# Patient Record
Sex: FEMALE | Race: WHITE | NOT HISPANIC OR LATINO | Employment: OTHER | URBAN - METROPOLITAN AREA
[De-identification: names, ages, dates, MRNs, and addresses within clinical notes are randomized per-mention and may not be internally consistent; named-entity substitution may affect disease eponyms.]

---

## 2022-01-01 ENCOUNTER — TELEPHONE (OUTPATIENT)
Dept: INTERNAL MEDICINE CLINIC | Facility: CLINIC | Age: 72
End: 2022-01-01

## 2022-01-01 ENCOUNTER — APPOINTMENT (INPATIENT)
Dept: RADIOLOGY | Facility: HOSPITAL | Age: 72
DRG: 177 | End: 2022-01-01
Payer: MEDICARE

## 2022-01-01 ENCOUNTER — TRANSITIONAL CARE MANAGEMENT (OUTPATIENT)
Dept: INTERNAL MEDICINE CLINIC | Facility: CLINIC | Age: 72
End: 2022-01-01

## 2022-01-01 ENCOUNTER — LAB REQUISITION (OUTPATIENT)
Dept: LAB | Facility: HOSPITAL | Age: 72
End: 2022-01-01
Payer: MEDICARE

## 2022-01-01 ENCOUNTER — HOSPITAL ENCOUNTER (INPATIENT)
Facility: HOSPITAL | Age: 72
LOS: 12 days | Discharge: HOME HEALTH | DRG: 177 | End: 2022-02-01
Attending: INTERNAL MEDICINE | Admitting: INTERNAL MEDICINE
Payer: MEDICARE

## 2022-01-01 ENCOUNTER — HOSPITAL ENCOUNTER (OUTPATIENT)
Dept: NON INVASIVE DIAGNOSTICS | Facility: CLINIC | Age: 72
Discharge: HOME | End: 2022-03-07
Payer: MEDICARE

## 2022-01-01 ENCOUNTER — APPOINTMENT (EMERGENCY)
Dept: RADIOLOGY | Facility: HOSPITAL | Age: 72
End: 2022-01-01
Payer: MEDICARE

## 2022-01-01 ENCOUNTER — APPOINTMENT (INPATIENT)
Dept: CT IMAGING | Facility: HOSPITAL | Age: 72
DRG: 177 | End: 2022-01-01
Payer: MEDICARE

## 2022-01-01 ENCOUNTER — HOSPITAL ENCOUNTER (EMERGENCY)
Facility: HOSPITAL | Age: 72
End: 2022-05-19
Attending: EMERGENCY MEDICINE
Payer: MEDICARE

## 2022-01-01 ENCOUNTER — HOME CARE VISIT (OUTPATIENT)
Dept: HOME HOSPICE | Facility: HOSPICE | Age: 72
End: 2022-01-01
Payer: MEDICARE

## 2022-01-01 ENCOUNTER — HOME CARE VISIT (OUTPATIENT)
Dept: HOME HEALTH SERVICES | Facility: HOME HEALTHCARE | Age: 72
End: 2022-01-01
Payer: MEDICARE

## 2022-01-01 ENCOUNTER — PATIENT OUTREACH (OUTPATIENT)
Dept: INTERNAL MEDICINE CLINIC | Facility: CLINIC | Age: 72
End: 2022-01-01

## 2022-01-01 ENCOUNTER — HOSPITAL ENCOUNTER (INPATIENT)
Facility: HOSPITAL | Age: 72
LOS: 1 days | Discharge: HOME HEALTH | DRG: 843 | End: 2022-05-14
Attending: EMERGENCY MEDICINE | Admitting: STUDENT IN AN ORGANIZED HEALTH CARE EDUCATION/TRAINING PROGRAM
Payer: MEDICARE

## 2022-01-01 ENCOUNTER — APPOINTMENT (INPATIENT)
Dept: RADIOLOGY | Facility: HOSPITAL | Age: 72
DRG: 314 | End: 2022-01-01
Payer: MEDICARE

## 2022-01-01 ENCOUNTER — APPOINTMENT (INPATIENT)
Dept: NON INVASIVE DIAGNOSTICS | Facility: HOSPITAL | Age: 72
DRG: 314 | End: 2022-01-01
Payer: MEDICARE

## 2022-01-01 ENCOUNTER — APPOINTMENT (EMERGENCY)
Dept: CT IMAGING | Facility: HOSPITAL | Age: 72
End: 2022-01-01
Payer: MEDICARE

## 2022-01-01 ENCOUNTER — HOSPICE ADMISSION (OUTPATIENT)
Dept: HOME HOSPICE | Facility: HOSPICE | Age: 72
End: 2022-01-01
Payer: MEDICARE

## 2022-01-01 ENCOUNTER — TELEPHONE (OUTPATIENT)
Dept: RADIOLOGY | Facility: HOSPITAL | Age: 72
End: 2022-01-01

## 2022-01-01 ENCOUNTER — OFFICE VISIT (OUTPATIENT)
Dept: GASTROENTEROLOGY | Facility: CLINIC | Age: 72
End: 2022-01-01
Payer: MEDICARE

## 2022-01-01 ENCOUNTER — APPOINTMENT (OUTPATIENT)
Dept: LAB | Facility: CLINIC | Age: 72
End: 2022-01-01
Payer: MEDICARE

## 2022-01-01 ENCOUNTER — OFFICE VISIT (OUTPATIENT)
Dept: INTERNAL MEDICINE CLINIC | Facility: CLINIC | Age: 72
End: 2022-01-01
Payer: MEDICARE

## 2022-01-01 ENCOUNTER — APPOINTMENT (INPATIENT)
Dept: NON INVASIVE DIAGNOSTICS | Facility: HOSPITAL | Age: 72
DRG: 177 | End: 2022-01-01
Payer: MEDICARE

## 2022-01-01 ENCOUNTER — APPOINTMENT (INPATIENT)
Dept: RADIOLOGY | Facility: HOSPITAL | Age: 72
DRG: 314 | End: 2022-01-01
Attending: INTERNAL MEDICINE
Payer: MEDICARE

## 2022-01-01 ENCOUNTER — TELEPHONE (OUTPATIENT)
Dept: PULMONOLOGY | Facility: CLINIC | Age: 72
End: 2022-01-01

## 2022-01-01 ENCOUNTER — OFFICE VISIT (OUTPATIENT)
Dept: PULMONOLOGY | Facility: CLINIC | Age: 72
End: 2022-01-01
Payer: MEDICARE

## 2022-01-01 ENCOUNTER — OFFICE VISIT (OUTPATIENT)
Dept: CARDIOLOGY CLINIC | Facility: CLINIC | Age: 72
End: 2022-01-01
Payer: MEDICARE

## 2022-01-01 ENCOUNTER — APPOINTMENT (EMERGENCY)
Dept: RADIOLOGY | Facility: HOSPITAL | Age: 72
DRG: 177 | End: 2022-01-01
Payer: MEDICARE

## 2022-01-01 ENCOUNTER — HOSPITAL ENCOUNTER (INPATIENT)
Facility: HOSPITAL | Age: 72
LOS: 4 days | Discharge: HOSPICE | DRG: 314 | End: 2022-05-23
Attending: INTERNAL MEDICINE | Admitting: INTERNAL MEDICINE
Payer: MEDICARE

## 2022-01-01 ENCOUNTER — APPOINTMENT (EMERGENCY)
Dept: NON INVASIVE DIAGNOSTICS | Facility: HOSPITAL | Age: 72
End: 2022-01-01
Payer: MEDICARE

## 2022-01-01 VITALS
BODY MASS INDEX: 15.83 KG/M2 | HEIGHT: 65 IN | RESPIRATION RATE: 17 BRPM | SYSTOLIC BLOOD PRESSURE: 102 MMHG | HEART RATE: 112 BPM | TEMPERATURE: 97.2 F | DIASTOLIC BLOOD PRESSURE: 70 MMHG | WEIGHT: 95 LBS | OXYGEN SATURATION: 90 %

## 2022-01-01 VITALS
SYSTOLIC BLOOD PRESSURE: 104 MMHG | DIASTOLIC BLOOD PRESSURE: 84 MMHG | OXYGEN SATURATION: 99 % | HEART RATE: 96 BPM | TEMPERATURE: 98.1 F | BODY MASS INDEX: 17.14 KG/M2 | HEIGHT: 65 IN

## 2022-01-01 VITALS
HEART RATE: 98 BPM | WEIGHT: 89 LBS | DIASTOLIC BLOOD PRESSURE: 72 MMHG | HEIGHT: 65 IN | RESPIRATION RATE: 16 BRPM | OXYGEN SATURATION: 100 % | SYSTOLIC BLOOD PRESSURE: 102 MMHG | BODY MASS INDEX: 14.83 KG/M2

## 2022-01-01 VITALS
DIASTOLIC BLOOD PRESSURE: 101 MMHG | BODY MASS INDEX: 14.39 KG/M2 | HEART RATE: 101 BPM | RESPIRATION RATE: 24 BRPM | OXYGEN SATURATION: 92 % | WEIGHT: 86.4 LBS | HEIGHT: 65 IN | SYSTOLIC BLOOD PRESSURE: 137 MMHG | TEMPERATURE: 98.2 F

## 2022-01-01 VITALS
RESPIRATION RATE: 22 BRPM | WEIGHT: 86 LBS | TEMPERATURE: 98 F | HEART RATE: 110 BPM | OXYGEN SATURATION: 97 % | BODY MASS INDEX: 14.33 KG/M2 | HEIGHT: 65 IN | DIASTOLIC BLOOD PRESSURE: 98 MMHG | SYSTOLIC BLOOD PRESSURE: 142 MMHG

## 2022-01-01 VITALS
RESPIRATION RATE: 18 BRPM | BODY MASS INDEX: 17.16 KG/M2 | HEART RATE: 81 BPM | DIASTOLIC BLOOD PRESSURE: 72 MMHG | TEMPERATURE: 97.5 F | HEIGHT: 65 IN | OXYGEN SATURATION: 96 % | SYSTOLIC BLOOD PRESSURE: 108 MMHG | WEIGHT: 103 LBS

## 2022-01-01 VITALS
HEIGHT: 65 IN | SYSTOLIC BLOOD PRESSURE: 96 MMHG | RESPIRATION RATE: 19 BRPM | WEIGHT: 91.93 LBS | OXYGEN SATURATION: 95 % | DIASTOLIC BLOOD PRESSURE: 69 MMHG | TEMPERATURE: 97.9 F | BODY MASS INDEX: 15.32 KG/M2 | HEART RATE: 58 BPM

## 2022-01-01 VITALS
HEIGHT: 65 IN | SYSTOLIC BLOOD PRESSURE: 110 MMHG | OXYGEN SATURATION: 99 % | HEART RATE: 105 BPM | BODY MASS INDEX: 15.81 KG/M2 | DIASTOLIC BLOOD PRESSURE: 80 MMHG

## 2022-01-01 VITALS — DIASTOLIC BLOOD PRESSURE: 60 MMHG | HEART RATE: 68 BPM | TEMPERATURE: 97.4 F | SYSTOLIC BLOOD PRESSURE: 92 MMHG

## 2022-01-01 DIAGNOSIS — F33.9 DEPRESSION, RECURRENT (HCC): ICD-10-CM

## 2022-01-01 DIAGNOSIS — J96.11 CHRONIC HYPOXEMIC RESPIRATORY FAILURE (HCC): ICD-10-CM

## 2022-01-01 DIAGNOSIS — J41.0 SIMPLE CHRONIC BRONCHITIS (HCC): Primary | ICD-10-CM

## 2022-01-01 DIAGNOSIS — Z11.59 NEED FOR HEPATITIS C SCREENING TEST: ICD-10-CM

## 2022-01-01 DIAGNOSIS — D64.9 ANEMIA, UNSPECIFIED TYPE: ICD-10-CM

## 2022-01-01 DIAGNOSIS — E87.6 HYPOKALEMIA: ICD-10-CM

## 2022-01-01 DIAGNOSIS — D64.9 ANEMIA, UNSPECIFIED TYPE: Primary | ICD-10-CM

## 2022-01-01 DIAGNOSIS — R62.7 FAILURE TO THRIVE IN ADULT: Primary | ICD-10-CM

## 2022-01-01 DIAGNOSIS — R53.83 FATIGUE, UNSPECIFIED TYPE: ICD-10-CM

## 2022-01-01 DIAGNOSIS — R63.6 UNDERWEIGHT: ICD-10-CM

## 2022-01-01 DIAGNOSIS — E43 SEVERE PROTEIN-CALORIE MALNUTRITION (HCC): ICD-10-CM

## 2022-01-01 DIAGNOSIS — I26.99 PULMONARY EMBOLISM, UNSPECIFIED CHRONICITY, UNSPECIFIED PULMONARY EMBOLISM TYPE, UNSPECIFIED WHETHER ACUTE COR PULMONALE PRESENT (HCC): ICD-10-CM

## 2022-01-01 DIAGNOSIS — R60.0 LOWER EXTREMITY EDEMA: ICD-10-CM

## 2022-01-01 DIAGNOSIS — K59.00 CONSTIPATION: ICD-10-CM

## 2022-01-01 DIAGNOSIS — K52.9 CHRONIC DIARRHEA: ICD-10-CM

## 2022-01-01 DIAGNOSIS — I71.4 ABDOMINAL AORTIC ANEURYSM (AAA) WITHOUT RUPTURE (HCC): ICD-10-CM

## 2022-01-01 DIAGNOSIS — J96.11 CHRONIC RESPIRATORY FAILURE WITH HYPOXIA (HCC): ICD-10-CM

## 2022-01-01 DIAGNOSIS — R60.0 LOCALIZED EDEMA: ICD-10-CM

## 2022-01-01 DIAGNOSIS — J96.01 ACUTE RESPIRATORY FAILURE WITH HYPOXIA (HCC): ICD-10-CM

## 2022-01-01 DIAGNOSIS — R63.4 WEIGHT LOSS: ICD-10-CM

## 2022-01-01 DIAGNOSIS — I71.9 AORTIC ANEURYSM (HCC): Primary | ICD-10-CM

## 2022-01-01 DIAGNOSIS — Z86.711 HISTORY OF PULMONARY EMBOLISM: ICD-10-CM

## 2022-01-01 DIAGNOSIS — E88.09 HYPOALBUMINEMIA: ICD-10-CM

## 2022-01-01 DIAGNOSIS — R60.0 BILATERAL LEG EDEMA: ICD-10-CM

## 2022-01-01 DIAGNOSIS — Z76.89 ENCOUNTER FOR SOCIAL WORK INTERVENTION: Primary | ICD-10-CM

## 2022-01-01 DIAGNOSIS — K83.8 COMMON BILE DUCT DILATATION: ICD-10-CM

## 2022-01-01 DIAGNOSIS — E53.8 B12 DEFICIENCY: ICD-10-CM

## 2022-01-01 DIAGNOSIS — I26.99 ACUTE PULMONARY EMBOLISM WITHOUT ACUTE COR PULMONALE, UNSPECIFIED PULMONARY EMBOLISM TYPE (HCC): ICD-10-CM

## 2022-01-01 DIAGNOSIS — E55.9 VITAMIN D DEFICIENCY: ICD-10-CM

## 2022-01-01 DIAGNOSIS — R60.0 EDEMA LEG: ICD-10-CM

## 2022-01-01 DIAGNOSIS — R60.0 BILATERAL LOWER EXTREMITY EDEMA: Primary | ICD-10-CM

## 2022-01-01 DIAGNOSIS — E43 UNSPECIFIED SEVERE PROTEIN-CALORIE MALNUTRITION (HCC): ICD-10-CM

## 2022-01-01 DIAGNOSIS — I95.9 HYPOTENSION, UNSPECIFIED HYPOTENSION TYPE: ICD-10-CM

## 2022-01-01 DIAGNOSIS — R60.0 LEG EDEMA, LEFT: ICD-10-CM

## 2022-01-01 DIAGNOSIS — J90 PLEURAL EFFUSION: ICD-10-CM

## 2022-01-01 DIAGNOSIS — M79.89 LEFT LEG SWELLING: ICD-10-CM

## 2022-01-01 DIAGNOSIS — E88.09 OTHER DISORDERS OF PLASMA-PROTEIN METABOLISM, NOT ELSEWHERE CLASSIFIED: ICD-10-CM

## 2022-01-01 DIAGNOSIS — I31.3 PERICARDIAL EFFUSION: ICD-10-CM

## 2022-01-01 DIAGNOSIS — R60.0 BILATERAL LEG EDEMA: Primary | ICD-10-CM

## 2022-01-01 DIAGNOSIS — R26.2 AMBULATORY DYSFUNCTION: ICD-10-CM

## 2022-01-01 DIAGNOSIS — U07.1 PNEUMONIA DUE TO COVID-19 VIRUS: ICD-10-CM

## 2022-01-01 DIAGNOSIS — R60.0 LOWER EXTREMITY EDEMA: Primary | ICD-10-CM

## 2022-01-01 DIAGNOSIS — K21.9 GERD (GASTROESOPHAGEAL REFLUX DISEASE): ICD-10-CM

## 2022-01-01 DIAGNOSIS — Z78.0 ASYMPTOMATIC POSTMENOPAUSAL STATE: ICD-10-CM

## 2022-01-01 DIAGNOSIS — J12.82 PNEUMONIA DUE TO COVID-19 VIRUS: ICD-10-CM

## 2022-01-01 DIAGNOSIS — R19.7 DIARRHEA, UNSPECIFIED TYPE: ICD-10-CM

## 2022-01-01 DIAGNOSIS — R11.14 BILIOUS VOMITING WITH NAUSEA: Primary | ICD-10-CM

## 2022-01-01 DIAGNOSIS — Z00.00 MEDICARE ANNUAL WELLNESS VISIT, INITIAL: ICD-10-CM

## 2022-01-01 DIAGNOSIS — U07.1 COVID-19 VIRUS INFECTION: Primary | ICD-10-CM

## 2022-01-01 DIAGNOSIS — I26.99 OTHER PULMONARY EMBOLISM WITHOUT ACUTE COR PULMONALE (HCC): ICD-10-CM

## 2022-01-01 DIAGNOSIS — Z51.81 ENCOUNTER FOR THERAPEUTIC DRUG LEVEL MONITORING: ICD-10-CM

## 2022-01-01 LAB
25(OH)D3 SERPL-MCNC: 27.2 NG/ML (ref 30–100)
2HR DELTA HS TROPONIN: -9 NG/L
2HR DELTA HS TROPONIN: 6 NG/L
4HR DELTA HS TROPONIN: -7 NG/L
ALBUMIN FLD-MCNC: 1 G/DL
ALBUMIN SERPL BCP-MCNC: 1.6 G/DL (ref 3.5–5)
ALBUMIN SERPL BCP-MCNC: 1.7 G/DL (ref 3.5–5)
ALBUMIN SERPL BCP-MCNC: 1.8 G/DL (ref 3.5–5)
ALBUMIN SERPL BCP-MCNC: 1.9 G/DL (ref 3.5–5)
ALBUMIN SERPL BCP-MCNC: 2.4 G/DL (ref 3.5–5)
ALBUMIN SERPL BCP-MCNC: 2.9 G/DL (ref 3.5–5)
ALP SERPL-CCNC: 146 U/L (ref 46–116)
ALP SERPL-CCNC: 213 U/L (ref 46–116)
ALP SERPL-CCNC: 228 U/L (ref 46–116)
ALP SERPL-CCNC: 233 U/L (ref 46–116)
ALP SERPL-CCNC: 239 U/L (ref 46–116)
ALP SERPL-CCNC: 241 U/L (ref 46–116)
ALP SERPL-CCNC: 246 U/L (ref 46–116)
ALP SERPL-CCNC: 247 U/L (ref 46–116)
ALT SERPL W P-5'-P-CCNC: 18 U/L (ref 12–78)
ALT SERPL W P-5'-P-CCNC: 26 U/L (ref 12–78)
ALT SERPL W P-5'-P-CCNC: 27 U/L (ref 12–78)
ALT SERPL W P-5'-P-CCNC: 27 U/L (ref 12–78)
ALT SERPL W P-5'-P-CCNC: 28 U/L (ref 12–78)
ALT SERPL W P-5'-P-CCNC: 30 U/L (ref 12–78)
ALT SERPL W P-5'-P-CCNC: 35 U/L (ref 12–78)
ALT SERPL W P-5'-P-CCNC: 52 U/L (ref 12–78)
ANA TITR SER IF: NEGATIVE {TITER}
ANION GAP SERPL CALCULATED.3IONS-SCNC: 10 MMOL/L (ref 4–13)
ANION GAP SERPL CALCULATED.3IONS-SCNC: 10 MMOL/L (ref 4–13)
ANION GAP SERPL CALCULATED.3IONS-SCNC: 11 MMOL/L (ref 4–13)
ANION GAP SERPL CALCULATED.3IONS-SCNC: 13 MMOL/L (ref 4–13)
ANION GAP SERPL CALCULATED.3IONS-SCNC: 17 MMOL/L (ref 4–13)
ANION GAP SERPL CALCULATED.3IONS-SCNC: 4 MMOL/L (ref 4–13)
ANION GAP SERPL CALCULATED.3IONS-SCNC: 6 MMOL/L (ref 4–13)
ANION GAP SERPL CALCULATED.3IONS-SCNC: 6 MMOL/L (ref 4–13)
ANION GAP SERPL CALCULATED.3IONS-SCNC: 7 MMOL/L (ref 4–13)
ANION GAP SERPL CALCULATED.3IONS-SCNC: 7 MMOL/L (ref 4–13)
ANION GAP SERPL CALCULATED.3IONS-SCNC: 8 MMOL/L (ref 4–13)
AORTIC ROOT: 3.1 CM
AORTIC ROOT: 3.3 CM
APICAL FOUR CHAMBER EJECTION FRACTION: 67 %
APICAL FOUR CHAMBER EJECTION FRACTION: 71 %
APICAL FOUR CHAMBER EJECTION FRACTION: 72 %
APICAL FOUR CHAMBER EJECTION FRACTION: 75 %
APTT PPP: 147 SECONDS (ref 23–37)
APTT PPP: 170 SECONDS (ref 23–37)
APTT PPP: 29 SECONDS (ref 23–37)
APTT PPP: 29 SECONDS (ref 23–37)
APTT PPP: 31 SECONDS (ref 23–37)
APTT PPP: 39 SECONDS (ref 23–37)
APTT PPP: 58 SECONDS (ref 23–37)
ASCENDING AORTA: 3.1 CM
AST SERPL W P-5'-P-CCNC: 173 U/L (ref 5–45)
AST SERPL W P-5'-P-CCNC: 32 U/L (ref 5–45)
AST SERPL W P-5'-P-CCNC: 35 U/L (ref 5–45)
AST SERPL W P-5'-P-CCNC: 38 U/L (ref 5–45)
AST SERPL W P-5'-P-CCNC: 45 U/L (ref 5–45)
AST SERPL W P-5'-P-CCNC: 53 U/L (ref 5–45)
AST SERPL W P-5'-P-CCNC: 56 U/L (ref 5–45)
AST SERPL W P-5'-P-CCNC: 66 U/L (ref 5–45)
ATRIAL RATE: 102 BPM
ATRIAL RATE: 106 BPM
ATRIAL RATE: 106 BPM
ATRIAL RATE: 107 BPM
ATRIAL RATE: 113 BPM
ATRIAL RATE: 117 BPM
ATRIAL RATE: 120 BPM
ATRIAL RATE: 120 BPM
ATRIAL RATE: 123 BPM
ATRIAL RATE: 337 BPM
ATRIAL RATE: 78 BPM
ATRIAL RATE: 90 BPM
BACTERIA BLD CULT: NORMAL
BACTERIA BLD CULT: NORMAL
BACTERIA SPEC ANAEROBE CULT: NO GROWTH
BACTERIA SPEC BFLD CULT: NO GROWTH
BASOPHILS # BLD AUTO: 0.01 THOUSANDS/ΜL (ref 0–0.1)
BASOPHILS # BLD AUTO: 0.02 THOUSANDS/ΜL (ref 0–0.1)
BASOPHILS # BLD AUTO: 0.03 THOUSANDS/ΜL (ref 0–0.1)
BASOPHILS NFR BLD AUTO: 0 % (ref 0–1)
BASOPHILS NFR BLD AUTO: 1 % (ref 0–1)
BASOPHILS NFR BLD AUTO: 1 % (ref 0–1)
BILIRUB DIRECT SERPL-MCNC: 0.09 MG/DL (ref 0–0.2)
BILIRUB SERPL-MCNC: 0.2 MG/DL (ref 0.2–1)
BILIRUB SERPL-MCNC: 0.25 MG/DL (ref 0.2–1)
BILIRUB SERPL-MCNC: 0.26 MG/DL (ref 0.2–1)
BILIRUB SERPL-MCNC: 0.28 MG/DL (ref 0.2–1)
BILIRUB SERPL-MCNC: 0.31 MG/DL (ref 0.2–1)
BILIRUB SERPL-MCNC: 0.4 MG/DL (ref 0.2–1)
BILIRUB SERPL-MCNC: 0.41 MG/DL (ref 0.2–1)
BILIRUB SERPL-MCNC: 0.49 MG/DL (ref 0.2–1)
BUN SERPL-MCNC: 11 MG/DL (ref 5–25)
BUN SERPL-MCNC: 16 MG/DL (ref 5–25)
BUN SERPL-MCNC: 16 MG/DL (ref 5–25)
BUN SERPL-MCNC: 17 MG/DL (ref 5–25)
BUN SERPL-MCNC: 18 MG/DL (ref 5–25)
BUN SERPL-MCNC: 18 MG/DL (ref 5–25)
BUN SERPL-MCNC: 21 MG/DL (ref 5–25)
BUN SERPL-MCNC: 22 MG/DL (ref 5–25)
BUN SERPL-MCNC: 23 MG/DL (ref 5–25)
BUN SERPL-MCNC: 23 MG/DL (ref 5–25)
BUN SERPL-MCNC: 8 MG/DL (ref 5–25)
BUN SERPL-MCNC: 9 MG/DL (ref 5–25)
CALCIUM ALBUM COR SERPL-MCNC: 10 MG/DL (ref 8.3–10.1)
CALCIUM ALBUM COR SERPL-MCNC: 9.5 MG/DL (ref 8.3–10.1)
CALCIUM ALBUM COR SERPL-MCNC: 9.5 MG/DL (ref 8.3–10.1)
CALCIUM ALBUM COR SERPL-MCNC: 9.6 MG/DL (ref 8.3–10.1)
CALCIUM ALBUM COR SERPL-MCNC: 9.6 MG/DL (ref 8.3–10.1)
CALCIUM ALBUM COR SERPL-MCNC: 9.8 MG/DL (ref 8.3–10.1)
CALCIUM ALBUM COR SERPL-MCNC: 9.9 MG/DL (ref 8.3–10.1)
CALCIUM SERPL-MCNC: 7.6 MG/DL (ref 8.3–10.1)
CALCIUM SERPL-MCNC: 7.6 MG/DL (ref 8.3–10.1)
CALCIUM SERPL-MCNC: 7.7 MG/DL (ref 8.3–10.1)
CALCIUM SERPL-MCNC: 7.9 MG/DL (ref 8.3–10.1)
CALCIUM SERPL-MCNC: 8 MG/DL (ref 8.3–10.1)
CALCIUM SERPL-MCNC: 8.1 MG/DL (ref 8.3–10.1)
CALCIUM SERPL-MCNC: 8.2 MG/DL (ref 8.3–10.1)
CALCIUM SERPL-MCNC: 8.3 MG/DL (ref 8.3–10.1)
CALCIUM SERPL-MCNC: 8.3 MG/DL (ref 8.3–10.1)
CALCIUM SERPL-MCNC: 8.7 MG/DL (ref 8.3–10.1)
CALCIUM SERPL-MCNC: 8.8 MG/DL (ref 8.3–10.1)
CARDIAC TROPONIN I PNL SERPL HS: 16 NG/L (ref 8–18)
CARDIAC TROPONIN I PNL SERPL HS: 23 NG/L
CARDIAC TROPONIN I PNL SERPL HS: 25 NG/L
CARDIAC TROPONIN I PNL SERPL HS: 28 NG/L
CARDIAC TROPONIN I PNL SERPL HS: 32 NG/L
CARDIAC TROPONIN I PNL SERPL HS: 34 NG/L
CHLORIDE SERPL-SCNC: 100 MMOL/L (ref 100–108)
CHLORIDE SERPL-SCNC: 101 MMOL/L (ref 100–108)
CHLORIDE SERPL-SCNC: 102 MMOL/L (ref 100–108)
CHLORIDE SERPL-SCNC: 103 MMOL/L (ref 100–108)
CHLORIDE SERPL-SCNC: 104 MMOL/L (ref 100–108)
CHLORIDE SERPL-SCNC: 106 MMOL/L (ref 100–108)
CHLORIDE SERPL-SCNC: 94 MMOL/L (ref 100–108)
CHLORIDE SERPL-SCNC: 98 MMOL/L (ref 100–108)
CHLORIDE SERPL-SCNC: 98 MMOL/L (ref 100–108)
CHLORIDE SERPL-SCNC: 99 MMOL/L (ref 100–108)
CO2 SERPL-SCNC: 21 MMOL/L (ref 21–32)
CO2 SERPL-SCNC: 22 MMOL/L (ref 21–32)
CO2 SERPL-SCNC: 23 MMOL/L (ref 21–32)
CO2 SERPL-SCNC: 26 MMOL/L (ref 21–32)
CO2 SERPL-SCNC: 27 MMOL/L (ref 21–32)
CO2 SERPL-SCNC: 29 MMOL/L (ref 21–32)
CO2 SERPL-SCNC: 29 MMOL/L (ref 21–32)
CO2 SERPL-SCNC: 30 MMOL/L (ref 21–32)
CO2 SERPL-SCNC: 31 MMOL/L (ref 21–32)
CO2 SERPL-SCNC: 31 MMOL/L (ref 21–32)
CO2 SERPL-SCNC: 32 MMOL/L (ref 21–32)
CO2 SERPL-SCNC: 33 MMOL/L (ref 21–32)
CO2 SERPL-SCNC: 33 MMOL/L (ref 21–32)
CREAT SERPL-MCNC: 0.41 MG/DL (ref 0.6–1.3)
CREAT SERPL-MCNC: 0.51 MG/DL (ref 0.6–1.3)
CREAT SERPL-MCNC: 0.52 MG/DL (ref 0.6–1.3)
CREAT SERPL-MCNC: 0.52 MG/DL (ref 0.6–1.3)
CREAT SERPL-MCNC: 0.56 MG/DL (ref 0.6–1.3)
CREAT SERPL-MCNC: 0.56 MG/DL (ref 0.6–1.3)
CREAT SERPL-MCNC: 0.57 MG/DL (ref 0.6–1.3)
CREAT SERPL-MCNC: 0.6 MG/DL (ref 0.6–1.3)
CREAT SERPL-MCNC: 0.6 MG/DL (ref 0.6–1.3)
CREAT SERPL-MCNC: 0.61 MG/DL (ref 0.6–1.3)
CREAT SERPL-MCNC: 0.64 MG/DL (ref 0.6–1.3)
CREAT SERPL-MCNC: 0.65 MG/DL (ref 0.6–1.3)
CREAT SERPL-MCNC: 1.05 MG/DL (ref 0.6–1.3)
CREAT SERPL-MCNC: 1.14 MG/DL (ref 0.6–1.3)
CRP SERPL QL: 156.5 MG/L
CRP SERPL QL: 28.5 MG/L
CRP SERPL QL: 29.3 MG/L
D DIMER PPP FEU-MCNC: 19.99 UG/ML FEU
D DIMER PPP FEU-MCNC: 2.85 UG/ML FEU
DME PARACHUTE DELIVERY DATE ACTUAL: NORMAL
DME PARACHUTE DELIVERY DATE EXPECTED: NORMAL
DME PARACHUTE DELIVERY DATE REQUESTED: NORMAL
DME PARACHUTE DELIVERY NOTE: NORMAL
DME PARACHUTE ITEM DESCRIPTION: NORMAL
DME PARACHUTE ORDER STATUS: NORMAL
DME PARACHUTE SUPPLIER NAME: NORMAL
DME PARACHUTE SUPPLIER PHONE: NORMAL
E WAVE DECELERATION TIME: 123 MS
E WAVE DECELERATION TIME: 125 MS
E WAVE DECELERATION TIME: 233 MS
EOSINOPHIL # BLD AUTO: 0 THOUSAND/ΜL (ref 0–0.61)
EOSINOPHIL # BLD AUTO: 0.01 THOUSAND/ΜL (ref 0–0.61)
EOSINOPHIL # BLD AUTO: 0.02 THOUSAND/ΜL (ref 0–0.61)
EOSINOPHIL # BLD AUTO: 0.02 THOUSAND/ΜL (ref 0–0.61)
EOSINOPHIL NFR BLD AUTO: 0 % (ref 0–6)
ERYTHROCYTE [DISTWIDTH] IN BLOOD BY AUTOMATED COUNT: 16 % (ref 11.6–15.1)
ERYTHROCYTE [DISTWIDTH] IN BLOOD BY AUTOMATED COUNT: 16.1 % (ref 11.6–15.1)
ERYTHROCYTE [DISTWIDTH] IN BLOOD BY AUTOMATED COUNT: 16.2 % (ref 11.6–15.1)
ERYTHROCYTE [DISTWIDTH] IN BLOOD BY AUTOMATED COUNT: 16.2 % (ref 11.6–15.1)
ERYTHROCYTE [DISTWIDTH] IN BLOOD BY AUTOMATED COUNT: 16.3 % (ref 11.6–15.1)
ERYTHROCYTE [DISTWIDTH] IN BLOOD BY AUTOMATED COUNT: 16.5 % (ref 11.6–15.1)
ERYTHROCYTE [DISTWIDTH] IN BLOOD BY AUTOMATED COUNT: 16.7 % (ref 11.6–15.1)
ERYTHROCYTE [DISTWIDTH] IN BLOOD BY AUTOMATED COUNT: 17.2 % (ref 11.6–15.1)
ERYTHROCYTE [DISTWIDTH] IN BLOOD BY AUTOMATED COUNT: 17.3 % (ref 11.6–15.1)
ERYTHROCYTE [DISTWIDTH] IN BLOOD BY AUTOMATED COUNT: 21.4 % (ref 11.6–15.1)
ERYTHROCYTE [DISTWIDTH] IN BLOOD BY AUTOMATED COUNT: 22.3 % (ref 11.6–15.1)
ERYTHROCYTE [DISTWIDTH] IN BLOOD BY AUTOMATED COUNT: 22.8 % (ref 11.6–15.1)
ERYTHROCYTE [DISTWIDTH] IN BLOOD BY AUTOMATED COUNT: 23.2 % (ref 11.6–15.1)
ERYTHROCYTE [DISTWIDTH] IN BLOOD BY AUTOMATED COUNT: 23.3 % (ref 11.6–15.1)
ERYTHROCYTE [DISTWIDTH] IN BLOOD BY AUTOMATED COUNT: 23.8 % (ref 11.6–15.1)
ERYTHROCYTE [SEDIMENTATION RATE] IN BLOOD: 15 MM/HOUR (ref 0–29)
FERRITIN SERPL-MCNC: 98 NG/ML (ref 8–388)
FLUAV RNA RESP QL NAA+PROBE: NEGATIVE
FLUBV RNA RESP QL NAA+PROBE: NEGATIVE
FRACTIONAL SHORTENING: 41 (ref 28–44)
FRACTIONAL SHORTENING: 42 % (ref 28–44)
FRACTIONAL SHORTENING: 44 % (ref 28–44)
GFR SERPL CREATININE-BSD FRML MDRD: 103 ML/MIN/1.73SQ M
GFR SERPL CREATININE-BSD FRML MDRD: 48 ML/MIN/1.73SQ M
GFR SERPL CREATININE-BSD FRML MDRD: 53 ML/MIN/1.73SQ M
GFR SERPL CREATININE-BSD FRML MDRD: 88 ML/MIN/1.73SQ M
GFR SERPL CREATININE-BSD FRML MDRD: 89 ML/MIN/1.73SQ M
GFR SERPL CREATININE-BSD FRML MDRD: 90 ML/MIN/1.73SQ M
GFR SERPL CREATININE-BSD FRML MDRD: 91 ML/MIN/1.73SQ M
GFR SERPL CREATININE-BSD FRML MDRD: 91 ML/MIN/1.73SQ M
GFR SERPL CREATININE-BSD FRML MDRD: 92 ML/MIN/1.73SQ M
GFR SERPL CREATININE-BSD FRML MDRD: 93 ML/MIN/1.73SQ M
GFR SERPL CREATININE-BSD FRML MDRD: 93 ML/MIN/1.73SQ M
GFR SERPL CREATININE-BSD FRML MDRD: 95 ML/MIN/1.73SQ M
GFR SERPL CREATININE-BSD FRML MDRD: 95 ML/MIN/1.73SQ M
GFR SERPL CREATININE-BSD FRML MDRD: 96 ML/MIN/1.73SQ M
GLUCOSE P FAST SERPL-MCNC: 107 MG/DL (ref 65–99)
GLUCOSE SERPL-MCNC: 100 MG/DL (ref 65–140)
GLUCOSE SERPL-MCNC: 103 MG/DL (ref 65–140)
GLUCOSE SERPL-MCNC: 114 MG/DL (ref 65–140)
GLUCOSE SERPL-MCNC: 126 MG/DL (ref 65–140)
GLUCOSE SERPL-MCNC: 134 MG/DL (ref 65–140)
GLUCOSE SERPL-MCNC: 134 MG/DL (ref 65–140)
GLUCOSE SERPL-MCNC: 141 MG/DL (ref 65–140)
GLUCOSE SERPL-MCNC: 64 MG/DL (ref 65–140)
GLUCOSE SERPL-MCNC: 71 MG/DL (ref 65–140)
GLUCOSE SERPL-MCNC: 75 MG/DL (ref 65–140)
GLUCOSE SERPL-MCNC: 75 MG/DL (ref 65–140)
GLUCOSE SERPL-MCNC: 88 MG/DL (ref 65–140)
GLUCOSE SERPL-MCNC: 89 MG/DL (ref 65–140)
GLUCOSE SERPL-MCNC: 96 MG/DL (ref 65–140)
GLUCOSE SERPL-MCNC: 97 MG/DL (ref 65–140)
GRAM STN SPEC: NORMAL
GRAM STN SPEC: NORMAL
HCT VFR BLD AUTO: 31.5 % (ref 34.8–46.1)
HCT VFR BLD AUTO: 32.8 % (ref 34.8–46.1)
HCT VFR BLD AUTO: 33.5 % (ref 34.8–46.1)
HCT VFR BLD AUTO: 36.6 % (ref 34.8–46.1)
HCT VFR BLD AUTO: 36.8 % (ref 34.8–46.1)
HCT VFR BLD AUTO: 36.9 % (ref 34.8–46.1)
HCT VFR BLD AUTO: 37.2 % (ref 34.8–46.1)
HCT VFR BLD AUTO: 37.7 % (ref 34.8–46.1)
HCT VFR BLD AUTO: 40.3 % (ref 34.8–46.1)
HCT VFR BLD AUTO: 40.8 % (ref 34.8–46.1)
HCT VFR BLD AUTO: 42.3 % (ref 34.8–46.1)
HCT VFR BLD AUTO: 43.1 % (ref 34.8–46.1)
HCT VFR BLD AUTO: 43.4 % (ref 34.8–46.1)
HCT VFR BLD AUTO: 45.1 % (ref 34.8–46.1)
HCT VFR BLD AUTO: 46.3 % (ref 34.8–46.1)
HCV AB SER QL: NORMAL
HGB BLD-MCNC: 10.2 G/DL (ref 11.5–15.4)
HGB BLD-MCNC: 10.3 G/DL (ref 11.5–15.4)
HGB BLD-MCNC: 10.7 G/DL (ref 11.5–15.4)
HGB BLD-MCNC: 11.2 G/DL (ref 11.5–15.4)
HGB BLD-MCNC: 11.3 G/DL (ref 11.5–15.4)
HGB BLD-MCNC: 11.6 G/DL (ref 11.5–15.4)
HGB BLD-MCNC: 11.6 G/DL (ref 11.5–15.4)
HGB BLD-MCNC: 11.8 G/DL (ref 11.5–15.4)
HGB BLD-MCNC: 12.4 G/DL (ref 11.5–15.4)
HGB BLD-MCNC: 12.7 G/DL (ref 11.5–15.4)
HGB BLD-MCNC: 12.9 G/DL (ref 11.5–15.4)
HGB BLD-MCNC: 13 G/DL (ref 11.5–15.4)
HGB BLD-MCNC: 13 G/DL (ref 11.5–15.4)
HGB BLD-MCNC: 13.5 G/DL (ref 11.5–15.4)
HGB BLD-MCNC: 14.1 G/DL (ref 11.5–15.4)
HISTIOCYTES NFR FLD: 5 %
IMM GRANULOCYTES # BLD AUTO: 0.02 THOUSAND/UL (ref 0–0.2)
IMM GRANULOCYTES # BLD AUTO: 0.03 THOUSAND/UL (ref 0–0.2)
IMM GRANULOCYTES # BLD AUTO: 0.04 THOUSAND/UL (ref 0–0.2)
IMM GRANULOCYTES # BLD AUTO: 0.06 THOUSAND/UL (ref 0–0.2)
IMM GRANULOCYTES # BLD AUTO: 0.06 THOUSAND/UL (ref 0–0.2)
IMM GRANULOCYTES # BLD AUTO: 0.08 THOUSAND/UL (ref 0–0.2)
IMM GRANULOCYTES # BLD AUTO: 0.1 THOUSAND/UL (ref 0–0.2)
IMM GRANULOCYTES NFR BLD AUTO: 0 % (ref 0–2)
IMM GRANULOCYTES NFR BLD AUTO: 1 % (ref 0–2)
INR PPP: 0.99 (ref 0.84–1.19)
INR PPP: 1.04 (ref 0.84–1.19)
INR PPP: 1.22 (ref 0.84–1.19)
INR PPP: 1.41 (ref 0.84–1.19)
INR PPP: 1.64 (ref 0.84–1.19)
INR PPP: 2.11 (ref 0.84–1.19)
INR PPP: 2.28 (ref 0.84–1.19)
INTERVENTRICULAR SEPTUM IN DIASTOLE (PARASTERNAL SHORT AXIS VIEW): 0.8 CM
INTERVENTRICULAR SEPTUM IN DIASTOLE (PARASTERNAL SHORT AXIS VIEW): 1 CM
INTERVENTRICULAR SEPTUM IN DIASTOLE (PARASTERNAL SHORT AXIS VIEW): 1.4 CM
INTERVENTRICULAR SEPTUM: 1 CM (ref 0.6–1.1)
INTERVENTRICULAR SEPTUM: 1.4 CM (ref 0.6–1.1)
IRON SATN MFR SERPL: 14 % (ref 15–50)
IRON SERPL-MCNC: 50 UG/DL (ref 50–170)
LAAS-AP2: 11.6 CM2
LAAS-AP4: 9.8 CM2
LACTATE SERPL-SCNC: 0.7 MMOL/L (ref 0.5–2)
LACTATE SERPL-SCNC: 4.9 MMOL/L (ref 0.5–2)
LACTATE SERPL-SCNC: 5 MMOL/L (ref 0.5–2)
LDH SERPL-CCNC: 485 U/L (ref 81–234)
LEFT ATRIUM AREA SYSTOLE SINGLE PLANE A4C: 8.9 CM2
LEFT ATRIUM AREA SYSTOLE SINGLE PLANE A4C: 8.9 CM2
LEFT ATRIUM SIZE: 2.7 CM
LEFT ATRIUM SIZE: 2.8 CM
LEFT INTERNAL DIMENSION IN SYSTOLE: 1.3 CM (ref 2.1–4)
LEFT INTERNAL DIMENSION IN SYSTOLE: 1.5 CM (ref 2.1–4)
LEFT INTERNAL DIMENSION IN SYSTOLE: 1.9 CM (ref 2.1–4)
LEFT VENTRICULAR INTERNAL DIMENSION IN DIASTOLE: 2.2 CM (ref 3.5–6)
LEFT VENTRICULAR INTERNAL DIMENSION IN DIASTOLE: 2.6 CM (ref 3.5–6)
LEFT VENTRICULAR INTERNAL DIMENSION IN DIASTOLE: 3.4 CM (ref 3.6–5.35)
LEFT VENTRICULAR POSTERIOR WALL IN END DIASTOLE: 0.8 CM
LEFT VENTRICULAR POSTERIOR WALL IN END DIASTOLE: 1 CM
LEFT VENTRICULAR POSTERIOR WALL IN END DIASTOLE: 1 CM
LEFT VENTRICULAR STROKE VOLUME: 11 ML
LEFT VENTRICULAR STROKE VOLUME: 18 ML
LEFT VENTRICULAR STROKE VOLUME: 37 ML
LIPASE SERPL-CCNC: 13 U/L (ref 73–393)
LVSV (TEICH): 11 ML
LVSV (TEICH): 18 ML
LYMPHOCYTES # BLD AUTO: 0.32 THOUSANDS/ΜL (ref 0.6–4.47)
LYMPHOCYTES # BLD AUTO: 0.42 THOUSANDS/ΜL (ref 0.6–4.47)
LYMPHOCYTES # BLD AUTO: 0.51 THOUSANDS/ΜL (ref 0.6–4.47)
LYMPHOCYTES # BLD AUTO: 0.52 THOUSANDS/ΜL (ref 0.6–4.47)
LYMPHOCYTES # BLD AUTO: 0.73 THOUSANDS/ΜL (ref 0.6–4.47)
LYMPHOCYTES NFR BLD AUTO: 10 % (ref 14–44)
LYMPHOCYTES NFR BLD AUTO: 3 % (ref 14–44)
LYMPHOCYTES NFR BLD AUTO: 44 %
LYMPHOCYTES NFR BLD AUTO: 5 % (ref 14–44)
LYMPHOCYTES NFR BLD AUTO: 6 % (ref 14–44)
LYMPHOCYTES NFR BLD AUTO: 8 % (ref 14–44)
LYMPHOCYTES NFR BLD AUTO: 9 % (ref 14–44)
LYMPHOCYTES NFR BLD AUTO: 9 % (ref 14–44)
MAGNESIUM SERPL-MCNC: 1.9 MG/DL (ref 1.6–2.6)
MAGNESIUM SERPL-MCNC: 2.1 MG/DL (ref 1.6–2.6)
MCH RBC QN AUTO: 25.8 PG (ref 26.8–34.3)
MCH RBC QN AUTO: 26.4 PG (ref 26.8–34.3)
MCH RBC QN AUTO: 26.6 PG (ref 26.8–34.3)
MCH RBC QN AUTO: 26.6 PG (ref 26.8–34.3)
MCH RBC QN AUTO: 26.7 PG (ref 26.8–34.3)
MCH RBC QN AUTO: 27 PG (ref 26.8–34.3)
MCH RBC QN AUTO: 31.1 PG (ref 26.8–34.3)
MCH RBC QN AUTO: 31.2 PG (ref 26.8–34.3)
MCH RBC QN AUTO: 31.3 PG (ref 26.8–34.3)
MCH RBC QN AUTO: 31.5 PG (ref 26.8–34.3)
MCH RBC QN AUTO: 31.5 PG (ref 26.8–34.3)
MCH RBC QN AUTO: 31.6 PG (ref 26.8–34.3)
MCH RBC QN AUTO: 31.7 PG (ref 26.8–34.3)
MCH RBC QN AUTO: 31.8 PG (ref 26.8–34.3)
MCH RBC QN AUTO: 32.3 PG (ref 26.8–34.3)
MCHC RBC AUTO-ENTMCNC: 28.8 G/DL (ref 31.4–37.4)
MCHC RBC AUTO-ENTMCNC: 29.9 G/DL (ref 31.4–37.4)
MCHC RBC AUTO-ENTMCNC: 30.1 G/DL (ref 31.4–37.4)
MCHC RBC AUTO-ENTMCNC: 30.4 G/DL (ref 31.4–37.4)
MCHC RBC AUTO-ENTMCNC: 30.5 G/DL (ref 31.4–37.4)
MCHC RBC AUTO-ENTMCNC: 30.7 G/DL (ref 31.4–37.4)
MCHC RBC AUTO-ENTMCNC: 30.8 G/DL (ref 31.4–37.4)
MCHC RBC AUTO-ENTMCNC: 30.9 G/DL (ref 31.4–37.4)
MCHC RBC AUTO-ENTMCNC: 31.1 G/DL (ref 31.4–37.4)
MCHC RBC AUTO-ENTMCNC: 31.4 G/DL (ref 31.4–37.4)
MCHC RBC AUTO-ENTMCNC: 31.4 G/DL (ref 31.4–37.4)
MCHC RBC AUTO-ENTMCNC: 31.5 G/DL (ref 31.4–37.4)
MCHC RBC AUTO-ENTMCNC: 31.9 G/DL (ref 31.4–37.4)
MCHC RBC AUTO-ENTMCNC: 32.1 G/DL (ref 31.4–37.4)
MCHC RBC AUTO-ENTMCNC: 32.4 G/DL (ref 31.4–37.4)
MCV RBC AUTO: 100 FL (ref 82–98)
MCV RBC AUTO: 101 FL (ref 82–98)
MCV RBC AUTO: 102 FL (ref 82–98)
MCV RBC AUTO: 104 FL (ref 82–98)
MCV RBC AUTO: 87 FL (ref 82–98)
MCV RBC AUTO: 88 FL (ref 82–98)
MCV RBC AUTO: 88 FL (ref 82–98)
MCV RBC AUTO: 90 FL (ref 82–98)
MCV RBC AUTO: 99 FL (ref 82–98)
MONO+MESO NFR FLD MANUAL: 2 %
MONOCYTES # BLD AUTO: 0.37 THOUSAND/ΜL (ref 0.17–1.22)
MONOCYTES # BLD AUTO: 0.38 THOUSAND/ΜL (ref 0.17–1.22)
MONOCYTES # BLD AUTO: 0.43 THOUSAND/ΜL (ref 0.17–1.22)
MONOCYTES # BLD AUTO: 0.44 THOUSAND/ΜL (ref 0.17–1.22)
MONOCYTES # BLD AUTO: 0.5 THOUSAND/ΜL (ref 0.17–1.22)
MONOCYTES # BLD AUTO: 0.65 THOUSAND/ΜL (ref 0.17–1.22)
MONOCYTES # BLD AUTO: 0.79 THOUSAND/ΜL (ref 0.17–1.22)
MONOCYTES NFR BLD AUTO: 38 %
MONOCYTES NFR BLD AUTO: 5 % (ref 4–12)
MONOCYTES NFR BLD AUTO: 7 % (ref 4–12)
MONOCYTES NFR BLD AUTO: 9 % (ref 4–12)
MV E'TISSUE VEL-LAT: 6 CM/S
MV E'TISSUE VEL-SEP: 5 CM/S
MV E'TISSUE VEL-SEP: 5 CM/S
MV E'TISSUE VEL-SEP: 8 CM/S
MV PEAK A VEL: 0.81 M/S
MV PEAK A VEL: 1.02 M/S
MV PEAK A VEL: 1.11 M/S
MV PEAK E VEL: 50 CM/S
MV PEAK E VEL: 67 CM/S
MV PEAK E VEL: 77 CM/S
MV STENOSIS PRESSURE HALF TIME: 0 MS
MV STENOSIS PRESSURE HALF TIME: 36 MS
MV STENOSIS PRESSURE HALF TIME: 36 MS
MV VALVE AREA P 1/2 METHOD: 6.11
MV VALVE AREA P 1/2 METHOD: 6.11 CM2
NEUTROPHILS # BLD AUTO: 11.78 THOUSANDS/ΜL (ref 1.85–7.62)
NEUTROPHILS # BLD AUTO: 4.58 THOUSANDS/ΜL (ref 1.85–7.62)
NEUTROPHILS # BLD AUTO: 5.11 THOUSANDS/ΜL (ref 1.85–7.62)
NEUTROPHILS # BLD AUTO: 5.32 THOUSANDS/ΜL (ref 1.85–7.62)
NEUTROPHILS # BLD AUTO: 6.33 THOUSANDS/ΜL (ref 1.85–7.62)
NEUTROPHILS # BLD AUTO: 6.63 THOUSANDS/ΜL (ref 1.85–7.62)
NEUTROPHILS # BLD AUTO: 9.7 THOUSANDS/ΜL (ref 1.85–7.62)
NEUTS SEG NFR BLD AUTO: 11 %
NEUTS SEG NFR BLD AUTO: 81 % (ref 43–75)
NEUTS SEG NFR BLD AUTO: 82 % (ref 43–75)
NEUTS SEG NFR BLD AUTO: 84 % (ref 43–75)
NEUTS SEG NFR BLD AUTO: 84 % (ref 43–75)
NEUTS SEG NFR BLD AUTO: 87 % (ref 43–75)
NEUTS SEG NFR BLD AUTO: 88 % (ref 43–75)
NEUTS SEG NFR BLD AUTO: 91 % (ref 43–75)
NRBC BLD AUTO-RTO: 0 /100 WBCS
NRBC BLD AUTO-RTO: 1 /100 WBCS
NT-PROBNP SERPL-MCNC: 1469 PG/ML
NT-PROBNP SERPL-MCNC: 1856 PG/ML
NT-PROBNP SERPL-MCNC: 394 PG/ML
NT-PROBNP SERPL-MCNC: 405 PG/ML
NT-PROBNP SERPL-MCNC: 641 PG/ML
P AXIS: 100 DEGREES
P AXIS: 103 DEGREES
P AXIS: 73 DEGREES
P AXIS: 75 DEGREES
P AXIS: 76 DEGREES
P AXIS: 77 DEGREES
P AXIS: 78 DEGREES
P AXIS: 81 DEGREES
P AXIS: 83 DEGREES
P AXIS: 86 DEGREES
P AXIS: 93 DEGREES
PH BODY FLUID: 7.5
PHOSPHATE SERPL-MCNC: 2.3 MG/DL (ref 2.3–4.1)
PLATELET # BLD AUTO: 148 THOUSANDS/UL (ref 149–390)
PLATELET # BLD AUTO: 153 THOUSANDS/UL (ref 149–390)
PLATELET # BLD AUTO: 160 THOUSANDS/UL (ref 149–390)
PLATELET # BLD AUTO: 174 THOUSANDS/UL (ref 149–390)
PLATELET # BLD AUTO: 202 THOUSANDS/UL (ref 149–390)
PLATELET # BLD AUTO: 262 THOUSANDS/UL (ref 149–390)
PLATELET # BLD AUTO: 290 THOUSANDS/UL (ref 149–390)
PLATELET # BLD AUTO: 294 THOUSANDS/UL (ref 149–390)
PLATELET # BLD AUTO: 355 THOUSANDS/UL (ref 149–390)
PLATELET # BLD AUTO: 375 THOUSANDS/UL (ref 149–390)
PLATELET # BLD AUTO: 394 THOUSANDS/UL (ref 149–390)
PLATELET # BLD AUTO: 397 THOUSANDS/UL (ref 149–390)
PLATELET # BLD AUTO: 401 THOUSANDS/UL (ref 149–390)
PLATELET # BLD AUTO: 416 THOUSANDS/UL (ref 149–390)
PLATELET # BLD AUTO: 439 THOUSANDS/UL (ref 149–390)
PLATELET # BLD AUTO: 441 THOUSANDS/UL (ref 149–390)
PMV BLD AUTO: 10.1 FL (ref 8.9–12.7)
PMV BLD AUTO: 10.2 FL (ref 8.9–12.7)
PMV BLD AUTO: 10.6 FL (ref 8.9–12.7)
PMV BLD AUTO: 10.7 FL (ref 8.9–12.7)
PMV BLD AUTO: 8.6 FL (ref 8.9–12.7)
PMV BLD AUTO: 8.6 FL (ref 8.9–12.7)
PMV BLD AUTO: 8.7 FL (ref 8.9–12.7)
PMV BLD AUTO: 8.7 FL (ref 8.9–12.7)
PMV BLD AUTO: 8.8 FL (ref 8.9–12.7)
PMV BLD AUTO: 9.1 FL (ref 8.9–12.7)
PMV BLD AUTO: 9.2 FL (ref 8.9–12.7)
PMV BLD AUTO: 9.2 FL (ref 8.9–12.7)
PMV BLD AUTO: 9.3 FL (ref 8.9–12.7)
PMV BLD AUTO: 9.5 FL (ref 8.9–12.7)
PMV BLD AUTO: 9.6 FL (ref 8.9–12.7)
PMV BLD AUTO: 9.7 FL (ref 8.9–12.7)
POTASSIUM SERPL-SCNC: 2.8 MMOL/L (ref 3.5–5.3)
POTASSIUM SERPL-SCNC: 3.1 MMOL/L (ref 3.5–5.3)
POTASSIUM SERPL-SCNC: 3.3 MMOL/L (ref 3.5–5.3)
POTASSIUM SERPL-SCNC: 3.6 MMOL/L (ref 3.5–5.3)
POTASSIUM SERPL-SCNC: 3.7 MMOL/L (ref 3.5–5.3)
POTASSIUM SERPL-SCNC: 3.8 MMOL/L (ref 3.5–5.3)
POTASSIUM SERPL-SCNC: 3.8 MMOL/L (ref 3.5–5.3)
POTASSIUM SERPL-SCNC: 4 MMOL/L (ref 3.5–5.3)
POTASSIUM SERPL-SCNC: 4.2 MMOL/L (ref 3.5–5.3)
POTASSIUM SERPL-SCNC: 4.2 MMOL/L (ref 3.5–5.3)
POTASSIUM SERPL-SCNC: 4.3 MMOL/L (ref 3.5–5.3)
POTASSIUM SERPL-SCNC: 4.4 MMOL/L (ref 3.5–5.3)
POTASSIUM SERPL-SCNC: 4.4 MMOL/L (ref 3.5–5.3)
POTASSIUM SERPL-SCNC: 4.5 MMOL/L (ref 3.5–5.3)
POTASSIUM SERPL-SCNC: 4.8 MMOL/L (ref 3.5–5.3)
POTASSIUM SERPL-SCNC: 5.5 MMOL/L (ref 3.5–5.3)
PR INTERVAL: 112 MS
PR INTERVAL: 114 MS
PR INTERVAL: 116 MS
PR INTERVAL: 118 MS
PR INTERVAL: 122 MS
PR INTERVAL: 124 MS
PR INTERVAL: 136 MS
PR INTERVAL: 148 MS
PR INTERVAL: 148 MS
PR INTERVAL: 154 MS
PR INTERVAL: 184 MS
PROCALCITONIN SERPL-MCNC: 0.08 NG/ML
PROCALCITONIN SERPL-MCNC: 0.09 NG/ML
PROCALCITONIN SERPL-MCNC: 0.26 NG/ML
PROCALCITONIN SERPL-MCNC: 0.31 NG/ML
PROCALCITONIN SERPL-MCNC: 16.26 NG/ML
PROCALCITONIN SERPL-MCNC: 23.58 NG/ML
PROT SERPL-MCNC: 4.8 G/DL (ref 6.4–8.2)
PROT SERPL-MCNC: 4.9 G/DL (ref 6.4–8.2)
PROT SERPL-MCNC: 5.1 G/DL (ref 6.4–8.2)
PROT SERPL-MCNC: 5.5 G/DL (ref 6.4–8.2)
PROT SERPL-MCNC: 5.6 G/DL (ref 6.4–8.2)
PROT SERPL-MCNC: 5.6 G/DL (ref 6.4–8.2)
PROT SERPL-MCNC: 5.8 G/DL (ref 6.4–8.2)
PROT SERPL-MCNC: 6.1 G/DL (ref 6.4–8.2)
PROTHROMBIN TIME: 12.7 SECONDS (ref 11.6–14.5)
PROTHROMBIN TIME: 13.2 SECONDS (ref 11.6–14.5)
PROTHROMBIN TIME: 14.9 SECONDS (ref 11.6–14.5)
PROTHROMBIN TIME: 16.7 SECONDS (ref 11.6–14.5)
PROTHROMBIN TIME: 18.6 SECONDS (ref 11.6–14.5)
PROTHROMBIN TIME: 22.7 SECONDS (ref 11.6–14.5)
PROTHROMBIN TIME: 24 SECONDS (ref 11.6–14.5)
QRS AXIS: -61 DEGREES
QRS AXIS: -72 DEGREES
QRS AXIS: -73 DEGREES
QRS AXIS: -76 DEGREES
QRS AXIS: -77 DEGREES
QRS AXIS: -80 DEGREES
QRS AXIS: -82 DEGREES
QRS AXIS: -88 DEGREES
QRS AXIS: 256 DEGREES
QRS AXIS: 94 DEGREES
QRSD INTERVAL: 104 MS
QRSD INTERVAL: 162 MS
QRSD INTERVAL: 58 MS
QRSD INTERVAL: 62 MS
QRSD INTERVAL: 64 MS
QRSD INTERVAL: 66 MS
QRSD INTERVAL: 68 MS
QRSD INTERVAL: 70 MS
QT INTERVAL: 304 MS
QT INTERVAL: 312 MS
QT INTERVAL: 318 MS
QT INTERVAL: 336 MS
QT INTERVAL: 336 MS
QT INTERVAL: 338 MS
QT INTERVAL: 344 MS
QT INTERVAL: 352 MS
QT INTERVAL: 360 MS
QT INTERVAL: 398 MS
QT INTERVAL: 442 MS
QT INTERVAL: 530 MS
QTC INTERVAL: 435 MS
QTC INTERVAL: 440 MS
QTC INTERVAL: 440 MS
QTC INTERVAL: 443 MS
QTC INTERVAL: 446 MS
QTC INTERVAL: 451 MS
QTC INTERVAL: 460 MS
QTC INTERVAL: 467 MS
QTC INTERVAL: 482 MS
QTC INTERVAL: 535 MS
QTC INTERVAL: 624 MS
QTC INTERVAL: 690 MS
RBC # BLD AUTO: 3.16 MILLION/UL (ref 3.81–5.12)
RBC # BLD AUTO: 3.26 MILLION/UL (ref 3.81–5.12)
RBC # BLD AUTO: 3.4 MILLION/UL (ref 3.81–5.12)
RBC # BLD AUTO: 3.58 MILLION/UL (ref 3.81–5.12)
RBC # BLD AUTO: 3.63 MILLION/UL (ref 3.81–5.12)
RBC # BLD AUTO: 3.68 MILLION/UL (ref 3.81–5.12)
RBC # BLD AUTO: 3.72 MILLION/UL (ref 3.81–5.12)
RBC # BLD AUTO: 3.72 MILLION/UL (ref 3.81–5.12)
RBC # BLD AUTO: 4 MILLION/UL (ref 3.81–5.12)
RBC # BLD AUTO: 4.65 MILLION/UL (ref 3.81–5.12)
RBC # BLD AUTO: 4.88 MILLION/UL (ref 3.81–5.12)
RBC # BLD AUTO: 4.89 MILLION/UL (ref 3.81–5.12)
RBC # BLD AUTO: 5 MILLION/UL (ref 3.81–5.12)
RBC # BLD AUTO: 5.03 MILLION/UL (ref 3.81–5.12)
RBC # BLD AUTO: 5.3 MILLION/UL (ref 3.81–5.12)
RBC # FLD MANUAL: 4000 /UL
RIGHT ATRIUM AREA SYSTOLE A4C: 7.5 CM2
RIGHT ATRIUM AREA SYSTOLE A4C: 7.7 CM2
RIGHT VENTRICLE ID DIMENSION: 2.4 CM
RIGHT VENTRICLE ID DIMENSION: 2.7 CM
RSV RNA RESP QL NAA+PROBE: NEGATIVE
SARS-COV-2 RNA RESP QL NAA+PROBE: POSITIVE
SITE: NORMAL
SL CV LEFT ATRIUM LENGTH A2C: 4.1 CM
SL CV LV EF: 60
SL CV LV EF: 70
SL CV LV EF: 70
SL CV LV EF: 75
SL CV PED ECHO LEFT VENTRICLE DIASTOLIC VOLUME (MOD BIPLANE) 2D: 15 ML
SL CV PED ECHO LEFT VENTRICLE DIASTOLIC VOLUME (MOD BIPLANE) 2D: 25 ML
SL CV PED ECHO LEFT VENTRICLE DIASTOLIC VOLUME (MOD BIPLANE) 2D: 47 ML
SL CV PED ECHO LEFT VENTRICLE SYSTOLIC VOLUME (MOD BIPLANE) 2D: 11 ML
SL CV PED ECHO LEFT VENTRICLE SYSTOLIC VOLUME (MOD BIPLANE) 2D: 4 ML
SL CV PED ECHO LEFT VENTRICLE SYSTOLIC VOLUME (MOD BIPLANE) 2D: 6 ML
SODIUM SERPL-SCNC: 133 MMOL/L (ref 136–145)
SODIUM SERPL-SCNC: 134 MMOL/L (ref 136–145)
SODIUM SERPL-SCNC: 135 MMOL/L (ref 136–145)
SODIUM SERPL-SCNC: 137 MMOL/L (ref 136–145)
SODIUM SERPL-SCNC: 138 MMOL/L (ref 136–145)
SODIUM SERPL-SCNC: 139 MMOL/L (ref 136–145)
SODIUM SERPL-SCNC: 141 MMOL/L (ref 136–145)
T WAVE AXIS: 101 DEGREES
T WAVE AXIS: 123 DEGREES
T WAVE AXIS: 129 DEGREES
T WAVE AXIS: 136 DEGREES
T WAVE AXIS: 139 DEGREES
T WAVE AXIS: 48 DEGREES
T WAVE AXIS: 62 DEGREES
T WAVE AXIS: 63 DEGREES
T WAVE AXIS: 67 DEGREES
T WAVE AXIS: 80 DEGREES
T WAVE AXIS: 88 DEGREES
T WAVE AXIS: 89 DEGREES
TIBC SERPL-MCNC: 352 UG/DL (ref 250–450)
TOTAL CELLS COUNTED SPEC: 100
TR MAX PG: 15 MMHG
TR PEAK VELOCITY: 1.9 M/S
TRICUSPID VALVE PEAK REGURGITATION VELOCITY: 1.91 M/S
TSH SERPL DL<=0.05 MIU/L-ACNC: 1.42 UIU/ML (ref 0.45–4.5)
VENTRICULAR RATE: 102 BPM
VENTRICULAR RATE: 106 BPM
VENTRICULAR RATE: 106 BPM
VENTRICULAR RATE: 107 BPM
VENTRICULAR RATE: 109 BPM
VENTRICULAR RATE: 113 BPM
VENTRICULAR RATE: 117 BPM
VENTRICULAR RATE: 118 BPM
VENTRICULAR RATE: 120 BPM
VENTRICULAR RATE: 120 BPM
VENTRICULAR RATE: 123 BPM
VENTRICULAR RATE: 90 BPM
VIT B12 SERPL-MCNC: 396 PG/ML (ref 100–900)
WBC # BLD AUTO: 11.07 THOUSAND/UL (ref 4.31–10.16)
WBC # BLD AUTO: 11.23 THOUSAND/UL (ref 4.31–10.16)
WBC # BLD AUTO: 11.34 THOUSAND/UL (ref 4.31–10.16)
WBC # BLD AUTO: 11.98 THOUSAND/UL (ref 4.31–10.16)
WBC # BLD AUTO: 12.77 THOUSAND/UL (ref 4.31–10.16)
WBC # BLD AUTO: 12.83 THOUSAND/UL (ref 4.31–10.16)
WBC # BLD AUTO: 13.13 THOUSAND/UL (ref 4.31–10.16)
WBC # BLD AUTO: 13.6 THOUSAND/UL (ref 4.31–10.16)
WBC # BLD AUTO: 15.91 THOUSAND/UL (ref 4.31–10.16)
WBC # BLD AUTO: 5.66 THOUSAND/UL (ref 4.31–10.16)
WBC # BLD AUTO: 6.15 THOUSAND/UL (ref 4.31–10.16)
WBC # BLD AUTO: 6.31 THOUSAND/UL (ref 4.31–10.16)
WBC # BLD AUTO: 7.55 THOUSAND/UL (ref 4.31–10.16)
WBC # BLD AUTO: 7.56 THOUSAND/UL (ref 4.31–10.16)
WBC # BLD AUTO: 9.82 THOUSAND/UL (ref 4.31–10.16)
WBC # FLD MANUAL: 102 /UL
Z-SCORE OF LEFT VENTRICULAR DIMENSION IN END SYSTOLE: -2.54

## 2022-01-01 PROCEDURE — 84484 ASSAY OF TROPONIN QUANT: CPT | Performed by: EMERGENCY MEDICINE

## 2022-01-01 PROCEDURE — 99223 1ST HOSP IP/OBS HIGH 75: CPT | Performed by: INTERNAL MEDICINE

## 2022-01-01 PROCEDURE — 83986 ASSAY PH BODY FLUID NOS: CPT | Performed by: INTERNAL MEDICINE

## 2022-01-01 PROCEDURE — 94640 AIRWAY INHALATION TREATMENT: CPT

## 2022-01-01 PROCEDURE — 99232 SBSQ HOSP IP/OBS MODERATE 35: CPT | Performed by: NURSE PRACTITIONER

## 2022-01-01 PROCEDURE — 74176 CT ABD & PELVIS W/O CONTRAST: CPT

## 2022-01-01 PROCEDURE — 94760 N-INVAS EAR/PLS OXIMETRY 1: CPT

## 2022-01-01 PROCEDURE — 85730 THROMBOPLASTIN TIME PARTIAL: CPT | Performed by: PHYSICIAN ASSISTANT

## 2022-01-01 PROCEDURE — 85610 PROTHROMBIN TIME: CPT | Performed by: PHYSICIAN ASSISTANT

## 2022-01-01 PROCEDURE — 99204 OFFICE O/P NEW MOD 45 MIN: CPT | Performed by: INTERNAL MEDICINE

## 2022-01-01 PROCEDURE — 83605 ASSAY OF LACTIC ACID: CPT | Performed by: PHYSICIAN ASSISTANT

## 2022-01-01 PROCEDURE — 80048 BASIC METABOLIC PNL TOTAL CA: CPT | Performed by: INTERNAL MEDICINE

## 2022-01-01 PROCEDURE — 80048 BASIC METABOLIC PNL TOTAL CA: CPT | Performed by: NURSE PRACTITIONER

## 2022-01-01 PROCEDURE — 71045 X-RAY EXAM CHEST 1 VIEW: CPT

## 2022-01-01 PROCEDURE — 71046 X-RAY EXAM CHEST 2 VIEWS: CPT

## 2022-01-01 PROCEDURE — 80048 BASIC METABOLIC PNL TOTAL CA: CPT | Performed by: PHYSICIAN ASSISTANT

## 2022-01-01 PROCEDURE — 93005 ELECTROCARDIOGRAM TRACING: CPT

## 2022-01-01 PROCEDURE — 84443 ASSAY THYROID STIM HORMONE: CPT

## 2022-01-01 PROCEDURE — 96374 THER/PROPH/DIAG INJ IV PUSH: CPT

## 2022-01-01 PROCEDURE — 85027 COMPLETE CBC AUTOMATED: CPT | Performed by: PHYSICIAN ASSISTANT

## 2022-01-01 PROCEDURE — 99285 EMERGENCY DEPT VISIT HI MDM: CPT

## 2022-01-01 PROCEDURE — 83735 ASSAY OF MAGNESIUM: CPT | Performed by: EMERGENCY MEDICINE

## 2022-01-01 PROCEDURE — 85379 FIBRIN DEGRADATION QUANT: CPT | Performed by: PHYSICIAN ASSISTANT

## 2022-01-01 PROCEDURE — 83880 ASSAY OF NATRIURETIC PEPTIDE: CPT | Performed by: EMERGENCY MEDICINE

## 2022-01-01 PROCEDURE — 93308 TTE F-UP OR LMTD: CPT | Performed by: INTERNAL MEDICINE

## 2022-01-01 PROCEDURE — 80053 COMPREHEN METABOLIC PANEL: CPT

## 2022-01-01 PROCEDURE — 85025 COMPLETE CBC W/AUTO DIFF WBC: CPT | Performed by: INTERNAL MEDICINE

## 2022-01-01 PROCEDURE — 86140 C-REACTIVE PROTEIN: CPT | Performed by: PHYSICIAN ASSISTANT

## 2022-01-01 PROCEDURE — 93321 DOPPLER ECHO F-UP/LMTD STD: CPT

## 2022-01-01 PROCEDURE — 93306 TTE W/DOPPLER COMPLETE: CPT | Performed by: INTERNAL MEDICINE

## 2022-01-01 PROCEDURE — 1124F ACP DISCUSS-NO DSCNMKR DOCD: CPT | Performed by: PHYSICIAN ASSISTANT

## 2022-01-01 PROCEDURE — 93971 EXTREMITY STUDY: CPT

## 2022-01-01 PROCEDURE — 85049 AUTOMATED PLATELET COUNT: CPT | Performed by: INTERNAL MEDICINE

## 2022-01-01 PROCEDURE — 80053 COMPREHEN METABOLIC PANEL: CPT | Performed by: INTERNAL MEDICINE

## 2022-01-01 PROCEDURE — 93010 ELECTROCARDIOGRAM REPORT: CPT | Performed by: INTERNAL MEDICINE

## 2022-01-01 PROCEDURE — 86140 C-REACTIVE PROTEIN: CPT | Performed by: INTERNAL MEDICINE

## 2022-01-01 PROCEDURE — 99232 SBSQ HOSP IP/OBS MODERATE 35: CPT | Performed by: INTERNAL MEDICINE

## 2022-01-01 PROCEDURE — 89051 BODY FLUID CELL COUNT: CPT | Performed by: INTERNAL MEDICINE

## 2022-01-01 PROCEDURE — 93325 DOPPLER ECHO COLOR FLOW MAPG: CPT

## 2022-01-01 PROCEDURE — 84484 ASSAY OF TROPONIN QUANT: CPT | Performed by: PHYSICIAN ASSISTANT

## 2022-01-01 PROCEDURE — 99152 MOD SED SAME PHYS/QHP 5/>YRS: CPT | Performed by: INTERNAL MEDICINE

## 2022-01-01 PROCEDURE — 99233 SBSQ HOSP IP/OBS HIGH 50: CPT | Performed by: PHYSICIAN ASSISTANT

## 2022-01-01 PROCEDURE — 99153 MOD SED SAME PHYS/QHP EA: CPT | Performed by: INTERNAL MEDICINE

## 2022-01-01 PROCEDURE — 87102 FUNGUS ISOLATION CULTURE: CPT | Performed by: INTERNAL MEDICINE

## 2022-01-01 PROCEDURE — 71275 CT ANGIOGRAPHY CHEST: CPT

## 2022-01-01 PROCEDURE — G0438 PPPS, INITIAL VISIT: HCPCS | Performed by: INTERNAL MEDICINE

## 2022-01-01 PROCEDURE — 99232 SBSQ HOSP IP/OBS MODERATE 35: CPT | Performed by: PHYSICIAN ASSISTANT

## 2022-01-01 PROCEDURE — 82042 OTHER SOURCE ALBUMIN QUAN EA: CPT | Performed by: INTERNAL MEDICINE

## 2022-01-01 PROCEDURE — G0299 HHS/HOSPICE OF RN EA 15 MIN: HCPCS

## 2022-01-01 PROCEDURE — 71250 CT THORAX DX C-: CPT

## 2022-01-01 PROCEDURE — 93308 TTE F-UP OR LMTD: CPT

## 2022-01-01 PROCEDURE — 99222 1ST HOSP IP/OBS MODERATE 55: CPT | Performed by: THORACIC SURGERY (CARDIOTHORACIC VASCULAR SURGERY)

## 2022-01-01 PROCEDURE — G0155 HHCP-SVS OF CSW,EA 15 MIN: HCPCS

## 2022-01-01 PROCEDURE — 0241U HB NFCT DS VIR RESP RNA 4 TRGT: CPT | Performed by: PHYSICIAN ASSISTANT

## 2022-01-01 PROCEDURE — 99283 EMERGENCY DEPT VISIT LOW MDM: CPT | Performed by: EMERGENCY MEDICINE

## 2022-01-01 PROCEDURE — 99233 SBSQ HOSP IP/OBS HIGH 50: CPT | Performed by: INTERNAL MEDICINE

## 2022-01-01 PROCEDURE — C1769 GUIDE WIRE: HCPCS | Performed by: INTERNAL MEDICINE

## 2022-01-01 PROCEDURE — 36415 COLL VENOUS BLD VENIPUNCTURE: CPT | Performed by: EMERGENCY MEDICINE

## 2022-01-01 PROCEDURE — 99222 1ST HOSP IP/OBS MODERATE 55: CPT | Performed by: SURGERY

## 2022-01-01 PROCEDURE — 83880 ASSAY OF NATRIURETIC PEPTIDE: CPT | Performed by: PHYSICIAN ASSISTANT

## 2022-01-01 PROCEDURE — 85610 PROTHROMBIN TIME: CPT | Performed by: NURSE PRACTITIONER

## 2022-01-01 PROCEDURE — 10330087 HSPC SERVICE INTENSITY ADD-ON

## 2022-01-01 PROCEDURE — 36415 COLL VENOUS BLD VENIPUNCTURE: CPT

## 2022-01-01 PROCEDURE — 87116 MYCOBACTERIA CULTURE: CPT | Performed by: INTERNAL MEDICINE

## 2022-01-01 PROCEDURE — 88305 TISSUE EXAM BY PATHOLOGIST: CPT | Performed by: PATHOLOGY

## 2022-01-01 PROCEDURE — 87205 SMEAR GRAM STAIN: CPT | Performed by: INTERNAL MEDICINE

## 2022-01-01 PROCEDURE — 85730 THROMBOPLASTIN TIME PARTIAL: CPT | Performed by: INTERNAL MEDICINE

## 2022-01-01 PROCEDURE — 99239 HOSP IP/OBS DSCHRG MGMT >30: CPT | Performed by: NURSE PRACTITIONER

## 2022-01-01 PROCEDURE — 85025 COMPLETE CBC W/AUTO DIFF WBC: CPT

## 2022-01-01 PROCEDURE — 82728 ASSAY OF FERRITIN: CPT | Performed by: PHYSICIAN ASSISTANT

## 2022-01-01 PROCEDURE — 83735 ASSAY OF MAGNESIUM: CPT | Performed by: INTERNAL MEDICINE

## 2022-01-01 PROCEDURE — 96361 HYDRATE IV INFUSION ADD-ON: CPT

## 2022-01-01 PROCEDURE — 80053 COMPREHEN METABOLIC PANEL: CPT | Performed by: EMERGENCY MEDICINE

## 2022-01-01 PROCEDURE — G1004 CDSM NDSC: HCPCS

## 2022-01-01 PROCEDURE — 94668 MNPJ CHEST WALL SBSQ: CPT

## 2022-01-01 PROCEDURE — 93306 TTE W/DOPPLER COMPLETE: CPT

## 2022-01-01 PROCEDURE — 84145 PROCALCITONIN (PCT): CPT | Performed by: PHYSICIAN ASSISTANT

## 2022-01-01 PROCEDURE — 83880 ASSAY OF NATRIURETIC PEPTIDE: CPT | Performed by: INTERNAL MEDICINE

## 2022-01-01 PROCEDURE — 96360 HYDRATION IV INFUSION INIT: CPT

## 2022-01-01 PROCEDURE — 99215 OFFICE O/P EST HI 40 MIN: CPT | Performed by: INTERNAL MEDICINE

## 2022-01-01 PROCEDURE — 93321 DOPPLER ECHO F-UP/LMTD STD: CPT | Performed by: INTERNAL MEDICINE

## 2022-01-01 PROCEDURE — XW033E5 INTRODUCTION OF REMDESIVIR ANTI-INFECTIVE INTO PERIPHERAL VEIN, PERCUTANEOUS APPROACH, NEW TECHNOLOGY GROUP 5: ICD-10-PCS | Performed by: INTERNAL MEDICINE

## 2022-01-01 PROCEDURE — 93971 EXTREMITY STUDY: CPT | Performed by: SURGERY

## 2022-01-01 PROCEDURE — 99285 EMERGENCY DEPT VISIT HI MDM: CPT | Performed by: EMERGENCY MEDICINE

## 2022-01-01 PROCEDURE — 84145 PROCALCITONIN (PCT): CPT | Performed by: INTERNAL MEDICINE

## 2022-01-01 PROCEDURE — 97530 THERAPEUTIC ACTIVITIES: CPT

## 2022-01-01 PROCEDURE — 99220 PR INITIAL OBSERVATION CARE/DAY 70 MINUTES: CPT | Performed by: INTERNAL MEDICINE

## 2022-01-01 PROCEDURE — 99239 HOSP IP/OBS DSCHRG MGMT >30: CPT | Performed by: INTERNAL MEDICINE

## 2022-01-01 PROCEDURE — 94761 N-INVAS EAR/PLS OXIMETRY MLT: CPT

## 2022-01-01 PROCEDURE — NC001 PR NO CHARGE: Performed by: INTERNAL MEDICINE

## 2022-01-01 PROCEDURE — 85027 COMPLETE CBC AUTOMATED: CPT | Performed by: NURSE PRACTITIONER

## 2022-01-01 PROCEDURE — C1894 INTRO/SHEATH, NON-LASER: HCPCS | Performed by: INTERNAL MEDICINE

## 2022-01-01 PROCEDURE — 93325 DOPPLER ECHO COLOR FLOW MAPG: CPT | Performed by: INTERNAL MEDICINE

## 2022-01-01 PROCEDURE — 33017 PRCRD DRG 6YR+ W/O CGEN CAR: CPT | Performed by: INTERNAL MEDICINE

## 2022-01-01 PROCEDURE — 99223 1ST HOSP IP/OBS HIGH 75: CPT | Performed by: FAMILY MEDICINE

## 2022-01-01 PROCEDURE — 97167 OT EVAL HIGH COMPLEX 60 MIN: CPT

## 2022-01-01 PROCEDURE — 94664 DEMO&/EVAL PT USE INHALER: CPT

## 2022-01-01 PROCEDURE — T2042 HOSPICE ROUTINE HOME CARE: HCPCS

## 2022-01-01 PROCEDURE — 0W9D30Z DRAINAGE OF PERICARDIAL CAVITY WITH DRAINAGE DEVICE, PERCUTANEOUS APPROACH: ICD-10-PCS | Performed by: INTERNAL MEDICINE

## 2022-01-01 PROCEDURE — 99214 OFFICE O/P EST MOD 30 MIN: CPT | Performed by: NURSE PRACTITIONER

## 2022-01-01 PROCEDURE — 86803 HEPATITIS C AB TEST: CPT

## 2022-01-01 PROCEDURE — 87206 SMEAR FLUORESCENT/ACID STAI: CPT | Performed by: INTERNAL MEDICINE

## 2022-01-01 PROCEDURE — 36415 COLL VENOUS BLD VENIPUNCTURE: CPT | Performed by: PHYSICIAN ASSISTANT

## 2022-01-01 PROCEDURE — 97163 PT EVAL HIGH COMPLEX 45 MIN: CPT

## 2022-01-01 PROCEDURE — 89050 BODY FLUID CELL COUNT: CPT | Performed by: INTERNAL MEDICINE

## 2022-01-01 PROCEDURE — 88112 CYTOPATH CELL ENHANCE TECH: CPT | Performed by: PATHOLOGY

## 2022-01-01 PROCEDURE — 80076 HEPATIC FUNCTION PANEL: CPT | Performed by: PHYSICIAN ASSISTANT

## 2022-01-01 PROCEDURE — 97166 OT EVAL MOD COMPLEX 45 MIN: CPT

## 2022-01-01 PROCEDURE — 83550 IRON BINDING TEST: CPT | Performed by: PHYSICIAN ASSISTANT

## 2022-01-01 PROCEDURE — 82306 VITAMIN D 25 HYDROXY: CPT

## 2022-01-01 PROCEDURE — 83690 ASSAY OF LIPASE: CPT | Performed by: EMERGENCY MEDICINE

## 2022-01-01 PROCEDURE — 80053 COMPREHEN METABOLIC PANEL: CPT | Performed by: NURSE PRACTITIONER

## 2022-01-01 PROCEDURE — 83615 LACTATE (LD) (LDH) ENZYME: CPT | Performed by: INTERNAL MEDICINE

## 2022-01-01 PROCEDURE — 99233 SBSQ HOSP IP/OBS HIGH 50: CPT | Performed by: STUDENT IN AN ORGANIZED HEALTH CARE EDUCATION/TRAINING PROGRAM

## 2022-01-01 PROCEDURE — 82607 VITAMIN B-12: CPT

## 2022-01-01 PROCEDURE — 83880 ASSAY OF NATRIURETIC PEPTIDE: CPT

## 2022-01-01 PROCEDURE — 87070 CULTURE OTHR SPECIMN AEROBIC: CPT | Performed by: INTERNAL MEDICINE

## 2022-01-01 PROCEDURE — 84100 ASSAY OF PHOSPHORUS: CPT | Performed by: INTERNAL MEDICINE

## 2022-01-01 PROCEDURE — 99284 EMERGENCY DEPT VISIT MOD MDM: CPT

## 2022-01-01 PROCEDURE — 86038 ANTINUCLEAR ANTIBODIES: CPT | Performed by: INTERNAL MEDICINE

## 2022-01-01 PROCEDURE — 74175 CTA ABDOMEN W/CONTRAST: CPT

## 2022-01-01 PROCEDURE — 94640 AIRWAY INHALATION TREATMENT: CPT | Performed by: SOCIAL WORKER

## 2022-01-01 PROCEDURE — 85652 RBC SED RATE AUTOMATED: CPT | Performed by: INTERNAL MEDICINE

## 2022-01-01 PROCEDURE — 87075 CULTR BACTERIA EXCEPT BLOOD: CPT | Performed by: INTERNAL MEDICINE

## 2022-01-01 PROCEDURE — 85025 COMPLETE CBC W/AUTO DIFF WBC: CPT | Performed by: EMERGENCY MEDICINE

## 2022-01-01 PROCEDURE — 87040 BLOOD CULTURE FOR BACTERIA: CPT | Performed by: PHYSICIAN ASSISTANT

## 2022-01-01 PROCEDURE — 99285 EMERGENCY DEPT VISIT HI MDM: CPT | Performed by: PHYSICIAN ASSISTANT

## 2022-01-01 PROCEDURE — 83540 ASSAY OF IRON: CPT | Performed by: PHYSICIAN ASSISTANT

## 2022-01-01 PROCEDURE — 85025 COMPLETE CBC W/AUTO DIFF WBC: CPT | Performed by: PHYSICIAN ASSISTANT

## 2022-01-01 PROCEDURE — 99231 SBSQ HOSP IP/OBS SF/LOW 25: CPT | Performed by: SURGERY

## 2022-01-01 RX ORDER — FUROSEMIDE 20 MG/1
20 TABLET ORAL 2 TIMES DAILY
Qty: 30 TABLET | Refills: 0 | Status: SHIPPED | OUTPATIENT
Start: 2022-01-01 | End: 2022-01-01

## 2022-01-01 RX ORDER — HEPARIN SODIUM 10000 [USP'U]/100ML
3-30 INJECTION, SOLUTION INTRAVENOUS
Status: DISCONTINUED | OUTPATIENT
Start: 2022-01-01 | End: 2022-01-01

## 2022-01-01 RX ORDER — FLUTICASONE FUROATE AND VILANTEROL 100; 25 UG/1; UG/1
1 POWDER RESPIRATORY (INHALATION) DAILY
Status: DISCONTINUED | OUTPATIENT
Start: 2022-01-01 | End: 2022-01-01

## 2022-01-01 RX ORDER — ONDANSETRON 2 MG/ML
4 INJECTION INTRAMUSCULAR; INTRAVENOUS EVERY 6 HOURS PRN
Status: DISCONTINUED | OUTPATIENT
Start: 2022-01-01 | End: 2022-01-01 | Stop reason: HOSPADM

## 2022-01-01 RX ORDER — PANTOPRAZOLE SODIUM 40 MG/1
40 TABLET, DELAYED RELEASE ORAL
Status: DISCONTINUED | OUTPATIENT
Start: 2022-01-01 | End: 2022-01-01 | Stop reason: HOSPADM

## 2022-01-01 RX ORDER — POTASSIUM CHLORIDE 14.9 MG/ML
20 INJECTION INTRAVENOUS ONCE
Status: COMPLETED | OUTPATIENT
Start: 2022-01-01 | End: 2022-01-01

## 2022-01-01 RX ORDER — ALBUMIN, HUMAN INJ 5% 5 %
12.5 SOLUTION INTRAVENOUS ONCE
Status: COMPLETED | OUTPATIENT
Start: 2022-01-01 | End: 2022-01-01

## 2022-01-01 RX ORDER — COLCHICINE 0.6 MG/1
0.6 TABLET ORAL 2 TIMES DAILY
Status: DISCONTINUED | OUTPATIENT
Start: 2022-01-01 | End: 2022-01-01

## 2022-01-01 RX ORDER — LIDOCAINE HYDROCHLORIDE 10 MG/ML
INJECTION, SOLUTION EPIDURAL; INFILTRATION; INTRACAUDAL; PERINEURAL AS NEEDED
Status: DISCONTINUED | OUTPATIENT
Start: 2022-01-01 | End: 2022-01-01 | Stop reason: HOSPADM

## 2022-01-01 RX ORDER — ALBUTEROL SULFATE 2.5 MG/3ML
5 SOLUTION RESPIRATORY (INHALATION) ONCE
Status: COMPLETED | OUTPATIENT
Start: 2022-01-01 | End: 2022-01-01

## 2022-01-01 RX ORDER — LOPERAMIDE HYDROCHLORIDE 2 MG/1
2 CAPSULE ORAL 4 TIMES DAILY PRN
Status: DISCONTINUED | OUTPATIENT
Start: 2022-01-01 | End: 2022-01-01 | Stop reason: HOSPADM

## 2022-01-01 RX ORDER — FUROSEMIDE 20 MG/1
20 TABLET ORAL 2 TIMES DAILY
Qty: 30 TABLET | Refills: 0 | Status: SHIPPED | OUTPATIENT
Start: 2022-01-01 | End: 2022-01-01 | Stop reason: SDUPTHER

## 2022-01-01 RX ORDER — FENTANYL CITRATE 50 UG/ML
INJECTION, SOLUTION INTRAMUSCULAR; INTRAVENOUS AS NEEDED
Status: DISCONTINUED | OUTPATIENT
Start: 2022-01-01 | End: 2022-01-01 | Stop reason: HOSPADM

## 2022-01-01 RX ORDER — SODIUM CHLORIDE, SODIUM GLUCONATE, SODIUM ACETATE, POTASSIUM CHLORIDE, MAGNESIUM CHLORIDE, SODIUM PHOSPHATE, DIBASIC, AND POTASSIUM PHOSPHATE .53; .5; .37; .037; .03; .012; .00082 G/100ML; G/100ML; G/100ML; G/100ML; G/100ML; G/100ML; G/100ML
1000 INJECTION, SOLUTION INTRAVENOUS ONCE
Status: COMPLETED | OUTPATIENT
Start: 2022-01-01 | End: 2022-01-01

## 2022-01-01 RX ORDER — ALBUTEROL SULFATE 90 UG/1
2 AEROSOL, METERED RESPIRATORY (INHALATION) EVERY 4 HOURS PRN
Qty: 8 G | Refills: 0 | Status: SHIPPED | OUTPATIENT
Start: 2022-01-01 | End: 2022-01-01

## 2022-01-01 RX ORDER — SODIUM POLYSTYRENE SULFONATE 4.1 MEQ/G
15 POWDER, FOR SUSPENSION ORAL; RECTAL ONCE
Status: COMPLETED | OUTPATIENT
Start: 2022-01-01 | End: 2022-01-01

## 2022-01-01 RX ORDER — ACETAMINOPHEN 325 MG/1
650 TABLET ORAL EVERY 6 HOURS PRN
Status: DISCONTINUED | OUTPATIENT
Start: 2022-01-01 | End: 2022-01-01 | Stop reason: HOSPADM

## 2022-01-01 RX ORDER — ALBUTEROL SULFATE 90 UG/1
2 AEROSOL, METERED RESPIRATORY (INHALATION) EVERY 4 HOURS PRN
Status: DISCONTINUED | OUTPATIENT
Start: 2022-01-01 | End: 2022-01-01 | Stop reason: HOSPADM

## 2022-01-01 RX ORDER — FLUTICASONE FUROATE AND VILANTEROL 100; 25 UG/1; UG/1
1 POWDER RESPIRATORY (INHALATION) DAILY
Qty: 60 BLISTER | Refills: 0 | Status: SHIPPED | OUTPATIENT
Start: 2022-01-01 | End: 2022-01-01 | Stop reason: SDUPTHER

## 2022-01-01 RX ORDER — SODIUM CHLORIDE, SODIUM GLUCONATE, SODIUM ACETATE, POTASSIUM CHLORIDE, MAGNESIUM CHLORIDE, SODIUM PHOSPHATE, DIBASIC, AND POTASSIUM PHOSPHATE .53; .5; .37; .037; .03; .012; .00082 G/100ML; G/100ML; G/100ML; G/100ML; G/100ML; G/100ML; G/100ML
50 INJECTION, SOLUTION INTRAVENOUS CONTINUOUS
Status: DISCONTINUED | OUTPATIENT
Start: 2022-01-01 | End: 2022-01-01

## 2022-01-01 RX ORDER — OXYCODONE HYDROCHLORIDE 5 MG/1
5 TABLET ORAL EVERY 2 HOUR PRN
Qty: 20 TABLET | Refills: 0 | Status: SHIPPED | OUTPATIENT
Start: 2022-01-01

## 2022-01-01 RX ORDER — LEVALBUTEROL 1.25 MG/.5ML
1.25 SOLUTION, CONCENTRATE RESPIRATORY (INHALATION)
Status: DISCONTINUED | OUTPATIENT
Start: 2022-01-01 | End: 2022-01-01

## 2022-01-01 RX ORDER — LANOLIN ALCOHOL/MO/W.PET/CERES
3 CREAM (GRAM) TOPICAL
Status: DISCONTINUED | OUTPATIENT
Start: 2022-01-01 | End: 2022-01-01 | Stop reason: HOSPADM

## 2022-01-01 RX ORDER — ALBUTEROL SULFATE 2.5 MG/3ML
2.5 SOLUTION RESPIRATORY (INHALATION) EVERY 6 HOURS PRN
Qty: 1080 ML | Refills: 3 | Status: SHIPPED | OUTPATIENT
Start: 2022-01-01 | End: 2022-01-01

## 2022-01-01 RX ORDER — ALPRAZOLAM 0.25 MG/1
0.25 TABLET ORAL 4 TIMES DAILY PRN
Status: DISCONTINUED | OUTPATIENT
Start: 2022-01-01 | End: 2022-01-01 | Stop reason: HOSPADM

## 2022-01-01 RX ORDER — FLUTICASONE FUROATE AND VILANTEROL 100; 25 UG/1; UG/1
1 POWDER RESPIRATORY (INHALATION) DAILY
Qty: 60 BLISTER | Refills: 3 | Status: SHIPPED | OUTPATIENT
Start: 2022-01-01

## 2022-01-01 RX ORDER — ACETAMINOPHEN 325 MG/1
650 TABLET ORAL ONCE
Status: COMPLETED | OUTPATIENT
Start: 2022-01-01 | End: 2022-01-01

## 2022-01-01 RX ORDER — NICOTINE 21 MG/24HR
14 PATCH, TRANSDERMAL 24 HOURS TRANSDERMAL DAILY
Status: DISCONTINUED | OUTPATIENT
Start: 2022-01-01 | End: 2022-01-01 | Stop reason: HOSPADM

## 2022-01-01 RX ORDER — GUAIFENESIN 1200 MG/1
1200 TABLET, EXTENDED RELEASE ORAL EVERY 12 HOURS SCHEDULED
Qty: 28 TABLET | Refills: 0 | Status: SHIPPED | OUTPATIENT
Start: 2022-01-01 | End: 2022-01-01

## 2022-01-01 RX ORDER — FLUTICASONE FUROATE AND VILANTEROL 100; 25 UG/1; UG/1
1 POWDER RESPIRATORY (INHALATION) DAILY
Qty: 60 BLISTER | Refills: 3 | Status: SHIPPED | OUTPATIENT
Start: 2022-01-01 | End: 2022-01-01 | Stop reason: SDUPTHER

## 2022-01-01 RX ORDER — LEVALBUTEROL INHALATION SOLUTION 1.25 MG/3ML
1.25 SOLUTION RESPIRATORY (INHALATION)
Status: DISCONTINUED | OUTPATIENT
Start: 2022-01-01 | End: 2022-01-01

## 2022-01-01 RX ORDER — HEPARIN SODIUM 1000 [USP'U]/ML
1800 INJECTION, SOLUTION INTRAVENOUS; SUBCUTANEOUS
Status: DISCONTINUED | OUTPATIENT
Start: 2022-01-01 | End: 2022-01-01

## 2022-01-01 RX ORDER — OXYCODONE HYDROCHLORIDE 5 MG/1
5 TABLET ORAL EVERY 4 HOURS PRN
Status: DISCONTINUED | OUTPATIENT
Start: 2022-01-01 | End: 2022-01-01

## 2022-01-01 RX ORDER — ONDANSETRON 2 MG/ML
4 INJECTION INTRAMUSCULAR; INTRAVENOUS EVERY 8 HOURS PRN
Status: DISCONTINUED | OUTPATIENT
Start: 2022-01-01 | End: 2022-01-01 | Stop reason: HOSPADM

## 2022-01-01 RX ORDER — ACETYLCYSTEINE 200 MG/ML
3 SOLUTION ORAL; RESPIRATORY (INHALATION)
Status: DISCONTINUED | OUTPATIENT
Start: 2022-01-01 | End: 2022-01-01

## 2022-01-01 RX ORDER — ALBUTEROL SULFATE 90 UG/1
2 AEROSOL, METERED RESPIRATORY (INHALATION) EVERY 4 HOURS PRN
Qty: 8 G | Refills: 0 | Status: SHIPPED | OUTPATIENT
Start: 2022-01-01

## 2022-01-01 RX ORDER — FUROSEMIDE 10 MG/ML
40 INJECTION INTRAMUSCULAR; INTRAVENOUS ONCE
Status: COMPLETED | OUTPATIENT
Start: 2022-01-01 | End: 2022-01-01

## 2022-01-01 RX ORDER — SODIUM CHLORIDE, SODIUM LACTATE, POTASSIUM CHLORIDE, CALCIUM CHLORIDE 600; 310; 30; 20 MG/100ML; MG/100ML; MG/100ML; MG/100ML
100 INJECTION, SOLUTION INTRAVENOUS CONTINUOUS
Status: DISPENSED | OUTPATIENT
Start: 2022-01-01 | End: 2022-01-01

## 2022-01-01 RX ORDER — WARFARIN SODIUM 5 MG/1
10 TABLET ORAL
Status: DISCONTINUED | OUTPATIENT
Start: 2022-01-01 | End: 2022-01-01

## 2022-01-01 RX ORDER — FAMOTIDINE 10 MG/ML
20 INJECTION, SOLUTION INTRAVENOUS ONCE
Status: COMPLETED | OUTPATIENT
Start: 2022-01-01 | End: 2022-01-01

## 2022-01-01 RX ORDER — POTASSIUM CHLORIDE 750 MG/1
10 TABLET, EXTENDED RELEASE ORAL 2 TIMES DAILY
Qty: 30 TABLET | Refills: 0 | Status: SHIPPED | OUTPATIENT
Start: 2022-01-01 | End: 2022-01-01

## 2022-01-01 RX ORDER — OXYCODONE HYDROCHLORIDE 5 MG/1
2.5 TABLET ORAL EVERY 4 HOURS PRN
Status: DISCONTINUED | OUTPATIENT
Start: 2022-01-01 | End: 2022-01-01

## 2022-01-01 RX ORDER — GABAPENTIN 100 MG/1
100 CAPSULE ORAL 3 TIMES DAILY
Status: DISCONTINUED | OUTPATIENT
Start: 2022-01-01 | End: 2022-01-01 | Stop reason: HOSPADM

## 2022-01-01 RX ORDER — ALBUTEROL SULFATE 2.5 MG/3ML
SOLUTION RESPIRATORY (INHALATION)
Status: COMPLETED
Start: 2022-01-01 | End: 2022-01-01

## 2022-01-01 RX ORDER — DOXYCYCLINE HYCLATE 100 MG/1
100 CAPSULE ORAL EVERY 12 HOURS
Status: DISPENSED | OUTPATIENT
Start: 2022-01-01 | End: 2022-01-01

## 2022-01-01 RX ORDER — GUAIFENESIN 600 MG
1200 TABLET, EXTENDED RELEASE 12 HR ORAL EVERY 12 HOURS SCHEDULED
Status: DISCONTINUED | OUTPATIENT
Start: 2022-01-01 | End: 2022-01-01 | Stop reason: HOSPADM

## 2022-01-01 RX ORDER — WARFARIN SODIUM 5 MG/1
TABLET ORAL
Qty: 30 TABLET | Refills: 0 | Status: SHIPPED | OUTPATIENT
Start: 2022-01-01 | End: 2022-01-01

## 2022-01-01 RX ORDER — WARFARIN SODIUM 5 MG/1
5 TABLET ORAL
Status: DISCONTINUED | OUTPATIENT
Start: 2022-01-01 | End: 2022-01-01

## 2022-01-01 RX ORDER — METHYLPREDNISOLONE SODIUM SUCCINATE 125 MG/2ML
125 INJECTION, POWDER, LYOPHILIZED, FOR SOLUTION INTRAMUSCULAR; INTRAVENOUS ONCE
Status: COMPLETED | OUTPATIENT
Start: 2022-01-01 | End: 2022-01-01

## 2022-01-01 RX ORDER — AMOXICILLIN 250 MG
1 CAPSULE ORAL DAILY
Qty: 30 TABLET | Refills: 0 | Status: SHIPPED | OUTPATIENT
Start: 2022-01-01 | End: 2022-01-01

## 2022-01-01 RX ORDER — SIMETHICONE 80 MG
80 TABLET,CHEWABLE ORAL EVERY 6 HOURS PRN
Status: DISCONTINUED | OUTPATIENT
Start: 2022-01-01 | End: 2022-01-01 | Stop reason: HOSPADM

## 2022-01-01 RX ORDER — FLUTICASONE FUROATE AND VILANTEROL 100; 25 UG/1; UG/1
1 POWDER RESPIRATORY (INHALATION) DAILY
Status: DISCONTINUED | OUTPATIENT
Start: 2022-01-01 | End: 2022-01-01 | Stop reason: HOSPADM

## 2022-01-01 RX ORDER — DEXAMETHASONE SODIUM PHOSPHATE 4 MG/ML
6 INJECTION, SOLUTION INTRA-ARTICULAR; INTRALESIONAL; INTRAMUSCULAR; INTRAVENOUS; SOFT TISSUE EVERY 24 HOURS
Status: COMPLETED | OUTPATIENT
Start: 2022-01-01 | End: 2022-01-01

## 2022-01-01 RX ORDER — SODIUM CHLORIDE 30 MG/ML INHALATION SOLUTION 30 MG/ML
4 SOLUTION INHALANT
Status: DISCONTINUED | OUTPATIENT
Start: 2022-01-01 | End: 2022-01-01

## 2022-01-01 RX ORDER — ACETAMINOPHEN 325 MG/1
650 TABLET ORAL
Status: DISCONTINUED | OUTPATIENT
Start: 2022-01-01 | End: 2022-01-01 | Stop reason: HOSPADM

## 2022-01-01 RX ORDER — ONDANSETRON 4 MG/1
4 TABLET, FILM COATED ORAL EVERY 6 HOURS
Qty: 60 TABLET | Refills: 0 | Status: SHIPPED | OUTPATIENT
Start: 2022-01-01 | End: 2022-05-29

## 2022-01-01 RX ORDER — GUAIFENESIN 600 MG
600 TABLET, EXTENDED RELEASE 12 HR ORAL EVERY 12 HOURS SCHEDULED
Status: DISCONTINUED | OUTPATIENT
Start: 2022-01-01 | End: 2022-01-01

## 2022-01-01 RX ORDER — ALBUMIN (HUMAN) 12.5 G/50ML
12.5 SOLUTION INTRAVENOUS ONCE
Status: COMPLETED | OUTPATIENT
Start: 2022-01-01 | End: 2022-01-01

## 2022-01-01 RX ORDER — WARFARIN SODIUM 5 MG/1
10 TABLET ORAL
Status: DISCONTINUED | OUTPATIENT
Start: 2022-01-01 | End: 2022-01-01 | Stop reason: HOSPADM

## 2022-01-01 RX ORDER — HYDROMORPHONE HCL/PF 1 MG/ML
0.3 SYRINGE (ML) INJECTION
Status: DISCONTINUED | OUTPATIENT
Start: 2022-01-01 | End: 2022-01-01 | Stop reason: HOSPADM

## 2022-01-01 RX ORDER — PANTOPRAZOLE SODIUM 40 MG/1
40 TABLET, DELAYED RELEASE ORAL
Qty: 30 TABLET | Refills: 0 | Status: SHIPPED | OUTPATIENT
Start: 2022-01-01 | End: 2022-01-01

## 2022-01-01 RX ORDER — ALBUTEROL SULFATE 90 UG/1
2 AEROSOL, METERED RESPIRATORY (INHALATION) EVERY 4 HOURS PRN
Qty: 8 G | Refills: 0 | Status: SHIPPED | OUTPATIENT
Start: 2022-01-01 | End: 2022-01-01 | Stop reason: SDUPTHER

## 2022-01-01 RX ORDER — HYDROMORPHONE HCL/PF 1 MG/ML
0.5 SYRINGE (ML) INJECTION
Status: DISCONTINUED | OUTPATIENT
Start: 2022-01-01 | End: 2022-01-01

## 2022-01-01 RX ORDER — SODIUM CHLORIDE 9 MG/ML
100 INJECTION, SOLUTION INTRAVENOUS CONTINUOUS
Status: DISCONTINUED | OUTPATIENT
Start: 2022-01-01 | End: 2022-01-01

## 2022-01-01 RX ORDER — LIDOCAINE 50 MG/G
1 PATCH TOPICAL DAILY
Status: DISCONTINUED | OUTPATIENT
Start: 2022-01-01 | End: 2022-01-01 | Stop reason: HOSPADM

## 2022-01-01 RX ORDER — HEPARIN SODIUM 1000 [USP'U]/ML
3600 INJECTION, SOLUTION INTRAVENOUS; SUBCUTANEOUS ONCE
Status: COMPLETED | OUTPATIENT
Start: 2022-01-01 | End: 2022-01-01

## 2022-01-01 RX ORDER — SODIUM CHLORIDE 9 MG/ML
INJECTION, SOLUTION INTRAVENOUS
Status: COMPLETED | OUTPATIENT
Start: 2022-01-01 | End: 2022-01-01

## 2022-01-01 RX ORDER — POTASSIUM CHLORIDE 20MEQ/15ML
20 LIQUID (ML) ORAL DAILY
Status: DISCONTINUED | OUTPATIENT
Start: 2022-01-01 | End: 2022-01-01 | Stop reason: HOSPADM

## 2022-01-01 RX ORDER — CYCLOBENZAPRINE HCL 10 MG
10 TABLET ORAL 3 TIMES DAILY PRN
Status: DISCONTINUED | OUTPATIENT
Start: 2022-01-01 | End: 2022-01-01 | Stop reason: HOSPADM

## 2022-01-01 RX ORDER — ONDANSETRON 2 MG/ML
INJECTION INTRAMUSCULAR; INTRAVENOUS AS NEEDED
Status: DISCONTINUED | OUTPATIENT
Start: 2022-01-01 | End: 2022-01-01 | Stop reason: HOSPADM

## 2022-01-01 RX ORDER — PANTOPRAZOLE SODIUM 40 MG/1
40 TABLET, DELAYED RELEASE ORAL DAILY
Qty: 30 TABLET | Refills: 0 | Status: SHIPPED | OUTPATIENT
Start: 2022-01-01 | End: 2022-06-13

## 2022-01-01 RX ORDER — POTASSIUM CHLORIDE 29.8 MG/ML
40 INJECTION INTRAVENOUS ONCE
Status: DISCONTINUED | OUTPATIENT
Start: 2022-01-01 | End: 2022-01-01

## 2022-01-01 RX ORDER — HEPARIN SODIUM 1000 [USP'U]/ML
3600 INJECTION, SOLUTION INTRAVENOUS; SUBCUTANEOUS
Status: DISCONTINUED | OUTPATIENT
Start: 2022-01-01 | End: 2022-01-01

## 2022-01-01 RX ORDER — SODIUM CHLORIDE, SODIUM LACTATE, POTASSIUM CHLORIDE, CALCIUM CHLORIDE 600; 310; 30; 20 MG/100ML; MG/100ML; MG/100ML; MG/100ML
75 INJECTION, SOLUTION INTRAVENOUS CONTINUOUS
Status: DISCONTINUED | OUTPATIENT
Start: 2022-01-01 | End: 2022-01-01 | Stop reason: HOSPADM

## 2022-01-01 RX ORDER — MELATONIN
1000 DAILY
Status: DISCONTINUED | OUTPATIENT
Start: 2022-01-01 | End: 2022-01-01 | Stop reason: HOSPADM

## 2022-01-01 RX ORDER — HALOPERIDOL 2 MG/ML
1 SOLUTION ORAL EVERY 2 HOUR PRN
Qty: 30 ML | Refills: 0 | Status: SHIPPED | OUTPATIENT
Start: 2022-01-01

## 2022-01-01 RX ORDER — MORPHINE SULFATE 100 MG/5ML
5 SOLUTION, CONCENTRATE ORAL EVERY 2 HOUR PRN
Qty: 30 ML | Refills: 0 | Status: SHIPPED | OUTPATIENT
Start: 2022-01-01

## 2022-01-01 RX ORDER — WARFARIN SODIUM 5 MG/1
10 TABLET ORAL
Status: COMPLETED | OUTPATIENT
Start: 2022-01-01 | End: 2022-01-01

## 2022-01-01 RX ORDER — ACETAMINOPHEN 325 MG/1
975 TABLET ORAL EVERY 6 HOURS SCHEDULED
Status: DISCONTINUED | OUTPATIENT
Start: 2022-01-01 | End: 2022-01-01

## 2022-01-01 RX ORDER — OXYCODONE HYDROCHLORIDE 5 MG/1
2.5 TABLET ORAL
Status: DISCONTINUED | OUTPATIENT
Start: 2022-01-01 | End: 2022-01-01 | Stop reason: HOSPADM

## 2022-01-01 RX ORDER — HEPARIN SODIUM 5000 [USP'U]/ML
5000 INJECTION, SOLUTION INTRAVENOUS; SUBCUTANEOUS EVERY 8 HOURS SCHEDULED
Status: DISCONTINUED | OUTPATIENT
Start: 2022-01-01 | End: 2022-01-01

## 2022-01-01 RX ORDER — ACETAMINOPHEN 325 MG/1
650 TABLET ORAL EVERY 6 HOURS PRN
Status: DISCONTINUED | OUTPATIENT
Start: 2022-01-01 | End: 2022-01-01

## 2022-01-01 RX ORDER — POTASSIUM CHLORIDE 20 MEQ/1
20 TABLET, EXTENDED RELEASE ORAL ONCE
Status: COMPLETED | OUTPATIENT
Start: 2022-01-01 | End: 2022-01-01

## 2022-01-01 RX ORDER — IPRATROPIUM BROMIDE AND ALBUTEROL SULFATE 2.5; .5 MG/3ML; MG/3ML
3 SOLUTION RESPIRATORY (INHALATION)
Status: DISCONTINUED | OUTPATIENT
Start: 2022-01-01 | End: 2022-01-01

## 2022-01-01 RX ORDER — SODIUM CHLORIDE, SODIUM GLUCONATE, SODIUM ACETATE, POTASSIUM CHLORIDE, MAGNESIUM CHLORIDE, SODIUM PHOSPHATE, DIBASIC, AND POTASSIUM PHOSPHATE .53; .5; .37; .037; .03; .012; .00082 G/100ML; G/100ML; G/100ML; G/100ML; G/100ML; G/100ML; G/100ML
75 INJECTION, SOLUTION INTRAVENOUS CONTINUOUS
Status: DISCONTINUED | OUTPATIENT
Start: 2022-01-01 | End: 2022-01-01

## 2022-01-01 RX ORDER — FUROSEMIDE 10 MG/ML
20 INJECTION INTRAMUSCULAR; INTRAVENOUS ONCE
Status: DISCONTINUED | OUTPATIENT
Start: 2022-01-01 | End: 2022-01-01

## 2022-01-01 RX ORDER — ECHINACEA PURPUREA EXTRACT 125 MG
1 TABLET ORAL
Status: DISCONTINUED | OUTPATIENT
Start: 2022-01-01 | End: 2022-01-01 | Stop reason: HOSPADM

## 2022-01-01 RX ORDER — ALPRAZOLAM 0.25 MG/1
0.25 TABLET ORAL 4 TIMES DAILY PRN
Qty: 15 TABLET | Refills: 0 | Status: SHIPPED | OUTPATIENT
Start: 2022-01-01

## 2022-01-01 RX ORDER — NICOTINE 21 MG/24HR
1 PATCH, TRANSDERMAL 24 HOURS TRANSDERMAL DAILY
Status: DISCONTINUED | OUTPATIENT
Start: 2022-01-01 | End: 2022-01-01

## 2022-01-01 RX ORDER — ACETAMINOPHEN, ASPIRIN AND CAFFEINE 250; 250; 65 MG/1; MG/1; MG/1
2 TABLET, FILM COATED ORAL EVERY 6 HOURS PRN
COMMUNITY

## 2022-01-01 RX ORDER — HEPARIN SODIUM 5000 [USP'U]/ML
5000 INJECTION, SOLUTION INTRAVENOUS; SUBCUTANEOUS EVERY 12 HOURS SCHEDULED
Status: DISCONTINUED | OUTPATIENT
Start: 2022-01-01 | End: 2022-01-01 | Stop reason: HOSPADM

## 2022-01-01 RX ORDER — OXYCODONE HYDROCHLORIDE 5 MG/1
5 TABLET ORAL EVERY 2 HOUR PRN
Status: DISCONTINUED | OUTPATIENT
Start: 2022-01-01 | End: 2022-01-01 | Stop reason: HOSPADM

## 2022-01-01 RX ORDER — FUROSEMIDE 10 MG/ML
40 INJECTION INTRAMUSCULAR; INTRAVENOUS
Status: DISCONTINUED | OUTPATIENT
Start: 2022-01-01 | End: 2022-01-01

## 2022-01-01 RX ADMIN — GUAIFENESIN 1200 MG: 600 TABLET ORAL at 21:56

## 2022-01-01 RX ADMIN — DICLOFENAC SODIUM 2 G: 10 GEL TOPICAL at 21:32

## 2022-01-01 RX ADMIN — UMECLIDINIUM 1 PUFF: 62.5 AEROSOL, POWDER ORAL at 08:54

## 2022-01-01 RX ADMIN — ACETAMINOPHEN 650 MG: 325 TABLET, FILM COATED ORAL at 07:18

## 2022-01-01 RX ADMIN — ALBUMIN (HUMAN) 12.5 G: 0.25 INJECTION, SOLUTION INTRAVENOUS at 20:25

## 2022-01-01 RX ADMIN — REMDESIVIR 100 MG: 100 INJECTION, POWDER, LYOPHILIZED, FOR SOLUTION INTRAVENOUS at 17:19

## 2022-01-01 RX ADMIN — GABAPENTIN 100 MG: 100 CAPSULE ORAL at 16:58

## 2022-01-01 RX ADMIN — SODIUM CHLORIDE SOLN NEBU 3% 4 ML: 3 NEBU SOLN at 13:39

## 2022-01-01 RX ADMIN — FLUTICASONE FUROATE AND VILANTEROL TRIFENATATE 1 PUFF: 100; 25 POWDER RESPIRATORY (INHALATION) at 17:00

## 2022-01-01 RX ADMIN — CEFTRIAXONE SODIUM 1000 MG: 10 INJECTION, POWDER, FOR SOLUTION INTRAVENOUS at 12:02

## 2022-01-01 RX ADMIN — DICLOFENAC SODIUM 2 G: 10 GEL TOPICAL at 12:30

## 2022-01-01 RX ADMIN — APIXABAN 10 MG: 5 TABLET, FILM COATED ORAL at 10:20

## 2022-01-01 RX ADMIN — IPRATROPIUM BROMIDE 0.5 MG: 0.5 SOLUTION RESPIRATORY (INHALATION) at 08:34

## 2022-01-01 RX ADMIN — OXYCODONE HYDROCHLORIDE 2.5 MG: 5 TABLET ORAL at 10:42

## 2022-01-01 RX ADMIN — LEVALBUTEROL HYDROCHLORIDE 1.25 MG: 1.25 SOLUTION, CONCENTRATE RESPIRATORY (INHALATION) at 07:26

## 2022-01-01 RX ADMIN — FUROSEMIDE 40 MG: 10 INJECTION, SOLUTION INTRAMUSCULAR; INTRAVENOUS at 12:33

## 2022-01-01 RX ADMIN — DOXYCYCLINE 100 MG: 100 CAPSULE ORAL at 21:11

## 2022-01-01 RX ADMIN — ALPRAZOLAM 0.25 MG: 0.25 TABLET ORAL at 21:13

## 2022-01-01 RX ADMIN — GUAIFENESIN 600 MG: 600 TABLET ORAL at 09:50

## 2022-01-01 RX ADMIN — SODIUM CHLORIDE, SODIUM GLUCONATE, SODIUM ACETATE, POTASSIUM CHLORIDE, MAGNESIUM CHLORIDE, SODIUM PHOSPHATE, DIBASIC, AND POTASSIUM PHOSPHATE 1000 ML: .53; .5; .37; .037; .03; .012; .00082 INJECTION, SOLUTION INTRAVENOUS at 15:46

## 2022-01-01 RX ADMIN — DICLOFENAC SODIUM 2 G: 10 GEL TOPICAL at 17:24

## 2022-01-01 RX ADMIN — HEPARIN SODIUM 5000 UNITS: 5000 INJECTION INTRAVENOUS; SUBCUTANEOUS at 22:01

## 2022-01-01 RX ADMIN — ALBUTEROL SULFATE 5 MG: 2.5 SOLUTION RESPIRATORY (INHALATION) at 12:39

## 2022-01-01 RX ADMIN — BISMUTH SUBSALICYLATE 524 MG: 525 LIQUID ORAL at 11:12

## 2022-01-01 RX ADMIN — OXYCODONE HYDROCHLORIDE 2.5 MG: 5 TABLET ORAL at 21:12

## 2022-01-01 RX ADMIN — ACETAMINOPHEN 650 MG: 325 TABLET, FILM COATED ORAL at 18:27

## 2022-01-01 RX ADMIN — ALBUTEROL SULFATE 2.5 MG: 2.5 SOLUTION RESPIRATORY (INHALATION) at 13:12

## 2022-01-01 RX ADMIN — COLCHICINE 0.6 MG: 0.6 TABLET ORAL at 17:15

## 2022-01-01 RX ADMIN — GUAIFENESIN 1200 MG: 600 TABLET ORAL at 21:32

## 2022-01-01 RX ADMIN — FLUTICASONE FUROATE AND VILANTEROL TRIFENATATE 1 PUFF: 100; 25 POWDER RESPIRATORY (INHALATION) at 11:12

## 2022-01-01 RX ADMIN — CEFTRIAXONE SODIUM 1000 MG: 10 INJECTION, POWDER, FOR SOLUTION INTRAVENOUS at 12:17

## 2022-01-01 RX ADMIN — SODIUM CHLORIDE, SODIUM GLUCONATE, SODIUM ACETATE, POTASSIUM CHLORIDE, MAGNESIUM CHLORIDE, SODIUM PHOSPHATE, DIBASIC, AND POTASSIUM PHOSPHATE 75 ML/HR: .53; .5; .37; .037; .03; .012; .00082 INJECTION, SOLUTION INTRAVENOUS at 03:14

## 2022-01-01 RX ADMIN — DICLOFENAC SODIUM 2 G: 10 GEL TOPICAL at 20:21

## 2022-01-01 RX ADMIN — ACETAMINOPHEN 650 MG: 325 TABLET, FILM COATED ORAL at 08:55

## 2022-01-01 RX ADMIN — Medication 1000 UNITS: at 09:53

## 2022-01-01 RX ADMIN — GUAIFENESIN 1200 MG: 600 TABLET ORAL at 09:19

## 2022-01-01 RX ADMIN — CEFTRIAXONE SODIUM 1000 MG: 10 INJECTION, POWDER, FOR SOLUTION INTRAVENOUS at 12:09

## 2022-01-01 RX ADMIN — GABAPENTIN 100 MG: 100 CAPSULE ORAL at 17:04

## 2022-01-01 RX ADMIN — COLCHICINE 0.6 MG: 0.6 TABLET ORAL at 08:54

## 2022-01-01 RX ADMIN — DEXAMETHASONE SODIUM PHOSPHATE 6 MG: 4 INJECTION, SOLUTION INTRAMUSCULAR; INTRAVENOUS at 16:42

## 2022-01-01 RX ADMIN — FLUTICASONE FUROATE AND VILANTEROL TRIFENATATE 1 PUFF: 100; 25 POWDER RESPIRATORY (INHALATION) at 10:01

## 2022-01-01 RX ADMIN — HEPARIN SODIUM 5000 UNITS: 5000 INJECTION INTRAVENOUS; SUBCUTANEOUS at 13:23

## 2022-01-01 RX ADMIN — FAMOTIDINE 20 MG: 10 INJECTION, SOLUTION INTRAVENOUS at 10:12

## 2022-01-01 RX ADMIN — SODIUM CHLORIDE 500 ML: 0.9 INJECTION, SOLUTION INTRAVENOUS at 18:26

## 2022-01-01 RX ADMIN — ALBUTEROL SULFATE 2 PUFF: 90 AEROSOL, METERED RESPIRATORY (INHALATION) at 21:57

## 2022-01-01 RX ADMIN — POTASSIUM CHLORIDE 20 MEQ: 20 SOLUTION ORAL at 10:00

## 2022-01-01 RX ADMIN — LEVALBUTEROL HYDROCHLORIDE 1.25 MG: 1.25 SOLUTION RESPIRATORY (INHALATION) at 08:32

## 2022-01-01 RX ADMIN — POTASSIUM CHLORIDE 20 MEQ: 20 SOLUTION ORAL at 17:04

## 2022-01-01 RX ADMIN — ENOXAPARIN SODIUM 50 MG: 60 INJECTION SUBCUTANEOUS at 09:34

## 2022-01-01 RX ADMIN — SODIUM CHLORIDE SOLN NEBU 3% 4 ML: 3 NEBU SOLN at 14:47

## 2022-01-01 RX ADMIN — DICLOFENAC SODIUM 2 G: 10 GEL TOPICAL at 21:33

## 2022-01-01 RX ADMIN — ACETAMINOPHEN 975 MG: 325 TABLET, FILM COATED ORAL at 23:34

## 2022-01-01 RX ADMIN — BISMUTH SUBSALICYLATE 524 MG: 525 LIQUID ORAL at 20:25

## 2022-01-01 RX ADMIN — HYDROMORPHONE HYDROCHLORIDE 0.5 MG: 1 INJECTION, SOLUTION INTRAMUSCULAR; INTRAVENOUS; SUBCUTANEOUS at 01:16

## 2022-01-01 RX ADMIN — DOXYCYCLINE 100 MG: 100 CAPSULE ORAL at 09:54

## 2022-01-01 RX ADMIN — ACETAMINOPHEN 650 MG: 325 TABLET, FILM COATED ORAL at 05:49

## 2022-01-01 RX ADMIN — SODIUM CHLORIDE, SODIUM LACTATE, POTASSIUM CHLORIDE, AND CALCIUM CHLORIDE 100 ML/HR: .6; .31; .03; .02 INJECTION, SOLUTION INTRAVENOUS at 12:30

## 2022-01-01 RX ADMIN — ALBUMIN (HUMAN) 12.5 G: 0.25 INJECTION, SOLUTION INTRAVENOUS at 14:12

## 2022-01-01 RX ADMIN — LEVALBUTEROL HYDROCHLORIDE 1.25 MG: 1.25 SOLUTION, CONCENTRATE RESPIRATORY (INHALATION) at 14:47

## 2022-01-01 RX ADMIN — IPRATROPIUM BROMIDE 0.5 MG: 0.5 SOLUTION RESPIRATORY (INHALATION) at 12:39

## 2022-01-01 RX ADMIN — GABAPENTIN 100 MG: 100 CAPSULE ORAL at 17:18

## 2022-01-01 RX ADMIN — DICLOFENAC SODIUM 2 G: 10 GEL TOPICAL at 21:28

## 2022-01-01 RX ADMIN — HEPARIN SODIUM 5000 UNITS: 5000 INJECTION INTRAVENOUS; SUBCUTANEOUS at 20:32

## 2022-01-01 RX ADMIN — ACETAMINOPHEN 650 MG: 325 TABLET, FILM COATED ORAL at 16:05

## 2022-01-01 RX ADMIN — ALBUTEROL SULFATE 2 PUFF: 90 AEROSOL, METERED RESPIRATORY (INHALATION) at 12:59

## 2022-01-01 RX ADMIN — ENOXAPARIN SODIUM 50 MG: 60 INJECTION SUBCUTANEOUS at 16:10

## 2022-01-01 RX ADMIN — LEVALBUTEROL HYDROCHLORIDE 1.25 MG: 1.25 SOLUTION, CONCENTRATE RESPIRATORY (INHALATION) at 19:10

## 2022-01-01 RX ADMIN — HEPARIN SODIUM AND DEXTROSE 12 UNITS/KG/HR: 10000; 5 INJECTION INTRAVENOUS at 08:30

## 2022-01-01 RX ADMIN — SODIUM CHLORIDE SOLN NEBU 3% 4 ML: 3 NEBU SOLN at 07:26

## 2022-01-01 RX ADMIN — FLUTICASONE FUROATE AND VILANTEROL TRIFENATATE 1 PUFF: 100; 25 POWDER RESPIRATORY (INHALATION) at 09:12

## 2022-01-01 RX ADMIN — GUAIFENESIN 600 MG: 600 TABLET ORAL at 08:39

## 2022-01-01 RX ADMIN — HEPARIN SODIUM 5000 UNITS: 5000 INJECTION INTRAVENOUS; SUBCUTANEOUS at 04:45

## 2022-01-01 RX ADMIN — CEFTRIAXONE SODIUM 1000 MG: 10 INJECTION, POWDER, FOR SOLUTION INTRAVENOUS at 12:21

## 2022-01-01 RX ADMIN — ALBUTEROL SULFATE 2 PUFF: 90 AEROSOL, METERED RESPIRATORY (INHALATION) at 01:48

## 2022-01-01 RX ADMIN — DICLOFENAC SODIUM 2 G: 10 GEL TOPICAL at 22:18

## 2022-01-01 RX ADMIN — DEXAMETHASONE SODIUM PHOSPHATE 6 MG: 4 INJECTION, SOLUTION INTRAMUSCULAR; INTRAVENOUS at 15:29

## 2022-01-01 RX ADMIN — REMDESIVIR 200 MG: 100 INJECTION, POWDER, LYOPHILIZED, FOR SOLUTION INTRAVENOUS at 17:19

## 2022-01-01 RX ADMIN — WARFARIN SODIUM 10 MG: 5 TABLET ORAL at 17:35

## 2022-01-01 RX ADMIN — GUAIFENESIN 1200 MG: 600 TABLET ORAL at 09:00

## 2022-01-01 RX ADMIN — CEFTRIAXONE SODIUM 1000 MG: 10 INJECTION, POWDER, FOR SOLUTION INTRAVENOUS at 12:24

## 2022-01-01 RX ADMIN — CEFTRIAXONE SODIUM 1000 MG: 10 INJECTION, POWDER, FOR SOLUTION INTRAVENOUS at 12:30

## 2022-01-01 RX ADMIN — SODIUM CHLORIDE 100 ML/HR: 0.9 INJECTION, SOLUTION INTRAVENOUS at 06:38

## 2022-01-01 RX ADMIN — ALBUTEROL SULFATE 2 PUFF: 90 AEROSOL, METERED RESPIRATORY (INHALATION) at 05:47

## 2022-01-01 RX ADMIN — DEXAMETHASONE SODIUM PHOSPHATE 6 MG: 4 INJECTION, SOLUTION INTRAMUSCULAR; INTRAVENOUS at 14:28

## 2022-01-01 RX ADMIN — Medication 1000 UNITS: at 10:00

## 2022-01-01 RX ADMIN — OXYCODONE HYDROCHLORIDE 2.5 MG: 5 TABLET ORAL at 12:31

## 2022-01-01 RX ADMIN — LEVALBUTEROL HYDROCHLORIDE 1.25 MG: 1.25 SOLUTION, CONCENTRATE RESPIRATORY (INHALATION) at 21:01

## 2022-01-01 RX ADMIN — GABAPENTIN 100 MG: 100 CAPSULE ORAL at 15:29

## 2022-01-01 RX ADMIN — ACETAMINOPHEN 650 MG: 325 TABLET, FILM COATED ORAL at 18:03

## 2022-01-01 RX ADMIN — CEFTRIAXONE SODIUM 1000 MG: 10 INJECTION, POWDER, FOR SOLUTION INTRAVENOUS at 12:18

## 2022-01-01 RX ADMIN — SODIUM CHLORIDE, SODIUM GLUCONATE, SODIUM ACETATE, POTASSIUM CHLORIDE, MAGNESIUM CHLORIDE, SODIUM PHOSPHATE, DIBASIC, AND POTASSIUM PHOSPHATE 50 ML/HR: .53; .5; .37; .037; .03; .012; .00082 INJECTION, SOLUTION INTRAVENOUS at 01:08

## 2022-01-01 RX ADMIN — ALBUTEROL SULFATE 2 PUFF: 90 AEROSOL, METERED RESPIRATORY (INHALATION) at 16:58

## 2022-01-01 RX ADMIN — IPRATROPIUM BROMIDE 0.5 MG: 0.5 SOLUTION RESPIRATORY (INHALATION) at 19:54

## 2022-01-01 RX ADMIN — DICLOFENAC SODIUM 2 G: 10 GEL TOPICAL at 09:12

## 2022-01-01 RX ADMIN — GUAIFENESIN 1200 MG: 600 TABLET ORAL at 09:20

## 2022-01-01 RX ADMIN — GUAIFENESIN 1200 MG: 600 TABLET ORAL at 08:39

## 2022-01-01 RX ADMIN — DEXAMETHASONE SODIUM PHOSPHATE 6 MG: 4 INJECTION, SOLUTION INTRAMUSCULAR; INTRAVENOUS at 16:09

## 2022-01-01 RX ADMIN — HEPARIN SODIUM 5000 UNITS: 5000 INJECTION INTRAVENOUS; SUBCUTANEOUS at 21:20

## 2022-01-01 RX ADMIN — ACETAMINOPHEN 650 MG: 325 TABLET, FILM COATED ORAL at 12:31

## 2022-01-01 RX ADMIN — OXYCODONE HYDROCHLORIDE 5 MG: 5 TABLET ORAL at 13:49

## 2022-01-01 RX ADMIN — CEFTRIAXONE SODIUM 1000 MG: 10 INJECTION, POWDER, FOR SOLUTION INTRAVENOUS at 13:04

## 2022-01-01 RX ADMIN — HEPARIN SODIUM 5000 UNITS: 5000 INJECTION INTRAVENOUS; SUBCUTANEOUS at 17:09

## 2022-01-01 RX ADMIN — HEPARIN SODIUM 5000 UNITS: 5000 INJECTION INTRAVENOUS; SUBCUTANEOUS at 05:03

## 2022-01-01 RX ADMIN — OXYCODONE HYDROCHLORIDE 2.5 MG: 5 TABLET ORAL at 07:50

## 2022-01-01 RX ADMIN — OXYCODONE HYDROCHLORIDE 2.5 MG: 5 TABLET ORAL at 17:18

## 2022-01-01 RX ADMIN — PANTOPRAZOLE SODIUM 40 MG: 40 TABLET, DELAYED RELEASE ORAL at 05:49

## 2022-01-01 RX ADMIN — DEXAMETHASONE SODIUM PHOSPHATE 6 MG: 4 INJECTION, SOLUTION INTRAMUSCULAR; INTRAVENOUS at 15:19

## 2022-01-01 RX ADMIN — GUAIFENESIN 1200 MG: 600 TABLET ORAL at 20:18

## 2022-01-01 RX ADMIN — POTASSIUM CHLORIDE 20 MEQ: 14.9 INJECTION, SOLUTION INTRAVENOUS at 14:33

## 2022-01-01 RX ADMIN — PANTOPRAZOLE SODIUM 40 MG: 40 TABLET, DELAYED RELEASE ORAL at 05:34

## 2022-01-01 RX ADMIN — IOHEXOL 70 ML: 350 INJECTION, SOLUTION INTRAVENOUS at 21:08

## 2022-01-01 RX ADMIN — PANTOPRAZOLE SODIUM 40 MG: 40 TABLET, DELAYED RELEASE ORAL at 06:06

## 2022-01-01 RX ADMIN — GABAPENTIN 100 MG: 100 CAPSULE ORAL at 22:01

## 2022-01-01 RX ADMIN — DICLOFENAC SODIUM 2 G: 10 GEL TOPICAL at 19:17

## 2022-01-01 RX ADMIN — SODIUM CHLORIDE, SODIUM GLUCONATE, SODIUM ACETATE, POTASSIUM CHLORIDE, MAGNESIUM CHLORIDE, SODIUM PHOSPHATE, DIBASIC, AND POTASSIUM PHOSPHATE 75 ML/HR: .53; .5; .37; .037; .03; .012; .00082 INJECTION, SOLUTION INTRAVENOUS at 16:59

## 2022-01-01 RX ADMIN — DICLOFENAC SODIUM 2 G: 10 GEL TOPICAL at 08:39

## 2022-01-01 RX ADMIN — SODIUM CHLORIDE SOLN NEBU 3% 4 ML: 3 NEBU SOLN at 21:01

## 2022-01-01 RX ADMIN — ENOXAPARIN SODIUM 50 MG: 60 INJECTION SUBCUTANEOUS at 21:05

## 2022-01-01 RX ADMIN — UMECLIDINIUM 1 PUFF: 62.5 AEROSOL, POWDER ORAL at 12:18

## 2022-01-01 RX ADMIN — SODIUM CHLORIDE SOLN NEBU 3% 4 ML: 3 NEBU SOLN at 08:20

## 2022-01-01 RX ADMIN — GABAPENTIN 100 MG: 100 CAPSULE ORAL at 08:42

## 2022-01-01 RX ADMIN — DICLOFENAC SODIUM 2 G: 10 GEL TOPICAL at 17:01

## 2022-01-01 RX ADMIN — ALBUMIN (HUMAN) 12.5 G: 12.5 INJECTION, SOLUTION INTRAVENOUS at 22:10

## 2022-01-01 RX ADMIN — SODIUM CHLORIDE 1000 ML: 0.9 INJECTION, SOLUTION INTRAVENOUS at 12:39

## 2022-01-01 RX ADMIN — COLCHICINE 0.6 MG: 0.6 TABLET ORAL at 08:42

## 2022-01-01 RX ADMIN — HEPARIN SODIUM 5000 UNITS: 5000 INJECTION INTRAVENOUS; SUBCUTANEOUS at 10:01

## 2022-01-01 RX ADMIN — WARFARIN SODIUM 5 MG: 5 TABLET ORAL at 17:30

## 2022-01-01 RX ADMIN — POTASSIUM CHLORIDE 20 MEQ: 14.9 INJECTION, SOLUTION INTRAVENOUS at 17:04

## 2022-01-01 RX ADMIN — ACETAMINOPHEN 650 MG: 325 TABLET, FILM COATED ORAL at 17:04

## 2022-01-01 RX ADMIN — DEXAMETHASONE SODIUM PHOSPHATE 6 MG: 4 INJECTION, SOLUTION INTRAMUSCULAR; INTRAVENOUS at 16:53

## 2022-01-01 RX ADMIN — DICLOFENAC SODIUM 2 G: 10 GEL TOPICAL at 09:19

## 2022-01-01 RX ADMIN — REMDESIVIR 100 MG: 100 INJECTION, POWDER, LYOPHILIZED, FOR SOLUTION INTRAVENOUS at 17:54

## 2022-01-01 RX ADMIN — ONDANSETRON 4 MG: 2 INJECTION INTRAMUSCULAR; INTRAVENOUS at 10:59

## 2022-01-01 RX ADMIN — WARFARIN SODIUM 10 MG: 5 TABLET ORAL at 17:01

## 2022-01-01 RX ADMIN — ACETAMINOPHEN 975 MG: 325 TABLET, FILM COATED ORAL at 12:18

## 2022-01-01 RX ADMIN — HYDROMORPHONE HYDROCHLORIDE 0.5 MG: 1 INJECTION, SOLUTION INTRAMUSCULAR; INTRAVENOUS; SUBCUTANEOUS at 19:13

## 2022-01-01 RX ADMIN — HEPARIN SODIUM 5000 UNITS: 5000 INJECTION INTRAVENOUS; SUBCUTANEOUS at 21:42

## 2022-01-01 RX ADMIN — SODIUM CHLORIDE 100 ML/HR: 0.9 INJECTION, SOLUTION INTRAVENOUS at 14:28

## 2022-01-01 RX ADMIN — LIDOCAINE 5% 1 PATCH: 700 PATCH TOPICAL at 12:53

## 2022-01-01 RX ADMIN — OXYCODONE HYDROCHLORIDE 5 MG: 5 TABLET ORAL at 03:24

## 2022-01-01 RX ADMIN — Medication 1 PATCH: at 16:10

## 2022-01-01 RX ADMIN — ACETAMINOPHEN 650 MG: 325 TABLET, FILM COATED ORAL at 07:50

## 2022-01-01 RX ADMIN — LEVALBUTEROL HYDROCHLORIDE 1.25 MG: 1.25 SOLUTION, CONCENTRATE RESPIRATORY (INHALATION) at 13:39

## 2022-01-01 RX ADMIN — DICLOFENAC SODIUM 2 G: 10 GEL TOPICAL at 12:31

## 2022-01-01 RX ADMIN — GUAIFENESIN 600 MG: 600 TABLET ORAL at 17:55

## 2022-01-01 RX ADMIN — GUAIFENESIN 1200 MG: 600 TABLET ORAL at 20:03

## 2022-01-01 RX ADMIN — DICLOFENAC SODIUM 2 G: 10 GEL TOPICAL at 08:47

## 2022-01-01 RX ADMIN — FLUTICASONE FUROATE AND VILANTEROL TRIFENATATE 1 PUFF: 100; 25 POWDER RESPIRATORY (INHALATION) at 09:54

## 2022-01-01 RX ADMIN — SODIUM CHLORIDE SOLN NEBU 3% 4 ML: 3 NEBU SOLN at 21:20

## 2022-01-01 RX ADMIN — PANTOPRAZOLE SODIUM 40 MG: 40 TABLET, DELAYED RELEASE ORAL at 06:19

## 2022-01-01 RX ADMIN — PANTOPRAZOLE SODIUM 40 MG: 40 TABLET, DELAYED RELEASE ORAL at 04:45

## 2022-01-01 RX ADMIN — REMDESIVIR 100 MG: 100 INJECTION, POWDER, LYOPHILIZED, FOR SOLUTION INTRAVENOUS at 16:47

## 2022-01-01 RX ADMIN — HEPARIN SODIUM 5000 UNITS: 5000 INJECTION INTRAVENOUS; SUBCUTANEOUS at 05:35

## 2022-01-01 RX ADMIN — DEXAMETHASONE SODIUM PHOSPHATE 6 MG: 4 INJECTION, SOLUTION INTRAMUSCULAR; INTRAVENOUS at 16:01

## 2022-01-01 RX ADMIN — DOXYCYCLINE 100 MG: 100 CAPSULE ORAL at 10:01

## 2022-01-01 RX ADMIN — DEXAMETHASONE SODIUM PHOSPHATE 6 MG: 4 INJECTION, SOLUTION INTRAMUSCULAR; INTRAVENOUS at 17:19

## 2022-01-01 RX ADMIN — DICLOFENAC SODIUM 2 G: 10 GEL TOPICAL at 21:57

## 2022-01-01 RX ADMIN — DOXYCYCLINE 100 MG: 100 CAPSULE ORAL at 12:17

## 2022-01-01 RX ADMIN — PANTOPRAZOLE SODIUM 40 MG: 40 TABLET, DELAYED RELEASE ORAL at 06:20

## 2022-01-01 RX ADMIN — OXYCODONE HYDROCHLORIDE 2.5 MG: 5 TABLET ORAL at 12:02

## 2022-01-01 RX ADMIN — ALBUTEROL SULFATE 2 PUFF: 90 AEROSOL, METERED RESPIRATORY (INHALATION) at 23:38

## 2022-01-01 RX ADMIN — SODIUM CHLORIDE, SODIUM LACTATE, POTASSIUM CHLORIDE, AND CALCIUM CHLORIDE 75 ML/HR: .6; .31; .03; .02 INJECTION, SOLUTION INTRAVENOUS at 12:22

## 2022-01-01 RX ADMIN — DICLOFENAC SODIUM 2 G: 10 GEL TOPICAL at 21:05

## 2022-01-01 RX ADMIN — ACETAMINOPHEN 650 MG: 325 TABLET, FILM COATED ORAL at 20:01

## 2022-01-01 RX ADMIN — SODIUM CHLORIDE 1000 ML: 0.9 INJECTION, SOLUTION INTRAVENOUS at 10:32

## 2022-01-01 RX ADMIN — ONDANSETRON 4 MG: 2 INJECTION INTRAMUSCULAR; INTRAVENOUS at 17:18

## 2022-01-01 RX ADMIN — LEVALBUTEROL HYDROCHLORIDE 1.25 MG: 1.25 SOLUTION, CONCENTRATE RESPIRATORY (INHALATION) at 21:21

## 2022-01-01 RX ADMIN — LIDOCAINE 5% 1 PATCH: 700 PATCH TOPICAL at 08:00

## 2022-01-01 RX ADMIN — SODIUM CHLORIDE 100 ML/HR: 0.9 INJECTION, SOLUTION INTRAVENOUS at 19:55

## 2022-01-01 RX ADMIN — SODIUM CHLORIDE SOLN NEBU 3% 4 ML: 3 NEBU SOLN at 19:10

## 2022-01-01 RX ADMIN — OXYCODONE HYDROCHLORIDE 2.5 MG: 5 TABLET ORAL at 12:52

## 2022-01-01 RX ADMIN — HEPARIN SODIUM 3600 UNITS: 1000 INJECTION INTRAVENOUS; SUBCUTANEOUS at 12:03

## 2022-01-01 RX ADMIN — GUAIFENESIN 1200 MG: 600 TABLET ORAL at 10:20

## 2022-01-01 RX ADMIN — HEPARIN SODIUM 5000 UNITS: 5000 INJECTION INTRAVENOUS; SUBCUTANEOUS at 12:53

## 2022-01-01 RX ADMIN — ONDANSETRON 4 MG: 2 INJECTION INTRAMUSCULAR; INTRAVENOUS at 06:08

## 2022-01-01 RX ADMIN — OXYCODONE HYDROCHLORIDE 2.5 MG: 5 TABLET ORAL at 16:58

## 2022-01-01 RX ADMIN — HEPARIN SODIUM AND DEXTROSE 18 UNITS/KG/HR: 10000; 5 INJECTION INTRAVENOUS at 16:44

## 2022-01-01 RX ADMIN — DEXAMETHASONE SODIUM PHOSPHATE 6 MG: 4 INJECTION, SOLUTION INTRAMUSCULAR; INTRAVENOUS at 14:31

## 2022-01-01 RX ADMIN — ONDANSETRON 4 MG: 2 INJECTION INTRAMUSCULAR; INTRAVENOUS at 05:06

## 2022-01-01 RX ADMIN — DICLOFENAC SODIUM 2 G: 10 GEL TOPICAL at 12:41

## 2022-01-01 RX ADMIN — Medication 3 MG: at 21:42

## 2022-01-01 RX ADMIN — METHYLPREDNISOLONE SODIUM SUCCINATE 125 MG: 125 INJECTION, POWDER, FOR SOLUTION INTRAMUSCULAR; INTRAVENOUS at 12:40

## 2022-01-01 RX ADMIN — WARFARIN SODIUM 10 MG: 5 TABLET ORAL at 20:18

## 2022-01-01 RX ADMIN — POTASSIUM CHLORIDE 20 MEQ: 20 TABLET, EXTENDED RELEASE ORAL at 10:00

## 2022-01-01 RX ADMIN — FUROSEMIDE 40 MG: 10 INJECTION, SOLUTION INTRAMUSCULAR; INTRAVENOUS at 17:54

## 2022-01-01 RX ADMIN — GABAPENTIN 100 MG: 100 CAPSULE ORAL at 21:13

## 2022-01-01 RX ADMIN — GUAIFENESIN 1200 MG: 600 TABLET ORAL at 08:42

## 2022-01-01 RX ADMIN — DICLOFENAC SODIUM 2 G: 10 GEL TOPICAL at 09:55

## 2022-01-01 RX ADMIN — ACETAMINOPHEN 975 MG: 325 TABLET, FILM COATED ORAL at 05:07

## 2022-01-01 RX ADMIN — GUAIFENESIN 600 MG: 600 TABLET ORAL at 20:01

## 2022-01-01 RX ADMIN — REMDESIVIR 100 MG: 100 INJECTION, POWDER, LYOPHILIZED, FOR SOLUTION INTRAVENOUS at 15:32

## 2022-01-01 RX ADMIN — DICLOFENAC SODIUM 2 G: 10 GEL TOPICAL at 17:31

## 2022-01-01 RX ADMIN — FLUTICASONE FUROATE AND VILANTEROL TRIFENATATE 1 PUFF: 100; 25 POWDER RESPIRATORY (INHALATION) at 08:47

## 2022-01-01 RX ADMIN — DICLOFENAC SODIUM 2 G: 10 GEL TOPICAL at 09:00

## 2022-01-01 RX ADMIN — GUAIFENESIN 1200 MG: 600 TABLET ORAL at 09:12

## 2022-01-01 RX ADMIN — CYCLOBENZAPRINE HYDROCHLORIDE 10 MG: 10 TABLET, FILM COATED ORAL at 16:38

## 2022-01-01 RX ADMIN — DOXYCYCLINE 100 MG: 100 CAPSULE ORAL at 12:30

## 2022-01-01 RX ADMIN — DICLOFENAC SODIUM 2 G: 10 GEL TOPICAL at 13:04

## 2022-01-01 RX ADMIN — GABAPENTIN 100 MG: 100 CAPSULE ORAL at 08:00

## 2022-01-01 RX ADMIN — ACETAMINOPHEN 650 MG: 325 TABLET, FILM COATED ORAL at 01:32

## 2022-01-01 RX ADMIN — LEVALBUTEROL HYDROCHLORIDE 1.25 MG: 1.25 SOLUTION RESPIRATORY (INHALATION) at 19:54

## 2022-01-01 RX ADMIN — GUAIFENESIN 1200 MG: 600 TABLET ORAL at 21:05

## 2022-01-01 RX ADMIN — DICLOFENAC SODIUM 2 G: 10 GEL TOPICAL at 17:55

## 2022-01-01 RX ADMIN — ONDANSETRON 4 MG: 2 INJECTION INTRAMUSCULAR; INTRAVENOUS at 07:50

## 2022-01-01 RX ADMIN — ACETAMINOPHEN 650 MG: 325 TABLET, FILM COATED ORAL at 12:02

## 2022-01-01 RX ADMIN — ONDANSETRON 4 MG: 2 INJECTION INTRAMUSCULAR; INTRAVENOUS at 08:56

## 2022-01-01 RX ADMIN — COLCHICINE 0.6 MG: 0.6 TABLET ORAL at 17:18

## 2022-01-01 RX ADMIN — GABAPENTIN 100 MG: 100 CAPSULE ORAL at 08:54

## 2022-01-01 RX ADMIN — DICLOFENAC SODIUM 2 G: 10 GEL TOPICAL at 12:09

## 2022-01-01 RX ADMIN — DOXYCYCLINE 100 MG: 100 CAPSULE ORAL at 21:28

## 2022-01-01 RX ADMIN — ALBUTEROL SULFATE 2 PUFF: 90 AEROSOL, METERED RESPIRATORY (INHALATION) at 08:37

## 2022-01-01 RX ADMIN — SODIUM POLYSTYRENE SULFONATE 15 G: 1 POWDER ORAL; RECTAL at 09:00

## 2022-01-01 RX ADMIN — DICLOFENAC SODIUM 2 G: 10 GEL TOPICAL at 10:21

## 2022-01-01 RX ADMIN — DOXYCYCLINE 100 MG: 100 CAPSULE ORAL at 22:18

## 2022-01-01 RX ADMIN — GUAIFENESIN 1200 MG: 600 TABLET ORAL at 20:28

## 2022-01-01 RX ADMIN — FLUTICASONE FUROATE AND VILANTEROL TRIFENATATE 1 PUFF: 100; 25 POWDER RESPIRATORY (INHALATION) at 08:54

## 2022-01-01 RX ADMIN — DICLOFENAC SODIUM 2 G: 10 GEL TOPICAL at 09:51

## 2022-01-01 RX ADMIN — PANTOPRAZOLE SODIUM 40 MG: 40 TABLET, DELAYED RELEASE ORAL at 06:35

## 2022-01-01 RX ADMIN — PANTOPRAZOLE SODIUM 40 MG: 40 TABLET, DELAYED RELEASE ORAL at 06:26

## 2022-01-01 RX ADMIN — ENOXAPARIN SODIUM 50 MG: 60 INJECTION SUBCUTANEOUS at 12:16

## 2022-01-01 RX ADMIN — FLUTICASONE FUROATE AND VILANTEROL TRIFENATATE 1 PUFF: 100; 25 POWDER RESPIRATORY (INHALATION) at 08:38

## 2022-01-01 RX ADMIN — OXYCODONE HYDROCHLORIDE 2.5 MG: 5 TABLET ORAL at 17:04

## 2022-01-01 RX ADMIN — ACETAMINOPHEN 975 MG: 325 TABLET, FILM COATED ORAL at 17:15

## 2022-01-01 RX ADMIN — DOXYCYCLINE 100 MG: 100 CAPSULE ORAL at 21:30

## 2022-01-01 RX ADMIN — ENOXAPARIN SODIUM 50 MG: 60 INJECTION SUBCUTANEOUS at 08:43

## 2022-01-01 RX ADMIN — LEVALBUTEROL HYDROCHLORIDE 1.25 MG: 1.25 SOLUTION, CONCENTRATE RESPIRATORY (INHALATION) at 08:20

## 2022-01-01 RX ADMIN — APIXABAN 10 MG: 5 TABLET, FILM COATED ORAL at 16:37

## 2022-01-01 RX ADMIN — ENOXAPARIN SODIUM 50 MG: 60 INJECTION SUBCUTANEOUS at 21:56

## 2022-01-01 RX ADMIN — DOXYCYCLINE 100 MG: 100 CAPSULE ORAL at 12:08

## 2022-01-01 RX ADMIN — HEPARIN SODIUM 3600 UNITS: 1000 INJECTION INTRAVENOUS; SUBCUTANEOUS at 16:45

## 2022-01-01 RX ADMIN — ACETAMINOPHEN 650 MG: 325 TABLET, FILM COATED ORAL at 20:45

## 2022-01-01 RX ADMIN — PANTOPRAZOLE SODIUM 40 MG: 40 TABLET, DELAYED RELEASE ORAL at 05:03

## 2022-01-01 RX ADMIN — IOHEXOL 100 ML: 350 INJECTION, SOLUTION INTRAVENOUS at 15:00

## 2022-01-20 PROBLEM — J12.82 PNEUMONIA DUE TO COVID-19 VIRUS: Status: ACTIVE | Noted: 2022-01-01

## 2022-01-20 PROBLEM — Z72.0 TOBACCO USE: Status: ACTIVE | Noted: 2022-01-01

## 2022-01-20 PROBLEM — J96.01 ACUTE RESPIRATORY FAILURE WITH HYPOXIA (HCC): Status: ACTIVE | Noted: 2022-01-01

## 2022-01-20 PROBLEM — E43 SEVERE PROTEIN-CALORIE MALNUTRITION (HCC): Status: ACTIVE | Noted: 2022-01-01

## 2022-01-20 PROBLEM — U07.1 PNEUMONIA DUE TO COVID-19 VIRUS: Status: ACTIVE | Noted: 2022-01-01

## 2022-01-20 NOTE — ASSESSMENT & PLAN NOTE
- presented with several days of worsening dyspnea on exertion, shortness of breath, cough, generalized weakness, fatigue, and decreased appetite  - tested positive on presentation 01/20/2022    - currently requiring 3 L nasal cannula O2; will be initiated on mild-pathway treatment   - IV remdesivir x5 days  - IV Decadron 6 mg daily times 10 days  - weight based Lovenox given D-dimer  - airborne and contact precautions  - incentive spirometry, out of bed as tolerated, PT/OT eval, prone positioning as tolerated

## 2022-01-20 NOTE — ASSESSMENT & PLAN NOTE
- frail and cachectic appearing on admission;  says she has been that way for years  - does have chronic history of smoking but reports no recent weight loss    - nutrition consult  - mealtime supplementation

## 2022-01-20 NOTE — H&P
[address] [place] [address]  H&P- Shawna Payne 1950, 66 y o  female MRN: [id number]  Unit/Bed#: ED 24 Encounter: [id number]  Primary Care Provider: No primary care provider on file  Date and time admitted to hospital: 1/20/2022 12:01 PM    * Pneumonia due to COVID-19 virus  Assessment & Plan  - presented with several days of worsening dyspnea on exertion, shortness of breath, cough, generalized weakness, fatigue, and decreased appetite  - tested positive on presentation 01/20/2022    - currently requiring 3 L nasal cannula O2; will be initiated on mild-pathway treatment   - IV remdesivir x5 days  - IV Decadron 6 mg daily times 10 days  - weight based Lovenox given D-dimer  - airborne and contact precautions  - incentive spirometry, out of bed as tolerated, PT/OT eval, prone positioning as tolerated    Severe protein-calorie malnutrition ([facility] [address] )  Assessment & Plan  - frail and cachectic appearing on admission;  says she has been that way for years  - does have chronic history of smoking but reports no recent weight loss    - nutrition consult  - mealtime supplementation    Tobacco use  Assessment & Plan  - active smoker for many decades  - nicotine patch ordered    Acute respiratory failure with hypoxia ([facility] [address] )  Assessment & Plan  - secondary to COVID-19 pneumonia  - given her smoking history would not be surprised if she has some underlying COPD as well    - wean O2 as tolerated  - treatment as above  VTE Pharmacologic Prophylaxis: VTE Score: 5 High Risk (Score >/= 5) - Pharmacological DVT Prophylaxis Ordered: enoxaparin (Lovenox)  Sequential Compression Devices Ordered  Code Status: Level 1 - Full Code   Discussion with family: Updated  () at bedside  Anticipated Length of Stay: Patient will be admitted on an inpatient basis with an anticipated length of stay of greater than 2 midnights secondary to COVID 19 PNA and hypoxic respiratory failure      Total Time for Visit, including Counseling / Coordination of Care: 45 minutes Greater than 50% of this total time spent on direct patient counseling and coordination of care  Chief Complaint: SOB    History of Present Illness:  Juana Cruz is a 66 y o  female with a PMH of tobacco abuse, who presents to the [facility] ER on 01/20/2022 with multiple complaints  She presents with several days of worsening shortness of breath, dyspnea on exertion, decreased appetite, generalized fatigue and weakness, and diarrhea  Her  recently had COVID-23  In the emergency room she tested positive for COVID-19 as well  She was also found to be hypoxic on arrival and required 3 L supplemental O2  During my evaluation she was in no obvious respiratory distress but she was ill-appearing  She stated that she did not even have enough strength to stand up without feeling like she would pass out  She will be admitted to the hospital service for further evaluation and management of COVID-19 pneumonia and hypoxic respiratory failure  REVIEW OF SYSTEMS  General Denies fevers or chills  Positive for generalized weakness  Positive for fatigue  HEENT Denies hearing or vision changes  Cardiovascular Denies chest pain  Positive for chronic lower extremity swelling    Denies palpitations  Denies dyspnea on exertion  Respiratory Positive for cough  Positive for shortness of breath  Positive for dyspnea on exertion  Genitourinary Denies hematuria  Denies dysuria  Denies difficulty voiding  Denies incontinence  Gastrointestinal Denies nausea, vomiting, or diarrhea  Denies hematochezia, melena, or hematemesis  Musculoskeletal Denies arthralgias or myalgias  Denies joint swelling  Psychiatric  Denies changes in mood  Denies anxiety or depression  Neurologic Denies headache  Positive for lightheadedness and dizziness   Denies numbness/tingling  Denies weakness  Endocrine Denies weight loss or weight gain  Denies excessive thirst, sweating, urination  Past Medical and Surgical History:   History reviewed  No pertinent past medical history  History reviewed  No pertinent surgical history  Meds/Allergies:  Prior to Admission medications    Not on File     I have reviewed home medications with patient personally  Allergies: No Known Allergies    Social History:  Marital Status: /Civil Union   Occupation: Does not work  Patient Pre-hospital Living Situation: Home  Patient Pre-hospital Level of Mobility: walks  Patient Pre-hospital Diet Restrictions: None  Substance Use History:   Social History     Substance and Sexual Activity   Alcohol Use Not Currently     Social History     Tobacco Use   Smoking Status Current Every Day Smoker    Types: Cigarettes   Smokeless Tobacco Never Used     Social History     Substance and Sexual Activity   Drug Use Never       Family History:  History reviewed  No pertinent family history  Physical Exam:     Vitals:   Blood Pressure: 103/64 (01/20/22 1337)  Pulse: 104 (01/20/22 1337)  Temperature: 99 4 °F (37 4 °C) (01/20/22 1214)  Temp Source: Rectal (01/20/22 1214)  Respirations: 20 (01/20/22 1337)  Height: 5' 5" (165 1 cm) (01/20/22 1214)  Weight - Scale: 46 7 kg (103 lb) (01/20/22 1214)  SpO2: 94 % (01/20/22 1337)    PHYSICAL EXAM:    Vitals signs reviewed  Constitutional   Awake and cooperative  NAD  Chronically ill-appearing  Frail  Cachectic  Head/Neck   Normocephalic  Atraumatic  HEENT   No scleral icterus  EOMI  Poor dentition  Heart   Regular rate and rhythm  No murmers  Lungs   Prolonged expiration but no wheezing  Respirations unlabored  Adequate air movement  Abdomen   Soft  Nontender  Nondistended  Skin   Skin color pale  Scattered ecchymosis present  No rashes  Extremities   No deformities  No peripheral edema  Neuro   Alert and oriented  No new deficits  Psych   Mood stable   Affect normal        Additional Data:     Lab Results:  Results from last 7 days   Lab Units 01/20/22  1218   WBC Thousand/uL 7 55   HEMOGLOBIN g/dL 12 7   HEMATOCRIT % 40 3   PLATELETS Thousands/uL 401*   NEUTROS PCT % 88*   LYMPHS PCT % 6*   MONOS PCT % 5   EOS PCT % 0     Results from last 7 days   Lab Units 01/20/22  1219   SODIUM mmol/L 139   POTASSIUM mmol/L 3 8   CHLORIDE mmol/L 100   CO2 mmol/L 22   BUN mg/dL 23   CREATININE mg/dL 1 14   ANION GAP mmol/L 17*   CALCIUM mg/dL 7 9*   ALBUMIN g/dL 2 4*   TOTAL BILIRUBIN mg/dL 0 28   ALK PHOS U/L 241*   ALT U/L 18   AST U/L 66*   GLUCOSE RANDOM mg/dL 134     Results from last 7 days   Lab Units 01/20/22  1219   INR  0 99             Results from last 7 days   Lab Units 01/20/22  1219   LACTIC ACID mmol/L 4 9*       Imaging: Personally reviewed the following imaging: chest xray  XR chest 1 view portable   Final Result by Sully Jacobson MD (01/20 1330)      No definite focal airspace consolidation identified  Emphysema  Workstation performed: [id number]           ** Please Note: This note has been constructed using a voice recognition system   **

## 2022-01-20 NOTE — ASSESSMENT & PLAN NOTE
- secondary to COVID-19 pneumonia  - given her smoking history would not be surprised if she has some underlying COPD as well    - wean O2 as tolerated  - treatment as above

## 2022-01-20 NOTE — ED PROVIDER NOTES
History  Chief Complaint   Patient presents with    Shortness of Breath     Increased SOB; hypoxic at home  Known expsore at home  C/o generalized weakness and body aches   Weakness - Generalized     66year old female presents to ED with chief complaint of shortness of breath  Onset reported as 1-2 days ago  Location is reported as chest  Quality is reported as increasing SOB/congestion with cough  Severity is moderate to severe  Associated symptoms: positive for congestion  Positive for cough, positive for sob, denies chest pain, positive for chills  Positive for generalized weakness  Denies vomiting  Modifiers: walking exacerbates SOB  Context: Called EMS for increasing SOB over past few days- found to be hypoxia on arrival by EMS  o2 satuation 69% on room air on arrival - o2 ordered  History provided by:  EMS personnel, patient and significant other  History limited by:  Acuity of condition   used: No        None       History reviewed  No pertinent past medical history  History reviewed  No pertinent surgical history  History reviewed  No pertinent family history  I have reviewed and agree with the history as documented  E-Cigarette/Vaping     E-Cigarette/Vaping Substances     Social History     Tobacco Use    Smoking status: Current Every Day Smoker     Types: Cigarettes    Smokeless tobacco: Never Used   Substance Use Topics    Alcohol use: Not Currently    Drug use: Never       Review of Systems   Unable to perform ROS: Acuity of condition   Constitutional: Positive for chills  Respiratory: Positive for cough and shortness of breath  Neurological: Positive for weakness  Physical Exam  Physical Exam  Vitals and nursing note reviewed  Constitutional:       General: She is not in acute distress  Appearance: Normal appearance  She is well-developed  She is not ill-appearing        Comments: BP (!) 84/56 (BP Location: Right arm)   Pulse 101   Temp 99 4 °F (37 4 °C) (Rectal)   Resp (!) 28   Ht 5' 5" (1 651 m)   Wt 46 7 kg (103 lb)   SpO2 94%   BMI 17 14 kg/m²      HENT:      Head: Normocephalic and atraumatic  Right Ear: External ear normal       Left Ear: External ear normal       Nose: Nose normal       Mouth/Throat:      Mouth: Mucous membranes are moist    Eyes:      General: No scleral icterus  Right eye: No discharge  Left eye: No discharge  Extraocular Movements: Extraocular movements intact  Conjunctiva/sclera: Conjunctivae normal    Cardiovascular:      Rate and Rhythm: Tachycardia present  Pulses: Normal pulses  Pulmonary:      Effort: Respiratory distress present  Breath sounds: Wheezing and rhonchi present  Abdominal:      General: There is no distension  Tenderness: There is no abdominal tenderness  There is no guarding or rebound  Musculoskeletal:         General: No swelling, tenderness, deformity or signs of injury  Normal range of motion  Cervical back: Normal range of motion and neck supple  No rigidity  No muscular tenderness  Skin:     Coloration: Skin is pale  Skin is not jaundiced  Findings: No erythema or rash  Neurological:      General: No focal deficit present  Mental Status: She is alert and oriented to person, place, and time  Mental status is at baseline  Cranial Nerves: No cranial nerve deficit  Sensory: No sensory deficit  Motor: Weakness present     Psychiatric:         Mood and Affect: Mood normal          Behavior: Behavior normal          Vital Signs  ED Triage Vitals   Temperature Pulse Respirations Blood Pressure SpO2   01/20/22 1214 01/20/22 1203 01/20/22 1203 01/20/22 1203 01/20/22 1203   99 4 °F (37 4 °C) 79 20 109/87 (!) 69 %      Temp Source Heart Rate Source Patient Position - Orthostatic VS BP Location FiO2 (%)   01/20/22 1203 01/20/22 1203 01/20/22 1203 01/20/22 1203 --   Rectal Monitor Lying Right arm       Pain Score 01/20/22 1214       No Pain           Vitals:    01/20/22 1214 01/20/22 1240 01/20/22 1337 01/20/22 1500   BP:  (!) 84/56 103/64 108/65   Pulse: (!) 110 101 104 96   Patient Position - Orthostatic VS:  Lying Sitting Lying         Visual Acuity      ED Medications  Medications   sodium chloride 0 9 % infusion (100 mL/hr Intravenous New Bag 1/20/22 1428)   dexamethasone (DECADRON) injection 6 mg (has no administration in time range)   enoxaparin (LOVENOX) subcutaneous injection 50 mg (has no administration in time range)   remdesivir (Veklury) 200 mg in sodium chloride 0 9 % 290 mL IVPB (has no administration in time range)     Followed by   remdesivir Rachael Shorts) 100 mg in sodium chloride 0 9 % 270 mL IVPB (has no administration in time range)   acetaminophen (TYLENOL) tablet 650 mg (has no administration in time range)   ondansetron (ZOFRAN) injection 4 mg (has no administration in time range)   nicotine (NICODERM CQ) 14 mg/24hr TD 24 hr patch 1 patch (has no administration in time range)   albuterol (PROVENTIL HFA,VENTOLIN HFA) inhaler 2 puff (has no administration in time range)   sodium chloride 0 9 % bolus 1,000 mL (0 mL Intravenous Stopped 1/20/22 1422)   albuterol inhalation solution 5 mg (5 mg Nebulization Given 1/20/22 1239)   ipratropium (ATROVENT) 0 02 % inhalation solution 0 5 mg (0 5 mg Nebulization Given 1/20/22 1239)   methylPREDNISolone sodium succinate (Solu-MEDROL) injection 125 mg (125 mg Intravenous Given 1/20/22 1240)       Diagnostic Studies  Results Reviewed     Procedure Component Value Units Date/Time    Procalcitonin with AM Reflex [[id number]]  (Abnormal) Collected: 01/20/22 1428    Lab Status: Final result Specimen: Blood from Arm, Left Updated: 01/20/22 1500     Procalcitonin 0 26 ng/ml     Procalcitonin Reflex [[id number]]     Lab Status: No result Specimen: Blood     HS Troponin I 2hr [[id number]] Collected: 01/20/22 1428    Lab Status: Final result Specimen: Blood from Arm, Left Updated: 01/20/22 1458     hs TnI 2hr 23 ng/L      Delta 2hr hsTnI -9 ng/L     Lactic acid 2 Hours [[id number]] Collected: 01/20/22 1428    Lab Status: In process Specimen: Blood from Arm, Left Updated: 01/20/22 1432    HS Troponin I 4hr [[id number]]     Lab Status: No result Specimen: Blood     C-reactive protein [[id number]] Collected: 01/20/22 1221    Lab Status: In process Specimen: Blood from Arm, Left Updated: 01/20/22 1407    Lactic acid [[id number]]  (Abnormal) Collected: 01/20/22 1219    Lab Status: Final result Specimen: Blood from Arm, Left Updated: 01/20/22 1354     LACTIC ACID 4 9 mmol/L     Narrative:      Result may be elevated if tourniquet was used during collection  NT-BNP PRO [[id number]]  (Abnormal) Collected: 01/20/22 1221    Lab Status: Final result Specimen: Blood from Arm, Left Updated: 01/20/22 1311     NT-proBNP 1,856 pg/mL     COVID/FLU/RSV - 2 hour TAT [[id number]]  (Abnormal) Collected: 01/20/22 1215    Lab Status: Final result Specimen: Nares from Nose Updated: 01/20/22 1308     SARS-CoV-2 Positive     INFLUENZA A PCR Negative     INFLUENZA B PCR Negative     RSV PCR Negative    Narrative:      FOR PEDIATRIC PATIENTS - copy/paste COVID Guidelines URL to browser: [email or web address]    SARS-CoV-2 assay is a Nucleic Acid Amplification assay intended for the  qualitative detection of nucleic acid from SARS-CoV-2 in nasopharyngeal  swabs  Results are for the presumptive identification of SARS-CoV-2 RNA  Positive results are indicative of infection with SARS-CoV-2, the virus  causing COVID-19, but do not rule out bacterial infection or co-infection  with other viruses  Laboratories within the [place] and its  territories are required to report all positive results to the appropriate  public health authorities   Negative results do not preclude SARS-CoV-2  infection and should not be used as the sole basis for treatment or other  patient management decisions  Negative results must be combined with  clinical observations, patient history, and epidemiological information  This test has not been FDA cleared or approved  This test has been authorized by FDA under an Emergency Use Authorization  (EUA)  This test is only authorized for the duration of time the  declaration that circumstances exist justifying the authorization of the  emergency use of an in vitro diagnostic tests for detection of SARS-CoV-2  virus and/or diagnosis of COVID-19 infection under section 564(b)(1) of  the Act, 21 U  [place] C  853QZY-8(G)(7), unless the authorization is terminated  or revoked sooner  The test has been validated but independent review by FDA  and CLIA is pending  Test performed using [employer] GeneXpert: This RT-PCR assay targets N2,  a region unique to SARS-CoV-2  A conserved region in the E-gene was chosen  for pan-Sarbecovirus detection which includes SARS-CoV-2      HS Troponin 0hr (reflex protocol) [[id number]]  (Normal) Collected: 01/20/22 1219    Lab Status: Final result Specimen: Blood from Arm, Left Updated: 01/20/22 1307     hs TnI 0hr 32 ng/L     Hepatic function panel [[id number]]  (Abnormal) Collected: 01/20/22 1219    Lab Status: Final result Specimen: Blood from Arm, Left Updated: 01/20/22 1301     Total Bilirubin 0 28 mg/dL      Bilirubin, Direct 0 09 mg/dL      Alkaline Phosphatase 241 U/L      AST 66 U/L      ALT 18 U/L      Total Protein 5 8 g/dL      Albumin 2 4 g/dL     Basic metabolic panel [[id number]]  (Abnormal) Collected: 01/20/22 1219    Lab Status: Final result Specimen: Blood from Arm, Left Updated: 01/20/22 1259     Sodium 139 mmol/L      Potassium 3 8 mmol/L      Chloride 100 mmol/L      CO2 22 mmol/L      ANION GAP 17 mmol/L      BUN 23 mg/dL      Creatinine 1 14 mg/dL      Glucose 134 mg/dL      Calcium 7 9 mg/dL      eGFR 48 ml/min/1 73sq m     Narrative:      [place] guidelines for Chronic Kidney Disease (CKD):   Stage 1 with normal or high GFR (GFR > 90 mL/min/1 73 square meters)    Stage 2 Mild CKD (GFR = 60-89 mL/min/1 73 square meters)    Stage 3A Moderate CKD (GFR = 45-59 mL/min/1 73 square meters)    Stage 3B Moderate CKD (GFR = 30-44 mL/min/1 73 square meters)    Stage 4 Severe CKD (GFR = 15-29 mL/min/1 73 square meters)    Stage 5 End Stage CKD (GFR <15 mL/min/1 73 square meters)  Note: GFR calculation is accurate only with a steady state creatinine    D-Dimer [[id number]]  (Abnormal) Collected: 01/20/22 1219    Lab Status: Final result Specimen: Blood from Arm, Left Updated: 01/20/22 1258     D-Dimer, Quant 2 85 ug/ml FEU     Protime-INR [[id number]]  (Normal) Collected: 01/20/22 1219    Lab Status: Final result Specimen: Blood from Arm, Left Updated: 01/20/22 1253     Protime 12 7 seconds      INR 0 99    APTT [[id number]]  (Normal) Collected: 01/20/22 1219    Lab Status: Final result Specimen: Blood from Arm, Left Updated: 01/20/22 1253     PTT 29 seconds     CBC and differential [[id number]]  (Abnormal) Collected: 01/20/22 1218    Lab Status: Final result Specimen: Blood from Arm, Left Updated: 01/20/22 1229     WBC 7 55 Thousand/uL      RBC 4 00 Million/uL      Hemoglobin 12 7 g/dL      Hematocrit 40 3 %       fL      MCH 31 8 pg      MCHC 31 5 g/dL      RDW 16 1 %      MPV 9 5 fL      Platelets 985 Thousands/uL      nRBC 1 /100 WBCs      Neutrophils Relative 88 %      Immat GRANS % 1 %      Lymphocytes Relative 6 %      Monocytes Relative 5 %      Eosinophils Relative 0 %      Basophils Relative 0 %      Neutrophils Absolute 6 63 Thousands/µL      Immature Grans Absolute 0 10 Thousand/uL      Lymphocytes Absolute 0 42 Thousands/µL      Monocytes Absolute 0 38 Thousand/µL      Eosinophils Absolute 0 00 Thousand/µL      Basophils Absolute 0 02 Thousands/µL     Blood culture #2 [[id number]] Collected: 01/20/22 1219    Lab Status:  In process Specimen: Blood from Arm, Right Updated: 01/20/22 1226 Blood culture #1 [[id number]] Collected: 01/20/22 1219    Lab Status: In process Specimen: Blood from Arm, Left Updated: 01/20/22 1226                 XR chest 1 view portable   Final Result by Lin Adame MD (01/20 1330)      No definite focal airspace consolidation identified  Emphysema  Workstation performed: [id number]                    Procedures  ECG 12 Lead Documentation Only    Date/Time: 1/20/2022 12:17 PM  Performed by: Felisha Moreno PA-C  Authorized by: Felisha Moreno PA-C     Indications / Diagnosis:  SOB  ECG reviewed by me, the ED Provider: yes    Patient location:  ED  Previous ECG:     Previous ECG:  Unavailable    Comparison to cardiac monitor: Yes    Interpretation:     Interpretation: normal    Rate:     ECG rate:  118    ECG rate assessment: tachycardic    Rhythm:     Rhythm: sinus tachycardia    Ectopy:     Ectopy: none    QRS:     QRS axis:  Left    QRS intervals:  Normal  Conduction:     Conduction: normal    ST segments:     ST segments:  Normal  T waves:     T waves: non-specific               ED Course  ED Course as of 01/20/22 1536   Thu Jan 20, 2022   1312 O2 started on 3L NC sats improved to 94% - nebs ordered  HEART Risk Score      Most Recent Value   Heart Score Risk Calculator    History 1 Filed at: 01/20/2022 1535   ECG 1 Filed at: 01/20/2022 1535   Age 2 Filed at: 01/20/2022 1535   Risk Factors 2 Filed at: 01/20/2022 1535   Troponin 1 Filed at: 01/20/2022 1535   HEART Score 7 Filed at: 01/20/2022 1535                        SBIRT 22yo+      Most Recent Value   SBIRT (25 yo +)    In order to provide better care to our patients, we are screening all of our patients for alcohol and drug use  Would it be okay to ask you these screening questions? Yes Filed at: 01/20/2022 1324   Initial Alcohol Screen: US AUDIT-C     1  How often do you have a drink containing alcohol? 0 Filed at: 01/20/2022 1324   2   How many drinks containing alcohol do you have on a typical day you are drinking? 0 Filed at: 01/20/2022 1324   3b  FEMALE Any Age, or MALE 65+: How often do you have 4 or more drinks on one occassion? 0 Filed at: 01/20/2022 1324   Audit-C Score 0 Filed at: 01/20/2022 1324   [place]: How many times in the past year have you    Used an illegal drug or used a prescription medication for non-medical reasons? Never Filed at: 01/20/2022 1324                    MDM  Number of Diagnoses or Management Options  Acute respiratory failure with hypoxia [facility]): new and requires workup  COVID-19 virus infection: new and requires workup  Diagnosis management comments: Differential diagnosis includes but is not limited to COPD exacerbation, asthma, pneumonia, pleural effusion, pericardial effusion, congestive heart failure, pleurisy, bronchospasm, pericarditis, bronchitis, influenza, plan SOB working including ekg, cxr, and labs  Lab results reviewed  BNP elevated 1856  INR normal at 0 99  No coagulopathy  Basic metabolic panel demonstrates a BUN of 23 creatinine 1 14 which is normal   No renal failure  Hepatic function panel remarkable for AST mildly elevated at 66, ALT of 18  Alk-phos elevated at 241  Possibly consistent with COVID  Lactic acid elevated at 4 9  D-dimer elevated 2 85  Initial troponin of 32, 2 hour troponin of  23 with a delta of -9  CBC demonstrates normal hemoglobin of 12 hematocrit 40 3 which is normal   No anemia  COVID test is positive    Chest x-ray images independently visualized interpreted by me  No acute infiltrate or pneumothorax  Chronic changes appearing consistent with emphysema          Amount and/or Complexity of Data Reviewed  Clinical lab tests: ordered and reviewed  Tests in the radiology section of CPT®: ordered and reviewed  Tests in the medicine section of CPT®: ordered and reviewed  Discussion of test results with the performing providers: yes  Obtain history from someone other than the patient: yes (EMS)  Review and summarize past medical records: yes  Discuss the patient with other providers: yes (Onofre/KRYSTA)  Independent visualization of images, tracings, or specimens: yes    Risk of Complications, Morbidity, and/or Mortality  General comments: Eighty summary:  70-year-old female presents to the emergency department with chief complaint increasing shortness of breath and recent COVID exposure  Patient with mild to moderate respiratory distress and hypoxia on arrival with an oxygen saturation of 69% on room air  Patient placed on oxygen and oxygen saturations improved to 94% on room air  Patient with coarse rhonchi and crackles clinical on auscultation  Patient in mild-to-moderate respiratory distress on arrival   Chest x-ray showed COPD changes but no acute infiltrate or pneumothorax  COVID test was positive  Labs remarkable for elevated lactic acid, elevated procalcitonin, normal hemoglobin hematocrit, and no renal failure  Abnormal labs appear consistent with COVID infection  Patient notified of all test results at bedside  Initial EKG showed a sinus tachycardia 118, likely secondary to increased work of breathing and shortness of breath  Patient received Solu-Medrol, bronchodilator treatments and oxygen supplementation  Her blood pressure improved to 108/65  Heart rate improved to 96  Hypoxia/SOB/tachycardia symptoms secondary to COVID-19 infection  No antibiotics ordered given for treatment of covid infection  IV solumedrol and bronchodilators and oxygen supplementation  Patient initially hypoxic and in respiratory distress on arrival which did stabilize with oxygen /bronchidilators/corticosteroids  Case discussed with Dr Justen Boo regarding admission for further treatment of acute respiratory failure with hypoxia secondary to covid 19 infection  The critical care time is separate of other billable procedures, treating other patients, and any teaching time      The critical care time was for: obtaining history from patient or surrogate, development of treatment plan with patient or surrogate, discussion with any applicable consultants, examination of the patient,ordering and performing treatments and interventions, ordering and reviewing any applicable laboratory or radiographic studies, reassessment of the patient for response to treatment, reviewing any pertinent and available old records, documentation time  The critical care time was necessary to prevent deterioration of the following organ systems: acute respiratory failure with hypoxia (o2 sat 69% on room air ) secondary to covid 19 infection  Time spent (in minutes):45              Disposition  Final diagnoses:   COVID-19 virus infection   Acute respiratory failure with hypoxia ([facility] [address] )     Time reflects when diagnosis was documented in both MDM as applicable and the Disposition within this note     Time User Action Codes Description Comment    1/20/2022  1:32 PM Rhae Kidney Add [U07 1] COVID-19 virus infection     1/20/2022  1:33 PM Rhae Kidney Add [J96 01] Acute respiratory failure with hypoxia ([facility] [address] )     1/20/2022  2:51 PM Nabeel Escobedo Add [E43] Severe protein-calorie malnutrition [facility])       ED Disposition     ED Disposition Condition Date/Time Comment    Admit Stable Thu Jan 20, 2022  1:32 PM Case was discussed with Dr Xiang Steiner and the patient's admission status was agreed to be Admission Status: inpatient status to the service of Dr Xiang Steiner   Follow-up Information    None         Patient's Medications    No medications on file       No discharge procedures on file      PDMP Review     None          ED Provider  Electronically Signed by           Sejal Pacheco PA-C  01/20/22 3718

## 2022-01-21 NOTE — ASSESSMENT & PLAN NOTE
frail and cachectic appearing on admission;  says she has been that way for years  does have chronic history of smoking but reports no recent weight loss  nutrition consult  mealtime supplementation

## 2022-01-21 NOTE — ASSESSMENT & PLAN NOTE
frail and cachectic appearing on admission;  says she has been that way for years  does have chronic history of smoking but reports no recent weight loss  nutrition consult  mealtime supplementation  BMI of 17

## 2022-01-21 NOTE — PHYSICAL THERAPY NOTE
Physical Therapy Evaluation   Time in: 751  Time out: 813  Total evaluation time: 22 minutes    Patient's Name: Toy Alegria    Admitting Diagnosis  Severe protein-calorie malnutrition ([facility] [address] [facility] ) [E43]  SOB (shortness of breath) [R06 02]  Acute respiratory failure with hypoxia ([facility] [address] ) [J96 01]  COVID-19 virus infection [U07 1]    Problem List  Patient Active Problem List   Diagnosis    Pneumonia due to COVID-19 virus    Acute respiratory failure with hypoxia ([facility] [address] )    Tobacco use    Severe protein-calorie malnutrition ([facility] [address] [facility] )       Past Medical History  History reviewed  No pertinent past medical history  Past Surgical History  History reviewed  No pertinent surgical history  PT performed at least 2 patient identifiers during session: Name and wristband  01/21/22 0751   PT Last Visit   PT Visit Date 01/21/22   Note Type   Note type Evaluation   Pain Assessment   Pain Assessment Tool 0-10   Pain Score No Pain   Restrictions/Precautions   Weight Bearing Precautions Per Order No   Braces or Orthoses   (none per pt)   Other Precautions Contact/isolation; Airborne/isolation; Bed Alarm;O2;Fall Risk  (+COVID19, 3L O2 NC)   Home Living   Type of [address] [place] [address] One level;Stairs to enter with rails; Performs ADLs on one level  (1 small RODNEY)   Bathroom Accessibility Accessible   Home Equipment   (no AD used at baseline)   Prior Function   Level of [place] Independent with ADLs and functional mobility   Lives With Spouse   Receives Help From Family   ADL Assistance Independent   IADLs Independent   Falls in the last 6 months 0   Vocational Retired   Comments (+)    General   Family/Caregiver Present No   Cognition   Overall Cognitive Status WFL   Arousal/Participation Alert   Orientation Level Oriented X4   Memory Within functional limits   Following Commands Follows all commands and directions without difficulty   Comments pt agreeable to PT eval   Subjective   Subjective "I feel weak"   RLE Assessment RLE Assessment   (grossly 3+/5)   LLE Assessment   LLE Assessment   (grossly 3+/5)   Coordination   Movements are Fluid and Coordinated 1   Sensation   (intermittent B feet)   Bed Mobility   Supine to Sit 5  Supervision   Sit to Supine 5  Supervision   Transfers   Sit to Stand 5  Supervision   Additional items Increased time required;Verbal cues   Stand to Sit 5  Supervision   Additional items Increased time required;Verbal cues   Ambulation/Elevation   Gait pattern Decreased foot clearance; Short stride; Step to   Gait Assistance 5  Supervision  (CGA)   Additional items Assist x 1;Verbal cues   Assistive Device None   Distance 10'; lateral side steps at EOB   Balance   Static Sitting Good   Dynamic Sitting Fair +   Static Standing Fair   Dynamic Standing Fair   Ambulatory Fair   Endurance Deficit   Endurance Deficit Yes   Activity Tolerance   Activity Tolerance Patient limited by fatigue   Nurse Made Aware BOLA Avelar verbalized pt appropriate to see for PT eval, made aware of session outcome/recs   Assessment   Prognosis Good   Problem List Decreased strength;Decreased endurance; Impaired balance;Decreased mobility; Impaired sensation   Assessment Pt is 66 y o  female seen for high-complexity PT evaluation on 1/21/2022 s/p admit to [facility] on 1/20/2022 w/ Pneumonia due to COVID-19 virus  PT was consulted to assess pt's functional mobility and d/c needs  Order placed for PT eval and tx, w/ ambulate patient order  PTA, pt resides c spouse in [facility] c 1 RODNEY; I c I/ADLs  At time of eval, pt performing bed mobility/ transfers at [facility]; short household distance gait trial at EOB c SBA-CGA  Upon evaluation, pt presenting with impaired functional mobility d/t decreased strength, decreased endurance, impaired balance, decreased mobility, impaired sensation and activity intolerance   Pertinent PMHx and current co-morbidities affecting pt's physical performance at time of assessment include: pneumonia, acute respiratory failure with hypoxia, tobacco use, severe protein calorie malnutrition  Personal factors affecting pt at time of eval include: stairs to enter home, inability to navigate community distances and unable to perform dynamic tasks in community  The following objective measures performed on IE also reveal limitations: Barthel Index: 55/100, Modified [place]: 3 (moderate disability) and AM-PAC 6-Clicks: 83/58  Pt's clinical presentation is currently unstable/unpredictable seen in pt's presentation of abnormal lab value(s), need for input for task focus and mobility technique, ongoing medical assessment and need for supplemental O2  Overall, pt's rehab potential and prognosis to return to PLOF is good as impacted by objective findings, warranting pt to receive further skilled PT interventions to address identified impairments, activity limitation(s), and participation restriction(s)  Pt to benefit from continued PT tx to address deficits as defined above and maximize level of functional independent mobility and consistency  From PT/mobility standpoint, recommendation at time of d/c would be home with home health rehabilitation pending progress in order to facilitate return to PLOF     Goals   Patient Goals to go home   STG Expiration Date 01/31/22   Short Term Goal #1 In 7-10 days: Increase bilateral LE strength 1/2 grade to facilitate independent mobility, Perform all bed mobility tasks modified independent to decrease caregiver burden, Perform all transfers modified independent to improve independence, Ambulate > 150 ft  with least restrictive assistive device modified independent w/o LOB and w/ normalized gait pattern 100% of the time, Navigate 1 stairs modified independent with unilateral handrail to facilitate return to previous living environment, Increase all balance 1/2 grade to decrease risk for falls, Improve Barthel Index score to 70 or greater to facilitate independence and PT provider will perform functional balance assessment to determine fall risk   PT Treatment Day 0   Plan   Treatment/Interventions Functional transfer training;LE strengthening/ROM; Therapeutic exercise; Endurance training;Patient/family training;Equipment eval/education; Bed mobility;Gait training;Spoke to nursing   PT Frequency 2-3x/wk   Recommendation   PT Discharge Recommendation Home with home health rehabilitation   Equipment Recommended   (TBD)   AM-PAC Basic Mobility Inpatient   Turning in Bed Without Bedrails 3   Lying on Back to Sitting on Edge of Flat Bed 3   Moving Bed to Chair 3   Standing Up From Chair 3   Walk in Room 3   Climb 3-5 Stairs 2   Basic Mobility Inpatient Raw Score 17   Basic Mobility Standardized Score 39 67   Highest Level Of Mobility   -[facility] Goal 5: Stand one or more mins   [facility] Highest Level of Mobility 6: Walk 10 steps or more   -[facility] Goal Achieved Yes   Modified Finley Scale   Modified [place] Scale 3   Barthel Index   Feeding 10   Bathing 0   Grooming Score 5   Dressing Score 5   Bladder Score 10   Bowels Score 10   Toilet Use Score 5   Transfers (Bed/Chair) Score 10   Mobility (Level Surface) Score 0   Stairs Score 0   Barthel Index Score 55       Marbella Kamara, PT, DPT

## 2022-01-21 NOTE — PLAN OF CARE
Problem: PHYSICAL THERAPY ADULT  Goal: Performs mobility at highest level of function for planned discharge setting  See evaluation for individualized goals  Description: Treatment/Interventions: Functional transfer training,LE strengthening/ROM,Therapeutic exercise,Endurance training,Patient/family training,Equipment eval/education,Bed mobility,Gait training,Spoke to nursing  Equipment Recommended:  (TBD)       See flowsheet documentation for full assessment, interventions and recommendations  1/21/2022 0948 by Jaki Gracia PT  Note: Prognosis: Good  Problem List: Decreased strength,Decreased endurance,Impaired balance,Decreased mobility,Impaired sensation  Assessment: Pt is 66 y o  female seen for high-complexity PT evaluation on 1/21/2022 s/p admit [facility] on 1/20/2022 w/ Pneumonia due to COVID-19 virus  PT was consulted to assess pt's functional mobility and d/c needs  Order placed for PT eval and tx, w/ ambulate patient order  PTA, pt resides c spouse in [facility] c 1 RODNEY; I c I/ADLs  At time of eval, pt performing bed mobility/ transfers at [facility]; short household distance gait trial at EOB c SBA-CGA  Upon evaluation, pt presenting with impaired functional mobility d/t decreased strength, decreased endurance, impaired balance, decreased mobility, impaired sensation and activity intolerance  Pertinent PMHx and current co-morbidities affecting pt's physical performance at time of assessment include: pneumonia, acute respiratory failure with hypoxia, tobacco use, severe protein calorie malnutrition  Personal factors affecting pt at time of eval include: stairs to enter home, inability to navigate community distances and unable to perform dynamic tasks in community  The following objective measures performed on IE also reveal limitations: Barthel Index: 55/100, Modified [place]: 3 (moderate disability) and AM-PAC 6-Clicks: 01/42   Pt's clinical presentation is currently unstable/unpredictable seen in pt's presentation of abnormal lab value(s), need for input for task focus and mobility technique, ongoing medical assessment and need for supplemental O2  Overall, pt's rehab potential and prognosis to return to PLOF is good as impacted by objective findings, warranting pt to receive further skilled PT interventions to address identified impairments, activity limitation(s), and participation restriction(s)  Pt to benefit from continued PT tx to address deficits as defined above and maximize level of functional independent mobility and consistency  From PT/mobility standpoint, recommendation at time of d/c would be home with home health rehabilitation pending progress in order to facilitate return to PLOF  PT Discharge Recommendation: Home with home health rehabilitation          See flowsheet documentation for full assessment  1/21/2022 0948 by Rodgershar Otero PT  Note: Prognosis: Good  Problem List: Decreased strength,Decreased endurance,Impaired balance,Decreased mobility,Impaired sensation  Assessment: Pt is 66 y o  female seen for high-complexity PT evaluation on 1/21/2022 s/p admit to [facility] on 1/20/2022 w/ Pneumonia due to COVID-19 virus  PT was consulted to assess pt's functional mobility and d/c needs  Order placed for PT eval and tx, w/ ambulate patient order  PTA, pt resides c spouse in [facility] c 1 RODNEY; I c I/ADLs  At time of eval, pt performing bed mobility/ transfers at [facility]; short household distance gait trial at EOB c SBA-CGA  Upon evaluation, pt presenting with impaired functional mobility d/t decreased strength, decreased endurance, impaired balance, decreased mobility, impaired sensation and activity intolerance  Pertinent PMHx and current co-morbidities affecting pt's physical performance at time of assessment include: pneumonia, acute respiratory failure with hypoxia, tobacco use, severe protein calorie malnutrition   Personal factors affecting pt at time of eval include: stairs to enter home, inability to navigate community distances and unable to perform dynamic tasks in community  The following objective measures performed on IE also reveal limitations: Barthel Index: 55/100, Modified [place]: 3 (moderate disability) and AM-PAC 6-Clicks: 74/40  Pt's clinical presentation is currently unstable/unpredictable seen in pt's presentation of abnormal lab value(s), need for input for task focus and mobility technique, ongoing medical assessment and need for supplemental O2  Overall, pt's rehab potential and prognosis to return to PLOF is good as impacted by objective findings, warranting pt to receive further skilled PT interventions to address identified impairments, activity limitation(s), and participation restriction(s)  Pt to benefit from continued PT tx to address deficits as defined above and maximize level of functional independent mobility and consistency  From PT/mobility standpoint, recommendation at time of d/c would be home with home health rehabilitation pending progress in order to facilitate return to PLOF  PT Discharge Recommendation: Home with home health rehabilitation          See flowsheet documentation for full assessment

## 2022-01-21 NOTE — PLAN OF CARE
Problem: Potential for Falls  Goal: Patient will remain free of falls  Description: INTERVENTIONS:  - Educate patient/family on patient safety including physical limitations  - Instruct patient to call for assistance with activity   - Consult OT/PT to assist with strengthening/mobility   - Keep Call bell within reach  - Keep bed low and locked with side rails adjusted as appropriate  - Keep care items and personal belongings within reach  - Initiate and maintain comfort rounds  - Make Fall Risk Sign visible to staff  - Offer Toileting every  Hours, in advance of need  - Initiate/Maintainalarm  - Obtain necessary fall risk management equipment:   - Apply yellow socks and bracelet for high fall risk patients  - Consider moving patient to room near nurses station  Outcome: Progressing     Problem: MOBILITY - ADULT  Goal: Maintain or return to baseline ADL function  Description: INTERVENTIONS:  -  Assess patient's ability to carry out ADLs; assess patient's baseline for ADL function and identify physical deficits which impact ability to perform ADLs (bathing, care of mouth/teeth, toileting, grooming, dressing, etc )  - Assess/evaluate cause of self-care deficits   - Assess range of motion  - Assess patient's mobility; develop plan if impaired  - Assess patient's need for assistive devices and provide as appropriate  - Encourage maximum independence but intervene and supervise when necessary  - Involve family in performance of ADLs  - Assess for home care needs following discharge   - Consider OT consult to assist with ADL evaluation and planning for discharge  - Provide patient education as appropriate  Outcome: Progressing  Goal: Maintains/Returns to pre admission functional level  Description: INTERVENTIONS:  - Perform BMAT or MOVE assessment daily    - Set and communicate daily mobility goal to care team and patient/family/caregiver     - Collaborate with rehabilitation services on mobility goals if consulted  - Perform Range of Motion  times a day  - Reposition patient every  hours    - Dangle patient  times a day  - Stand patient  times a day  - Ambulate patient  times a day  - Out of bed to chair  times a day   - Out of bed for meals  times a day  - Out of bed for toileting  - Record patient progress and toleration of activity level   Outcome: Progressing     Problem: PAIN - ADULT  Goal: Verbalizes/displays adequate comfort level or baseline comfort level  Description: Interventions:  - Encourage patient to monitor pain and request assistance  - Assess pain using appropriate pain scale  - Administer analgesics based on type and severity of pain and evaluate response  - Implement non-pharmacological measures as appropriate and evaluate response  - Consider cultural and social influences on pain and pain management  - Notify physician/advanced practitioner if interventions unsuccessful or patient reports new pain  Outcome: Progressing     Problem: INFECTION - ADULT  Goal: Absence or prevention of progression during hospitalization  Description: INTERVENTIONS:  - Assess and monitor for signs and symptoms of infection  - Monitor lab/diagnostic results  - Monitor all insertion sites, i e  indwelling lines, tubes, and drains  - Monitor endotracheal if appropriate and nasal secretions for changes in amount and color  - [place] appropriate cooling/warming therapies per order  - Administer medications as ordered  - Instruct and encourage patient and family to use good hand hygiene technique  - Identify and instruct in appropriate isolation precautions for identified infection/condition  Outcome: Progressing  Goal: Absence of fever/infection during neutropenic period  Description: INTERVENTIONS:  - Monitor WBC    Outcome: Progressing     Problem: SAFETY ADULT  Goal: Patient will remain free of falls  Description: INTERVENTIONS:  - Educate patient/family on patient safety including physical limitations  - Instruct patient to call for assistance with activity   - Consult OT/PT to assist with strengthening/mobility   - Keep Call bell within reach  - Keep bed low and locked with side rails adjusted as appropriate  - Keep care items and personal belongings within reach  - Initiate and maintain comfort rounds  - Make Fall Risk Sign visible to staff  - Offer Toileting every  Hours, in advance of need  - Initiate/Maintain alarm  - Obtain necessary fall risk management equipment:   - Apply yellow socks and bracelet for high fall risk patients  - Consider moving patient to room near nurses station  Outcome: Progressing  Goal: Maintain or return to baseline ADL function  Description: INTERVENTIONS:  -  Assess patient's ability to carry out ADLs; assess patient's baseline for ADL function and identify physical deficits which impact ability to perform ADLs (bathing, care of mouth/teeth, toileting, grooming, dressing, etc )  - Assess/evaluate cause of self-care deficits   - Assess range of motion  - Assess patient's mobility; develop plan if impaired  - Assess patient's need for assistive devices and provide as appropriate  - Encourage maximum independence but intervene and supervise when necessary  - Involve family in performance of ADLs  - Assess for home care needs following discharge   - Consider OT consult to assist with ADL evaluation and planning for discharge  - Provide patient education as appropriate  Outcome: Progressing  Goal: Maintains/Returns to pre admission functional level  Description: INTERVENTIONS:  - Perform BMAT or MOVE assessment daily    - Set and communicate daily mobility goal to care team and patient/family/caregiver  - Collaborate with rehabilitation services on mobility goals if consulted  - Perform Range of Motion  times a day  - Reposition patient every  hours    - Dangle patient  times a day  - Stand patient  times a day  - Ambulate patient  times a day  - Out of bed to chair  times a day   - Out of bed for meals  times a day  - Out of bed for toileting  - Record patient progress and toleration of activity level   Outcome: Progressing     Problem: DISCHARGE PLANNING  Goal: Discharge to home or other facility with appropriate resources  Description: INTERVENTIONS:  - Identify barriers to discharge w/patient and caregiver  - Arrange for needed discharge resources and transportation as appropriate  - Identify discharge learning needs (meds, wound care, etc )  - Arrange for interpretive services to assist at discharge as needed  - Refer to Case Management Department for coordinating discharge planning if the patient needs post-hospital services based on physician/advanced practitioner order or complex needs related to functional status, cognitive ability, or social support system  Outcome: Progressing     Problem: Knowledge Deficit  Goal: Patient/family/caregiver demonstrates understanding of disease process, treatment plan, medications, and discharge instructions  Description: Complete learning assessment and assess knowledge base    Interventions:  - Provide teaching at level of understanding  - Provide teaching via preferred learning methods  Outcome: Progressing     Problem: RESPIRATORY - ADULT  Goal: Achieves optimal ventilation and oxygenation  Description: INTERVENTIONS:  - Assess for changes in respiratory status  - Assess for changes in mentation and behavior  - Position to facilitate oxygenation and minimize respiratory effort  - Oxygen administered by appropriate delivery if ordered  - Initiate smoking cessation education as indicated  - Encourage broncho-pulmonary hygiene including cough, deep breathe, Incentive Spirometry  - Assess the need for suctioning and aspirate as needed  - Assess and instruct to report SOB or any respiratory difficulty  - Respiratory Therapy support as indicated  Outcome: Progressing     Problem: SKIN/TISSUE INTEGRITY - ADULT  Goal: Skin Integrity remains intact(Skin Breakdown Prevention)  Description: Assess:  -Perform [place] assessment every   -Clean and moisturize skin every   -Inspect skin when repositioning, toileting, and assisting with ADLS  -Assess under medical devices such as  every   -Assess extremities for adequate circulation and sensation     Bed Management:  -Have minimal linens on bed & keep smooth, unwrinkled  -Change linens as needed when moist or perspiring  -Avoid sitting or lying in one position for more than  hours while in bed  -Keep HOB at degrees     Toileting:  -Offer bedside commode  -Assess for incontinence every  -Use incontinent care products after each incontinent episode such as     Activity:  -Mobilize patient  times a day  -Encourage activity and walks on unit  -Encourage or provide ROM exercises   -Turn and reposition patient every  Hours  -Use appropriate equipment to lift or move patient in bed  -Instruct/ Assist with weight shifting every  when out of bed in chair  -Consider limitation of chair time  hour intervals    Skin Care:  -Avoid use of baby powder, tape, friction and shearing, hot water or constrictive clothing  -Relieve pressure over bony prominences using   -Do not massage red bony areas    Next Steps:  -Teach patient strategies to minimize risks such as    -Consider consults to  interdisciplinary teams such as   Outcome: Progressing  Goal: Incision(s), wounds(s) or drain site(s) healing without S/S of infection  Description: INTERVENTIONS  - Assess and document dressing, incision, wound bed, drain sites and surrounding tissue  - Provide patient and family education  - Perform skin care/dressing changes every   Outcome: Progressing  Goal: Pressure injury heals and does not worsen  Description: Interventions:  - Implement low air loss mattress or specialty surface (Criteria met)  - Apply silicone foam dressing  - Instruct/assist with weight shifting every  minutes when in chair   - Limit chair time t hour intervals  - Use special pressure reducing interventions such as  when in chair   - Apply fecal or urinary incontinence containment device   - Perform passive or active ROM every   - Turn and reposition patient & offload bony prominences every  hours   - Utilize friction reducing device or surface for transfers   - Consider consults to  interdisciplinary teams such as   - Use incontinent care products after each incontinent episode such as  - Consider nutrition services referral as needed  Outcome: Progressing

## 2022-01-21 NOTE — ASSESSMENT & PLAN NOTE
secondary to COVID-19 pneumonia  given her smoking history would not be surprised if she has some underlying COPD as well  wean O2 as tolerated

## 2022-01-21 NOTE — PLAN OF CARE
Problem: Potential for Falls  Goal: Patient will remain free of falls  Description: INTERVENTIONS:  - Educate patient/family on patient safety including physical limitations  - Instruct patient to call for assistance with activity   - Consult OT/PT to assist with strengthening/mobility   - Keep Call bell within reach  - Keep bed low and locked with side rails adjusted as appropriate  - Keep care items and personal belongings within reach  - Initiate and maintain comfort rounds  - Make Fall Risk Sign visible to staff  - Offer Toileting every  Hours, in advance of need  - Initiate/Maintain alarm  - Obtain necessary fall risk management equipment:   - Apply yellow socks and bracelet for high fall risk patients  - Consider moving patient to room near nurses station  Outcome: Progressing     Problem: MOBILITY - ADULT  Goal: Maintain or return to baseline ADL function  Description: INTERVENTIONS:  -  Assess patient's ability to carry out ADLs; assess patient's baseline for ADL function and identify physical deficits which impact ability to perform ADLs (bathing, care of mouth/teeth, toileting, grooming, dressing, etc )  - Assess/evaluate cause of self-care deficits   - Assess range of motion  - Assess patient's mobility; develop plan if impaired  - Assess patient's need for assistive devices and provide as appropriate  - Encourage maximum independence but intervene and supervise when necessary  - Involve family in performance of ADLs  - Assess for home care needs following discharge   - Consider OT consult to assist with ADL evaluation and planning for discharge  - Provide patient education as appropriate  Outcome: Progressing  Goal: Maintains/Returns to pre admission functional level  Description: INTERVENTIONS:  - Perform BMAT or MOVE assessment daily    - Set and communicate daily mobility goal to care team and patient/family/caregiver     - Collaborate with rehabilitation services on mobility goals if consulted  - Perform Range of Motion  times a day  - Reposition patient every  hours    - Dangle patient  times a day  - Stand patient  times a day  - Ambulate patient  times a day  - Out of bed to chair  times a day   - Out of bed for meals  times a day  - Out of bed for toileting  - Record patient progress and toleration of activity level   Outcome: Progressing     Problem: PAIN - ADULT  Goal: Verbalizes/displays adequate comfort level or baseline comfort level  Description: Interventions:  - Encourage patient to monitor pain and request assistance  - Assess pain using appropriate pain scale  - Administer analgesics based on type and severity of pain and evaluate response  - Implement non-pharmacological measures as appropriate and evaluate response  - Consider cultural and social influences on pain and pain management  - Notify physician/advanced practitioner if interventions unsuccessful or patient reports new pain  Outcome: Progressing     Problem: INFECTION - ADULT  Goal: Absence or prevention of progression during hospitalization  Description: INTERVENTIONS:  - Assess and monitor for signs and symptoms of infection  - Monitor lab/diagnostic results  - Monitor all insertion sites, i e  indwelling lines, tubes, and drains  - Monitor endotracheal if appropriate and nasal secretions for changes in amount and color  - [place] appropriate cooling/warming therapies per order  - Administer medications as ordered  - Instruct and encourage patient and family to use good hand hygiene technique  - Identify and instruct in appropriate isolation precautions for identified infection/condition  Outcome: Progressing  Goal: Absence of fever/infection during neutropenic period  Description: INTERVENTIONS:  - Monitor WBC    Outcome: Progressing     Problem: SAFETY ADULT  Goal: Patient will remain free of falls  Description: INTERVENTIONS:  - Educate patient/family on patient safety including physical limitations  - Instruct patient to call for assistance with activity   - Consult OT/PT to assist with strengthening/mobility   - Keep Call bell within reach  - Keep bed low and locked with side rails adjusted as appropriate  - Keep care items and personal belongings within reach  - Initiate and maintain comfort rounds  - Make Fall Risk Sign visible to staff  - Offer Toileting every  Hours, in advance of need  - Initiate/Maintain alarm  - Obtain necessary fall risk management equipment:   - Apply yellow socks and bracelet for high fall risk patients  - Consider moving patient to room near nurses station  Outcome: Progressing  Goal: Maintain or return to baseline ADL function  Description: INTERVENTIONS:  -  Assess patient's ability to carry out ADLs; assess patient's baseline for ADL function and identify physical deficits which impact ability to perform ADLs (bathing, care of mouth/teeth, toileting, grooming, dressing, etc )  - Assess/evaluate cause of self-care deficits   - Assess range of motion  - Assess patient's mobility; develop plan if impaired  - Assess patient's need for assistive devices and provide as appropriate  - Encourage maximum independence but intervene and supervise when necessary  - Involve family in performance of ADLs  - Assess for home care needs following discharge   - Consider OT consult to assist with ADL evaluation and planning for discharge  - Provide patient education as appropriate  Outcome: Progressing  Goal: Maintains/Returns to pre admission functional level  Description: INTERVENTIONS:  - Perform BMAT or MOVE assessment daily    - Set and communicate daily mobility goal to care team and patient/family/caregiver  - Collaborate with rehabilitation services on mobility goals if consulted  - Perform Range of Motion  times a day  - Reposition patient every  hours    - Dangle patient  times a day  - Stand patient  times a day  - Ambulate patient  times a day  - Out of bed to chair  times a day   - Out of bed for meals  times a day  - Out of bed for toileting  - Record patient progress and toleration of activity level   Outcome: Progressing     Problem: DISCHARGE PLANNING  Goal: Discharge to home or other facility with appropriate resources  Description: INTERVENTIONS:  - Identify barriers to discharge w/patient and caregiver  - Arrange for needed discharge resources and transportation as appropriate  - Identify discharge learning needs (meds, wound care, etc )  - Arrange for interpretive services to assist at discharge as needed  - Refer to Case Management Department for coordinating discharge planning if the patient needs post-hospital services based on physician/advanced practitioner order or complex needs related to functional status, cognitive ability, or social support system  Outcome: Progressing     Problem: Knowledge Deficit  Goal: Patient/family/caregiver demonstrates understanding of disease process, treatment plan, medications, and discharge instructions  Description: Complete learning assessment and assess knowledge base    Interventions:  - Provide teaching at level of understanding  - Provide teaching via preferred learning methods  Outcome: Progressing     Problem: RESPIRATORY - ADULT  Goal: Achieves optimal ventilation and oxygenation  Description: INTERVENTIONS:  - Assess for changes in respiratory status  - Assess for changes in mentation and behavior  - Position to facilitate oxygenation and minimize respiratory effort  - Oxygen administered by appropriate delivery if ordered  - Initiate smoking cessation education as indicated  - Encourage broncho-pulmonary hygiene including cough, deep breathe, Incentive Spirometry  - Assess the need for suctioning and aspirate as needed  - Assess and instruct to report SOB or any respiratory difficulty  - Respiratory Therapy support as indicated  Outcome: Progressing     Problem: SKIN/TISSUE INTEGRITY - ADULT  Goal: Skin Integrity remains intact(Skin Breakdown Prevention)  Description: Assess:  -Perform [place] assessment every   -Clean and moisturize skin every   -Inspect skin when repositioning, toileting, and assisting with ADLS  -Assess under medical devices such as  every   -Assess extremities for adequate circulation and sensation     Bed Management:  -Have minimal linens on bed & keep smooth, unwrinkled  -Change linens as needed when moist or perspiring  -Avoid sitting or lying in one position for more than  hours while in bed  -Keep HOB at degrees     Toileting:  -Offer bedside commode  -Assess for incontinence every   -Use incontinent care products after each incontinent episode such as     Activity:  -Mobilize patient  times a day  -Encourage activity and walks on unit  -Encourage or provide ROM exercises   -Turn and reposition patient every  Hours  -Use appropriate equipment to lift or move patient in bed  -Instruct/ Assist with weight shifting every  when out of bed in chair  -Consider limitation of chair time  hour intervals    Skin Care:  -Avoid use of baby powder, tape, friction and shearing, hot water or constrictive clothing  -Relieve pressure over bony prominences using   -Do not massage red bony areas    Next Steps:  -Teach patient strategies to minimize risks such as    -Consider consults to  interdisciplinary teams such as   Outcome: Progressing  Goal: Incision(s), wounds(s) or drain site(s) healing without S/S of infection  Description: INTERVENTIONS  - Assess and document dressing, incision, wound bed, drain sites and surrounding tissue  - Provide patient and family education  - Perform skin care/dressing changes every   Outcome: Progressing  Goal: Pressure injury heals and does not worsen  Description: Interventions:  - Implement low air loss mattress or specialty surface (Criteria met)  - Apply silicone foam dressing  - Instruct/assist with weight shifting every  minutes when in chair   - Limit chair time to  hour intervals  - Use special pressure reducing interventions such as  when in chair   - Apply fecal or urinary incontinence containment device   - Perform passive or active ROM every   - Turn and reposition patient & offload bony prominences every  hours   - Utilize friction reducing device or surface for transfers   - Consider consults to  interdisciplinary teams such as   - Use incontinent care products after each incontinent episode such as  - Consider nutrition services referral as needed  Outcome: Progressing

## 2022-01-21 NOTE — CASE MANAGEMENT
Case Management Assessment & Discharge Planning Note    Patient name Christian Ragsdale  Location /-98 MRN [id number]  : 1950 Date 2022       Current Admission Date: 2022  Current Admission Diagnosis:Pneumonia due to COVID-19 virus   Patient Active Problem List    Diagnosis Date Noted    Pneumonia due to COVID-19 virus 2022    Acute respiratory failure with hypoxia ([facility] [address] ) 2022    Tobacco use 2022    Severe protein-calorie malnutrition ([facility] [address] ) 2022      LOS (days): 1  Geometric Mean LOS (GMLOS) (days):   Days to GMLOS:     OBJECTIVE:    Risk of Unplanned Readmission Score: 9         Current admission status: Inpatient       Preferred Pharmacy: No Pharmacies Listed  Primary Care Provider: No primary care provider on file  Primary Insurance: MEDICARE  Secondary Insurance:     ASSESSMENT:  [address] [facility] Representative - Spouse   Primary Phone: [phone] (Mobile)               Advance Directives  Does patient have a [address]?: No  Was patient offered paperwork?: Yes ( reported that patient has the paperwork and is in the process of completing it )  Does patient currently have a Health Care decision maker?: Yes, please see Health Care Proxy section  Does patient have Advance Directives?: No  Was patient offered paperwork?: Yes ( reported that patient has the paperwork and is in the process of completing it )  Primary Contact: Patient's     Readmission Root Cause  30 Day Readmission: No    Patient Information  Admitted from[de-identified] Home  Mental Status: Alert  During Assessment patient was accompanied by: Not accompanied during assessment  Assessment information provided by[de-identified] Spouse (Made 2 attempts to contact patient in room, no answer)  Primary Caregiver: Self  Support Systems: Spouse/significant other  [place] of Residence: [place] [address] do you live in?:  Volance entry access options  Select all that apply : Stairs  Number of steps to enter home  : 1  Do the steps have railings?: No  Type of Current Residence: [place]  In the last 12 months, was there a time when you were not able to pay the mortgage or rent on time?: No  In the last 12 months, how many places have you lived?: 1  In the last 12 months, was there a time when you did not have a steady place to sleep or slept in a shelter (including now)?: No  Homeless/housing insecurity resource given?: N/A  Living Arrangements: Lives w/ Spouse/significant other  Is patient a ?: No    Activities of Daily Living Prior to Admission  Functional Status: Independent  Completes ADLs independently?: Yes  Ambulates independently?: Yes  Does patient use assisted devices?: No  Does patient currently own DME?: Yes  What DME does the patient currently own?: [employer]  Does patient have a history of Outpatient Therapy (PT/OT)?: No  Does the patient have a history of Short-Term Rehab?: Yes (Patient has STR Hx, but  is unsure of the facility )  Does patient have a history of HHC?: No  Does patient currently have [address]?: No    Patient Information Continued  Income Source: Pension/custodial  Does patient have prescription coverage?: Yes (Patient uses [address] in [facility], and her  denied any barriers to obtaining or affording prescriptions )  Within the past 12 months, you worried that your food would run out before you got the money to buy more : Never true  Within the past 12 months, the food you bought just didnt last and you didnt have money to get more : Never true  Food insecurity resource given?: N/A  Does patient receive dialysis treatments?: No  Does patient have a history of substance abuse?: Currently using (Patient smokes a few cigarettes per day )  Is patient currently in treatment for substance abuse?: No  Patient declined treatment information    Does patient have a history of Mental Health Diagnosis?: No    Means of Transportation  Means of Transport to [facility][de-identified] Family transport  In the past 12 months, has lack of transportation kept you from medical appointments or from getting medications?: No  In the past 12 months, has lack of transportation kept you from meetings, work, or from getting things needed for daily living?: No  Was application for public transport provided?: N/A    DISCHARGE DETAILS:    Discharge planning discussed with[de-identified] Patient's   Freedom of Choice: Yes  Comments - Freedom of Choice:  denied any needs at this time, but he reported that patient will likely agree to STR vs  [address] if recommended  CM to discuss with patient once she returns to her room and PT/OT recs have been made    CM contacted family/caregiver?: Yes  Were Treatment Team discharge recommendations reviewed with patient/caregiver?: Yes  Did patient/caregiver verbalize understanding of patient care needs?: Yes  Were patient/caregiver advised of the risks associated with not following Treatment Team discharge recommendations?: Yes    Contacts  Patient Contacts: Freda Koyanagi  Relationship to Patient[de-identified] Family  Contact Method: Phone  Phone Number: [phone]  Reason/Outcome: Continuity of [address]         Is the patient interested in [address] at discharge?: No    DME Referral Provided  Referral made for DME?: No    Would you like to participate in our [facility] [address] service program?  : No - Declined    Treatment Team Recommendation: Home  Discharge Destination Plan[de-identified] Home  Transport at Discharge : Family

## 2022-01-21 NOTE — PROGRESS NOTES
[address] [place] [address]  Progress Note Joshua Dumont 1950, 66 y o  female MRN: [id number]  Unit/Bed#: -01 Encounter: [id number]  Primary Care Provider: No primary care provider on file  Date and time admitted to hospital: 1/20/2022 12:01 PM    * Pneumonia due to COVID-19 virus  Assessment & Plan  presented with several days of worsening dyspnea on exertion, shortness of breath, cough, generalized weakness, fatigue, and decreased appetite  · tested positive on presentation 01/20/2022  · currently requiring 3 L nasal cannula O2; will be initiated on mild-pathway treatment   · IV remdesivir day 2 of 5  · IV Decadron 6 mg daily times day 2 of 10  · weight based Lovenox given D-dimer  · incentive spirometry, out of bed as tolerated, prone positioning as tolerated    Severe protein-calorie malnutrition ([facility] [address] )  Assessment & Plan  frail and cachectic appearing on admission;  says she has been that way for years  does have chronic history of smoking but reports no recent weight loss  nutrition consult  mealtime supplementation    Tobacco use  Assessment & Plan  active smoker for many decades  nicotine patch ordered    Acute respiratory failure with hypoxia ([facility] [address] )  Assessment & Plan  secondary to COVID-19 pneumonia  given her smoking history would not be surprised if she has some underlying COPD as well  wean O2 as tolerated        VTE Pharmacologic Prophylaxis: VTE Score: 5 High Risk (Score >/= 5) - Pharmacological DVT Prophylaxis Ordered: enoxaparin (Lovenox)  Sequential Compression Devices Ordered  Patient Centered Rounds: I performed bedside rounds with nursing staff today  Discussions with Specialists or Other Care Team Provider:  Reviewed notes    Education and Discussions with Family / Patient:  Discussed with patient    Time Spent for Care: 20 minutes  More than 50% of total time spent on counseling and coordination of care as described above      Current Length of Stay: 1 day(s)  Current Patient Status: Inpatient   Certification Statement: The patient will continue to require additional inpatient hospital stay due to [facility] treatment  Discharge Plan: Anticipate discharge in 48-72 hrs to discharge location to be determined pending rehab evaluations  Code Status: Level 1 - Full Code    Subjective:   Resting comfortably in bed offers no complaints at this time    Objective:     Vitals:   Temp (24hrs), Av 1 °F (36 7 °C), Min:97 3 °F (36 3 °C), Max:99 4 °F (37 4 °C)    Temp:  [97 3 °F (36 3 °C)-99 4 °F (37 4 °C)] 97 8 °F (36 6 °C)  HR:  [] 85  Resp:  [16-28] 18  BP: ()/(56-87) 109/69  SpO2:  [69 %-100 %] 96 %  Body mass index is 17 14 kg/m²  Input and Output Summary (last 24 hours): Intake/Output Summary (Last 24 hours) at 2022 0955  Last data filed at 2022  Gross per 24 hour   Intake 2000 ml   Output --   Net 2000 ml       Physical Exam:   Physical Exam  Vitals and nursing note reviewed  Cardiovascular:      Rate and Rhythm: Normal rate  Pulmonary:      Effort: Pulmonary effort is normal    Abdominal:      Palpations: Abdomen is soft  Musculoskeletal:         General: Normal range of motion  Skin:     General: Skin is warm  Neurological:      Mental Status: She is alert  Mental status is at baseline  Psychiatric:         Mood and Affect: Mood normal          Thought Content:  Thought content normal          Judgment: Judgment normal        Additional Data:     Labs:  Results from last 7 days   Lab Units 22  1218   WBC Thousand/uL 7 55   HEMOGLOBIN g/dL 12 7   HEMATOCRIT % 40 3   PLATELETS Thousands/uL 401*   NEUTROS PCT % 88*   LYMPHS PCT % 6*   MONOS PCT % 5   EOS PCT % 0     Results from last 7 days   Lab Units 22  1219   SODIUM mmol/L 139   POTASSIUM mmol/L 3 8   CHLORIDE mmol/L 100   CO2 mmol/L 22   BUN mg/dL 23   CREATININE mg/dL 1 14   ANION GAP mmol/L 17*   CALCIUM mg/dL 7 9*   ALBUMIN g/dL 2 4*   TOTAL BILIRUBIN mg/dL 0 28   ALK PHOS U/L 241*   ALT U/L 18   AST U/L 66*   GLUCOSE RANDOM mg/dL 134     Results from last 7 days   Lab Units 01/20/22  1219   INR  0 99             Results from last 7 days   Lab Units 01/21/22  0555 01/20/22  1428 01/20/22  1219   LACTIC ACID mmol/L 0 7 5 0* 4 9*   PROCALCITONIN ng/ml 0 31* 0 26*  --        Lines/Drains:  Invasive Devices  Report    Peripheral Intravenous Line            Peripheral IV 01/20/22 Left Antecubital <1 day    Peripheral IV 01/20/22 Left Forearm <1 day                Recent Cultures (last 7 days):   Results from last 7 days   Lab Units 01/20/22  1219   BLOOD CULTURE  Received in Microbiology Lab  Culture in Progress  Received in Microbiology Lab  Culture in Progress  Last 24 Hours Medication List:   Current Facility-Administered Medications   Medication Dose Route Frequency Provider Last Rate    acetaminophen  650 mg Oral Q6H PRN Vasiliy Judit Yoliinic, DO      albuterol  2 puff Inhalation Q4H PRN Vasiliy Juditlynn Escobedo, DO      dexamethasone  6 mg Intravenous Q24H Vasiliy Judit Yoliinic, DO      enoxaparin  1 mg/kg Subcutaneous Q12H [address] [facility] Kuldeep, DO      nicotine  1 patch Transdermal Daily Vasiliy Judit Yoliinic, DO      ondansetron  4 mg Intravenous Q6H PRN Vasiliy Judit Yoliinic, DO      remdesivir  100 mg Intravenous Q24H Vasiliy Judit Yoliinic, DO      sodium chloride  100 mL/hr Intravenous Continuous Vasiliy Judit Yoliinic,  mL/hr (01/21/22 6320)      Today, Patient Was Seen By: ANDREIA Verdugo    **Please Note: This note may have been constructed using a voice recognition system  **

## 2022-01-21 NOTE — ASSESSMENT & PLAN NOTE
presented with several days of worsening dyspnea on exertion, shortness of breath, cough, generalized weakness, fatigue, and decreased appetite  · tested positive on presentation 01/20/2022  · currently requiring 3 L nasal cannula O2; will be initiated on mild-pathway treatment   · IV remdesivir day 2 of 5  · IV Decadron 6 mg daily times day 2 of 10  · weight based Lovenox given D-dimer  · incentive spirometry, out of bed as tolerated, prone positioning as tolerated

## 2022-01-21 NOTE — ASSESSMENT & PLAN NOTE
presented with several days of worsening dyspnea on exertion, shortness of breath, cough, generalized weakness, fatigue, and decreased appetite  · tested positive on presentation 01/20/2022  · currently requiring 2-3 L nasal cannula O2; will be initiated on mild-pathway treatment   · IV remdesivir day 3 of 5  · IV Decadron 6 mg daily times day 3 of 10  · weight based Lovenox given D-dimer  · incentive spirometry, out of bed as tolerated, prone positioning as tolerated  · Procalcitonin increased from 0 26 to 0 31 will start on Rocephin and doxycycline  · Discontinue IV fluids

## 2022-01-22 NOTE — PROGRESS NOTES
[facility]  Progress Note Rosa Elena Le 1950, 66 y o  female MRN: [id number]  Unit/Bed#: -01 Encounter: [id number]  Primary Care Provider: No primary care provider on file  Date and time admitted to hospital: 1/20/2022 12:01 PM    * Pneumonia due to COVID-19 virus  Assessment & Plan  presented with several days of worsening dyspnea on exertion, shortness of breath, cough, generalized weakness, fatigue, and decreased appetite  · tested positive on presentation 01/20/2022  · currently requiring 2-3 L nasal cannula O2; will be initiated on mild-pathway treatment   · IV remdesivir day 3 of 5  · IV Decadron 6 mg daily times day 3 of 10  · weight based Lovenox given D-dimer  · incentive spirometry, out of bed as tolerated, prone positioning as tolerated  · Procalcitonin increased from 0 26 to 0 31 will start on Rocephin and doxycycline  · Discontinue IV fluids    Severe protein-calorie malnutrition ([facility] [address] )  Assessment & Plan  frail and cachectic appearing on admission;  says she has been that way for years  does have chronic history of smoking but reports no recent weight loss  nutrition consult  mealtime supplementation  BMI of 17    Tobacco use  Assessment & Plan  active smoker for many decades  nicotine patch ordered    Acute respiratory failure with hypoxia ([facility] [address] )  Assessment & Plan  secondary to COVID-19 pneumonia  given her smoking history would not be surprised if she has some underlying COPD as well  wean O2 as tolerated      VTE Pharmacologic Prophylaxis: VTE Score: 5 High Risk (Score >/= 5) - Pharmacological DVT Prophylaxis Ordered: enoxaparin (Lovenox)  Sequential Compression Devices Ordered  Patient Centered Rounds: I performed bedside rounds with nursing staff today  Discussions with Specialists or Other Care Team Provider:  Reviewed notes    Education and Discussions with Family / Patient:  Discussed with patient    Time Spent for Care: 20 minutes   More than 50% of total time spent on counseling and coordination of care as described above  Current Length of Stay: 2 day(s)  Current Patient Status: Inpatient   Certification Statement: The patient will continue to require additional inpatient hospital stay due to Management of COVID  Discharge Plan: Anticipate discharge in 48 hrs to home  Code Status: Level 1 - Full Code    Subjective:   Denies any chest pain chest tightness shortness of breath or difficulty breathing is very fatigued emotional support provided educated her on her bacterial pneumonia on top of her viral COVID pneumonia verbalizes understanding will hopefully have improvement respiratory antibiotics encouraged ambulation and incentive spirometer    Objective:     Vitals:   Temp (24hrs), Av 7 °F (36 5 °C), Min:97 5 °F (36 4 °C), Max:97 9 °F (36 6 °C)    Temp:  [97 5 °F (36 4 °C)-97 9 °F (36 6 °C)] 97 9 °F (36 6 °C)  HR:  [66-87] 77  Resp:  [16-19] 19  BP: (103-121)/(58-79) 103/58  SpO2:  [88 %-97 %] 88 %  Body mass index is 17 14 kg/m²  Input and Output Summary (last 24 hours): Intake/Output Summary (Last 24 hours) at 2022 1020  Last data filed at 2022 1955  Gross per 24 hour   Intake 1000 ml   Output --   Net 1000 ml       Physical Exam:   Physical Exam  Vitals and nursing note reviewed  Cardiovascular:      Rate and Rhythm: Normal rate  Pulses: Normal pulses  Pulmonary:      Breath sounds: Normal breath sounds  Abdominal:      Palpations: Abdomen is soft  Musculoskeletal:         General: Normal range of motion  Skin:     General: Skin is warm  Neurological:      Mental Status: She is alert  Mental status is at baseline     Psychiatric:         Mood and Affect: Mood normal        Additional Data:     Labs:  Results from last 7 days   Lab Units 22  0533 22  1218 22  1218   WBC Thousand/uL 9 82   < > 7 55   HEMOGLOBIN g/dL 10 2*   < > 12 7   HEMATOCRIT % 31 5*   < > 40 3   PLATELETS Thousands/uL 355 < > 401*   NEUTROS PCT %  --   --  88*   LYMPHS PCT %  --   --  6*   MONOS PCT %  --   --  5   EOS PCT %  --   --  0    < > = values in this interval not displayed  Results from last 7 days   Lab Units 01/22/22  0533 01/20/22  1219 01/20/22  1219   SODIUM mmol/L 141   < > 139   POTASSIUM mmol/L 4 3   < > 3 8   CHLORIDE mmol/L 106   < > 100   CO2 mmol/L 27   < > 22   BUN mg/dL 22   < > 23   CREATININE mg/dL 0 64   < > 1 14   ANION GAP mmol/L 8   < > 17*   CALCIUM mg/dL 7 6*   < > 7 9*   ALBUMIN g/dL  --   --  2 4*   TOTAL BILIRUBIN mg/dL  --   --  0 28   ALK PHOS U/L  --   --  241*   ALT U/L  --   --  18   AST U/L  --   --  66*   GLUCOSE RANDOM mg/dL 88   < > 134    < > = values in this interval not displayed  Results from last 7 days   Lab Units 01/20/22  1219   INR  0 99             Results from last 7 days   Lab Units 01/21/22  0555 01/20/22  1428 01/20/22  1219   LACTIC ACID mmol/L 0 7 5 0* 4 9*   PROCALCITONIN ng/ml 0 31* 0 26*  --        Lines/Drains:  Invasive Devices  Report    Peripheral Intravenous Line            Peripheral IV 01/22/22 Right Antecubital <1 day                      Imaging: No pertinent imaging reviewed  Recent Cultures (last 7 days):   Results from last 7 days   Lab Units 01/20/22  1219   BLOOD CULTURE  No Growth at 24 hrs  No Growth at 24 hrs         Last 24 Hours Medication List:   Current Facility-Administered Medications   Medication Dose Route Frequency Provider Last Rate    acetaminophen  650 mg Oral Q6H PRN Nawaf Escobedo, DO      albuterol  2 puff Inhalation Q4H PRN Nawaf Escobedo, DO      cefTRIAXone  1,000 mg Intravenous Q24H ANDREIA Carty      dexamethasone  6 mg Intravenous Q24H Nawaf Escobedo, DO      doxycycline hyclate  100 mg Oral Q12H ANDREIA Carty      enoxaparin  1 mg/kg Subcutaneous Q12H 101 Nicolls Papo Light, DO      nicotine  1 patch Transdermal Daily Nawaf Escobedo DO      ondansetron  4 mg Intravenous Q6H PRN Bella Escobedo DO      remdesivir  100 mg Intravenous Q24H Patricia Alarcon DO          Today, Patient Was Seen By: ANDREIA Bo    **Please Note: This note may have been constructed using a voice recognition system  ** Patient tolerated procedure well. Patient tolerated procedure well.

## 2022-01-22 NOTE — PLAN OF CARE
Problem: Potential for Falls  Goal: Patient will remain free of falls  Description: INTERVENTIONS:  - Educate patient/family on patient safety including physical limitations  - Instruct patient to call for assistance with activity   - Consult OT/PT to assist with strengthening/mobility   - Keep Call bell within reach  - Keep bed low and locked with side rails adjusted as appropriate  - Keep care items and personal belongings within reach  - Initiate and maintain comfort rounds  - Make Fall Risk Sign visible to staff  - Offer Toileting every  Hours, in advance of need  - Initiate/Maintain alarm  - Obtain necessary fall risk management equipment:   - Apply yellow socks and bracelet for high fall risk patients  - Consider moving patient to room near nurses station  1/22/2022 1348 by Elidia Christine RN  Outcome: Progressing  1/22/2022 1347 by Elidia Christine RN  Outcome: Progressing     Problem: MOBILITY - ADULT  Goal: Maintain or return to baseline ADL function  Description: INTERVENTIONS:  -  Assess patient's ability to carry out ADLs; assess patient's baseline for ADL function and identify physical deficits which impact ability to perform ADLs (bathing, care of mouth/teeth, toileting, grooming, dressing, etc )  - Assess/evaluate cause of self-care deficits   - Assess range of motion  - Assess patient's mobility; develop plan if impaired  - Assess patient's need for assistive devices and provide as appropriate  - Encourage maximum independence but intervene and supervise when necessary  - Involve family in performance of ADLs  - Assess for home care needs following discharge   - Consider OT consult to assist with ADL evaluation and planning for discharge  - Provide patient education as appropriate  1/22/2022 1348 by Elidia Christine RN  Outcome: Progressing  1/22/2022 1347 by Elidia Christine RN  Outcome: Progressing  Goal: Maintains/Returns to pre admission functional level  Description: INTERVENTIONS:  - Perform BMAT or MOVE assessment daily    - Set and communicate daily mobility goal to care team and patient/family/caregiver  - Collaborate with rehabilitation services on mobility goals if consulted  - Perform Range of Motion  times a day  - Reposition patient every  hours    - Dangle patient  times a day  - Stand patient  times a day  - Ambulate patient  times a day  - Out of bed to chair  times a day   - Out of bed for meals  times a day  - Out of bed for toileting  - Record patient progress and toleration of activity level   1/22/2022 1348 by Radha Adame RN  Outcome: Progressing  1/22/2022 1347 by Radha Adame RN  Outcome: Progressing     Problem: PAIN - ADULT  Goal: Verbalizes/displays adequate comfort level or baseline comfort level  Description: Interventions:  - Encourage patient to monitor pain and request assistance  - Assess pain using appropriate pain scale  - Administer analgesics based on type and severity of pain and evaluate response  - Implement non-pharmacological measures as appropriate and evaluate response  - Consider cultural and social influences on pain and pain management  - Notify physician/advanced practitioner if interventions unsuccessful or patient reports new pain  1/22/2022 1348 by Radha Adame RN  Outcome: Progressing  1/22/2022 1347 by Radha Adame RN  Outcome: Progressing     Problem: INFECTION - ADULT  Goal: Absence or prevention of progression during hospitalization  Description: INTERVENTIONS:  - Assess and monitor for signs and symptoms of infection  - Monitor lab/diagnostic results  - Monitor all insertion sites, i e  indwelling lines, tubes, and drains  - Monitor endotracheal if appropriate and nasal secretions for changes in amount and color  - [place] appropriate cooling/warming therapies per order  - Administer medications as ordered  - Instruct and encourage patient and family to use good hand hygiene technique  - Identify and instruct in appropriate isolation precautions for identified infection/condition  1/22/2022 1348 by Alek Nicole RN  Outcome: Progressing  1/22/2022 1347 by Alek Nicole RN  Outcome: Progressing  Goal: Absence of fever/infection during neutropenic period  Description: INTERVENTIONS:  - Monitor WBC    1/22/2022 1348 by Alek Nicole RN  Outcome: Progressing  1/22/2022 1347 by Alek Nicole RN  Outcome: Progressing     Problem: SAFETY ADULT  Goal: Patient will remain free of falls  Description: INTERVENTIONS:  - Educate patient/family on patient safety including physical limitations  - Instruct patient to call for assistance with activity   - Consult OT/PT to assist with strengthening/mobility   - Keep Call bell within reach  - Keep bed low and locked with side rails adjusted as appropriate  - Keep care items and personal belongings within reach  - Initiate and maintain comfort rounds  - Make Fall Risk Sign visible to staff  - Offer Toileting every  Hours, in advance of need  - Initiate/Maintain alarm  - Obtain necessary fall risk management equipment:   - Apply yellow socks and bracelet for high fall risk patients  - Consider moving patient to room near nurses station  1/22/2022 1348 by Alek Nicole RN  Outcome: Progressing  1/22/2022 1347 by Alek Nicole RN  Outcome: Progressing  Goal: Maintain or return to baseline ADL function  Description: INTERVENTIONS:  -  Assess patient's ability to carry out ADLs; assess patient's baseline for ADL function and identify physical deficits which impact ability to perform ADLs (bathing, care of mouth/teeth, toileting, grooming, dressing, etc )  - Assess/evaluate cause of self-care deficits   - Assess range of motion  - Assess patient's mobility; develop plan if impaired  - Assess patient's need for assistive devices and provide as appropriate  - Encourage maximum independence but intervene and supervise when necessary  - Involve family in performance of ADLs  - Assess for home care needs following discharge   - Consider OT consult to assist with ADL evaluation and planning for discharge  - Provide patient education as appropriate  1/22/2022 1348 by Indra Roman RN  Outcome: Progressing  1/22/2022 1347 by Indra Roman RN  Outcome: Progressing  Goal: Maintains/Returns to pre admission functional level  Description: INTERVENTIONS:  - Perform BMAT or MOVE assessment daily    - Set and communicate daily mobility goal to care team and patient/family/caregiver  - Collaborate with rehabilitation services on mobility goals if consulted  - Perform Range of Motion  times a day  - Reposition patient every  hours    - Dangle patient  times a day  - Stand patie times a day  - Ambulate patient  times a day  - Out of bed to chair  times a day   - Out of bed for meals  times a day  - Out of bed for toileting  - Record patient progress and toleration of activity level   1/22/2022 1348 by Indra Roman RN  Outcome: Progressing  1/22/2022 1347 by Indra Roman RN  Outcome: Progressing     Problem: DISCHARGE PLANNING  Goal: Discharge to home or other facility with appropriate resources  Description: INTERVENTIONS:  - Identify barriers to discharge w/patient and caregiver  - Arrange for needed discharge resources and transportation as appropriate  - Identify discharge learning needs (meds, wound care, etc )  - Arrange for interpretive services to assist at discharge as needed  - Refer to Case Management Department for coordinating discharge planning if the patient needs post-hospital services based on physician/advanced practitioner order or complex needs related to functional status, cognitive ability, or social support system  1/22/2022 1348 by Indra Roman RN  Outcome: Progressing  1/22/2022 1347 by Indar Roman RN  Outcome: Progressing     Problem: Knowledge Deficit  Goal: Patient/family/caregiver demonstrates understanding of disease process, treatment plan, medications, and discharge instructions  Description: Complete learning assessment and assess knowledge base    Interventions:  - Provide teaching at level of understanding  - Provide teaching via preferred learning methods  1/22/2022 1348 by Tyshawn Quintana RN  Outcome: Progressing  1/22/2022 1347 by Tyshawn Quintana RN  Outcome: Progressing     Problem: RESPIRATORY - ADULT  Goal: Achieves optimal ventilation and oxygenation  Description: INTERVENTIONS:  - Assess for changes in respiratory status  - Assess for changes in mentation and behavior  - Position to facilitate oxygenation and minimize respiratory effort  - Oxygen administered by appropriate delivery if ordered  - Initiate smoking cessation education as indicated  - Encourage broncho-pulmonary hygiene including cough, deep breathe, Incentive Spirometry  - Assess the need for suctioning and aspirate as needed  - Assess and instruct to report SOB or any respiratory difficulty  - Respiratory Therapy support as indicated  1/22/2022 1348 by Tyshawn Quintana RN  Outcome: Progressing  1/22/2022 1347 by Tyshawn Quintana RN  Outcome: Progressing     Problem: SKIN/TISSUE INTEGRITY - ADULT  Goal: Skin Integrity remains intact(Skin Breakdown Prevention)  Description: Assess:  -Perform [place] assessment every   -Clean and moisturize skin every   -Inspect skin when repositioning, toileting, and assisting with ADLS  -Assess under medical devices such as  every   -Assess extremities for adequate circulation and sensation     Bed Management:  -Have minimal linens on bed & keep smooth, unwrinkled  -Change linens as needed when moist or perspiring  -Avoid sitting or lying in one position for more than  hours while in bed  -Keep HOB at degrees     Toileting:  -Offer bedside commode  -Assess for incontinence every   -Use incontinent care products after each incontinent episode such as     Activity:  -Mobilize patient  times a day  -Encourage activity and walks on unit  -Encourage or provide ROM exercises -Turn and reposition patient every  Hours  -Use appropriate equipment to lift or move patient in bed  -Instruct/ Assist with weight shifting every  when out of bed in chair  -Consider limitation of chair time  hour intervals    Skin Care:  -Avoid use of baby powder, tape, friction and shearing, hot water or constrictive clothing  -Relieve pressure over bony prominences using   -Do not massage red bony areas    Next Steps:  -Teach patient strategies to minimize risks such as    -Consider consults to  interdisciplinary teams such as   1/22/2022 1348 by Moises Ybarra RN  Outcome: Progressing  1/22/2022 1347 by Moises Ybarra RN  Outcome: Progressing  Goal: Incision(s), wounds(s) or drain site(s) healing without S/S of infection  Description: INTERVENTIONS  - Assess and document dressing, incision, wound bed, drain sites and surrounding tissue  - Provide patient and family education  - Perform skin care/dressing changes every   1/22/2022 1348 by Moises Ybarra RN  Outcome: Progressing  1/22/2022 1347 by Moises Ybarra RN  Outcome: Progressing  Goal: Pressure injury heals and does not worsen  Description: Interventions:  - Implement low air loss mattress or specialty surface (Criteria met)  - Apply silicone foam dressing  - Instruct/assist with weight shifting every  minutes when in chair   - Limit chair time to  hour intervals  - Use special pressure reducing interventions such as when in chair   - Apply fecal or urinary incontinence containment device   - Perform passive or active ROM every   - Turn and reposition patient & offload bony prominences every  hours   - Utilize friction reducing device or surface for transfers   - Consider consults to  interdisciplinary teams such as   - Use incontinent care products after each incontinent episode such as   - Consider nutrition services referral as needed  1/22/2022 1348 by Moises Ybarra RN  Outcome: Progressing  1/22/2022 1347 by Moises Ybarra RN  Outcome: Progressing     Problem: Nutrition/Hydration-ADULT  Goal: Nutrient/Hydration intake appropriate for improving, restoring or maintaining nutritional needs  Description: Monitor and assess patient's nutrition/hydration status for malnutrition  Collaborate with interdisciplinary team and initiate plan and interventions as ordered  Monitor patient's weight and dietary intake as ordered or per policy  Utilize nutrition screening tool and intervene as necessary  Determine patient's food preferences and provide high-protein, high-caloric foods as appropriate       INTERVENTIONS:  - Monitor oral intake, urinary output, labs, and treatment plans  - Assess nutrition and hydration status and recommend course of action  - Evaluate amount of meals eaten  - Assist patient with eating if necessary   - Allow adequate time for meals  - Recommend/ encourage appropriate diets, oral nutritional supplements, and vitamin/mineral supplements  - Order, calculate, and assess calorie counts as needed  - Recommend, monitor, and adjust tube feedings and TPN/PPN based on assessed needs  - Assess need for intravenous fluids  - Provide specific nutrition/hydration education as appropriate  - Include patient/family/caregiver in decisions related to nutrition  1/22/2022 1348 by Elidia Christine RN  Outcome: Progressing  1/22/2022 1347 by Elidia Christine RN  Outcome: Progressing     Problem: Prexisting or High Potential for Compromised Skin Integrity  Goal: Skin integrity is maintained or improved  Description: INTERVENTIONS:  - Identify patients at risk for skin breakdown  - Assess and monitor skin integrity  - Assess and monitor nutrition and hydration status  - Monitor labs   - Assess for incontinence   - Turn and reposition patient  - Assist with mobility/ambulation  - Relieve pressure over bony prominences  - Avoid friction and shearing  - Provide appropriate hygiene as needed including keeping skin clean and dry  - Evaluate need for skin moisturizer/barrier cream  - Collaborate with interdisciplinary team   - Patient/family teaching  - Consider wound care consult   1/22/2022 1348 by Marquis Wan RN  Outcome: Progressing  1/22/2022 1347 by Marquis Wan, RN  Outcome: Progressing

## 2022-01-22 NOTE — RESPIRATORY THERAPY NOTE
RT Protocol Note  Christian Ragsdale 66 y o  female MRN: [id number]  Unit/Bed#: -01 Encounter: [id number]    Assessment    Principal Problem:    Pneumonia due to COVID-19 virus  Active Problems:    Acute respiratory failure with hypoxia (HCC)    Tobacco use    Severe protein-calorie malnutrition ([address] )      Home Pulmonary Medications:  none       History reviewed  No pertinent past medical history  Social History     Socioeconomic History    Marital status: /Civil Union     Spouse name: None    Number of children: None    Years of education: None    Highest education level: None   Occupational History    None   Tobacco Use    Smoking status: Current Every Day Smoker     Types: Cigarettes    Smokeless tobacco: Never Used   Substance and Sexual Activity    Alcohol use: Not Currently    Drug use: Never    Sexual activity: None   Other Topics Concern    None   Social History Narrative    None     Social Determinants of Health     Financial Resource Strain: Not on file   Food Insecurity: No Food Insecurity    Worried About Running Out of Food in the Last Year: Never true    [place] of Food in the Last Year: Never true   Transportation Needs: No Transportation Needs    Lack of Transportation (Medical): No    Lack of Transportation (Non-Medical): No   Physical Activity: Not on file   Stress: Not on file   Social Connections: Not on file   Intimate Partner Violence: Not on file   Housing Stability: Low Risk     Unable to Pay for Housing in the Last Year: No    Number of Places Lived in the Last Year: 1    Unstable Housing in the Last Year: No       Subjective         Objective    Physical Exam:   Assessment Type: Assess only  General Appearance: Alert,Awake  Bilateral Breath Sounds: Diminished    Vitals:  Blood pressure 103/58, pulse 77, temperature 97 9 °F (36 6 °C), temperature source Oral, resp  rate 19, height 5' 5" (1 651 m), weight 46 7 kg (103 lb), SpO2 (!) 88 %            Imaging and other studies: I have personally reviewed pertinent reports  Plan    Respiratory Plan: Mild Distress pathway     Pt admitted for covid  No prior respiratory history  bbs are clear,  Decreased  q 6 albuterol/atrovent ordered by crnp has been discontinued at this time  No indication for breathing txs

## 2022-01-22 NOTE — PLAN OF CARE
Problem: Potential for Falls  Goal: Patient will remain free of falls  Description: INTERVENTIONS:  - Educate patient/family on patient safety including physical limitations  - Instruct patient to call for assistance with activity   - Consult OT/PT to assist with strengthening/mobility   - Keep Call bell within reach  - Keep bed low and locked with side rails adjusted as appropriate  - Keep care items and personal belongings within reach  - Initiate and maintain comfort rounds  - Make Fall Risk Sign visible to staff  - Offer Toileting every  Hours, in advance of need  - Initiate/Maintain alarm  - Obtain necessary fall risk management equipment:   - Apply yellow socks and bracelet for high fall risk patients  - Consider moving patient to room near nurses station  Outcome: Progressing     Problem: MOBILITY - ADULT  Goal: Maintain or return to baseline ADL function  Description: INTERVENTIONS:  -  Assess patient's ability to carry out ADLs; assess patient's baseline for ADL function and identify physical deficits which impact ability to perform ADLs (bathing, care of mouth/teeth, toileting, grooming, dressing, etc )  - Assess/evaluate cause of self-care deficits   - Assess range of motion  - Assess patient's mobility; develop plan if impaired  - Assess patient's need for assistive devices and provide as appropriate  - Encourage maximum independence but intervene and supervise when necessary  - Involve family in performance of ADLs  - Assess for home care needs following discharge   - Consider OT consult to assist with ADL evaluation and planning for discharge  - Provide patient education as appropriate  Outcome: Progressing  Goal: Maintains/Returns to pre admission functional level  Description: INTERVENTIONS:  - Perform BMAT or MOVE assessment daily    - Set and communicate daily mobility goal to care team and patient/family/caregiver     - Collaborate with rehabilitation services on mobility goals if consulted  - Perform Range of Motion  times a day  - Reposition patient every  hours    - Dangle patient  times a day  - Stand patient  times a day  - Ambulate patient  times a day  - Out of bed to chair  times a day   - Out of bed for meals  times a day  - Out of bed for toileting  - Record patient progress and toleration of activity level   Outcome: Progressing     Problem: PAIN - ADULT  Goal: Verbalizes/displays adequate comfort level or baseline comfort level  Description: Interventions:  - Encourage patient to monitor pain and request assistance  - Assess pain using appropriate pain scale  - Administer analgesics based on type and severity of pain and evaluate response  - Implement non-pharmacological measures as appropriate and evaluate response  - Consider cultural and social influences on pain and pain management  - Notify physician/advanced practitioner if interventions unsuccessful or patient reports new pain  Outcome: Progressing     Problem: INFECTION - ADULT  Goal: Absence or prevention of progression during hospitalization  Description: INTERVENTIONS:  - Assess and monitor for signs and symptoms of infection  - Monitor lab/diagnostic results  - Monitor all insertion sites, i e  indwelling lines, tubes, and drains  - Monitor endotracheal if appropriate and nasal secretions for changes in amount and color  - [place] appropriate cooling/warming therapies per order  - Administer medications as ordered  - Instruct and encourage patient and family to use good hand hygiene technique  - Identify and instruct in appropriate isolation precautions for identified infection/condition  Outcome: Progressing  Goal: Absence of fever/infection during neutropenic period  Description: INTERVENTIONS:  - Monitor WBC    Outcome: Progressing     Problem: SAFETY ADULT  Goal: Patient will remain free of falls  Description: INTERVENTIONS:  - Educate patient/family on patient safety including physical limitations  - Instruct patient to call for assistance with activity   - Consult OT/PT to assist with strengthening/mobility   - Keep Call bell within reach  - Keep bed low and locked with side rails adjusted as appropriate  - Keep care items and personal belongings within reach  - Initiate and maintain comfort rounds  - Make Fall Risk Sign visible to staff  - Offer Toileting every  Hours, in advance of need  - Initiate/Maintain alarm  - Obtain necessary fall risk management equipment:   - Apply yellow socks and bracelet for high fall risk patients  - Consider moving patient to room near nurses station  Outcome: Progressing  Goal: Maintain or return to baseline ADL function  Description: INTERVENTIONS:  -  Assess patient's ability to carry out ADLs; assess patient's baseline for ADL function and identify physical deficits which impact ability to perform ADLs (bathing, care of mouth/teeth, toileting, grooming, dressing, etc )  - Assess/evaluate cause of self-care deficits   - Assess range of motion  - Assess patient's mobility; develop plan if impaired  - Assess patient's need for assistive devices and provide as appropriate  - Encourage maximum independence but intervene and supervise when necessary  - Involve family in performance of ADLs  - Assess for home care needs following discharge   - Consider OT consult to assist with ADL evaluation and planning for discharge  - Provide patient education as appropriate  Outcome: Progressing  Goal: Maintains/Returns to pre admission functional level  Description: INTERVENTIONS:  - Perform BMAT or MOVE assessment daily    - Set and communicate daily mobility goal to care team and patient/family/caregiver  - Collaborate with rehabilitation services on mobility goals if consulted  - Perform Range of Motion  times a day  - Reposition patient every  hours    - Dangle patient  times a day  - Stand patient  times a day  - Ambulate patient  times a day  - Out of bed to chair  times a day   - Out of bed for meals  times a day  - Out of bed for toileting  - Record patient progress and toleration of activity level   Outcome: Progressing     Problem: DISCHARGE PLANNING  Goal: Discharge to home or other facility with appropriate resources  Description: INTERVENTIONS:  - Identify barriers to discharge w/patient and caregiver  - Arrange for needed discharge resources and transportation as appropriate  - Identify discharge learning needs (meds, wound care, etc )  - Arrange for interpretive services to assist at discharge as needed  - Refer to Case Management Department for coordinating discharge planning if the patient needs post-hospital services based on physician/advanced practitioner order or complex needs related to functional status, cognitive ability, or social support system  Outcome: Progressing     Problem: Knowledge Deficit  Goal: Patient/family/caregiver demonstrates understanding of disease process, treatment plan, medications, and discharge instructions  Description: Complete learning assessment and assess knowledge base    Interventions:  - Provide teaching at level of understanding  - Provide teaching via preferred learning methods  Outcome: Progressing     Problem: RESPIRATORY - ADULT  Goal: Achieves optimal ventilation and oxygenation  Description: INTERVENTIONS:  - Assess for changes in respiratory status  - Assess for changes in mentation and behavior  - Position to facilitate oxygenation and minimize respiratory effort  - Oxygen administered by appropriate delivery if ordered  - Initiate smoking cessation education as indicated  - Encourage broncho-pulmonary hygiene including cough, deep breathe, Incentive Spirometry  - Assess the need for suctioning and aspirate as needed  - Assess and instruct to report SOB or any respiratory difficulty  - Respiratory Therapy support as indicated  Outcome: Progressing     Problem: SKIN/TISSUE INTEGRITY - ADULT  Goal: Skin Integrity remains intact(Skin Breakdown Prevention)  Description: Assess:  -Perform Ranulfo assessment every   -Clean and moisturize skin every   -Inspect skin when repositioning, toileting, and assisting with ADLS  -Assess under medical devices such as  every   -Assess extremities for adequate circulation and sensation     Bed Management:  -Have minimal linens on bed & keep smooth, unwrinkled  -Change linens as needed when moist or perspiring  -Avoid sitting or lying in one position for more than  hours while in bed  -Keep HOB at degrees     Toileting:  -Offer bedside commode  -Assess for incontinence every   -Use incontinent care products after each incontinent episode such as     Activity:  -Mobilize patient times a day  -Encourage activity and walks on unit  -Encourage or provide ROM exercises   -Turn and reposition patient every  Hours  -Use appropriate equipment to lift or move patient in bed  -Instruct/ Assist with weight shifting every  when out of bed in chair  -Consider limitation of chair time hour intervals    Skin Care:  -Avoid use of baby powder, tape, friction and shearing, hot water or constrictive clothing  -Relieve pressure over bony prominences using   -Do not massage red bony areas    Next Steps:  -Teach patient strategies to minimize risks such as    -Consider consults to  interdisciplinary teams such as   Outcome: Progressing  Goal: Incision(s), wounds(s) or drain site(s) healing without S/S of infection  Description: INTERVENTIONS  - Assess and document dressing, incision, wound bed, drain sites and surrounding tissue  - Provide patient and family education  - Perform skin care/dressing changes every   Outcome: Progressing  Goal: Pressure injury heals and does not worsen  Description: Interventions:  - Implement low air loss mattress or specialty surface (Criteria met)  - Apply silicone foam dressing  - Instruct/assist with weight shifting every  minutes when in chair   - Limit chair time to hour intervals  - Use special pressure reducing interventions such as  when in chair   - Apply fecal or urinary incontinence containment device   - Perform passive or active ROM every   - Turn and reposition patient & offload bony prominences every  hours   - Utilize friction reducing device or surface for transfers   - Consider consults to  interdisciplinary teams such as   - Use incontinent care products after each incontinent episode such a  - Consider nutrition services referral as needed  Outcome: Progressing     Problem: Nutrition/Hydration-ADULT  Goal: Nutrient/Hydration intake appropriate for improving, restoring or maintaining nutritional needs  Description: Monitor and assess patient's nutrition/hydration status for malnutrition  Collaborate with interdisciplinary team and initiate plan and interventions as ordered  Monitor patient's weight and dietary intake as ordered or per policy  Utilize nutrition screening tool and intervene as necessary  Determine patient's food preferences and provide high-protein, high-caloric foods as appropriate       INTERVENTIONS:  - Monitor oral intake, urinary output, labs, and treatment plans  - Assess nutrition and hydration status and recommend course of action  - Evaluate amount of meals eaten  - Assist patient with eating if necessary   - Allow adequate time for meals  - Recommend/ encourage appropriate diets, oral nutritional supplements, and vitamin/mineral supplements  - Order, calculate, and assess calorie counts as needed  - Recommend, monitor, and adjust tube feedings and TPN/PPN based on assessed needs  - Assess need for intravenous fluids  - Provide specific nutrition/hydration education as appropriate  - Include patient/family/caregiver in decisions related to nutrition  Outcome: Progressing     Problem: Prexisting or High Potential for Compromised Skin Integrity  Goal: Skin integrity is maintained or improved  Description: INTERVENTIONS:  - Identify patients at risk for skin breakdown  - Assess and monitor skin integrity  - Assess and monitor nutrition and hydration status  - Monitor labs   - Assess for incontinence   - Turn and reposition patient  - Assist with mobility/ambulation  - Relieve pressure over bony prominences  - Avoid friction and shearing  - Provide appropriate hygiene as needed including keeping skin clean and dry  - Evaluate need for skin moisturizer/barrier cream  - Collaborate with interdisciplinary team   - Patient/family teaching  - Consider wound care consult   Outcome: Progressing

## 2022-01-23 NOTE — PLAN OF CARE
Problem: Potential for Falls  Goal: Patient will remain free of falls  Description: INTERVENTIONS:  - Educate patient/family on patient safety including physical limitations  - Instruct patient to call for assistance with activity   - Consult OT/PT to assist with strengthening/mobility   - Keep Call bell within reach  - Keep bed low and locked with side rails adjusted as appropriate  - Keep care items and personal belongings within reach  - Initiate and maintain comfort rounds  - Make Fall Risk Sign visible to staff  - Offer Toileting every Hours, in advance of need  - Initiate/Maintain alarm  - Obtain necessary fall risk management equipment:   - Apply yellow socks and bracelet for high fall risk patients  - Consider moving patient to room near nurses station  Outcome: Progressing     Problem: MOBILITY - ADULT  Goal: Maintain or return to baseline ADL function  Description: INTERVENTIONS:  -  Assess patient's ability to carry out ADLs; assess patient's baseline for ADL function and identify physical deficits which impact ability to perform ADLs (bathing, care of mouth/teeth, toileting, grooming, dressing, etc )  - Assess/evaluate cause of self-care deficits   - Assess range of motion  - Assess patient's mobility; develop plan if impaired  - Assess patient's need for assistive devices and provide as appropriate  - Encourage maximum independence but intervene and supervise when necessary  - Involve family in performance of ADLs  - Assess for home care needs following discharge   - Consider OT consult to assist with ADL evaluation and planning for discharge  - Provide patient education as appropriate  Outcome: Progressing  Goal: Maintains/Returns to pre admission functional level  Description: INTERVENTIONS:  - Perform BMAT or MOVE assessment daily    - Set and communicate daily mobility goal to care team and patient/family/caregiver     - Collaborate with rehabilitation services on mobility goals if consulted  - Perform Range of Motion 3 times a day  - Reposition patient every 2 hours    - Dangle patient 3 times a day  - Stand patient 3 times a day  - Ambulate patient 3 times a day  - Out of bed to chair 3 times a day   - Out of bed for meals 3 times a day  - Out of bed for toileting  - Record patient progress and toleration of activity level   Outcome: Progressing     Problem: PAIN - ADULT  Goal: Verbalizes/displays adequate comfort level or baseline comfort level  Description: Interventions:  - Encourage patient to monitor pain and request assistance  - Assess pain using appropriate pain scale  - Administer analgesics based on type and severity of pain and evaluate response  - Implement non-pharmacological measures as appropriate and evaluate response  - Consider cultural and social influences on pain and pain management  - Notify physician/advanced practitioner if interventions unsuccessful or patient reports new pain  Outcome: Progressing     Problem: INFECTION - ADULT  Goal: Absence or prevention of progression during hospitalization  Description: INTERVENTIONS:  - Assess and monitor for signs and symptoms of infection  - Monitor lab/diagnostic results  - Monitor all insertion sites, i e  indwelling lines, tubes, and drains  - Monitor endotracheal if appropriate and nasal secretions for changes in amount and color  - [place] appropriate cooling/warming therapies per order  - Administer medications as ordered  - Instruct and encourage patient and family to use good hand hygiene technique  - Identify and instruct in appropriate isolation precautions for identified infection/condition  Outcome: Progressing  Goal: Absence of fever/infection during neutropenic period  Description: INTERVENTIONS:  - Monitor WBC    Outcome: Progressing     Problem: SAFETY ADULT  Goal: Patient will remain free of falls  Description: INTERVENTIONS:  - Educate patient/family on patient safety including physical limitations  - Instruct patient to call for assistance with activity   - Consult OT/PT to assist with strengthening/mobility   - Keep Call bell within reach  - Keep bed low and locked with side rails adjusted as appropriate  - Keep care items and personal belongings within reach  - Initiate and maintain comfort rounds  - Make Fall Risk Sign visible to staff  - Offer Toileting every 2 Hours, in advance of need  - Initiate/Maintain alarm  - Obtain necessary fall risk management equipment:   - Apply yellow socks and bracelet for high fall risk patients  - Consider moving patient to room near nurses station  Outcome: Progressing  Goal: Maintain or return to baseline ADL function  Description: INTERVENTIONS:  -  Assess patient's ability to carry out ADLs; assess patient's baseline for ADL function and identify physical deficits which impact ability to perform ADLs (bathing, care of mouth/teeth, toileting, grooming, dressing, etc )  - Assess/evaluate cause of self-care deficits   - Assess range of motion  - Assess patient's mobility; develop plan if impaired  - Assess patient's need for assistive devices and provide as appropriate  - Encourage maximum independence but intervene and supervise when necessary  - Involve family in performance of ADLs  - Assess for home care needs following discharge   - Consider OT consult to assist with ADL evaluation and planning for discharge  - Provide patient education as appropriate  Outcome: Progressing  Goal: Maintains/Returns to pre admission functional level  Description: INTERVENTIONS:  - Perform BMAT or MOVE assessment daily    - Set and communicate daily mobility goal to care team and patient/family/caregiver  - Collaborate with rehabilitation services on mobility goals if consulted  - Perform Range of Motion 3 times a day  - Reposition patient every 2 hours    - Dangle patient 3 times a day  - Stand patient 3 times a day  - Ambulate patient 3 times a day  - Out of bed to chair 3 times a day   - Out of bed for meals 3 times a day  - Out of bed for toileting  - Record patient progress and toleration of activity level   Outcome: Progressing     Problem: DISCHARGE PLANNING  Goal: Discharge to home or other facility with appropriate resources  Description: INTERVENTIONS:  - Identify barriers to discharge w/patient and caregiver  - Arrange for needed discharge resources and transportation as appropriate  - Identify discharge learning needs (meds, wound care, etc )  - Arrange for interpretive services to assist at discharge as needed  - Refer to Case Management Department for coordinating discharge planning if the patient needs post-hospital services based on physician/advanced practitioner order or complex needs related to functional status, cognitive ability, or social support system  Outcome: Progressing     Problem: Knowledge Deficit  Goal: Patient/family/caregiver demonstrates understanding of disease process, treatment plan, medications, and discharge instructions  Description: Complete learning assessment and assess knowledge base    Interventions:  - Provide teaching at level of understanding  - Provide teaching via preferred learning methods  Outcome: Progressing     Problem: RESPIRATORY - ADULT  Goal: Achieves optimal ventilation and oxygenation  Description: INTERVENTIONS:  - Assess for changes in respiratory status  - Assess for changes in mentation and behavior  - Position to facilitate oxygenation and minimize respiratory effort  - Oxygen administered by appropriate delivery if ordered  - Initiate smoking cessation education as indicated  - Encourage broncho-pulmonary hygiene including cough, deep breathe, Incentive Spirometry  - Assess the need for suctioning and aspirate as needed  - Assess and instruct to report SOB or any respiratory difficulty  - Respiratory Therapy support as indicated  Outcome: Progressing     Problem: SKIN/TISSUE INTEGRITY - ADULT  Goal: Skin Integrity remains intact(Skin Breakdown Prevention)  Description: Assess:  -Perform [place] assessment every shift  -Clean and moisturize skin every shift  -Inspect skin when repositioning, toileting, and assisting with ADLS  -Assess under medical devices such as  every   -Assess extremities for adequate circulation and sensation     Bed Management:  -Have minimal linens on bed & keep smooth, unwrinkled  -Change linens as needed when moist or perspiring  -Avoid sitting or lying in one position for more than  hours while in bed  -Keep HOB at degrees     Toileting:  -Offer bedside commode  -Assess for incontinence every   -Use incontinent care products after each incontinent episode such as     Activity:  -Mobilize patient  times a day  -Encourage activity and walks on unit  -Encourage or provide ROM exercises   -Turn and reposition patient every  Hours  -Use appropriate equipment to lift or move patient in bed  -Instruct/ Assist with weight shifting every  when out of bed in chair  -Consider limitation of chair time  hour intervals    Skin Care:  -Avoid use of baby powder, tape, friction and shearing, hot water or constrictive clothing  -Relieve pressure over bony prominences using   -Do not massage red bony areas    Next Steps:  -Teach patient strategies to minimize risks such as    -Consider consults to  interdisciplinary teams such as   Outcome: Progressing  Goal: Incision(s), wounds(s) or drain site(s) healing without S/S of infection  Description: INTERVENTIONS  - Assess and document dressing, incision, wound bed, drain sites and surrounding tissue  - Provide patient and family education  - Perform skin care/dressing changes every   Outcome: Progressing  Goal: Pressure injury heals and does not worsen  Description: Interventions:  - Implement low air loss mattress or specialty surface (Criteria met)  - Apply silicone foam dressing  - Instruct/assist with weight shifting every  minutes when in chair   - Limit chair time to  hour intervals  - Use special pressure reducing interventions such as  when in chair   - Apply fecal or urinary incontinence containment device   - Perform passive or active ROM every   - Turn and reposition patient & offload bony prominences every  hours   - Utilize friction reducing device or surface for transfers   - Consider consults to  interdisciplinary teams such as   - Use incontinent care products after each incontinent episode such as  Consider nutrition services referral as needed  Outcome: Progressing     Problem: Nutrition/Hydration-ADULT  Goal: Nutrient/Hydration intake appropriate for improving, restoring or maintaining nutritional needs  Description: Monitor and assess patient's nutrition/hydration status for malnutrition  Collaborate with interdisciplinary team and initiate plan and interventions as ordered  Monitor patient's weight and dietary intake as ordered or per policy  Utilize nutrition screening tool and intervene as necessary  Determine patient's food preferences and provide high-protein, high-caloric foods as appropriate       INTERVENTIONS:  - Monitor oral intake, urinary output, labs, and treatment plans  - Assess nutrition and hydration status and recommend course of action  - Evaluate amount of meals eaten  - Assist patient with eating if necessary   - Allow adequate time for meals  - Recommend/ encourage appropriate diets, oral nutritional supplements, and vitamin/mineral supplements  - Order, calculate, and assess calorie counts as needed  - Recommend, monitor, and adjust tube feedings and TPN/PPN based on assessed needs  - Assess need for intravenous fluids  - Provide specific nutrition/hydration education as appropriate  - Include patient/family/caregiver in decisions related to nutrition  Outcome: Progressing     Problem: Prexisting or High Potential for Compromised Skin Integrity  Goal: Skin integrity is maintained or improved  Description: INTERVENTIONS:  - Identify patients at risk for skin breakdown  - Assess and monitor skin integrity  - Assess and monitor nutrition and hydration status  - Monitor labs   - Assess for incontinence   - Turn and reposition patient  - Assist with mobility/ambulation  - Relieve pressure over bony prominences  - Avoid friction and shearing  - Provide appropriate hygiene as needed including keeping skin clean and dry  - Evaluate need for skin moisturizer/barrier cream  - Collaborate with interdisciplinary team   - Patient/family teaching  - Consider wound care consult   Outcome: Progressing

## 2022-01-23 NOTE — PROGRESS NOTES
After Visit Summary   4/5/2018    Zechariah Ashby    MRN: [id number]           Patient Information     Date Of Birth          1959        Visit Information        Provider Department      4/5/2018 10:30 AM ROOM 5 [facility]        Today's Diagnoses     Non-small cell carcinoma of lung (H)    -  1    Atrial flutter, unspecified type (H)        Long-term (current) use of anticoagulants           Follow-ups after your visit        Your next 10 appointments already scheduled     Apr 10, 2018 10:45 AM CDT   ecg with [place] [facility]   [facility] ([facility])    [address] [address]   [phone]            Apr 10, 2018 11:00 AM CDT   Return Visit with Daksha Montemayor PA-C   [facility] ([facility])    [address] [address]   [phone]            Apr 19, 2018  8:30 AM CDT   Level 3 with ROOM 6 [facility] ([facility])    [facility]  [address]  [facility] [address]   [phone]            Apr 19, 2018  9:15 AM CDT   Return Visit with Joycelyn May MD   [facility] ([facility])    [facility]  [address]  [facility] [address]   [phone]            Apr 26, 2018 10:00 AM CDT   Level 1 with ROOM 7 [facility] ([facility])    [facility]  [address]  [facility] [address]   [phone]              Who to contact     If you have questions or need follow up information about today's clinic visit or your schedule please contact [facility] directly at [phone].  Normal or non-critical lab and imaging results will be communicated to you by MyChart, letter or phone within 4 business days after the clinic has received the results. If you do not  [address] [place] [address]  Progress Note Delfino Mc 1950, 66 y o  female MRN: [id number]  Unit/Bed#: -01 Encounter: [id number]  Primary Care Provider: No primary care provider on file  Date and time admitted to hospital: 1/20/2022 12:01 PM    * Pneumonia due to COVID-19 virus  Assessment & Plan  presented with several days of worsening dyspnea on exertion, shortness of breath, cough, generalized weakness, fatigue, and decreased appetite  · tested positive on presentation 01/20/2022  · currently requiring 2-3 L nasal cannula O2; will be initiated on mild-pathway treatment   · IV remdesivir day 4 of 5  · IV Decadron 6 mg daily times day 4of 10  · weight based Lovenox given D-dimer  · Procalcitonin increased from 0 26 to 0 31 will start on Rocephin and doxycycline  · Scheduled nebulizers p r n  Inhalers    Severe protein-calorie malnutrition ([facility] [address] )  Assessment & Plan  frail and cachectic appearing on admission;  says she has been that way for years  does have chronic history of smoking but reports no recent weight loss  nutrition consult  mealtime supplementation  BMI of 17    Tobacco use  Assessment & Plan  active smoker for many decades  nicotine patch ordered    Acute respiratory failure with hypoxia ([facility] [address] )  Assessment & Plan  secondary to COVID-19 pneumonia  given her smoking history would not be surprised if she has some underlying COPD as well  wean O2 as tolerated        VTE Pharmacologic Prophylaxis: VTE Score: 5 High Risk (Score >/= 5) - Pharmacological DVT Prophylaxis Ordered: enoxaparin (Lovenox)  Sequential Compression Devices Ordered  Patient Centered Rounds: I performed bedside rounds with nursing staff today  Discussions with Specialists or Other Care Team Provider:  Reviewed notes    Education and Discussions with Family / Patient:  Discussed with patient  Time Spent for Care: 20 minutes   More than 50% of total time spent on counseling and coordination of care as "hear from us within 7 days, please contact the clinic through [employer] or phone. If you have a critical or abnormal lab result, we will notify you by phone as soon as possible.  Submit refill requests through [employer] or call your pharmacy and they will forward the refill request to us. Please allow 3 business days for your refill to be completed.          Additional Information About Your Visit        [employer] Information     [employer] lets you send messages to your doctor, view your test results, renew your prescriptions, schedule appointments and more. To sign up, go to [email or web address] . Click on \"Log in\" on the left side of the screen, which will take you to the Welcome page. Then click on \"Sign up Now\" on the right side of the page.     You will be asked to enter the access code listed below, as well as some personal information. Please follow the directions to create your username and password.     Your access code is: [email or web address]  Expires: 2018 10:55 AM     Your access code will  in 90 days. If you need help or a new code, please call your [facility] clinic or [phone].        Care EveryWhere ID     This is your Care EveryWhere ID. This could be used by other organizations to access your [facility] medical records  [id number]        Your Vitals Were     Pulse Temperature Respirations Pulse Oximetry          67 98.6  F (37  C) (Tympanic) 16 99%         Blood Pressure from Last 3 Encounters:   18 92/56   18 117/75   18 106/60    Weight from Last 3 Encounters:   18 68 kg (149 lb 14.4 oz)   03/15/18 65.1 kg (143 lb 9.6 oz)   18 67.7 kg (149 lb 3.2 oz)              We Performed the Following     Basic metabolic panel     CBC with platelets differential     INR          Today's Medication Changes          These changes are accurate as of 18  1:47 PM.  If you have any questions, ask your nurse or doctor.               Start taking these medicines.        " described above  Current Length of Stay: 3 day(s)  Current Patient Status: Inpatient   Certification Statement: The patient will continue to require additional inpatient hospital stay due to Antibiotics COVID treatment oxygen steroids  Discharge Plan: Anticipate discharge in 48-72 hrs to home  Code Status: Level 1 - Full Code    Subjective:   Resting in bed sitting up in bed saying that she is having some increased trouble breathing will try a dose of Lasix and monitor for improvement encouraged patient to use incentive spirometer and get out of bed into the chair she has been in bed for the last 3 days she is refusing her Lovenox education has been provided to her about the risks of refusing a blood thinner with decreased activity in the setting of COVID she verbalizes understanding    Objective:     Vitals:   Temp (24hrs), Av 7 °F (35 9 °C), Min:96 °F (35 6 °C), Max:97 5 °F (36 4 °C)    Temp:  [96 °F (35 6 °C)-97 5 °F (36 4 °C)] 96 °F (35 6 °C)  HR:  [] 87  Resp:  [18-20] 18  BP: (140-142)/(89-98) 142/97  SpO2:  [89 %-97 %] 93 %  Body mass index is 17 14 kg/m²  Input and Output Summary (last 24 hours): Intake/Output Summary (Last 24 hours) at 2022 1047  Last data filed at 2022 2106  Gross per 24 hour   Intake 240 ml   Output --   Net 240 ml       Physical Exam:   Physical Exam  Vitals and nursing note reviewed  Constitutional:       General: She is not in acute distress  Appearance: She is cachectic  She is ill-appearing  Cardiovascular:      Rate and Rhythm: Normal rate  Pulses: Normal pulses  Pulmonary:      Effort: Respiratory distress present  Breath sounds: Wheezing present  Abdominal:      Palpations: Abdomen is soft  Musculoskeletal:      Cervical back: Normal range of motion  Skin:     General: Skin is warm  Neurological:      Mental Status: She is alert  Mental status is at baseline     Psychiatric:         Mood and Affect: Mood normal  Additional Data:     Labs:  Results from last 7 days   Lab Units 01/23/22  0457 01/22/22  0533 01/20/22  1218   WBC Thousand/uL 11 98*   < > 7 55   HEMOGLOBIN g/dL 10 7*   < > 12 7   HEMATOCRIT % 33 5*   < > 40 3   PLATELETS Thousands/uL 202   < > 401*   NEUTROS PCT %  --   --  88*   LYMPHS PCT %  --   --  6*   MONOS PCT %  --   --  5   EOS PCT %  --   --  0    < > = values in this interval not displayed  Results from last 7 days   Lab Units 01/23/22  0457 01/22/22  0533 01/20/22  1219   SODIUM mmol/L 138   < > 139   POTASSIUM mmol/L 4 2   < > 3 8   CHLORIDE mmol/L 104   < > 100   CO2 mmol/L 30   < > 22   BUN mg/dL 16   < > 23   CREATININE mg/dL 0 51*   < > 1 14   ANION GAP mmol/L 4   < > 17*   CALCIUM mg/dL 7 6*   < > 7 9*   ALBUMIN g/dL  --   --  2 4*   TOTAL BILIRUBIN mg/dL  --   --  0 28   ALK PHOS U/L  --   --  241*   ALT U/L  --   --  18   AST U/L  --   --  66*   GLUCOSE RANDOM mg/dL 141*   < > 134    < > = values in this interval not displayed  Results from last 7 days   Lab Units 01/20/22  1219   INR  0 99             Results from last 7 days   Lab Units 01/21/22  0555 01/20/22  1428 01/20/22  1219   LACTIC ACID mmol/L 0 7 5 0* 4 9*   PROCALCITONIN ng/ml 0 31* 0 26*  --        Lines/Drains:  Invasive Devices  Report    Peripheral Intravenous Line            Peripheral IV 01/22/22 Right Antecubital 1 day            Recent Cultures (last 7 days):   Results from last 7 days   Lab Units 01/20/22  1219   BLOOD CULTURE  No Growth at 48 hrs  No Growth at 48 hrs         Last 24 Hours Medication List:   Current Facility-Administered Medications   Medication Dose Route Frequency Provider Last Rate    acetaminophen  650 mg Oral Q6H PRN Linnea Escobedo, DO      albuterol  2 puff Inhalation Q4H PRN Linnea Escobedo, DO      cefTRIAXone  1,000 mg Intravenous Q24H She Tyshawn, CRNP 1,000 mg (01/22/22 1217)    cyclobenzaprine  10 mg Oral TID PRN ANDREIA Marquez      Dose/Directions    ondansetron 4 MG tablet   Commonly known as:  ZOFRAN   Used for:  Non-small cell carcinoma of lung (H)        Dose:  4 mg   Take 1 tablet (4 mg) by mouth every 8 hours as needed for nausea   Quantity:  30 tablet   Refills:  1            Where to get your medicines      These medications were sent to [id number] - [place], [place] - [address]  [address] [address]    Hours:  Closed 10-16-08 business to [facility] Phone:  [phone]     ondansetron 4 MG tablet                Primary Care Provider Office Phone # Fax #    Kailash Mason [phone] [facility] [address]        Equal Access to Services     ROD PERRY : Diego bateso Soasher, waaxda luqadaha, qaybta kaalmada adeegyaturner, harley carrasquillo . So [facility] [phone].    ATENCIÓN: Si habla español, tiene a morocho disposición servicios gratuitos de asistencia lingüística. [facility] [phone].    We comply with applicable federal civil rights laws and [place] laws. We do not discriminate on the basis of race, color, national origin, age, disability, sex, sexual orientation, or gender identity.            Thank you!     Thank you for choosing [facility]  for your care. Our goal is always to provide you with excellent care. Hearing back from our patients is one way we can continue to improve our services. Please take a few minutes to complete the written survey that you may receive in the mail after your visit with us. Thank you!             Your Updated Medication List - Protect others around you: Learn how to safely use, store and throw away your medicines at www.disposemymeds.org.          This list is accurate as of 4/5/18  1:47 PM.  Always use your most recent med list.                   Brand Name Dispense Instructions for use Diagnosis    albuterol 108 (90 BASE) MCG/ACT Inhaler    PROAIR HFA    1 Inhaler    Inhale 2  dexamethasone  6 mg Intravenous Q24H [facility],       Diclofenac Sodium  2 g Topical 4x Daily ANDREIA Mauro      doxycycline hyclate  100 mg Oral Q12H ANDREIA Almanza      enoxaparin  1 mg/kg Subcutaneous Q12H [address] [place], [place]      nicotine  1 patch Transdermal Daily [facility] [place],       ondansetron  4 mg Intravenous Q6H PRN [facility]      remdesivir  100 mg Intravenous Q24H Vidal Padilla DO          Today, Patient Was Seen By: ANDREIA Almanza    **Please Note: This note may have been constructed using a voice recognition system  ** puffs into the lungs every 4 hours as needed for shortness of breath / dyspnea or wheezing    Chronic obstructive pulmonary disease, unspecified COPD type (H)       amiodarone 200 MG tablet    PACERONE/CODARONE    90 tablet    1 tablet daily    Paroxysmal atrial fibrillation (H)       LORazepam 0.5 MG tablet    ATIVAN    30 tablet    Take 1 tablet (0.5 mg) by mouth every 4 hours as needed (Anxiety, Nausea/Vomiting or Sleep)    Non-small cell carcinoma of lung (H)       morphine 15 MG IR tablet    MSIR    30 tablet    1/2 to 1 pill every 4 hours as needed for air hunger.    Non-small cell carcinoma of lung (H)       * nicotine 14 MG/24HR 24 hr patch    NICODERM CQ    14 patch    Place 1 patch onto the skin every 24 hours    Encounter for tobacco use cessation counseling, Tobacco abuse       * nicotine 7 MG/24HR 24 hr patch    NICODERM CQ    30 patch    Place 1 patch onto the skin every 24 hours    Tobacco abuse       * nicotine 21 MG/24HR 24 hr patch    NICODERM CQ    30 patch    Place 1 patch onto the skin every 24 hours    Non-small cell carcinoma of lung (H)       omeprazole 40 MG capsule    priLOSEC    30 capsule    Take 1 capsule (40 mg) by mouth daily Take 30-60 minutes before a meal.    Gastroesophageal reflux disease without esophagitis       ondansetron 4 MG tablet    ZOFRAN    30 tablet    Take 1 tablet (4 mg) by mouth every 8 hours as needed for nausea    Non-small cell carcinoma of lung (H)       prochlorperazine 10 MG tablet    COMPAZINE    30 tablet    Take 1 tablet (10 mg) by mouth every 6 hours as needed (Nausea/Vomiting)    Non-small cell carcinoma of lung (H)       * warfarin 5 MG tablet    COUMADIN    30 tablet    Take 1.25 mg MWF; 2.5 mg rest of the week as directed by the Anticoagulation Clinic (establishing maintenance dose).    Atrial flutter, unspecified type (H)       * warfarin 2.5 MG tablet    COUMADIN    75 tablet    Take 1.25 mg MWF, 2.5 mg rest of the week or as directed by the  Anticoagulation Clinic (establishing maintenance dose).    Long-term (current) use of anticoagulants, Atrial flutter, unspecified type (H)       * Notice:  This list has 5 medication(s) that are the same as other medications prescribed for you. Read the directions carefully, and ask your doctor or other care provider to review them with you.

## 2022-01-23 NOTE — ASSESSMENT & PLAN NOTE
presented with several days of worsening dyspnea on exertion, shortness of breath, cough, generalized weakness, fatigue, and decreased appetite  · tested positive on presentation 01/20/2022  · currently requiring 2-3 L nasal cannula O2; will be initiated on mild-pathway treatment   · IV remdesivir day 4 of 5  · IV Decadron 6 mg daily times day 4of 10  · weight based Lovenox given D-dimer  · Procalcitonin increased from 0 26 to 0 31 will start on Rocephin and doxycycline  · Scheduled nebulizers p r n   Inhalers

## 2022-01-23 NOTE — PLAN OF CARE
Problem: Potential for Falls  Goal: Patient will remain free of falls  Description: INTERVENTIONS:  - Educate patient/family on patient safety including physical limitations  - Instruct patient to call for assistance with activity   - Consult OT/PT to assist with strengthening/mobility   - Keep Call bell within reach  - Keep bed low and locked with side rails adjusted as appropriate  - Keep care items and personal belongings within reach  - Initiate and maintain comfort rounds  - Make Fall Risk Sign visible to staff  - Offer Toileting every  Hours, in advance of need  - Initiate/Maintain alarm  - Obtain necessary fall risk management equipment:   - Apply yellow socks and bracelet for high fall risk patients  - Consider moving patient to room near nurses station  Outcome: Progressing     Problem: MOBILITY - ADULT  Goal: Maintain or return to baseline ADL function  Description: INTERVENTIONS:  -  Assess patient's ability to carry out ADLs; assess patient's baseline for ADL function and identify physical deficits which impact ability to perform ADLs (bathing, care of mouth/teeth, toileting, grooming, dressing, etc )  - Assess/evaluate cause of self-care deficits   - Assess range of motion  - Assess patient's mobility; develop plan if impaired  - Assess patient's need for assistive devices and provide as appropriate  - Encourage maximum independence but intervene and supervise when necessary  - Involve family in performance of ADLs  - Assess for home care needs following discharge   - Consider OT consult to assist with ADL evaluation and planning for discharge  - Provide patient education as appropriate  Outcome: Progressing  Goal: Maintains/Returns to pre admission functional level  Description: INTERVENTIONS:  - Perform BMAT or MOVE assessment daily    - Set and communicate daily mobility goal to care team and patient/family/caregiver     - Collaborate with rehabilitation services on mobility goals if consulted  - Perform Range of Motion  times a day  - Reposition patient every  hours    - Dangle patient  times a day  - Stand patient  times a day  - Ambulate patient  times a day  - Out of bed to chair  times a day   - Out of bed for meals  times a day  - Out of bed for toileting  - Record patient progress and toleration of activity level   Outcome: Progressing     Problem: PAIN - ADULT  Goal: Verbalizes/displays adequate comfort level or baseline comfort level  Description: Interventions:  - Encourage patient to monitor pain and request assistance  - Assess pain using appropriate pain scale  - Administer analgesics based on type and severity of pain and evaluate response  - Implement non-pharmacological measures as appropriate and evaluate response  - Consider cultural and social influences on pain and pain management  - Notify physician/advanced practitioner if interventions unsuccessful or patient reports new pain  Outcome: Progressing     Problem: INFECTION - ADULT  Goal: Absence or prevention of progression during hospitalization  Description: INTERVENTIONS:  - Assess and monitor for signs and symptoms of infection  - Monitor lab/diagnostic results  - Monitor all insertion sites, i e  indwelling lines, tubes, and drains  - Monitor endotracheal if appropriate and nasal secretions for changes in amount and color  - [place] appropriate cooling/warming therapies per order  - Administer medications as ordered  - Instruct and encourage patient and family to use good hand hygiene technique  - Identify and instruct in appropriate isolation precautions for identified infection/condition  Outcome: Progressing  Goal: Absence of fever/infection during neutropenic period  Description: INTERVENTIONS:  - Monitor WBC    Outcome: Progressing     Problem: SAFETY ADULT  Goal: Patient will remain free of falls  Description: INTERVENTIONS:  - Educate patient/family on patient safety including physical limitations  - Instruct patient to call for assistance with activity   - Consult OT/PT to assist with strengthening/mobility   - Keep Call bell within reach  - Keep bed low and locked with side rails adjusted as appropriate  - Keep care items and personal belongings within reach  - Initiate and maintain comfort rounds  - Make Fall Risk Sign visible to staff  - Offer Toileting every  Hours, in advance of need  - Initiate/Maintain alarm  - Obtain necessary fall risk management equipment:   - Apply yellow socks and bracelet for high fall risk patients  - Consider moving patient to room near nurses station  Outcome: Progressing  Goal: Maintain or return to baseline ADL function  Description: INTERVENTIONS:  -  Assess patient's ability to carry out ADLs; assess patient's baseline for ADL function and identify physical deficits which impact ability to perform ADLs (bathing, care of mouth/teeth, toileting, grooming, dressing, etc )  - Assess/evaluate cause of self-care deficits   - Assess range of motion  - Assess patient's mobility; develop plan if impaired  - Assess patient's need for assistive devices and provide as appropriate  - Encourage maximum independence but intervene and supervise when necessary  - Involve family in performance of ADLs  - Assess for home care needs following discharge   - Consider OT consult to assist with ADL evaluation and planning for discharge  - Provide patient education as appropriate  Outcome: Progressing  Goal: Maintains/Returns to pre admission functional level  Description: INTERVENTIONS:  - Perform BMAT or MOVE assessment daily    - Set and communicate daily mobility goal to care team and patient/family/caregiver  - Collaborate with rehabilitation services on mobility goals if consulted  - Perform Range of Motion  times a day  - Reposition patient every  hours    - Dangle patient  times a day  - Stand patient  times a day  - Ambulate patient  times a day  - Out of bed to chair  times a day   - Out of bed for meals  times a day  - Out of bed for toileting  - Record patient progress and toleration of activity level   Outcome: Progressing     Problem: DISCHARGE PLANNING  Goal: Discharge to home or other facility with appropriate resources  Description: INTERVENTIONS:  - Identify barriers to discharge w/patient and caregiver  - Arrange for needed discharge resources and transportation as appropriate  - Identify discharge learning needs (meds, wound care, etc )  - Arrange for interpretive services to assist at discharge as needed  - Refer to Case Management Department for coordinating discharge planning if the patient needs post-hospital services based on physician/advanced practitioner order or complex needs related to functional status, cognitive ability, or social support system  Outcome: Progressing     Problem: Knowledge Deficit  Goal: Patient/family/caregiver demonstrates understanding of disease process, treatment plan, medications, and discharge instructions  Description: Complete learning assessment and assess knowledge base    Interventions:  - Provide teaching at level of understanding  - Provide teaching via preferred learning methods  Outcome: Progressing     Problem: RESPIRATORY - ADULT  Goal: Achieves optimal ventilation and oxygenation  Description: INTERVENTIONS:  - Assess for changes in respiratory status  - Assess for changes in mentation and behavior  - Position to facilitate oxygenation and minimize respiratory effort  - Oxygen administered by appropriate delivery if ordered  - Initiate smoking cessation education as indicated  - Encourage broncho-pulmonary hygiene including cough, deep breathe, Incentive Spirometry  - Assess the need for suctioning and aspirate as needed  - Assess and instruct to report SOB or any respiratory difficulty  - Respiratory Therapy support as indicated  Outcome: Progressing     Problem: SKIN/TISSUE INTEGRITY - ADULT  Goal: Skin Integrity remains intact(Skin Breakdown Prevention)  Description: Assess:  -Perform Ranulfo assessment every   -Clean and moisturize skin every   -Inspect skin when repositioning, toileting, and assisting with ADLS  -Assess under medical devices such as  every   -Assess extremities for adequate circulation and sensation     Bed Management:  -Have minimal linens on bed & keep smooth, unwrinkled  -Change linens as needed when moist or perspiring  -Avoid sitting or lying in one position for more than  hours while in bed  -Keep HOB at degrees     Toileting:  -Offer bedside commode  -Assess for incontinence every   -Use incontinent care products after each incontinent episode such as     Activity:  -Mobilize patient  times a day  -Encourage activity and walks on unit  -Encourage or provide ROM exercises   -Turn and reposition patient every  Hours  -Use appropriate equipment to lift or move patient in bed  -Instruct/ Assist with weight shifting every  when out of bed in chair  -Consider limitation of chair time  hour intervals    Skin Care:  -Avoid use of baby powder, tape, friction and shearing, hot water or constrictive clothing  -Relieve pressure over bony prominences using   -Do not massage red bony areas    Next Steps:  -Teach patient strategies to minimize risks such as    -Consider consults to  interdisciplinary teams such as   Outcome: Progressing  Goal: Incision(s), wounds(s) or drain site(s) healing without S/S of infection  Description: INTERVENTIONS  - Assess and document dressing, incision, wound bed, drain sites and surrounding tissue  - Provide patient and family education  - Perform skin care/dressing changes every   Outcome: Progressing  Goal: Pressure injury heals and does not worsen  Description: Interventions:  - Implement low air loss mattress or specialty surface (Criteria met)  - Apply silicone foam dressing  - Instruct/assist with weight shifting every  minutes when in chair   - Limit chair time to  hour intervals  - Use special pressure reducing interventions such as  when in chair   - Apply fecal or urinary incontinence containment device   - Perform passive or active ROM every   - Turn and reposition patient & offload bony prominences every  hours   - Utilize friction reducing device or surface for transfers   - Consider consults to  interdisciplinary teams such as   - Use incontinent care products after each incontinent episode such as  - Consider nutrition services referral as needed  Outcome: Progressing     Problem: Nutrition/Hydration-ADULT  Goal: Nutrient/Hydration intake appropriate for improving, restoring or maintaining nutritional needs  Description: Monitor and assess patient's nutrition/hydration status for malnutrition  Collaborate with interdisciplinary team and initiate plan and interventions as ordered  Monitor patient's weight and dietary intake as ordered or per policy  Utilize nutrition screening tool and intervene as necessary  Determine patient's food preferences and provide high-protein, high-caloric foods as appropriate       INTERVENTIONS:  - Monitor oral intake, urinary output, labs, and treatment plans  - Assess nutrition and hydration status and recommend course of action  - Evaluate amount of meals eaten  - Assist patient with eating if necessary   - Allow adequate time for meals  - Recommend/ encourage appropriate diets, oral nutritional supplements, and vitamin/mineral supplements  - Order, calculate, and assess calorie counts as needed  - Recommend, monitor, and adjust tube feedings and TPN/PPN based on assessed needs  - Assess need for intravenous fluids  - Provide specific nutrition/hydration education as appropriate  - Include patient/family/caregiver in decisions related to nutrition  Outcome: Progressing     Problem: Prexisting or High Potential for Compromised Skin Integrity  Goal: Skin integrity is maintained or improved  Description: INTERVENTIONS:  - Identify patients at risk for skin breakdown  - Assess and monitor skin integrity  - Assess and monitor nutrition and hydration status  - Monitor labs   - Assess for incontinence   - Turn and reposition patient  - Assist with mobility/ambulation  - Relieve pressure over bony prominences  - Avoid friction and shearing  - Provide appropriate hygiene as needed including keeping skin clean and dry  - Evaluate need for skin moisturizer/barrier cream  - Collaborate with interdisciplinary team   - Patient/family teaching  - Consider wound care consult   Outcome: Progressing

## 2022-01-24 NOTE — ASSESSMENT & PLAN NOTE
· Secondary to COVID-19 pneumonia  · Given her smoking history would not be surprised if she has some underlying COPD as well  · Wean O2 as tolerated

## 2022-01-24 NOTE — ASSESSMENT & PLAN NOTE
Presented with several days of worsening dyspnea on exertion, shortness of breath, cough, generalized weakness, fatigue, and decreased appetite  · Tested positive for COVID-19 on presentation 01/20/2022  · Currently requiring 2-3 L nasal cannula O2; will be initiated on mild-pathway treatment   · IV remdesivir day 5 of 5  · IV Decadron 6 mg daily times day 5 of 10  · Weight based Lovenox given D-dimer  · Procalcitonin increased from 0 26 to 0 31, continue Rocephin and doxycycline per protocol, day   · Scheduled nebulizers p r n   Inhalers  Currently on MILD COVID pathway:  · Continue O2 supplement, goal >92% O2 sat; remains on 3-4 L   · Continue IV remdesivir, day 5 of 5  · Continue IV Decadron, day 5 of 10  · Continue IV ceftriaxone/doxycycline given elevated procalcitonin, day 3 of 7  · Continue lovenox BID (ddimer 2 85) -- patient has been refusing this medication  · Encourage patient to self-prone, incentive spirometer, OOB when appropriate  · Monitor inflammatory markers q 2-3 days  · D-dimer 2 85,  5, NT-BNP 1856,  Procalcitonin 0 26 -> 0 31

## 2022-01-24 NOTE — OCCUPATIONAL THERAPY NOTE
Occupational Therapy Evaluation        Patient Name: Maricel Contreras Date: 1/24/2022 01/24/22 1420   OT Last Visit   OT Visit Date 01/24/22   Note Type   Note type Evaluation   Restrictions/Precautions   Weight Bearing Precautions Per Order No   Braces or Orthoses   (none per pt)   Other Precautions Fall Risk; Airborne/isolation;Contact/isolation;Droplet precautions   Pain Assessment   Pain Assessment Tool 0-10   Pain Score No Pain   Home Living   Type of [address] One level;Stairs to enter with rails  (1 RODNEY)   [address] Other (Comment)  (no AD at baseline)   Prior Function   Level of [place] Independent with ADLs and functional mobility   Lives With Spouse   Receives Help From Family   ADL Assistance Independent   IADLs Independent   Falls in the last 6 months 0   Vocational Retired   Comments Patient was a   Lifestyle   Autonomy Patient was independent with ADLs/ IADLs  Lives with spouse in a one story house, 1 RODNEY  Patient was ambulatory with no AD and driving  Reciprocal Relationships Supportive    Psychosocial   Psychosocial (WDL) WDL   ADL   Eating Assistance 7  Independent   Grooming Assistance 6  Modified Independent   UB Bathing Assistance 3  Moderate Assistance   LB Bathing Assistance 2  Maximal Assistance   UB Dressing Assistance 3  Moderate Assistance   LB Dressing Assistance 2  Maximal [address]  2  Maximal Assistance   Functional Assistance 3  Moderate Assistance   Bed Mobility   Additional Comments received patient sitting upright in bed in forward flex position  Transfers   Sit to Stand 4  Minimal assistance   Additional items Assist x 1; Armrests; Increased time required;Verbal cues   Stand to Sit 4  Minimal assistance   Additional items Assist x 1; Increased time required;Verbal cues; Bedrails;Armrests   Functional Mobility   Functional Mobility 4  Minimal assistance   Additional Comments completed sit to stand and stand pivot transfers to recliner Chair  Patient wanted to sit in full forward flexed position and refused to sit back in chair  patient was assisted back to bed- assist of 2 RN and therapist as patient refused to complete task  Balance   Static Sitting Good   Dynamic Sitting Fair +   Static Standing Fair   Dynamic Standing Fair -   Ambulatory Poor +   Activity Tolerance   Activity Tolerance Patient limited by fatigue; Other (Comment)  (poor disposition/ lack of motivation to engage in therapy)   Medical Staff Made Aware Seb Waggoner made aware of therapy outcome, recommended Psych eval to address mood and depression? also recommended STR, as patient is unable to complete any functional tasks at this time  Nurse Made Aware RN aware of therapy outcome  RUE Assessment   RUE Assessment X  (AROM WFL, strength deficit)   RUE Strength   RUE Overall Strength Deficits  (3+/5)   LUE Assessment   LUE Assessment X  (AROM WFL, strength deficit)   LUE Strength   LUE Overall Strength Deficits  (3+/5)   Hand Function   Gross Motor Coordination Impaired   Fine Motor Coordination Functional   Sensation   Light Touch No apparent deficits  (BUEs)   Vision-Basic Assessment   Current Vision Wears glasses all the time   Cognition   Overall Cognitive Status [facility]   Arousal/Participation Alert; Uncooperative;Persistent stimuli required   Attention Attends with cues to redirect   Orientation Level Oriented X4   Memory Within functional limits   Following Commands Follows all commands and directions without difficulty   Assessment Patient is a 66 y o  female seen for OT evaluation s/p admit to [id number] [facility] on 1/20/2022 w/Pneumonia due to COVID-19 virus  Commorbidities affecting patient's functional performance at time of assessment include: pneumonia, acute respiratory failure with hypoxia, tobacco use, severe protein calorie malnutrition  Orders placed for OT evaluation and treatment  Performed at least two patient identifiers during session including name and wristband  Prior to admission, Patient was independent with ADLs/ IADLs  Lives with spouse in a one story house, 1 RODNEY  Patient was ambulatory with no AD and driving  Personal factors affecting patient at time of initial evaluation include: limited caregiver support, steps to enter, limited insight into deficits, decreased initiation and engagement, difficulty performing ADLs and difficulty performing IADLs  Upon evaluation, patient requires minimal  assist for UB ADLs, moderate and maximal assist for LB ADLs  Occupational performance is affected by the following deficits: decreased functional use of BUEs, decreased muscle strength, degenerative arthritic joint changes, impaired gross motor coordination, dynamic sit/ stand balance deficit with poor standing tolerance time for self care and functional mobility, decreased activity tolerance, decreased safety awareness, decreased coping skills and postural control and postural alignment deficit, requiring external assistance to complete transitional movements  Patient presents with poor disposition/ lack of motivation to engage in therapy  SLIM made aware of therapy outcome, recommended Psych eval to address mood and depression? also recommended STR, as patient is unable to complete any functional tasks at this time  Patient to benefit from continued Occupational Therapy treatment while in the hospital to address deficits as defined above and maximize level of functional independence with ADLs and functional mobility  Occupational Performance areas to address include: grooming , bathing/ shower, dressing, toilet hygiene, transfer to all surfaces, functional ambulation, functional mobility, emergency response, health maintenance, medication routine/ management, IADLs: safety procedures, IADLs: meal prep/ clean up and Leisure Participation   From OT standpoint, recommendation at time of d/c would be Short Term Rehab   Limitation Decreased ADL status; Decreased Safe judgement during ADL;Decreased endurance;Decreased self-care trans;Decreased high-level ADLs   Prognosis Good   Plan   Treatment Interventions ADL retraining;Functional transfer training;UE strengthening/ROM; Endurance training;Patient/family training;Equipment evaluation/education; Compensatory technique education;Continued evaluation; Energy conservation; Activityengagement   Goal Expiration Date 02/07/22   OT Frequency 3-5x/wk   Recommendation   OT Discharge Recommendation Post acute rehabilitation services   AM-PAC Daily Activity Inpatient   Lower Body Dressing 1   Bathing 1   Toileting 1   Upper Body Dressing 2   Grooming 2   Eating 2   Daily Activity Raw Score 9   Turning Head Towards Sound 4   Follow Simple Instructions 2   Low Function Daily Activity Raw Score 15   Low Function Daily Activity Standardized Score 26 28   AM-PAC Applied Cognition Inpatient   Following a Speech/Presentation 4   Understanding Ordinary Conversation 4   Taking Medications 3   Remembering Where Things Are Placed or Put Away 3   Remembering List of 4-5 Errands 3   Taking Care of Complicated Tasks 3   Applied Cognition Raw Score 20   Applied Cognition Standardized Score 41 76   Barthel Index   Feeding 10   Bathing 0   Grooming Score 0   Dressing Score 5   Bladder Score 0   Bowels Score 5   Toilet Use Score 5   Transfers (Bed/Chair) Score 10   Mobility (Level Surface) Score 0   Stairs Score 0   Barthel Index Score 35             1 - Patient will verbalize and demonstrate use of energy conservation/ deep breathing technique and work simplification skills during functional activity with no verbal cues  2 - Patient will verbalize and demonstrate good body mechanics and joint protection techniques during  ADLs/ IADLs with no verbal cues      3 - Patient will increase OOB/ sitting tolerance to 2-4 hours per day for increased participation in self care and leisure tasks with no s/s of exertion  4 - Patient will increase standing tolerance time to 5  minutes with unilateral UE support to complete sink level ADLs@ mod I level  5 - Patient will increase sitting tolerance at edge of bed to 20 minutes to complete UB ADLs @ set up assist level  6 - Patient will transfer bed to Chair / toilet at Set up assist level with AD as indicated  7 - Patient will complete UB ADLs with set up assist      8 - Patient will complete LB ADLs with min assist with the use of adaptive equipment       9 - Patient will complete toileting hygiene with set up assist/ supervision for thoroughness    10 - Patient/ Family  will demonstrate competency with UE Home Exercise Program

## 2022-01-24 NOTE — PLAN OF CARE
Problem: Potential for Falls  Goal: Patient will remain free of falls  Description: INTERVENTIONS:  - Educate patient/family on patient safety including physical limitations  - Instruct patient to call for assistance with activity   - Consult OT/PT to assist with strengthening/mobility   - Keep Call bell within reach  - Keep bed low and locked with side rails adjusted as appropriate  - Keep care items and personal belongings within reach  - Initiate and maintain comfort rounds  - Make Fall Risk Sign visible to staff  - Apply yellow socks and bracelet for high fall risk patients  - Consider moving patient to room near nurses station  Outcome: Progressing     Problem: INFECTION - ADULT  Goal: Absence or prevention of progression during hospitalization  Description: INTERVENTIONS:  - Assess and monitor for signs and symptoms of infection  - Monitor lab/diagnostic results  - Monitor all insertion sites, i e  indwelling lines, tubes, and drains  - Monitor endotracheal if appropriate and nasal secretions for changes in amount and color  - [place] appropriate cooling/warming therapies per order  - Administer medications as ordered  - Instruct and encourage patient and family to use good hand hygiene technique  - Identify and instruct in appropriate isolation precautions for identified infection/condition  Outcome: Progressing

## 2022-01-24 NOTE — CASE MANAGEMENT
Case Management Progress Note    Patient name Jeremias Lucas  Location /-36 MRN [id number]  : 1950 Date 2022       LOS (days): 4  Geometric Mean LOS (GMLOS) (days): 5 40  Days to GMLOS:1 4        OBJECTIVE:        Current admission status: Inpatient  Preferred Pharmacy: No Pharmacies Listed  Primary Care Provider: No primary care provider on file  Primary Insurance: MEDICARE  Secondary Insurance:     PROGRESS NOTE:      Per SLIM patient will continue to require additional inpatient hospital stay 48 plus due to ongoing reports of dyspnea and PT/ OT re-evaluation

## 2022-01-24 NOTE — ASSESSMENT & PLAN NOTE
· Frail and cachectic appearing on admission;  says she has been that way for years  · Does have chronic history of smoking but reports no recent weight loss  · Nutrition consult, mealtime supplementation  · BMI of 17

## 2022-01-24 NOTE — PLAN OF CARE
Problem: Potential for Falls  Goal: Patient will remain free of falls  Description: INTERVENTIONS:  - Educate patient/family on patient safety including physical limitations  - Instruct patient to call for assistance with activity   - Consult OT/PT to assist with strengthening/mobility   - Keep Call bell within reach  - Keep bed low and locked with side rails adjusted as appropriate  - Keep care items and personal belongings within reach  - Initiate and maintain comfort rounds  - Make Fall Risk Sign visible to staff  - Offer Toileting every  Hours, in advance of need  - Initiate/Maintain alarm  - Obtain necessary fall risk management equipment:   - Apply yellow socks and bracelet for high fall risk patients  - Consider moving patient to room near nurses station  Outcome: Progressing     Problem: MOBILITY - ADULT  Goal: Maintain or return to baseline ADL function  Description: INTERVENTIONS:  -  Assess patient's ability to carry out ADLs; assess patient's baseline for ADL function and identify physical deficits which impact ability to perform ADLs (bathing, care of mouth/teeth, toileting, grooming, dressing, etc )  - Assess/evaluate cause of self-care deficits   - Assess range of motion  - Assess patient's mobility; develop plan if impaired  - Assess patient's need for assistive devices and provide as appropriate  - Encourage maximum independence but intervene and supervise when necessary  - Involve family in performance of ADLs  - Assess for home care needs following discharge   - Consider OT consult to assist with ADL evaluation and planning for discharge  - Provide patient education as appropriate  Outcome: Progressing  Goal: Maintains/Returns to pre admission functional level  Description: INTERVENTIONS:  - Perform BMAT or MOVE assessment daily    - Set and communicate daily mobility goal to care team and patient/family/caregiver     - Collaborate with rehabilitation services on mobility goals if consulted  - Perform Range of Motion times a day  - Reposition patient every  hours    - Dangle patient  times a day  - Stand patient  times a day  - Ambulate patient  times a day  - Out of bed to chair  times a day   - Out of bed for meals  times a day  - Out of bed for toileting  - Record patient progress and toleration of activity level   Outcome: Progressing     Problem: PAIN - ADULT  Goal: Verbalizes/displays adequate comfort level or baseline comfort level  Description: Interventions:  - Encourage patient to monitor pain and request assistance  - Assess pain using appropriate pain scale  - Administer analgesics based on type and severity of pain and evaluate response  - Implement non-pharmacological measures as appropriate and evaluate response  - Consider cultural and social influences on pain and pain management  - Notify physician/advanced practitioner if interventions unsuccessful or patient reports new pain  Outcome: Progressing     Problem: INFECTION - ADULT  Goal: Absence or prevention of progression during hospitalization  Description: INTERVENTIONS:  - Assess and monitor for signs and symptoms of infection  - Monitor lab/diagnostic results  - Monitor all insertion sites, i e  indwelling lines, tubes, and drains  - Monitor endotracheal if appropriate and nasal secretions for changes in amount and color  - [place] appropriate cooling/warming therapies per order  - Administer medications as ordered  - Instruct and encourage patient and family to use good hand hygiene technique  - Identify and instruct in appropriate isolation precautions for identified infection/condition  Outcome: Progressing  Goal: Absence of fever/infection during neutropenic period  Description: INTERVENTIONS:  - Monitor WBC    Outcome: Progressing     Problem: SAFETY ADULT  Goal: Patient will remain free of falls  Description: INTERVENTIONS:  - Educate patient/family on patient safety including physical limitations  - Instruct patient to call for assistance with activity   - Consult OT/PT to assist with strengthening/mobility   - Keep Call bell within reach  - Keep bed low and locked with side rails adjusted as appropriate  - Keep care items and personal belongings within reach  - Initiate and maintain comfort rounds  - Make Fall Risk Sign visible to staff  - Offer Toileting every Hours, in advance of need  - Initiate/Maintain alarm  - Obtain necessary fall risk management equipment:   - Apply yellow socks and bracelet for high fall risk patients  - Consider moving patient to room near nurses station  Outcome: Progressing  Goal: Maintain or return to baseline ADL function  Description: INTERVENTIONS:  -  Assess patient's ability to carry out ADLs; assess patient's baseline for ADL function and identify physical deficits which impact ability to perform ADLs (bathing, care of mouth/teeth, toileting, grooming, dressing, etc )  - Assess/evaluate cause of self-care deficits   - Assess range of motion  - Assess patient's mobility; develop plan if impaired  - Assess patient's need for assistive devices and provide as appropriate  - Encourage maximum independence but intervene and supervise when necessary  - Involve family in performance of ADLs  - Assess for home care needs following discharge   - Consider OT consult to assist with ADL evaluation and planning for discharge  - Provide patient education as appropriate  Outcome: Progressing  Goal: Maintains/Returns to pre admission functional level  Description: INTERVENTIONS:  - Perform BMAT or MOVE assessment daily    - Set and communicate daily mobility goal to care team and patient/family/caregiver  - Collaborate with rehabilitation services on mobility goals if consulted  - Perform Range of Motion  times a day  - Reposition patient every  hours    - Dangle patient  times a day  - Stand patient  times a day  - Ambulate patient  times a day  - Out of bed to chair  times a day   - Out of bed for meals  times a day  - Out of bed for toileting  - Record patient progress and toleration of activity level   Outcome: Progressing     Problem: DISCHARGE PLANNING  Goal: Discharge to home or other facility with appropriate resources  Description: INTERVENTIONS:  - Identify barriers to discharge w/patient and caregiver  - Arrange for needed discharge resources and transportation as appropriate  - Identify discharge learning needs (meds, wound care, etc )  - Arrange for interpretive services to assist at discharge as needed  - Refer to Case Management Department for coordinating discharge planning if the patient needs post-hospital services based on physician/advanced practitioner order or complex needs related to functional status, cognitive ability, or social support system  Outcome: Progressing     Problem: Knowledge Deficit  Goal: Patient/family/caregiver demonstrates understanding of disease process, treatment plan, medications, and discharge instructions  Description: Complete learning assessment and assess knowledge base    Interventions:  - Provide teaching at level of understanding  - Provide teaching via preferred learning methods  Outcome: Progressing     Problem: RESPIRATORY - ADULT  Goal: Achieves optimal ventilation and oxygenation  Description: INTERVENTIONS:  - Assess for changes in respiratory status  - Assess for changes in mentation and behavior  - Position to facilitate oxygenation and minimize respiratory effort  - Oxygen administered by appropriate delivery if ordered  - Initiate smoking cessation education as indicated  - Encourage broncho-pulmonary hygiene including cough, deep breathe, Incentive Spirometry  - Assess the need for suctioning and aspirate as needed  - Assess and instruct to report SOB or any respiratory difficulty  - Respiratory Therapy support as indicated  Outcome: Progressing     Problem: SKIN/TISSUE INTEGRITY - ADULT  Goal: Skin Integrity remains intact(Skin Breakdown Prevention)  Description: Assess:  -Perform Ranulfo assessment every   -Clean and moisturize skin every   -Inspect skin when repositioning, toileting, and assisting with ADLS  -Assess under medical devices such as  every   -Assess extremities for adequate circulation and sensation     Bed Management:  -Have minimal linens on bed & keep smooth, unwrinkled  -Change linens as needed when moist or perspiring  -Avoid sitting or lying in one position for more than  hours while in bed  -Keep HOB at degrees     Toileting:  -Offer bedside commode  -Assess for incontinence every  -Use incontinent care products after each incontinent episode such as     Activity:  -Mobilize patient  times a day  -Encourage activity and walks on unit  -Encourage or provide ROM exercises   -Turn and reposition patient every 3 Hours  -Use appropriate equipment to lift or move patient in bed  -Instruct/ Assist with weight shifting every when out of bed in chair  -Consider limitation of chair time  hour intervals    Skin Care:  -Avoid use of baby powder, tape, friction and shearing, hot water or constrictive clothing  -Relieve pressure over bony prominences using   -Do not massage red bony areas    Next Steps:  -Teach patient strategies to minimize risks such as    -Consider consults to  interdisciplinary teams such as   Outcome: Progressing  Goal: Incision(s), wounds(s) or drain site(s) healing without S/S of infection  Description: INTERVENTIONS  - Assess and document dressing, incision, wound bed, drain sites and surrounding tissue  - Provide patient and family education  - Perform skin care/dressing changes every   Outcome: Progressing  Goal: Pressure injury heals and does not worsen  Description: Interventions:  - Implement low air loss mattress or specialty surface (Criteria met)  - Apply silicone foam dressing  - Instruct/assist with weight shifting every  minutes when in chair   - Limit chair time to  hour intervals  - Use special pressure reducing interventions such as  when in chair   - Apply fecal or urinary incontinence containment device   - Perform passive or active ROM every   - Turn and reposition patient & offload bony prominences every  hours   - Utilize friction reducing device or surface for transfers   - Consider consults to  interdisciplinary teams such as   - Use incontinent care products after each incontinent episode such as   - Consider nutrition services referral as needed  Outcome: Progressing     Problem: Nutrition/Hydration-ADULT  Goal: Nutrient/Hydration intake appropriate for improving, restoring or maintaining nutritional needs  Description: Monitor and assess patient's nutrition/hydration status for malnutrition  Collaborate with interdisciplinary team and initiate plan and interventions as ordered  Monitor patient's weight and dietary intake as ordered or per policy  Utilize nutrition screening tool and intervene as necessary  Determine patient's food preferences and provide high-protein, high-caloric foods as appropriate       INTERVENTIONS:  - Monitor oral intake, urinary output, labs, and treatment plans  - Assess nutrition and hydration status and recommend course of action  - Evaluate amount of meals eaten  - Assist patient with eating if necessary   - Allow adequate time for meals  - Recommend/ encourage appropriate diets, oral nutritional supplements, and vitamin/mineral supplements  - Order, calculate, and assess calorie counts as needed  - Recommend, monitor, and adjust tube feedings and TPN/PPN based on assessed needs  - Assess need for intravenous fluids  - Provide specific nutrition/hydration education as appropriate  - Include patient/family/caregiver in decisions related to nutrition  Outcome: Progressing     Problem: Prexisting or High Potential for Compromised Skin Integrity  Goal: Skin integrity is maintained or improved  Description: INTERVENTIONS:  - Identify patients at risk for skin breakdown  - Assess and monitor skin integrity  - Assess and monitor nutrition and hydration status  - Monitor labs   - Assess for incontinence   - Turn and reposition patient  - Assist with mobility/ambulation  - Relieve pressure over bony prominences  - Avoid friction and shearing  - Provide appropriate hygiene as needed including keeping skin clean and dry  - Evaluate need for skin moisturizer/barrier cream  - Collaborate with interdisciplinary team   - Patient/family teaching  - Consider wound care consult   Outcome: Progressing

## 2022-01-24 NOTE — PROGRESS NOTES
[facility] Progress Note Wendy Hatfield 1950, 66 y o  female MRN: [id number]  Unit/Bed#: -01 Encounter: [id number]  Primary Care Provider: No primary care provider on file  Date and time admitted to hospital: 1/20/2022 12:01 PM    * Pneumonia due to COVID-19 virus  Assessment & Plan  Presented with several days of worsening dyspnea on exertion, shortness of breath, cough, generalized weakness, fatigue, and decreased appetite  · Tested positive for COVID-19 on presentation 01/20/2022  · Currently requiring 2-3 L nasal cannula O2; will be initiated on mild-pathway treatment   · IV remdesivir day 5 of 5  · IV Decadron 6 mg daily times day 5 of 10  · Weight based Lovenox given D-dimer  · Procalcitonin increased from 0 26 to 0 31, continue Rocephin and doxycycline per protocol, day   · Scheduled nebulizers p r n   Inhalers  Currently on MILD COVID pathway:  · Continue O2 supplement, goal >92% O2 sat; remains on 3-4 L   · Continue IV remdesivir, day 5 of 5  · Continue IV Decadron, day 5 of 10  · Continue IV ceftriaxone/doxycycline given elevated procalcitonin, day 3 of 7  · Continue lovenox BID (ddimer 2 85) -- patient has been refusing this medication  · Encourage patient to self-prone, incentive spirometer, OOB when appropriate  · Monitor inflammatory markers q 2-3 days  · D-dimer 2 85,  5, NT-BNP 1856,  Procalcitonin 0 26 -> 0 31    Acute respiratory failure with hypoxia (HCC)  Assessment & Plan  · Secondary to COVID-19 pneumonia  · Given her smoking history would not be surprised if she has some underlying COPD as well  · Wean O2 as tolerated    Tobacco use  Assessment & Plan  · Active smoker for many decades  · Nicotine patch ordered - has been refusing    Severe protein-calorie malnutrition ([facility] [address] )  Assessment & Plan  · Frail and cachectic appearing on admission;  says she has been that way for years  · Does have chronic history of smoking but reports no recent weight loss  · Nutrition consult, mealtime supplementation  · BMI of 17        VTE Pharmacologic Prophylaxis: VTE Score: 5 High Risk (Score >/= 5) - Pharmacological DVT Prophylaxis Ordered: enoxaparin (Lovenox)  Sequential Compression Devices Ordered  Patient Centered Rounds: I evaluated the patient without nursing staff present due to [address] with Specialists or Other Care Team Provider: RISHABH     Education and Discussions with Family / Patient: Patient declined call to   Time Spent for Care: 20 minutes  More than 50% of total time spent on counseling and coordination of care as described above  Current Length of Stay: 4 day(s)  Current Patient Status: Inpatient   Certification Statement: The patient will continue to require additional inpatient hospital stay due to ongoing reports of dyspnea  Discharge Plan: Anticipate discharge in 24-48 hrs to discharge location to be determined pending rehab evaluations  Code Status: Level 1 - Full Code    Subjective:   CC "I am awful"    Patient seen this morning and was found slumped over on herself in bed, sleeping  States that she can't lay flat or move because she gets short of breath  She thinks she had a BM and feels too weak to clean herself up  Denies chest pain or abdominal pain  No fevers  She has no appetite, but states that she usually doesn't have an appetite  She also reports that she is having dysphagia without odynophagia, with associated heartburn  Objective:     Vitals:   Temp (24hrs), Av 9 °F (36 6 °C), Min:97 7 °F (36 5 °C), Max:98 1 °F (36 7 °C)    Temp:  [97 7 °F (36 5 °C)-98 1 °F (36 7 °C)] 98 1 °F (36 7 °C)  HR:  [] 99  Resp:  [18-20] 18  BP: (140-143)/() 140/102  SpO2:  [80 %-97 %] 97 %  Body mass index is 17 14 kg/m²  Input and Output Summary (last 24 hours):      Intake/Output Summary (Last 24 hours) at 2022 0841  Last data filed at 2022 0100  Gross per 24 hour   Intake 120 ml Output 200 ml   Net -80 ml       Physical Exam:   Physical Exam  Vitals and nursing note reviewed  Constitutional:       General: She is not in acute distress  Appearance: She is cachectic  She is ill-appearing  Cardiovascular:      Rate and Rhythm: Normal rate and regular rhythm  Heart sounds: No murmur heard  Pulmonary:      Effort: No respiratory distress  Breath sounds: Decreased breath sounds present  No wheezing or rales  Comments: O2 via NC, saturations % but patient still reports SOB  Abdominal:      General: Bowel sounds are normal       Palpations: Abdomen is soft  Musculoskeletal:      Right lower leg: No edema  Left lower leg: No edema  Skin:     Coloration: Skin is pale  Neurological:      Mental Status: She is alert  Psychiatric:         Mood and Affect: Mood is depressed  Affect is flat  Additional Data:     Labs:  Results from last 7 days   Lab Units 01/24/22  0620 01/22/22  0533 01/20/22  1218   WBC Thousand/uL 13 13*   < > 7 55   HEMOGLOBIN g/dL 11 8   < > 12 7   HEMATOCRIT % 36 8   < > 40 3   PLATELETS Thousands/uL 174   < > 401*   NEUTROS PCT %  --   --  88*   LYMPHS PCT %  --   --  6*   MONOS PCT %  --   --  5   EOS PCT %  --   --  0    < > = values in this interval not displayed  Results from last 7 days   Lab Units 01/24/22  0537 01/22/22  0533 01/20/22  1219   SODIUM mmol/L 139   < > 139   POTASSIUM mmol/L 4 4   < > 3 8   CHLORIDE mmol/L 99*   < > 100   CO2 mmol/L 30   < > 22   BUN mg/dL 18   < > 23   CREATININE mg/dL 0 60   < > 1 14   ANION GAP mmol/L 10   < > 17*   CALCIUM mg/dL 8 2*   < > 7 9*   ALBUMIN g/dL  --   --  2 4*   TOTAL BILIRUBIN mg/dL  --   --  0 28   ALK PHOS U/L  --   --  241*   ALT U/L  --   --  18   AST U/L  --   --  66*   GLUCOSE RANDOM mg/dL 126   < > 134    < > = values in this interval not displayed       Results from last 7 days   Lab Units 01/20/22  1219   INR  0 99             Results from last 7 days Lab Units 01/21/22  0555 01/20/22  1428 01/20/22  1219   LACTIC ACID mmol/L 0 7 5 0* 4 9*   PROCALCITONIN ng/ml 0 31* 0 26*  --        Lines/Drains:  Invasive Devices  Report    Peripheral Intravenous Line            Peripheral IV 01/23/22 Right;Upper;Ventral (anterior) Arm <1 day                      Imaging: No pertinent imaging reviewed  Recent Cultures (last 7 days):   Results from last 7 days   Lab Units 01/20/22  1219   BLOOD CULTURE  No Growth at 72 hrs  No Growth at 72 hrs  Last 24 Hours Medication List:   Current Facility-Administered Medications   Medication Dose Route Frequency Provider Last Rate    acetaminophen  650 mg Oral Q6H PRN Candida Ovens Yoliinic, DO      albuterol  2 puff Inhalation Q4H PRN Candida Taiwos Gregg, DO      cefTRIAXone  1,000 mg Intravenous Q24H Jaron Larios CRNP 1,000 mg (01/23/22 1304)    cyclobenzaprine  10 mg Oral TID PRN ANDREIA Salgado      dexamethasone  6 mg Intravenous Q24H Candida Escobedo, DO      Diclofenac Sodium  2 g Topical 4x Daily [facility], ANDREIA      doxycycline hyclate  100 mg Oral Q12H ANDREIA Mayberry      enoxaparin  1 mg/kg Subcutaneous Q12H [address] Candida Escobedo, DO      guaiFENesin  600 mg Oral Q12H [address] ANDREIA [place]      nicotine  1 patch Transdermal Daily Candida Escobedo, DO      ondansetron  4 mg Intravenous Q6H PRN Candida Escobedo, DO      remdesivir  100 mg Intravenous Q24H Urbano Aquino DO          Today, Patient Was Seen By: Luis Angel Hearn PA-C    **Please Note: This note may have been constructed using a voice recognition system  **

## 2022-01-25 NOTE — PLAN OF CARE
Problem: PHYSICAL THERAPY ADULT  Goal: Performs mobility at highest level of function for planned discharge setting  See evaluation for individualized goals  Description: Treatment/Interventions: Functional transfer training,LE strengthening/ROM,Therapeutic exercise,Endurance training,Patient/family training,Equipment eval/education,Bed mobility,Gait training,Spoke to nursing  Equipment Recommended:  (TBD)       See flowsheet documentation for full assessment, interventions and recommendations  Outcome: Not Progressing  Note: Prognosis: Fair  Problem List: Decreased strength,Decreased endurance,Impaired balance,Decreased mobility,Impaired sensation  Assessment: Pt seen for PT treatment session this date, consisting of ther act focused on bed mobility, transfers, sitting tolerance  Since previous session, pt has made no progress in terms of requiring increased A for all phases of mobility, decreased balance scores, worsened AM-PAC score, decreased sitting and standing tolerance/posture  Pertinent barriers during this session include SOB  Current goals and POC remain appropriate, pt continues to have rehab potential  Pt prognosis for achieving goals is fair, pending pt progress with hospitalization/medical status improvements, and indicated by [facility], ability to follow directions, recent onset and eye contact  Pt limited d/t fear of pain provocation, fear of falling and self limiting  PT recommends post acute rehabilitation services upon discharge  Pt continues to be functioning below baseline level, and remains limited 2* factors listed above  PT will continue to see pt during current hospitalization in order to address the deficits listed above and provide interventions consistent w/ POC in effort to achieve STGs    Barriers to Discharge: Inaccessible home environment,Decreased caregiver support        PT Discharge Recommendation: Post acute rehabilitation services          See flowsheet documentation for full assessment

## 2022-01-25 NOTE — ASSESSMENT & PLAN NOTE
Presented with several days of worsening dyspnea on exertion, shortness of breath, cough, generalized weakness, fatigue, and decreased appetite    Tested positive for COVID-19 on presentation 01/20/2022  Currently on MILD COVID pathway:  · Continue O2 supplement, goal >92% O2 sat; remains on 3-4 L   · Completed IV remdesivir, 1/24  · Continue IV Decadron, day 7 of 10  · Continue IV ceftriaxone/doxycycline given elevated procalcitonin, day 5 of 7  · Recommended lovenox BID (ddimer rising) since admission  · Patient has been refusing this medication, however CTA 1/25 does confirm PE  · Switch to therapeutic heparin  · Encourage patient to self-prone, incentive spirometer, OOB when appropriate  · Monitor inflammatory markers q 2-3 days  · D-dimer 2 85 -> 19 99,  5 ->28 5, NT-BNP 1856,  Procalcitonin 0 26 -> 0 31 -> 0 09

## 2022-01-25 NOTE — PROGRESS NOTES
[address]  SLIM Progress Note Albertina Arn 1950, 66 y o  female MRN: [id number]  Unit/Bed#: -01 Encounter: [id number]  Primary Care Provider: No primary care provider on file  Date and time admitted to hospital: 1/20/2022 12:01 PM    * Pneumonia due to COVID-19 virus  Assessment & Plan  Presented with several days of worsening dyspnea on exertion, shortness of breath, cough, generalized weakness, fatigue, and decreased appetite  Tested positive for COVID-19 on presentation 01/20/2022  Currently on MILD COVID pathway:  · Continue O2 supplement, goal >92% O2 sat; remains on 3-4 L   · Completed IV remdesivir, 1/24  · Continue IV Decadron, day 6 of 10  · Continue IV ceftriaxone/doxycycline given elevated procalcitonin, day 4 of 7  · Continue lovenox BID (ddimer 2 85) -- patient has been refusing this medication  · Encourage patient to self-prone, incentive spirometer, OOB when appropriate  · Monitor inflammatory markers q 2-3 days  · D-dimer 2 85,  5, NT-BNP 1856,  Procalcitonin 0 26 -> 0 31  · Repeat Ddimer, CRP, and procal pending today    Acute respiratory failure with hypoxia (HCC)  Assessment & Plan  · Secondary to COVID-19 pneumonia  · Given her smoking history would not be surprised if she has some underlying COPD as well  · Wean O2 as tolerated    Tobacco use  Assessment & Plan  · Active smoker for many decades  · Nicotine patch ordered - has been refusing    Severe protein-calorie malnutrition ([facility] )  Assessment & Plan  · Frail and cachectic appearing on admission;  says she has been that way for years  · Does have chronic history of smoking but reports no recent weight loss  · Nutrition consult, mealtime supplementation  · BMI of 17        VTE Pharmacologic Prophylaxis: VTE Score: 5 High Risk (Score >/= 5) - Pharmacological DVT Prophylaxis Ordered: enoxaparin (Lovenox)  Sequential Compression Devices Ordered      Patient Centered Rounds: I evaluated the patient without nursing staff present due to [facility] with Specialists or Other Care Team Provider: RISHABH     Education and Discussions with Family / Patient: Patient declined call to   Time Spent for Care: 20 minutes  More than 50% of total time spent on counseling and coordination of care as described above  Current Length of Stay: 5 day(s)  Current Patient Status: Inpatient   Certification Statement: The patient will continue to require additional inpatient hospital stay due to ongoing COVID treatment requiring support, needs STR placement will get echo and update CXR today due to ongoing SOB  Discharge Plan: Anticipate discharge in 24-48 hrs to rehab facility  Code Status: Level 1 - Full Code    Subjective:   CC "I can't breathe"    Patient seen sitting up, leaning forward in bed again  She states she still feels like she can't catch her breath and the only way she feels better is to lean forward  She cannot lay back at all  No chest pain, however  Patient refusing lovenox despite conversation at the bedside regarding the risks/benefits of its use in the setting of COVID pneumonia, with rising DDimer  Patient states "I'll take my chances, it hurts too much"  Objective:     Vitals:   Temp (24hrs), Av 2 °F (36 8 °C), Min:97 9 °F (36 6 °C), Max:98 4 °F (36 9 °C)    Temp:  [97 9 °F (36 6 °C)-98 4 °F (36 9 °C)] 97 9 °F (36 6 °C)  HR:  [75-99] 85  Resp:  [16-18] 16  BP: (119-140)/() 119/88  SpO2:  [95 %-100 %] 97 %  Body mass index is 17 14 kg/m²  Input and Output Summary (last 24 hours): Intake/Output Summary (Last 24 hours) at 2022 8415  Last data filed at 2022 1300  Gross per 24 hour   Intake 420 ml   Output --   Net 420 ml       Physical Exam:   Physical Exam  Vitals and nursing note reviewed  Constitutional:       General: She is in acute distress  Appearance: She is cachectic  She is ill-appearing  She is not toxic-appearing  Cardiovascular:      Rate and Rhythm: Normal rate and regular rhythm  Heart sounds: No murmur heard  No friction rub  No gallop  Pulmonary:      Effort: Pulmonary effort is normal       Breath sounds: Normal breath sounds  No wheezing or rales  Abdominal:      Palpations: Abdomen is soft  Musculoskeletal:      Right lower leg: No edema  Left lower leg: Edema present  Neurological:      Mental Status: She is alert  Psychiatric:         Mood and Affect: Mood is depressed  Behavior: Behavior is withdrawn  Additional Data:     Labs:  Results from last 7 days   Lab Units 01/25/22  0507 01/22/22  0533 01/20/22  1218   WBC Thousand/uL 11 34*   < > 7 55   HEMOGLOBIN g/dL 11 6   < > 12 7   HEMATOCRIT % 36 9   < > 40 3   PLATELETS Thousands/uL 148*   < > 401*   NEUTROS PCT %  --   --  88*   LYMPHS PCT %  --   --  6*   MONOS PCT %  --   --  5   EOS PCT %  --   --  0    < > = values in this interval not displayed  Results from last 7 days   Lab Units 01/25/22  0507 01/22/22  0533 01/20/22  1219   SODIUM mmol/L 138   < > 139   POTASSIUM mmol/L 4 4   < > 3 8   CHLORIDE mmol/L 99*   < > 100   CO2 mmol/L 33*   < > 22   BUN mg/dL 23   < > 23   CREATININE mg/dL 0 65   < > 1 14   ANION GAP mmol/L 6   < > 17*   CALCIUM mg/dL 8 3   < > 7 9*   ALBUMIN g/dL  --   --  2 4*   TOTAL BILIRUBIN mg/dL  --   --  0 28   ALK PHOS U/L  --   --  241*   ALT U/L  --   --  18   AST U/L  --   --  66*   GLUCOSE RANDOM mg/dL 134   < > 134    < > = values in this interval not displayed       Results from last 7 days   Lab Units 01/20/22  1219   INR  0 99             Results from last 7 days   Lab Units 01/21/22  0555 01/20/22  1428 01/20/22  1219   LACTIC ACID mmol/L 0 7 5 0* 4 9*   PROCALCITONIN ng/ml 0 31* 0 26*  --        Lines/Drains:  Invasive Devices  Report    Peripheral Intravenous Line            Peripheral IV 01/23/22 Right;Upper;Ventral (anterior) Arm 1 day                      Imaging: No pertinent imaging reviewed  Recent Cultures (last 7 days):   Results from last 7 days   Lab Units 01/20/22  1219   BLOOD CULTURE  No Growth After 4 Days  No Growth After 4 Days  Last 24 Hours Medication List:   Current Facility-Administered Medications   Medication Dose Route Frequency Provider Last Rate    acetaminophen  650 mg Oral Q6H PRN Peggye Yonas Yoliinic, DO      albuterol  2 puff Inhalation Q4H PRN Peggye Yonas Yoliinic, DO      cefTRIAXone  1,000 mg Intravenous Q24H ANDREIA Ochoa 1,000 mg (01/24/22 1209)    cyclobenzaprine  10 mg Oral TID PRN ANDREIA Rowley      dexamethasone  6 mg Intravenous Q24H Pegwillae Yonas Escobedo, DO      Diclofenac Sodium  2 g Topical 4x Daily ANDREIA Rowley      doxycycline hyclate  100 mg Oral Q12H ANDREIA Ochoa      enoxaparin  1 mg/kg Subcutaneous Q12H [facility] Pegmisha Escobedo, DO      guaiFENesin  600 mg Oral Q12H [facility] ANDREIA Ochoa      nicotine  1 patch Transdermal Daily Peggye Yonas Escobedo, DO      ondansetron  4 mg Intravenous Q6H PRN Pegmisha Escobedo, DO          Today, Patient Was Seen By: Rachell Mckeon PA-C    **Please Note: This note may have been constructed using a voice recognition system  **

## 2022-01-25 NOTE — ASSESSMENT & PLAN NOTE
Presented with several days of worsening dyspnea on exertion, shortness of breath, cough, generalized weakness, fatigue, and decreased appetite    Tested positive for COVID-19 on presentation 01/20/2022  Currently on MILD COVID pathway:  · Continue O2 supplement, goal >92% O2 sat; remains on 3-4 L   · Completed IV remdesivir, 1/24  · Continue IV Decadron, day 6 of 10  · Continue IV ceftriaxone/doxycycline given elevated procalcitonin, day 4 of 7  · Continue lovenox BID (ddimer 2 85) -- patient has been refusing this medication  · Encourage patient to self-prone, incentive spirometer, OOB when appropriate  · Monitor inflammatory markers q 2-3 days  · D-dimer 2 85,  5, NT-BNP 1856,  Procalcitonin 0 26 -> 0 31  · Repeat Ddimer, CRP, and procal pending today

## 2022-01-25 NOTE — QUICK NOTE
Patient agreeable to clinical update to her   I was able to contact him by phone and provide with an update on current status and concerns  All questions answered to the best of my ability

## 2022-01-25 NOTE — ASSESSMENT & PLAN NOTE
· Secondary to COVID-19 pneumonia as well as underlying severe emphysema, mucous plugging, and RML collapse  · Continue O2 suppelement  · Aggressive pulmonary toilet  · Scheduled nebs, mucinex, ISB, flutter valve, vest therapy  · Consider mucomyst nebs  · Pulmonology consulted

## 2022-01-25 NOTE — PHYSICAL THERAPY NOTE
Physical Therapy Treatment Note       01/25/22 0939   PT Last Visit   PT Visit Date 01/25/22   Note Type   Note Type Treatment   Pain Assessment   Pain Assessment Tool 0-10   Pain Score No Pain   Restrictions/Precautions   Weight Bearing Precautions Per Order No   Other Precautions Contact/isolation; Airborne/isolation; Bed Alarm;Multiple lines;O2;Fall Risk  (+COVID19 confirmed  3L O2 NC)   General   Chart Reviewed Yes   Response to Previous Treatment Patient with no complaints from previous session  Family/Caregiver Present No   Cognition   Overall Cognitive Status WFL   Arousal/Participation Alert;Arousable   Attention Attends with cues to redirect   Orientation Level Oriented X4   Memory Within functional limits   Following Commands Follows all commands and directions without difficulty   Comments pt agreeable to PT session c encouragement   Subjective   Subjective "I feel much weaker"   Bed Mobility   Supine to Sit 4  Minimal assistance   Additional items Assist x 1;HOB elevated; Increased time required;LE management;Verbal cues   Sit to Supine 4  Minimal assistance   Additional items Assist x 1;HOB elevated; Increased time required;LE management;Verbal cues   Transfers   Sit to Stand 4  Minimal assistance   Additional items Assist x 2; Increased time required;Verbal cues;Armrests   Stand to Sit 4  Minimal assistance   Additional items Assist x 2; Increased time required;Verbal cues;Armrests   Stand pivot 4  Minimal assistance   Additional items Assist x 2; Increased time required;Verbal cues;Armrests  (EOB<>BSC (2 trials))   Additional Comments SPO2 down to 85% on 3L O2 NC during stand pivot transfers, requiring 2-3min seated rest recovery  Pt adamant about transferring BTB, not agreeable to sitting OOB in recliner at this time   pt c forward flexed trunk throughout session   Ambulation/Elevation   Gait pattern Not tested  (d/t fatigue, SOB/DOZIER)   Balance   Static Sitting Fair   Dynamic Sitting Fair -   Static Standing Poor +   Dynamic Standing Poor   Ambulatory Poor   Endurance Deficit   Endurance Deficit Yes   Activity Tolerance   Activity Tolerance Patient limited by fatigue   Medical Staff Made Aware RISHABH Quiroz Offer notified of PT rec   Nurse Made Aware RN Fanny Burnette verbalized pt appropriate to see for PT, made aware of session outcome/recs  Present to assist c OOB mobility   Assessment   Prognosis Fair   Problem List Decreased strength;Decreased endurance; Impaired balance;Decreased mobility; Impaired sensation   Assessment Pt seen for PT treatment session this date, consisting of ther act focused on bed mobility, transfers, sitting tolerance  Since previous session, pt has made no progress in terms of requiring increased A for all phases of mobility, decreased balance scores, worsened AM-PAC score, decreased sitting and standing tolerance/posture  Pertinent barriers during this session include SOB  Current goals and POC remain appropriate, pt continues to have rehab potential  Pt prognosis for achieving goals is fair, pending pt progress with hospitalization/medical status improvements, and indicated by [facility], ability to follow directions, recent onset and eye contact  Pt limited d/t fear of pain provocation, fear of falling and self limiting  PT recommends post acute rehabilitation services upon discharge  Pt continues to be functioning below baseline level, and remains limited 2* factors listed above  PT will continue to see pt during current hospitalization in order to address the deficits listed above and provide interventions consistent w/ POC in effort to achieve STGs  Barriers to Discharge Inaccessible home environment;Decreased caregiver support   Goals   Patient Goals none expressed   STG Expiration Date 01/31/22   PT Treatment Day 1   Plan   Treatment/Interventions Functional transfer training;LE strengthening/ROM; Therapeutic exercise; Endurance training;Patient/family training;Equipment eval/education; Bed mobility;Gait training;Spoke to nursing;OT;Spoke to case management   Progress No functional improvements   PT Frequency 3-5x/wk   Recommendation   PT Discharge Recommendation Post acute rehabilitation services   Equipment Recommended   (TBD)   AM-PAC Basic Mobility Inpatient   Turning in Bed Without Bedrails 3   Lying on Back to Sitting on Edge of Flat Bed 3   Moving Bed to Chair 2   Standing Up From Chair 2   Walk in Room 1   Climb 3-5 Stairs 1   Basic Mobility Inpatient Raw Score 12   Basic Mobility Standardized Score 32 23   Highest Level Of Mobility   JH-HLM Goal 4: Move to chair/commode   JH-HLM Highest Level of Mobility 4: Move to chair/commode   JH-HLM Goal Achieved Yes             Gurwinder Watkins, PT, DPT    Time of PT treatment session: [phone] (total treatment time = 24 minutes)

## 2022-01-25 NOTE — TREATMENT PLAN
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

## 2022-01-25 NOTE — CASE MANAGEMENT
Case Management Discharge Planning Note    Patient name Ronen Arita  Location /-87 MRN [id number]  : 1950 Date 2022       Current Admission Date: 2022  Current Admission Diagnosis:Pneumonia due to COVID-19 virus   Patient Active Problem List    Diagnosis Date Noted    Pneumonia due to COVID-19 virus 2022    Acute respiratory failure with hypoxia ([facility] ) 2022    Tobacco use 2022    Severe protein-calorie malnutrition ([facility] ) 2022      LOS (days): 5  Geometric Mean LOS (GMLOS) (days): 5 40  Days to GMLOS:0 3     OBJECTIVE:  Risk of Unplanned Readmission Score: 9         Current admission status: Inpatient   Preferred Pharmacy: No Pharmacies Listed  Primary Care Provider: No primary care provider on file  Primary Insurance: MEDICARE  Secondary Insurance:     DISCHARGE DETAILS:    Discharge planning discussed with[de-identified] ryan deleon  Freedom of Choice: Yes  Comments - Freedom of Choice: Ciro deleon states patient does not want STR and he will have patient home  Lin Sparks is agreeable with [facility] services    CM contacted family/caregiver?: Yes  Were Treatment Team discharge recommendations reviewed with patient/caregiver?: Yes  Did patient/caregiver verbalize understanding of patient care needs?: Yes       Contacts  Patient Contacts: CiroDarren  Relationship to Patient[de-identified] Family  Contact Method: Phone  Phone Number: [phone]  Reason/Outcome: Continuity of [address]         Is the patient interested in [facility] at discharge?: Yes  [address] requested[de-identified] [address] Name[de-identified] [facility] Provider[de-identified] PCP  Home Health Services Needed[de-identified] Oxygen Via Nasal Cannula,Strengthening/Theraputic Exercises to Improve Function  Oxygen LPM Ordered (if applicable based on home health services needed):: 3 LPM  Homebound Criteria Met[de-identified] Requires the Assistance of Another Person for Safe Ambulation or to Leave the Home,Uses an Assist Device (i e  cane, walker, etc)  Supporting Clincal Findings[de-identified] Limited Endurance,Requires Oxygen    DME Referral Provided  Referral made for DME?: No    Other Referral/Resources/Interventions Provided:  Interventions: [facility]  Referral Comments: referrals pended  Would you like to participate in our [address] service program?  : No - Declined    Treatment Team Recommendation: Short Term Rehab  Discharge Destination Plan[de-identified] Home with [facility] (referrals pended)  Transport at Discharge : [facility]  Dispatcher Contacted:  No

## 2022-01-25 NOTE — PLAN OF CARE
Problem: OCCUPATIONAL THERAPY ADULT  Goal: Performs self-care activities at highest level of function for planned discharge setting  See evaluation for individualized goals  Description: Treatment Interventions: ADL retraining,Functional transfer training,UE strengthening/ROM,Endurance training,Patient/family training,Equipment evaluation/education,Compensatory technique education,Continued evaluation,Energy conservation,Activityengagement          See flowsheet documentation for full assessment, interventions and recommendations  Note: Limitation: Decreased ADL status,Decreased Safe judgement during ADL,Decreased endurance,Decreased self-care trans,Decreased high-level ADLs  Prognosis: Good  Assessment: Patient is a 66 y o  female seen for OT evaluation s/p admit to [id number] [place] [address] on 1/20/2022 w/Pneumonia due to COVID-19 virus  Commorbidities affecting patient's functional performance at time of assessment include: pneumonia, acute respiratory failure with hypoxia, tobacco use, severe protein calorie malnutrition  Orders placed for OT evaluation and treatment  Performed at least two patient identifiers during session including name and wristband  Prior to admission, Patient was independent with ADLs/ IADLs  Lives with spouse in a one story house, [address]  Patient was ambulatory with no AD and driving  Personal factors affecting patient at time of initial evaluation include: limited caregiver support, steps to enter, limited insight into deficits, decreased initiation and engagement, difficulty performing ADLs and difficulty performing IADLs  Upon evaluation, patient requires minimal  assist for UB ADLs, moderate and maximal assist for LB ADLs   Occupational performance is affected by the following deficits: decreased functional use of BUEs, decreased muscle strength, degenerative arthritic joint changes, impaired gross motor coordination, dynamic sit/ stand balance deficit with poor standing tolerance time for self care and functional mobility, decreased activity tolerance, decreased safety awareness, decreased coping skills and postural control and postural alignment deficit, requiring external assistance to complete transitional movements  Patient presents with poor disposition/ lack of motivation to engage in therapy  SLIM made aware of therapy outcome, recommended Psych eval to address mood and depression? also recommended STR, as patient is unable to complete any functional tasks at this time  Patient to benefit from continued Occupational Therapy treatment while in the hospital to address deficits as defined above and maximize level of functional independence with ADLs and functional mobility  Occupational Performance areas to address include: grooming , bathing/ shower, dressing, toilet hygiene, transfer to all surfaces, functional ambulation, functional mobility, emergency response, health maintenance, medication routine/ management, IADLs: safety procedures, IADLs: meal prep/ clean up and Leisure Participation   From OT standpoint, recommendation at time of d/c would be Short Term Rehab     OT Discharge Recommendation: Post acute rehabilitation services

## 2022-01-25 NOTE — PLAN OF CARE
Problem: Potential for Falls  Goal: Patient will remain free of falls  Description: INTERVENTIONS:  - Educate patient/family on patient safety including physical limitations  - Instruct patient to call for assistance with activity   - Consult OT/PT to assist with strengthening/mobility   - Keep Call bell within reach  - Keep bed low and locked with side rails adjusted as appropriate  - Keep care items and personal belongings within reach  - Initiate and maintain comfort rounds  - Make Fall Risk Sign visible to staff  Problem: PAIN - ADULT  Goal: Verbalizes/displays adequate comfort level or baseline comfort level  Description: Interventions:  - Encourage patient to monitor pain and request assistance  - Assess pain using appropriate pain scale  - Administer analgesics based on type and severity of pain and evaluate response  - Implement non-pharmacological measures as appropriate and evaluate response  - Consider cultural and social influences on pain and pain management  - Notify physician/advanced practitioner if interventions unsuccessful or patient reports new pain  Outcome: Progressing     - Apply yellow socks and bracelet for high fall risk patients  - Consider moving patient to room near nurses station  Outcome: Progressing

## 2022-01-26 NOTE — CONSULTS
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    Plan:   Acute hypoxemic respiratory failure secondary to COVID-19 pneumonia  Suspect patient may have had some underlying O2 requirement given significant emphysema and limitation of her activity, has not seen a physician in many years  COVID positive on 01/20 but had been having symptoms for a couple of weeks  CT scan done yesterday shows a single pulmonary embolism, shows significant emphysema as well as right middle lobe collapse due to mucus plugging  Treatment per mild COVID pathway  She is currently on 2 L nasal cannula with sats in the mid to high 90s, titrate to maintain O2 sats greater than 89%  CRP and procalcitonin are trending down  She completed remdesivir on 01/24  Today is day 7/10 of Decadron  Procalcitonin has been elevated, she is on antibiotics with ceftriaxone and doxycycline  Has completed 5 days, can discontinue today  Had been refusing Lovenox, D-dimer was 19, found to have a PE yesterday and was switched to therapeutic heparin  Will need transition to [address] [place] [address] for discharge  She was started on Xopenex and hypertonic saline as well as flutter valve yesterday for mucus plugging  Will continue the same  Vest currently unavailable  May benefit from continued use of nebulizer upon discharge though suspect patient will be resistant to this  Would benefit from outpatient PFTs as well as repeat imaging in 6-8 weeks  Smoking cessation, has been refusing nicotine patch  Would also meet criteria for annual CT lung screening  Will follow intermittently, please call with questions      History of Present Illness   Physician Requesting Consult: Chetan Uriostegui Nuno Garcia MD  Reason for Consult / Principal Problem: COVID 23  Hx and PE limited by: None  HPI: Norvin Duverney is a 66y o  year old female current smoker with no known past medical history, has not seen a physician in many years who presents with worsening shortness of breath and generalized fatigue over the last several days  Began to have increasing respiratory symptoms about 2 weeks ago and was worsening over the last several days  She is currently feeling slightly better with her breathing, still with significant fatigue  She is a current smoker, has never been formally diagnosed with COPD  She has not seen a physician in many years  She was hospitalized once some years ago for pneumonia but otherwise not treated for frequent exacerbations of her COPD or bronchitis  At baseline she is limited with her activity and therefore does not do much  She has no recent weight loss, has been very thin for some time now  Consults    Review of Systems   Constitutional: Positive for fatigue  HENT: Negative  Respiratory: Positive for cough and shortness of breath  Cardiovascular: Negative  Gastrointestinal: Negative  Genitourinary: Negative  Musculoskeletal: Negative  Skin: Negative  Allergic/Immunologic: Negative  Neurological: Positive for weakness  Psychiatric/Behavioral: Negative  Historical Information   History reviewed  No pertinent past medical history  Past Surgical History:   Procedure Laterality Date    LAPAROSCOPIC PARTIAL GASTRECTOMY       Social History   Social History     Substance and Sexual Activity   Alcohol Use Not Currently     Social History     Substance and Sexual Activity   Drug Use Never     E-Cigarette/Vaping     E-Cigarette/Vaping Substances     Social History     Tobacco Use   Smoking Status Current Every Day Smoker    Types: Cigarettes   Smokeless Tobacco Never Used     Occupational History:     Family History: History reviewed   No pertinent family history  Meds/Allergies   all current active meds have been reviewed, pertinent pulmonary meds have been reviewed and current meds:   Current Facility-Administered Medications   Medication Dose Route Frequency    acetaminophen (TYLENOL) tablet 650 mg  650 mg Oral Q6H PRN    albuterol (PROVENTIL HFA,VENTOLIN HFA) inhaler 2 puff  2 puff Inhalation Q4H PRN    cefTRIAXone (ROCEPHIN) 1,000 mg in dextrose 5 % 50 mL IVPB  1,000 mg Intravenous Q24H    cyclobenzaprine (FLEXERIL) tablet 10 mg  10 mg Oral TID PRN    dexamethasone (DECADRON) injection 6 mg  6 mg Intravenous Q24H    Diclofenac Sodium (VOLTAREN) 1 % topical gel 2 g  2 g Topical 4x Daily    doxycycline hyclate (VIBRAMYCIN) capsule 100 mg  100 mg Oral Q12H    guaiFENesin (MUCINEX) 12 hr tablet 1,200 mg  1,200 mg Oral Q12H HCAPARRO    heparin (porcine) 25,000 units in D5W 250 mL infusion (premix)  3-30 Units/kg/hr (Order-Specific) Intravenous Titrated    heparin (porcine) injection 1,800 Units  1,800 Units Intravenous Q1H PRN    heparin (porcine) injection 3,600 Units  3,600 Units Intravenous Q1H PRN    levalbuterol (XOPENEX) inhalation solution 1 25 mg  1 25 mg Nebulization TID    nicotine (NICODERM CQ) 14 mg/24hr TD 24 hr patch 1 patch  1 patch Transdermal Daily    ondansetron (ZOFRAN) injection 4 mg  4 mg Intravenous Q6H PRN    pantoprazole (PROTONIX) EC tablet 40 mg  40 mg Oral Early Morning    sodium chloride 3 % inhalation solution 4 mL  4 mL Nebulization TID       No Known Allergies    Objective   Vitals: Blood pressure 121/78, pulse 91, temperature (!) 96 6 °F (35 9 °C), resp  rate 18, height 5' 5" (1 651 m), weight 46 7 kg (103 lb), SpO2 (!) 89 %  ,Body mass index is 17 14 kg/m²      Intake/Output Summary (Last 24 hours) at 1/26/2022 1154  Last data filed at 1/26/2022 0900  Gross per 24 hour   Intake 208 ml   Output --   Net 208 ml     Invasive Devices  Report    Peripheral Intravenous Line            Peripheral IV 01/25/22 Left;Ventral (anterior) Forearm <1 day                Physical Exam  Constitutional:       General: She is not in acute distress  Appearance: She is cachectic  She is not ill-appearing  HENT:      Head: Normocephalic and atraumatic  Mouth/Throat:      Pharynx: Oropharynx is clear  Eyes:      Conjunctiva/sclera: Conjunctivae normal       Pupils: Pupils are equal, round, and reactive to light  Cardiovascular:      Rate and Rhythm: Normal rate and regular rhythm  Pulmonary:      Effort: Pulmonary effort is normal  No respiratory distress  Breath sounds: Decreased breath sounds present  No wheezing, rhonchi or rales  Comments: Course cough during exam  Abdominal:      General: Abdomen is flat  There is no distension  Musculoskeletal:         General: Normal range of motion  Cervical back: Normal range of motion  Right lower leg: Edema ( trace) present  Left lower leg: Edema (Trace) present  Skin:     General: Skin is warm and dry  Neurological:      Mental Status: She is alert and oriented to person, place, and time  Psychiatric:         Mood and Affect: Mood normal          Behavior: Behavior normal          Lab Results:   I have personally reviewed pertinent lab results  , BNP: No results found for: BNP, CBC:   Lab Results   Component Value Date    WBC 15 91 (H) 01/25/2022    HGB 11 3 (L) 01/25/2022    HCT 36 6 01/25/2022     (H) 01/25/2022     01/25/2022    MCH 31 1 01/25/2022    MCHC 30 9 (L) 01/25/2022    RDW 16 5 (H) 01/25/2022    MPV 9 7 01/25/2022   , CMP: No results found for: SODIUM, K, CL, CO2, ANIONGAP, BUN, CREATININE, GLUCOSE, CALCIUM, AST, ALT, ALKPHOS, PROT, BILITOT, EGFR  Imaging Studies: I have personally reviewed pertinent reports  and I have personally reviewed pertinent films in PACS  EKG, Pathology, and Other Studies: I have personally reviewed pertinent reports      VTE Prophylaxis: Sequential compression device (Venodyne)  and Heparin    Code Status: Level 1 - Full Code  Advance Directive and Living Will:      Power of :    POLST:

## 2022-01-26 NOTE — PROGRESS NOTES
[address] [facility]  SL Progress Note [facility] 1950, 66 y o  female MRN: [id number]  Unit/Bed#: -01 Encounter: [id number]  Primary Care Provider: No primary care provider on file  Date and time admitted to hospital: 1/20/2022 12:01 PM    * Pneumonia due to COVID-19 virus  Assessment & Plan  Presented with several days of worsening dyspnea on exertion, shortness of breath, cough, generalized weakness, fatigue, and decreased appetite    Tested positive for COVID-19 on presentation 01/20/2022  Currently on MILD COVID pathway:  · Continue O2 supplement, goal >92% O2 sat; remains on 3-4 L   · Completed IV remdesivir, 1/24  · Continue IV Decadron, day 7 of 10  · Continue IV ceftriaxone/doxycycline given elevated procalcitonin, day 5 of 7  · Recommended lovenox BID (ddimer rising) since admission  · Patient has been refusing this medication, however CTA 1/25 does confirm PE  · Switch to therapeutic heparin  · Encourage patient to self-prone, incentive spirometer, OOB when appropriate  · Monitor inflammatory markers q 2-3 days  · D-dimer 2 85 -> 19 99,  5 ->28 5, NT-BNP 1856,  Procalcitonin 0 26 -> 0 31 -> 0 09    Acute respiratory failure with hypoxia (HCC)  Assessment & Plan  · Secondary to COVID-19 pneumonia as well as underlying severe emphysema, mucous plugging, and RML collapse  · Continue O2 suppelement  · Aggressive pulmonary toilet  · Scheduled nebs, mucinex, ISB, flutter valve, vest therapy  · Consider mucomyst nebs  · Pulmonology consulted    Tobacco use  Assessment & Plan  · Active smoker for many decades  · Nicotine patch ordered - has been refusing    Severe protein-calorie malnutrition ([address] )  Assessment & Plan  · Frail and cachectic appearing on admission;  says she has been that way for years  · Does have chronic history of smoking but reports no recent weight loss  · Nutrition consult, mealtime supplementation  · BMI of 17        VTE Pharmacologic Prophylaxis: VTE Score: 5 High Risk (Score >/= 5) - Pharmacological DVT Prophylaxis Ordered: heparin drip  Sequential Compression Devices Ordered  Patient Centered Rounds: I performed bedside rounds with nursing staff today  Discussions with Specialists or Other Care Team Provider: CM, pulm     Education and Discussions with Family / Patient: Updated  () via phone  Time Spent for Care: 30 minutes  More than 50% of total time spent on counseling and coordination of care as described above  Current Length of Stay: 6 day(s)  Current Patient Status: Inpatient   Certification Statement: The patient will continue to require additional inpatient hospital stay due to acute PE, hypoxia  Discharge Plan: Anticipate discharge in 48-72 hrs to home with home services   declining rehab recs    Code Status: Level 1 - Full Code    Subjective:   CC "I'm just awful"    Patient seen this morning receiving AM meds with nursing staff  Still feels short of breath, still coughing and unable to bring anything up  Denies new symptoms  We discussed participating in cares and getting OOB more, she is resistant and tells me she is going back to bed  Objective:     Vitals:   Temp (24hrs), Av 9 °F (36 6 °C), Min:96 8 °F (36 °C), Max:98 5 °F (36 9 °C)    Temp:  [96 8 °F (36 °C)-98 5 °F (36 9 °C)] 96 8 °F (36 °C)  HR:  [30-97] 85  Resp:  [18-20] 18  BP: (121-127)/(87-88) 127/87  SpO2:  [83 %-100 %] 83 %  Body mass index is 17 14 kg/m²  Input and Output Summary (last 24 hours): Intake/Output Summary (Last 24 hours) at 2022 1520  Last data filed at 2022 1910  Gross per 24 hour   Intake 118 ml   Output --   Net 118 ml       Physical Exam:   Physical Exam  Vitals and nursing note reviewed  Constitutional:       General: She is not in acute distress  Appearance: She is cachectic  She is ill-appearing  Cardiovascular:      Rate and Rhythm: Normal rate and regular rhythm  Heart sounds: Normal heart sounds  Pulmonary:      Effort: Tachypnea present  No respiratory distress  Breath sounds: Decreased breath sounds present  No wheezing  Comments: Very coarse sounding cough  Musculoskeletal:      Right lower leg: No edema  Left lower leg: No edema  Neurological:      Mental Status: She is alert and oriented to person, place, and time  Psychiatric:         Mood and Affect: Mood is depressed  Affect is flat  Behavior: Behavior is withdrawn  Additional Data:     Labs:  Results from last 7 days   Lab Units 01/25/22  1720 01/22/22  0533 01/20/22  1218   WBC Thousand/uL 15 91*   < > 7 55   HEMOGLOBIN g/dL 11 3*   < > 12 7   HEMATOCRIT % 36 6   < > 40 3   PLATELETS Thousands/uL 153   < > 401*   NEUTROS PCT %  --   --  88*   LYMPHS PCT %  --   --  6*   MONOS PCT %  --   --  5   EOS PCT %  --   --  0    < > = values in this interval not displayed  Results from last 7 days   Lab Units 01/25/22  0507 01/22/22  0533 01/20/22  1219   SODIUM mmol/L 138   < > 139   POTASSIUM mmol/L 4 4   < > 3 8   CHLORIDE mmol/L 99*   < > 100   CO2 mmol/L 33*   < > 22   BUN mg/dL 23   < > 23   CREATININE mg/dL 0 65   < > 1 14   ANION GAP mmol/L 6   < > 17*   CALCIUM mg/dL 8 3   < > 7 9*   ALBUMIN g/dL  --   --  2 4*   TOTAL BILIRUBIN mg/dL  --   --  0 28   ALK PHOS U/L  --   --  241*   ALT U/L  --   --  18   AST U/L  --   --  66*   GLUCOSE RANDOM mg/dL 134   < > 134    < > = values in this interval not displayed       Results from last 7 days   Lab Units 01/25/22  1720   INR  1 22*             Results from last 7 days   Lab Units 01/26/22  0445 01/25/22  0918 01/21/22  0555 01/20/22  1428 01/20/22  1219   LACTIC ACID mmol/L  --   --  0 7 5 0* 4 9*   PROCALCITONIN ng/ml 0 08 0 09 0 31* 0 26*  --        Lines/Drains:  Invasive Devices  Report    Peripheral Intravenous Line            Peripheral IV 01/25/22 Left;Ventral (anterior) Forearm <1 day Telemetry:  Telemetry Orders (From admission, onward)             24 Hour Telemetry Monitoring  Continuous x 24 Hours (Telem)        References:    Telemetry Guidelines   Question:  Reason for 24 Hour Telemetry  Answer:  Pulmonary Embolism - 24 hours without resp  compromise, dysrhythmias, hemodynamically stable                 Telemetry Reviewed: Normal Sinus Rhythm  Indication for Continued Telemetry Use: No indication for continued use  Will discontinue  Imaging: Reviewed radiology reports from this admission including: chest CT scan    Recent Cultures (last 7 days):   Results from last 7 days   Lab Units 01/20/22  1219   BLOOD CULTURE  No Growth After 5 Days  No Growth After 5 Days         Last 24 Hours Medication List:   Current Facility-Administered Medications   Medication Dose Route Frequency Provider Last Rate    acetaminophen  650 mg Oral Q6H PRN Linnea Escobedo, DO      albuterol  2 puff Inhalation Q4H PRN Linnea Escobedo, DO      cefTRIAXone  1,000 mg Intravenous Q24H ИВАН McgillNP 1,000 mg (01/25/22 1224)    cyclobenzaprine  10 mg Oral TID PRN ANDREIA Marquez      dexamethasone  6 mg Intravenous Q24H Linnea Escobedo, DO      Diclofenac Sodium  2 g Topical 4x Daily ANDREIA Marquez      doxycycline hyclate  100 mg Oral Q12H ANDREIA Mcgill      guaiFENesin  1,200 mg Oral Q12H [facility] Kirsten Pry, PA-C      heparin (porcine)  3-30 Units/kg/hr (Order-Specific) Intravenous Titrated Kirsten Pry, PA-C 15 Units/kg/hr (01/26/22 3403)    heparin (porcine)  1,800 Units Intravenous Q1H PRN Kirsten Pry, PA-C      heparin (porcine)  3,600 Units Intravenous Q1H PRN Sole Vanegas PA-C      levalbuterol  1 25 mg Nebulization TID Alva Argueta MD      nicotine  1 patch Transdermal Daily Linnea Escobedo,       ondansetron  4 mg Intravenous Q6H PRN Linnea Escobedo, DO      pantoprazole  40 mg Oral Early Morning Fabien Canada PA-C      sodium chloride  4 mL Nebulization TID Jenniffer Gore MD          Today, Patient Was Seen By: Fabien Canada PA-C    **Please Note: This note may have been constructed using a voice recognition system  **

## 2022-01-27 NOTE — ASSESSMENT & PLAN NOTE
Presented with several days of worsening dyspnea on exertion, shortness of breath, cough, generalized weakness, fatigue, and decreased appetite    Tested positive for COVID-19 on presentation 01/20/2022  Currently on MILD COVID pathway:  · Continue O2 supplement, goal >92% O2 sat; remains on 3-4 L   · Completed IV remdesivir, 1/24  · Continue IV Decadron, day 8 of 10  · Continue IV ceftriaxone/doxycycline given elevated procalcitonin, day 6 of 7  · Recommended lovenox BID (ddimer rising) since admission  · Patient had been refusing this medication, however CTA 1/25 does confirm PE  · Switched to therapeutic heparin - price check DOACs today 1/27  · Encourage patient to self-prone, incentive spirometer, flutter valve, OOB   · Monitor inflammatory markers q 2-3 days  · D-dimer 2 85 -> 19 99,  5 ->28 5, NT-BNP 1856,  Procalcitonin 0 26 -> 0 31 -> 0 09

## 2022-01-27 NOTE — CASE MANAGEMENT
Case Management Discharge Planning Note    Patient name Shawna Payne  Location /-24 MRN [id number]  : 1950 Date 2022       Current Admission Date: 2022  Current Admission Diagnosis:Pneumonia due to COVID-19 virus   Patient Active Problem List    Diagnosis Date Noted    Pneumonia due to COVID-19 virus 2022    Acute respiratory failure with hypoxia ([facility] [address] ) 2022    Tobacco use 2022    Severe protein-calorie malnutrition ([facility] [address] ) 2022      LOS (days): 7  Geometric Mean LOS (GMLOS) (days): 5 40  Days to GMLOS:-1 6     OBJECTIVE:  Risk of Unplanned Readmission Score: 14         Current admission status: Inpatient   Preferred Pharmacy:   [address] [place] [address],  [facility]  [address]  [facility] [address]  Phone: [phone] Fax: [phone]    Primary Care Provider: No primary care provider on file  Primary Insurance: MEDICARE  Secondary Insurance:     DISCHARGE DETAILS:    Discharge planning discussed with[de-identified] ryanadrienne  Freedom of Choice: Yes  Comments - Freedom of Choice: Ciro deleon states patient does not want STR and he will have patient home  Marah Doctor is agreeable with [facility] services    CM contacted family/caregiver?: Yes  Were Treatment Team discharge recommendations reviewed with patient/caregiver?: Yes  Did patient/caregiver verbalize understanding of patient care needs?: Yes  Were patient/caregiver advised of the risks associated with not following Treatment Team discharge recommendations?: Yes    Contacts  Patient Contacts: Anthony  Relationship to Patient[de-identified] Family  Contact Method: Phone  Phone Number: [phone]  Reason/Outcome: Continuity of [address]         Is the patient interested in [facility] at discharge?: Yes    DME Referral Provided  Referral made for DME?: No    Other Referral/Resources/Interventions Provided:  Interventions: [facility]  Referral Comments: [facility]  Would you like to participate in our [facility] service program?  : No - Declined    Treatment Team Recommendation: Short Term Rehab  Discharge Destination Plan[de-identified] Home with [place] at Discharge : [facility]  Dispatcher Contacted:  No

## 2022-01-27 NOTE — PLAN OF CARE
Problem: Potential for Falls  Goal: Patient will remain free of falls  Description: INTERVENTIONS:  - Educate patient/family on patient safety including physical limitations  - Instruct patient to call for assistance with activity   - Consult OT/PT to assist with strengthening/mobility   - Keep Call bell within reach  - Keep bed low and locked with side rails adjusted as appropriate  - Keep care items and personal belongings within reach  - Initiate and maintain comfort rounds  - Make Fall Risk Sign visible to staff  - Offer Toileting every 2 Hours, in advance of need  - Initiate/Maintain alarm  - Obtain necessary fall risk management equipment:   - Apply yellow socks and bracelet for high fall risk patients  - Consider moving patient to room near nurses station  Outcome: Progressing     Problem: MOBILITY - ADULT  Goal: Maintain or return to baseline ADL function  Description: INTERVENTIONS:  -  Assess patient's ability to carry out ADLs; assess patient's baseline for ADL function and identify physical deficits which impact ability to perform ADLs (bathing, care of mouth/teeth, toileting, grooming, dressing, etc )  - Assess/evaluate cause of self-care deficits   - Assess range of motion  - Assess patient's mobility; develop plan if impaired  - Assess patient's need for assistive devices and provide as appropriate  - Encourage maximum independence but intervene and supervise when necessary  - Involve family in performance of ADLs  - Assess for home care needs following discharge   - Consider OT consult to assist with ADL evaluation and planning for discharge  - Provide patient education as appropriate  Outcome: Progressing  Goal: Maintains/Returns to pre admission functional level  Description: INTERVENTIONS:  - Perform BMAT or MOVE assessment daily    - Set and communicate daily mobility goal to care team and patient/family/caregiver     - Collaborate with rehabilitation services on mobility goals if consulted  - Perform Range of Motion 2 times a day  - Reposition patient every 2 hours    - Dangle patient 2 times a day  - Stand patient 2 times a day  - Ambulate patient 2 times a day  - Out of bed to chair 2 times a day   - Out of bed for meals 2 times a day  - Out of bed for toileting  - Record patient progress and toleration of activity level   Outcome: Progressing     Problem: PAIN - ADULT  Goal: Verbalizes/displays adequate comfort level or baseline comfort level  Description: Interventions:  - Encourage patient to monitor pain and request assistance  - Assess pain using appropriate pain scale  - Administer analgesics based on type and severity of pain and evaluate response  - Implement non-pharmacological measures as appropriate and evaluate response  - Consider cultural and social influences on pain and pain management  - Notify physician/advanced practitioner if interventions unsuccessful or patient reports new pain  Outcome: Progressing     Problem: INFECTION - ADULT  Goal: Absence or prevention of progression during hospitalization  Description: INTERVENTIONS:  - Assess and monitor for signs and symptoms of infection  - Monitor lab/diagnostic results  - Monitor all insertion sites, i e  indwelling lines, tubes, and drains  - Monitor endotracheal if appropriate and nasal secretions for changes in amount and color  - McGregor appropriate cooling/warming therapies per order  - Administer medications as ordered  - Instruct and encourage patient and family to use good hand hygiene technique  - Identify and instruct in appropriate isolation precautions for identified infection/condition  Outcome: Progressing  Goal: Absence of fever/infection during neutropenic period  Description: INTERVENTIONS:  - Monitor WBC    Outcome: Progressing     Problem: SAFETY ADULT  Goal: Patient will remain free of falls  Description: INTERVENTIONS:  - Educate patient/family on patient safety including physical limitations  - Instruct patient to call for assistance with activity   - Consult OT/PT to assist with strengthening/mobility   - Keep Call bell within reach  - Keep bed low and locked with side rails adjusted as appropriate  - Keep care items and personal belongings within reach  - Initiate and maintain comfort rounds  - Make Fall Risk Sign visible to staff  - Offer Toileting every 2 Hours, in advance of need  - Initiate/Maintain alarm  - Obtain necessary fall risk management equipment:   - Apply yellow socks and bracelet for high fall risk patients  - Consider moving patient to room near nurses station  Outcome: Progressing  Goal: Maintain or return to baseline ADL function  Description: INTERVENTIONS:  -  Assess patient's ability to carry out ADLs; assess patient's baseline for ADL function and identify physical deficits which impact ability to perform ADLs (bathing, care of mouth/teeth, toileting, grooming, dressing, etc )  - Assess/evaluate cause of self-care deficits   - Assess range of motion  - Assess patient's mobility; develop plan if impaired  - Assess patient's need for assistive devices and provide as appropriate  - Encourage maximum independence but intervene and supervise when necessary  - Involve family in performance of ADLs  - Assess for home care needs following discharge   - Consider OT consult to assist with ADL evaluation and planning for discharge  - Provide patient education as appropriate  Outcome: Progressing  Goal: Maintains/Returns to pre admission functional level  Description: INTERVENTIONS:  - Perform BMAT or MOVE assessment daily    - Set and communicate daily mobility goal to care team and patient/family/caregiver  - Collaborate with rehabilitation services on mobility goals if consulted  - Perform Range of Motion 2 times a day  - Reposition patient every 2 hours    - Dangle patient 2 times a day  - Stand patient 2 times a day  - Ambulate patient 2 times a day  - Out of bed to chair 2 times a day   - Out of bed for meals 2 times a day  - Out of bed for toileting  - Record patient progress and toleration of activity level   Outcome: Progressing     Problem: DISCHARGE PLANNING  Goal: Discharge to home or other facility with appropriate resources  Description: INTERVENTIONS:  - Identify barriers to discharge w/patient and caregiver  - Arrange for needed discharge resources and transportation as appropriate  - Identify discharge learning needs (meds, wound care, etc )  - Arrange for interpretive services to assist at discharge as needed  - Refer to Case Management Department for coordinating discharge planning if the patient needs post-hospital services based on physician/advanced practitioner order or complex needs related to functional status, cognitive ability, or social support system  Outcome: Progressing     Problem: Knowledge Deficit  Goal: Patient/family/caregiver demonstrates understanding of disease process, treatment plan, medications, and discharge instructions  Description: Complete learning assessment and assess knowledge base    Interventions:  - Provide teaching at level of understanding  - Provide teaching via preferred learning methods  Outcome: Progressing     Problem: RESPIRATORY - ADULT  Goal: Achieves optimal ventilation and oxygenation  Description: INTERVENTIONS:  - Assess for changes in respiratory status  - Assess for changes in mentation and behavior  - Position to facilitate oxygenation and minimize respiratory effort  - Oxygen administered by appropriate delivery if ordered  - Initiate smoking cessation education as indicated  - Encourage broncho-pulmonary hygiene including cough, deep breathe, Incentive Spirometry  - Assess the need for suctioning and aspirate as needed  - Assess and instruct to report SOB or any respiratory difficulty  - Respiratory Therapy support as indicated  Outcome: Progressing     Problem: SKIN/TISSUE INTEGRITY - ADULT  Goal: Skin Integrity remains intact(Skin Breakdown Prevention)  Description: Assess:  -Perform Ranulfo assessment every shift and after incontinent episodes   -Clean and moisturize skin every shift and after incontinent episodes   -Inspect skin when repositioning, toileting, and assisting with ADLS  -Assess under medical devices  -Assess extremities for adequate circulation and sensation     Bed Management:  -Have minimal linens on bed & keep smooth, unwrinkled  -Change linens as needed when moist or perspiring  -Avoid sitting or lying in one position for more than 2 hours while in bed  -Keep HOB at 30 degrees     Toileting:  -Offer bedside commode  -Assess for incontinence  -Use incontinent care products after each incontinent episode   Activity:  -Mobilize patient 2 times a day  -Encourage activity and walks on unit  -Encourage or provide ROM exercises   -Turn and reposition patient every 2 Hours  -Use appropriate equipment to lift or move patient in bed  -Instruct/ Assist with weight shifting every 2 hours when out of bed in chair  -Consider limitation of chair time 2 hour intervals    Skin Care:  -Avoid use of baby powder, tape, friction and shearing, hot water or constrictive clothing  -Relieve pressure over bony prominences using wedge  -Do not massage red bony areas    Next Steps:  -Teach patient strategies to minimize risks   -Consider consults to  interdisciplinary teams   Outcome: Progressing  Goal: Incision(s), wounds(s) or drain site(s) healing without S/S of infection  Description: INTERVENTIONS  - Assess and document dressing, incision, wound bed, drain sites and surrounding tissue  - Provide patient and family education  - Perform skin care/dressing changes   Outcome: Progressing  Goal: Pressure injury heals and does not worsen  Description: Interventions:  - Implement low air loss mattress or specialty surface (Criteria met)  - Apply silicone foam dressing  - Instruct/assist with weight shifting every 30 minutes when in chair   - Limit chair time to 2 hour intervals  - Use special pressure reducing interventions   - Apply fecal or urinary incontinence containment device   - Perform passive or active ROM   - Turn and reposition patient & offload bony prominences every 2 hours   - Utilize friction reducing device or surface for transfers   - Consider consults to  interdisciplinary teams   - Use incontinent care products after each incontinent episode   - Consider nutrition services referral as needed  Outcome: Progressing     Problem: Nutrition/Hydration-ADULT  Goal: Nutrient/Hydration intake appropriate for improving, restoring or maintaining nutritional needs  Description: Monitor and assess patient's nutrition/hydration status for malnutrition  Collaborate with interdisciplinary team and initiate plan and interventions as ordered  Monitor patient's weight and dietary intake as ordered or per policy  Utilize nutrition screening tool and intervene as necessary  Determine patient's food preferences and provide high-protein, high-caloric foods as appropriate       INTERVENTIONS:  - Monitor oral intake, urinary output, labs, and treatment plans  - Assess nutrition and hydration status and recommend course of action  - Evaluate amount of meals eaten  - Assist patient with eating if necessary   - Allow adequate time for meals  - Recommend/ encourage appropriate diets, oral nutritional supplements, and vitamin/mineral supplements  - Order, calculate, and assess calorie counts as needed  - Recommend, monitor, and adjust tube feedings based on assessed needs  - Assess need for intravenous fluids  - Provide  nutrition/hydration education as appropriate  - Include patient/family/caregiver in decisions related to nutrition  Outcome: Progressing     Problem: Prexisting or High Potential for Compromised Skin Integrity  Goal: Skin integrity is maintained or improved  Description: INTERVENTIONS:  - Identify patients at risk for skin breakdown  - Assess and monitor skin integrity  - Assess and monitor nutrition and hydration status  - Monitor labs   - Assess for incontinence   - Turn and reposition patient  - Assist with mobility/ambulation  - Relieve pressure over bony prominences  - Avoid friction and shearing  - Provide appropriate hygiene as needed including keeping skin clean and dry  - Evaluate need for skin moisturizer/barrier cream  - Collaborate with interdisciplinary team   - Patient/family teaching  - Consider wound care consult   Outcome: Progressing

## 2022-01-27 NOTE — CASE MANAGEMENT
Case Management Progress Note    Patient name Laura Benites  Location /-52 MRN [id number]  : 1950 Date 2022       LOS (days): 7  Geometric Mean LOS (GMLOS) (days): 5 40  Days to GMLOS:-1 6        OBJECTIVE:        Current admission status: Inpatient  Preferred Pharmacy:   [facility], [address]  [facility] [place] [address]  Phone: [phone] Fax: [phone]    Primary Care Provider: No primary care provider on file  Primary Insurance: MEDICARE  Secondary Insurance:     PROGRESS NOTE:      Per SLIM patient will require inpatient stay TBD due to bridged to coumadin once INR is therapeutic

## 2022-01-27 NOTE — PLAN OF CARE
Problem: Potential for Falls  Goal: Patient will remain free of falls  Description: INTERVENTIONS:  - Educate patient/family on patient safety including physical limitations  - Instruct patient to call for assistance with activity   - Consult OT/PT to assist with strengthening/mobility   - Keep Call bell within reach  - Keep bed low and locked with side rails adjusted as appropriate  - Keep care items and personal belongings within reach  - Initiate and maintain comfort rounds  - Make Fall Risk Sign visible to staff  - Offer Toileting every 2  Hours, in advance of need  - Initiate/Maintain bed/chair alarm  - Obtain necessary fall risk management equipment  - Apply yellow socks and bracelet for high fall risk patients  - Consider moving patient to room near nurses station  Outcome: Progressing     Problem: MOBILITY - ADULT  Goal: Maintain or return to baseline ADL function  Description: INTERVENTIONS:  -  Assess patient's ability to carry out ADLs; assess patient's baseline for ADL function and identify physical deficits which impact ability to perform ADLs (bathing, care of mouth/teeth, toileting, grooming, dressing, etc )  - Assess/evaluate cause of self-care deficits   - Assess range of motion  - Assess patient's mobility; develop plan if impaired  - Assess patient's need for assistive devices and provide as appropriate  - Encourage maximum independence but intervene and supervise when necessary  - Involve family in performance of ADLs  - Assess for home care needs following discharge   - Consider OT consult to assist with ADL evaluation and planning for discharge  - Provide patient education as appropriate  Outcome: Progressing  Goal: Maintains/Returns to pre admission functional level  Description: INTERVENTIONS:  - Perform BMAT or MOVE assessment daily    - Set and communicate daily mobility goal to care team and patient/family/caregiver     - Collaborate with rehabilitation services on mobility goals if consulted  - Reposition patient every 2  hours    - Stand patient 3 times a day  - Ambulate patient 2  times a day  - Out of bed to chair 3 times a day   - Out of bed for meals 3  times a day  - Out of bed for toileting  - Record patient progress and toleration of activity level   Outcome: Progressing     Problem: PAIN - ADULT  Goal: Verbalizes/displays adequate comfort level or baseline comfort level  Description: Interventions:  - Encourage patient to monitor pain and request assistance  - Assess pain using appropriate pain scale  - Administer analgesics based on type and severity of pain and evaluate response  - Implement non-pharmacological measures as appropriate and evaluate response  - Consider cultural and social influences on pain and pain management  - Notify physician/advanced practitioner if interventions unsuccessful or patient reports new pain  Outcome: Progressing     Problem: INFECTION - ADULT  Goal: Absence or prevention of progression during hospitalization  Description: INTERVENTIONS:  - Assess and monitor for signs and symptoms of infection  - Monitor lab/diagnostic results  - Monitor all insertion sites, i e  indwelling lines, tubes, and drains  - Monitor endotracheal if appropriate and nasal secretions for changes in amount and color  - Sterling appropriate cooling/warming therapies per order  - Administer medications as ordered  - Instruct and encourage patient and family to use good hand hygiene technique  - Identify and instruct in appropriate isolation precautions for identified infection/condition  Outcome: Progressing  Goal: Absence of fever/infection during neutropenic period  Description: INTERVENTIONS:  - Monitor WBC    Outcome: Progressing     Problem: SAFETY ADULT  Goal: Patient will remain free of falls  Description: INTERVENTIONS:  - Educate patient/family on patient safety including physical limitations  - Instruct patient to call for assistance with activity   - Consult OT/PT to assist with strengthening/mobility   - Keep Call bell within reach  - Keep bed low and locked with side rails adjusted as appropriate  - Keep care items and personal belongings within reach  - Initiate and maintain comfort rounds  - Make Fall Risk Sign visible to staff  - Offer Toileting every 2 Hours, in advance of need  - Initiate/Maintain bed/chair alarm  - Obtain necessary fall risk management equipment  - Apply yellow socks and bracelet for high fall risk patients  - Consider moving patient to room near nurses station  Outcome: Progressing  Goal: Maintain or return to baseline ADL function  Description: INTERVENTIONS:  -  Assess patient's ability to carry out ADLs; assess patient's baseline for ADL function and identify physical deficits which impact ability to perform ADLs (bathing, care of mouth/teeth, toileting, grooming, dressing, etc )  - Assess/evaluate cause of self-care deficits   - Assess range of motion  - Assess patient's mobility; develop plan if impaired  - Assess patient's need for assistive devices and provide as appropriate  - Encourage maximum independence but intervene and supervise when necessary  - Involve family in performance of ADLs  - Assess for home care needs following discharge   - Consider OT consult to assist with ADL evaluation and planning for discharge  - Provide patient education as appropriate  Outcome: Progressing  Goal: Maintains/Returns to pre admission functional level  Description: INTERVENTIONS:  - Perform BMAT or MOVE assessment daily    - Set and communicate daily mobility goal to care team and patient/family/caregiver  - Collaborate with rehabilitation services on mobility goals if consulted  - Reposition patient every 2  hours    - Stand patient 3  times a day  - Ambulate patient 2  times a day  - Out of bed to chair 3 times a day   - Out of bed for meals 3  times a day  - Out of bed for toileting  - Record patient progress and toleration of activity level   Outcome: Progressing     Problem: DISCHARGE PLANNING  Goal: Discharge to home or other facility with appropriate resources  Description: INTERVENTIONS:  - Identify barriers to discharge w/patient and caregiver  - Arrange for needed discharge resources and transportation as appropriate  - Identify discharge learning needs (meds, wound care, etc )  - Arrange for interpretive services to assist at discharge as needed  - Refer to Case Management Department for coordinating discharge planning if the patient needs post-hospital services based on physician/advanced practitioner order or complex needs related to functional status, cognitive ability, or social support system  Outcome: Progressing     Problem: Knowledge Deficit  Goal: Patient/family/caregiver demonstrates understanding of disease process, treatment plan, medications, and discharge instructions  Description: Complete learning assessment and assess knowledge base    Interventions:  - Provide teaching at level of understanding  - Provide teaching via preferred learning methods  Outcome: Progressing     Problem: RESPIRATORY - ADULT  Goal: Achieves optimal ventilation and oxygenation  Description: INTERVENTIONS:  - Assess for changes in respiratory status  - Assess for changes in mentation and behavior  - Position to facilitate oxygenation and minimize respiratory effort  - Oxygen administered by appropriate delivery if ordered  - Initiate smoking cessation education as indicated  - Encourage broncho-pulmonary hygiene including cough, deep breathe, Incentive Spirometry  - Assess the need for suctioning and aspirate as needed  - Assess and instruct to report SOB or any respiratory difficulty  - Respiratory Therapy support as indicated  Outcome: Progressing     Problem: SKIN/TISSUE INTEGRITY - ADULT  Goal: Skin Integrity remains intact(Skin Breakdown Prevention)  Description: Assess:  -Perform Ranulfo assessment every shift  -Clean and moisturize skin every 2 hrs  -Inspect skin when repositioning, toileting, and assisting with ADLS  -Assess under medical devices such as tubings  every 2 hrs  -Assess extremities for adequate circulation and sensation     Bed Management:  -Have minimal linens on bed & keep smooth, unwrinkled  -Change linens as needed when moist or perspiring  -Avoid sitting or lying in one position for more than 2  hours while in bed  -Keep HOB at 30 degrees     Toileting:  -Offer bedside commode  -Assess for incontinence every 2 hrs  -Use incontinent care products after each incontinent episode such as hydraguard    Activity:  -Mobilize patient 3 times a day  -Encourage activity and walks on unit  -Encourage or provide ROM exercises   -Turn and reposition patient every 2  Hours  -Use appropriate equipment to lift or move patient in bed  -Instruct/ Assist with weight shifting every 2 hrs when out of bed in chair  -Consider limitation of chair time 4 hour intervals    Skin Care:  -Avoid use of baby powder, tape, friction and shearing, hot water or constrictive clothing  -Relieve pressure over bony prominences using allevyn  -Do not massage red bony areas    Next Steps:  -Teach patient strategies to minimize risks such as skin break down  -Consider consults to  interdisciplinary teams such as   Outcome: Progressing  Goal: Incision(s), wounds(s) or drain site(s) healing without S/S of infection  Description: INTERVENTIONS  - Assess and document dressing, incision, wound bed, drain sites and surrounding tissue  - Provide patient and family education  - Perform skin care/dressing changes every shift  Outcome: Progressing  Goal: Pressure injury heals and does not worsen  Description: Interventions:  - Implement low air loss mattress or specialty surface (Criteria met)  - Apply silicone foam dressing  - Instruct/assist with weight shifting every  120 minutes when in chair   - Limit chair time to 4  hour intervals  - Use special pressure reducing interventions such as  cushion when in chair   - Apply fecal or urinary incontinence containment device   - Perform passive or active ROM every shift  - Turn and reposition patient & offload bony prominences every 2  hours   - Utilize friction reducing device or surface for transfers   - Consider consults to  interdisciplinary teams such as  - Use incontinent care products after each incontinent episode such as cream  - Consider nutrition services referral as needed  Outcome: Progressing     Problem: Nutrition/Hydration-ADULT  Goal: Nutrient/Hydration intake appropriate for improving, restoring or maintaining nutritional needs  Description: Monitor and assess patient's nutrition/hydration status for malnutrition  Collaborate with interdisciplinary team and initiate plan and interventions as ordered  Monitor patient's weight and dietary intake as ordered or per policy  Utilize nutrition screening tool and intervene as necessary  Determine patient's food preferences and provide high-protein, high-caloric foods as appropriate       INTERVENTIONS:  - Monitor oral intake, urinary output, labs, and treatment plans  - Assess nutrition and hydration status and recommend course of action  - Evaluate amount of meals eaten  - Assist patient with eating if necessary   - Allow adequate time for meals  - Recommend/ encourage appropriate diets, oral nutritional supplements, and vitamin/mineral supplements  - Order, calculate, and assess calorie counts as needed  - Recommend, monitor, and adjust tube feedings based on assessed needs  - Assess need for intravenous fluids  - Provide  nutrition/hydration education as appropriate  - Include patient/family/caregiver in decisions related to nutrition  Outcome: Progressing     Problem: Prexisting or High Potential for Compromised Skin Integrity  Goal: Skin integrity is maintained or improved  Description: INTERVENTIONS:  - Identify patients at risk for skin breakdown  - Assess and monitor skin integrity  - Assess and monitor nutrition and hydration status  - Monitor labs   - Assess for incontinence   - Turn and reposition patient  - Assist with mobility/ambulation  - Relieve pressure over bony prominences  - Avoid friction and shearing  - Provide appropriate hygiene as needed including keeping skin clean and dry  - Evaluate need for skin moisturizer/barrier cream  - Collaborate with interdisciplinary team   - Patient/family teaching  - Consider wound care consult   Outcome: Progressing

## 2022-01-27 NOTE — PROGRESS NOTES
[address]  SLIM Progress Note Radha Vásquez 1950, 66 y o  female MRN: [id number]  Unit/Bed#: -01 Encounter: [id number]  Primary Care Provider: No primary care provider on file  Date and time admitted to hospital: 1/20/2022 12:01 PM    * Pneumonia due to COVID-19 virus  Assessment & Plan  Presented with several days of worsening dyspnea on exertion, shortness of breath, cough, generalized weakness, fatigue, and decreased appetite    Tested positive for COVID-19 on presentation 01/20/2022  Currently on MILD COVID pathway:  · Continue O2 supplement, goal >92% O2 sat; remains on 3-4 L   · Completed IV remdesivir, 1/24  · Continue IV Decadron, day 8 of 10  · Continue IV ceftriaxone/doxycycline given elevated procalcitonin, day 6 of 7  · Recommended lovenox BID (ddimer rising) since admission  · Patient had been refusing this medication, however CTA 1/25 does confirm PE  · Switched to therapeutic heparin - price check DOACs today 1/27  · Encourage patient to self-prone, incentive spirometer, flutter valve, OOB   · Monitor inflammatory markers q 2-3 days  · D-dimer 2 85 -> 19 99,  5 ->28 5, NT-BNP 1856,  Procalcitonin 0 26 -> 0 31 -> 0 09    Acute respiratory failure with hypoxia (HCC)  Assessment & Plan  · Secondary to COVID-19 pneumonia as well as underlying severe emphysema, mucous plugging, and RML collapse  · Continue O2 suppelement  · Aggressive pulmonary toilet  · Scheduled nebs, mucinex, ISB, flutter valve, vest therapy  · Consider mucomyst nebs  · Pulmonology consult appreciated    Tobacco use  Assessment & Plan  · Active smoker for many decades  · Nicotine patch ordered - has been refusing    Severe protein-calorie malnutrition ([facility] [address] )  Assessment & Plan  · Frail and cachectic appearing on admission;  says she has been that way for years  · Does have chronic history of smoking but reports no recent weight loss  · Nutrition consult, mealtime supplementation  · BMI of 17        VTE Pharmacologic Prophylaxis: VTE Score: 5 High Risk (Score >/= 5) - Pharmacological DVT Prophylaxis Ordered: heparin drip  Sequential Compression Devices Ordered  Patient Centered Rounds: I evaluated the patient without nursing staff present due to [address] with Specialists or Other Care Team Provider: RISHABH     Education and Discussions with Family / Patient: Updated  () via phone  Time Spent for Care: 20 minutes  More than 50% of total time spent on counseling and coordination of care as described above  Current Length of Stay: 7 day(s)  Current Patient Status: Inpatient   Certification Statement: The patient will continue to require additional inpatient hospital stay due to pending further improvement and pulmonology clearance  Discharge Plan: Anticipate discharge in 24-48 hrs to home with home services  Code Status: Level 1 - Full Code    Subjective:   CC "I'm just terrible"    Patient reports no improvement overnight - still has a lot of phlegm that she can't cough up  No worsening, though  She is bruising a lot on the heparin drip  She has zero motivation  She does admit, that she though she was finally feeling a little better yesterday for a while  Objective:     Vitals:   Temp (24hrs), Av 9 °F (36 1 °C), Min:96 1 °F (35 6 °C), Max:97 5 °F (36 4 °C)    Temp:  [96 1 °F (35 6 °C)-97 5 °F (36 4 °C)] 97 5 °F (36 4 °C)  HR:  [83-98] 83  Resp:  [16-18] 18  BP: (105-130)/(69-78) 114/70  SpO2:  [89 %-100 %] 100 %  Body mass index is 17 14 kg/m²  Input and Output Summary (last 24 hours): Intake/Output Summary (Last 24 hours) at 2022 0739  Last data filed at 2022 1830  Gross per 24 hour   Intake 270 ml   Output --   Net 270 ml       Physical Exam:   Physical Exam  Vitals and nursing note reviewed  Constitutional:       General: She is not in acute distress  Appearance: She is cachectic  She is not toxic-appearing  Cardiovascular:      Rate and Rhythm: Normal rate and regular rhythm  Pulmonary:      Effort: Pulmonary effort is normal  No respiratory distress  Breath sounds: Normal breath sounds  Abdominal:      General: Bowel sounds are normal    Neurological:      Mental Status: She is alert and oriented to person, place, and time  Psychiatric:         Mood and Affect: Mood normal          Behavior: Behavior normal            Additional Data:     Labs:  Results from last 7 days   Lab Units 01/25/22  1720 01/22/22  0533 01/20/22  1218   WBC Thousand/uL 15 91*   < > 7 55   HEMOGLOBIN g/dL 11 3*   < > 12 7   HEMATOCRIT % 36 6   < > 40 3   PLATELETS Thousands/uL 153   < > 401*   NEUTROS PCT %  --   --  88*   LYMPHS PCT %  --   --  6*   MONOS PCT %  --   --  5   EOS PCT %  --   --  0    < > = values in this interval not displayed  Results from last 7 days   Lab Units 01/25/22  0507 01/22/22  0533 01/20/22  1219   SODIUM mmol/L 138   < > 139   POTASSIUM mmol/L 4 4   < > 3 8   CHLORIDE mmol/L 99*   < > 100   CO2 mmol/L 33*   < > 22   BUN mg/dL 23   < > 23   CREATININE mg/dL 0 65   < > 1 14   ANION GAP mmol/L 6   < > 17*   CALCIUM mg/dL 8 3   < > 7 9*   ALBUMIN g/dL  --   --  2 4*   TOTAL BILIRUBIN mg/dL  --   --  0 28   ALK PHOS U/L  --   --  241*   ALT U/L  --   --  18   AST U/L  --   --  66*   GLUCOSE RANDOM mg/dL 134   < > 134    < > = values in this interval not displayed  Results from last 7 days   Lab Units 01/25/22  1720   INR  1 22*             Results from last 7 days   Lab Units 01/26/22  0445 01/25/22  0918 01/21/22  0555 01/20/22  1428 01/20/22  1219   LACTIC ACID mmol/L  --   --  0 7 5 0* 4 9*   PROCALCITONIN ng/ml 0 08 0 09 0 31* 0 26*  --        Lines/Drains:  Invasive Devices  Report    Peripheral Intravenous Line            Peripheral IV 01/27/22 Left;Ventral (anterior) Forearm <1 day                      Imaging: No pertinent imaging reviewed      Recent Cultures (last 7 days):   Results from last 7 days   Lab Units 01/20/22  1219   BLOOD CULTURE  No Growth After 5 Days  No Growth After 5 Days  Last 24 Hours Medication List:   Current Facility-Administered Medications   Medication Dose Route Frequency Provider Last Rate    acetaminophen  650 mg Oral Q6H PRN Vasiliy Escobedo,       albuterol  2 puff Inhalation Q4H PRN Vasiliy Escobedo, DO      cefTRIAXone  1,000 mg Intravenous Q24H [facility] CRNP 1,000 mg (01/26/22 1221)    cyclobenzaprine  10 mg Oral TID PRN ANDREIA Parnell      dexamethasone  6 mg Intravenous Q24H Vasiliy Escobedo DO      Diclofenac Sodium  2 g Topical 4x Daily ANDREIA Parnell      doxycycline hyclate  100 mg Oral Q12H [facility] Noman CRJIN      guaiFENesin  1,200 mg Oral Q12H [facility] Maya Sanford PA-C      heparin (porcine)  3-30 Units/kg/hr (Order-Specific) Intravenous Titrated Maya Sanford PA-C 12 Units/kg/hr (01/27/22 0104)    heparin (porcine)  1,800 Units Intravenous Q1H PRN Maya Sanford PA-C      heparin (porcine)  3,600 Units Intravenous Q1H PRN Sole Vanegas PA-C      levalbuterol  1 25 mg Nebulization TID Ellie Mills MD      nicotine  1 patch Transdermal Daily Vasiliy Escobedo DO      ondansetron  4 mg Intravenous Q6H PRN Vasiliy Escobedo DO      pantoprazole  40 mg Oral Early Morning Sole Vanegas PA-C      sodium chloride  4 mL Nebulization TID Ellie Mills MD          Today, Patient Was Seen By: Maya Sanford PA-C    **Please Note: This note may have been constructed using a voice recognition system  **

## 2022-01-27 NOTE — CASE MANAGEMENT
Case Management Discharge Planning Note    Patient name Olya Madrigal  Location /-37 MRN [id number]  : 1950 Date 2022       Current Admission Date: 2022  Current Admission Diagnosis:Pneumonia due to COVID-19 virus   Patient Active Problem List    Diagnosis Date Noted    Pneumonia due to COVID-19 virus 2022    Acute respiratory failure with hypoxia ([facility] [address] ) 2022    Tobacco use 2022    Severe protein-calorie malnutrition ([facility] [address] ) 2022      LOS (days): 7  Geometric Mean LOS (GMLOS) (days): 5 40  Days to GMLOS:-1 5     OBJECTIVE:  Risk of Unplanned Readmission Score: 12         Current admission status: Inpatient   Preferred Pharmacy:   [address] [place] [address], [address]  [facility] [address]  Phone: [phone] Fax: [phone]    Primary Care Provider: No primary care provider on file  Primary Insurance: MEDICARE  Secondary Insurance:     DISCHARGE DETAILS:       Other Referral/Resources/Interventions Provided:  Interventions: Prescription Price Check  Referral Comments: CM phoned jeovany at [facility] [phone] who confirmed xarelto cost is $944 99 and eliquis cost is $823 99   patient does not have insurance and cm will be able to provide 30 free card

## 2022-01-27 NOTE — NURSING NOTE
(Darren Alegria) listed as emergency contact, given update from nursing staff regarding patients status

## 2022-01-27 NOTE — CASE MANAGEMENT
Case Management Discharge Planning Note    Patient name Bridgett Cat  Location /-26 MRN [id number]  : 1950 Date 2022       Current Admission Date: 2022  Current Admission Diagnosis:Pneumonia due to COVID-19 virus   Patient Active Problem List    Diagnosis Date Noted    Pneumonia due to COVID-19 virus 2022    Acute respiratory failure with hypoxia ([facility] [address] ) 2022    Tobacco use 2022    Severe protein-calorie malnutrition ([facility] [address] ) 2022      LOS (days): 7  Geometric Mean LOS (GMLOS) (days): 5 40  Days to GMLOS:-1 4     OBJECTIVE:  Risk of Unplanned Readmission Score: 12         Current admission status: Inpatient   Preferred Pharmacy:   [address] [place] [address], [facility] Fabian Hearn  [address]  [place] [address]  Phone: [phone] Fax: [phone]    Primary Care Provider: No primary care provider on file  Primary Insurance: MEDICARE  Secondary Insurance:     DISCHARGE DETAILS:      Other Referral/Resources/Interventions Provided:  Interventions: Prescription Price Check  Referral Comments: RISHABH Wilkins at [facility] [phone] to acevedo check [facility] asked this CM to call back as they are not processed yet

## 2022-01-27 NOTE — ASSESSMENT & PLAN NOTE
· Secondary to COVID-19 pneumonia as well as underlying severe emphysema, mucous plugging, and RML collapse  · Continue O2 suppelement  · Aggressive pulmonary toilet  · Scheduled nebs, mucinex, ISB, flutter valve, vest therapy  · Consider mucomyst nebs  · Pulmonology consult appreciated

## 2022-01-28 PROBLEM — D64.9 ANEMIA: Status: ACTIVE | Noted: 2022-01-01

## 2022-01-28 PROBLEM — I95.9 HYPOTENSION: Status: ACTIVE | Noted: 2022-01-01

## 2022-01-28 PROBLEM — I26.99 PULMONARY EMBOLISM (HCC): Status: ACTIVE | Noted: 2022-01-01

## 2022-01-28 NOTE — QUICK NOTE
Notified by RN that after receiving a 500 mL IV fluid bolus patient still hypotensive, and nurse recheck BP twice in currently BP is 85/53, mean arterial pressure 64  For now will give 1 time IV albumin 5% dose and discussed RN to recheck BP manually again once albumin complete    Continue closely monitor

## 2022-01-28 NOTE — ASSESSMENT & PLAN NOTE
· Symptomatic hypotension 80s/40s with lightheadedness  · Likely hypovolemic hypotension in poor po intake since admission, patient finally taking po today, please encourage    · Given a 0 5 L bolus on 1/28 without improvement, later the BP responded to Albumin  · Gentle IVF maintenance today & check orthostatics tomorrow

## 2022-01-28 NOTE — ASSESSMENT & PLAN NOTE
Hemoglobin   Date Value Ref Range Status   01/27/2022 10 3 (L) 11 5 - 15 4 g/dL Final   01/25/2022 11 3 (L) 11 5 - 15 4 g/dL Final   01/25/2022 11 6 11 5 - 15 4 g/dL Final     · On AC for acute PE, monitor for abnormal bleeding  · Heparin gtt bridge to coumadin  · Am CBC

## 2022-01-28 NOTE — PLAN OF CARE
Problem: Potential for Falls  Goal: Patient will remain free of falls  Description: INTERVENTIONS:  - Educate patient/family on patient safety including physical limitations  - Instruct patient to call for assistance with activity   - Consult OT/PT to assist with strengthening/mobility   - Keep Call bell within reach  - Keep bed low and locked with side rails adjusted as appropriate  - Keep care items and personal belongings within reach  - Initiate and maintain comfort rounds  - Make Fall Risk Sign visible to staff  - Offer Toileting every    Hours, in advance of need  - Initiate/Maintain   alarm  - Obtain necessary fall risk management equipment:     - Apply yellow socks and bracelet for high fall risk patients  - Consider moving patient to room near nurses station  Outcome: Progressing     Problem: MOBILITY - ADULT  Goal: Maintain or return to baseline ADL function  Description: INTERVENTIONS:  -  Assess patient's ability to carry out ADLs; assess patient's baseline for ADL function and identify physical deficits which impact ability to perform ADLs (bathing, care of mouth/teeth, toileting, grooming, dressing, etc )  - Assess/evaluate cause of self-care deficits   - Assess range of motion  - Assess patient's mobility; develop plan if impaired  - Assess patient's need for assistive devices and provide as appropriate  - Encourage maximum independence but intervene and supervise when necessary  - Involve family in performance of ADLs  - Assess for home care needs following discharge   - Consider OT consult to assist with ADL evaluation and planning for discharge  - Provide patient education as appropriate  Outcome: Progressing  Goal: Maintains/Returns to pre admission functional level  Description: INTERVENTIONS:  - Perform BMAT or MOVE assessment daily    - Set and communicate daily mobility goal to care team and patient/family/caregiver     - Collaborate with rehabilitation services on mobility goals if consulted  - Perform Range of Motion    times a day  - Reposition patient every    hours    - Dangle patient    times a day  - Stand patient    times a day  - Ambulate patient    times a day  - Out of bed to chair    times a day   - Out of bed for meals    times a day  - Out of bed for toileting  - Record patient progress and toleration of activity level   Outcome: Progressing     Problem: PAIN - ADULT  Goal: Verbalizes/displays adequate comfort level or baseline comfort level  Description: Interventions:  - Encourage patient to monitor pain and request assistance  - Assess pain using appropriate pain scale  - Administer analgesics based on type and severity of pain and evaluate response  - Implement non-pharmacological measures as appropriate and evaluate response  - Consider cultural and social influences on pain and pain management  - Notify physician/advanced practitioner if interventions unsuccessful or patient reports new pain  Outcome: Progressing     Problem: INFECTION - ADULT  Goal: Absence or prevention of progression during hospitalization  Description: INTERVENTIONS:  - Assess and monitor for signs and symptoms of infection  - Monitor lab/diagnostic results  - Monitor all insertion sites, i e  indwelling lines, tubes, and drains  - Monitor endotracheal if appropriate and nasal secretions for changes in amount and color  - [place] appropriate cooling/warming therapies per order  - Administer medications as ordered  - Instruct and encourage patient and family to use good hand hygiene technique  - Identify and instruct in appropriate isolation precautions for identified infection/condition  Outcome: Progressing  Goal: Absence of fever/infection during neutropenic period  Description: INTERVENTIONS:  - Monitor WBC    Outcome: Progressing     Problem: SAFETY ADULT  Goal: Patient will remain free of falls  Description: INTERVENTIONS:  - Educate patient/family on patient safety including physical limitations  - Instruct patient to call for assistance with activity   - Consult OT/PT to assist with strengthening/mobility   - Keep Call bell within reach  - Keep bed low and locked with side rails adjusted as appropriate  - Keep care items and personal belongings within reach  - Initiate and maintain comfort rounds  - Make Fall Risk Sign visible to staff  - Offer Toileting every    Hours, in advance of need  - Initiate/Maintain   alarm  - Obtain necessary fall risk management equipment:     - Apply yellow socks and bracelet for high fall risk patients  - Consider moving patient to room near nurses station  Outcome: Progressing  Goal: Maintain or return to baseline ADL function  Description: INTERVENTIONS:  -  Assess patient's ability to carry out ADLs; assess patient's baseline for ADL function and identify physical deficits which impact ability to perform ADLs (bathing, care of mouth/teeth, toileting, grooming, dressing, etc )  - Assess/evaluate cause of self-care deficits   - Assess range of motion  - Assess patient's mobility; develop plan if impaired  - Assess patient's need for assistive devices and provide as appropriate  - Encourage maximum independence but intervene and supervise when necessary  - Involve family in performance of ADLs  - Assess for home care needs following discharge   - Consider OT consult to assist with ADL evaluation and planning for discharge  - Provide patient education as appropriate  Outcome: Progressing  Goal: Maintains/Returns to pre admission functional level  Description: INTERVENTIONS:  - Perform BMAT or MOVE assessment daily    - Set and communicate daily mobility goal to care team and patient/family/caregiver  - Collaborate with rehabilitation services on mobility goals if consulted  - Perform Range of Motion    times a day  - Reposition patient every    hours    - Dangle patient    times a day  - Stand patient    times a day  - Ambulate patient    times a day  - Out of bed to chair    times a day   - Out of bed for meals    times a day  - Out of bed for toileting  - Record patient progress and toleration of activity level   Outcome: Progressing     Problem: DISCHARGE PLANNING  Goal: Discharge to home or other facility with appropriate resources  Description: INTERVENTIONS:  - Identify barriers to discharge w/patient and caregiver  - Arrange for needed discharge resources and transportation as appropriate  - Identify discharge learning needs (meds, wound care, etc )  - Arrange for interpretive services to assist at discharge as needed  - Refer to Case Management Department for coordinating discharge planning if the patient needs post-hospital services based on physician/advanced practitioner order or complex needs related to functional status, cognitive ability, or social support system  Outcome: Progressing     Problem: Knowledge Deficit  Goal: Patient/family/caregiver demonstrates understanding of disease process, treatment plan, medications, and discharge instructions  Description: Complete learning assessment and assess knowledge base    Interventions:  - Provide teaching at level of understanding  - Provide teaching via preferred learning methods  Outcome: Progressing     Problem: RESPIRATORY - ADULT  Goal: Achieves optimal ventilation and oxygenation  Description: INTERVENTIONS:  - Assess for changes in respiratory status  - Assess for changes in mentation and behavior  - Position to facilitate oxygenation and minimize respiratory effort  - Oxygen administered by appropriate delivery if ordered  - Initiate smoking cessation education as indicated  - Encourage broncho-pulmonary hygiene including cough, deep breathe, Incentive Spirometry  - Assess the need for suctioning and aspirate as needed  - Assess and instruct to report SOB or any respiratory difficulty  - Respiratory Therapy support as indicated  Outcome: Progressing     Problem: SKIN/TISSUE INTEGRITY - ADULT  Goal: Skin Integrity remains intact(Skin Breakdown Prevention)  Description: Assess:  -Perform [place] assessment every     -Clean and moisturize skin every     -Inspect skin when repositioning, toileting, and assisting with ADLS  -Assess under medical devices such as    every     -Assess extremities for adequate circulation and sensation     Bed Management:  -Have minimal linens on bed & keep smooth, unwrinkled  -Change linens as needed when moist or perspiring  -Avoid sitting or lying in one position for more than    hours while in bed  -Keep HOB at   degrees     Toileting:  -Offer bedside commode  -Assess for incontinence every     -Use incontinent care products after each incontinent episode such as       Activity:  -Mobilize patient    times a day  -Encourage activity and walks on unit  -Encourage or provide ROM exercises   -Turn and reposition patient every    Hours  -Use appropriate equipment to lift or move patient in bed  -Instruct/ Assist with weight shifting every    when out of bed in chair  -Consider limitation of chair time    hour intervals    Skin Care:  -Avoid use of baby powder, tape, friction and shearing, hot water or constrictive clothing  -Relieve pressure over bony prominences using     -Do not massage red bony areas    Next Steps:  -Teach patient strategies to minimize risks such as      -Consider consults to  interdisciplinary teams such as     Outcome: Progressing  Goal: Incision(s), wounds(s) or drain site(s) healing without S/S of infection  Description: INTERVENTIONS  - Assess and document dressing, incision, wound bed, drain sites and surrounding tissue  - Provide patient and family education  - Perform skin care/dressing changes every     Outcome: Progressing  Goal: Pressure injury heals and does not worsen  Description: Interventions:  - Implement low air loss mattress or specialty surface (Criteria met)  - Apply silicone foam dressing  - Instruct/assist with weight shifting every    minutes when in chair   - Limit chair time to    hour intervals  - Use special pressure reducing interventions such as    when in chair   - Apply fecal or urinary incontinence containment device   - Perform passive or active ROM every     - Turn and reposition patient & offload bony prominences every    hours   - Utilize friction reducing device or surface for transfers   - Consider consults to  interdisciplinary teams such as     - Use incontinent care products after each incontinent episode such as     - Consider nutrition services referral as needed  Outcome: Progressing     Problem: Nutrition/Hydration-ADULT  Goal: Nutrient/Hydration intake appropriate for improving, restoring or maintaining nutritional needs  Description: Monitor and assess patient's nutrition/hydration status for malnutrition  Collaborate with interdisciplinary team and initiate plan and interventions as ordered  Monitor patient's weight and dietary intake as ordered or per policy  Utilize nutrition screening tool and intervene as necessary  Determine patient's food preferences and provide high-protein, high-caloric foods as appropriate       INTERVENTIONS:  - Monitor oral intake, urinary output, labs, and treatment plans  - Assess nutrition and hydration status and recommend course of action  - Evaluate amount of meals eaten  - Assist patient with eating if necessary   - Allow adequate time for meals  - Recommend/ encourage appropriate diets, oral nutritional supplements, and vitamin/mineral supplements  - Order, calculate, and assess calorie counts as needed  - Recommend, monitor, and adjust tube feedings based on assessed needs  - Assess need for intravenous fluids  - Provide  nutrition/hydration education as appropriate  - Include patient/family/caregiver in decisions related to nutrition  Outcome: Progressing     Problem: Prexisting or High Potential for Compromised Skin Integrity  Goal: Skin integrity is maintained or improved  Description: INTERVENTIONS:  - Identify patients at risk for skin breakdown  - Assess and monitor skin integrity  - Assess and monitor nutrition and hydration status  - Monitor labs   - Assess for incontinence   - Turn and reposition patient  - Assist with mobility/ambulation  - Relieve pressure over bony prominences  - Avoid friction and shearing  - Provide appropriate hygiene as needed including keeping skin clean and dry  - Evaluate need for skin moisturizer/barrier cream  - Collaborate with interdisciplinary team   - Patient/family teaching  - Consider wound care consult   Outcome: Progressing

## 2022-01-28 NOTE — ASSESSMENT & PLAN NOTE
Presented with several days of worsening dyspnea on exertion, shortness of breath, cough, generalized weakness, fatigue, and decreased appetite    Tested positive for COVID-19 on presentation 01/20/2022  Currently on MILD COVID pathway:  · Continue O2 supplement, goal >92% O2 sat; remains on 2 L   · Completed IV remdesivir, 1/24  · Continue IV Decadron, day 9 of 10  · Continue IV ceftriaxone/doxycycline given elevated procalcitonin, day 7 of 7  · AC with heparin gtt bridge to coumadin  · Encourage patient to self-prone, incentive spirometer, flutter valve, OOB

## 2022-01-28 NOTE — PROGRESS NOTES
[address] [facility]  Progress Note Rodolfo Primer 1950, 66 y o  female MRN: [id number]  Unit/Bed#: -01 Encounter: [id number]  Primary Care Provider: No primary care provider on file  Date and time admitted to hospital: 1/20/2022 12:01 PM    * Pneumonia due to COVID-19 virus  Assessment & Plan  Presented with several days of worsening dyspnea on exertion, shortness of breath, cough, generalized weakness, fatigue, and decreased appetite  Tested positive for COVID-19 on presentation 01/20/2022  Currently on MILD COVID pathway:  · Continue O2 supplement, goal >92% O2 sat; remains on 2 L   · Completed IV remdesivir, 1/24  · Continue IV Decadron, day 9 of 10  · Continue IV ceftriaxone/doxycycline given elevated procalcitonin, day 7 of 7  · AC with heparin gtt bridge to coumadin  · Encourage patient to self-prone, incentive spirometer, flutter valve, OOB    Hypotension  Assessment & Plan  · Symptomatic hypotension 80s/40s with lightheadedness  · Likely hypovolemic hypotension in poor po intake since admission, patient finally taking po today, please encourage    · Given a 0 5 L bolus on 1/28 without improvement, later the BP responded to Albumin  · Gentle IVF maintenance today & check orthostatics tomorrow    Pulmonary embolism [facility])  Assessment & Plan  · Confirmed on CTA PE, likely present on admission  · Heparin gtt bridge to coumadin  · Echo 1/21 nl RV fxn    Anemia  Assessment & Plan  Hemoglobin   Date Value Ref Range Status   01/27/2022 10 3 (L) 11 5 - 15 4 g/dL Final   01/25/2022 11 3 (L) 11 5 - 15 4 g/dL Final   01/25/2022 11 6 11 5 - 15 4 g/dL Final     · On AC for acute PE, monitor for abnormal bleeding  · Heparin gtt bridge to coumadin  · Am CBC    Severe protein-calorie malnutrition ([address] )  Assessment & Plan  · Frail and cachectic appearing on admission;  says she has been that way for years  · Does have chronic history of smoking but reports no recent weight loss  · Nutrition consult, mealtime supplementation  · BMI of 17    Tobacco use  Assessment & Plan  · Active smoker for many decades  · Nicotine patch ordered - has been refusing    Acute respiratory failure with hypoxia ([address] )  Assessment & Plan  · Secondary to COVID-19 pneumonia as well as underlying severe emphysema, mucous plugging, and RML collapse  · Continue O2 supplement to 2L  · Aggressive pulmonary toilet  · Scheduled nebs, mucinex, ISB, flutter valve, vest therapy  · Consider mucomyst nebs  · Pulmonology consult appreciated      VTE Pharmacologic Prophylaxis: VTE Score: 5 High Risk (Score >/= 5) - Pharmacological DVT Prophylaxis Ordered: heparin drip  Sequential Compression Devices Ordered  warfarin bridge    Patient Centered Rounds: I performed bedside rounds with nursing staff today  Discussions with Specialists or Other Care Team Provider: cm    Education and Discussions with Family / Patient: Updated  () via phone  Time Spent for Care: 30 minutes  More than 50% of total time spent on counseling and coordination of care as described above  Current Length of Stay: 8 day(s)  Current Patient Status: Inpatient   Certification Statement: The patient will continue to require additional inpatient hospital stay due to heparin gtt to warfarin bridge  Discharge Plan: Anticipate discharge in 48 hrs to home with home services  Can be dc once warfarin inr is therapeutic range  Code Status: Level 1 - Full Code    Subjective:   Completing the doxycycline makes her stomach hurt so she was refusing her last dose of antibiotics today  Slowly increasing p o  Intake last night and today  Patient reports lightheadedness with movement especially sitting up in bed  There was episode of hypotension overnight which she was symptomatic of lightheadedness  No abnormal bleeding    No chest pain or worsening cough or shortness of breath      Objective:     Vitals:   Temp (24hrs), Av 4 °F (36 3 °C), Min:97 2 °F (36 2 °C), Max:97 6 °F (36 4 °C)    Temp:  [97 2 °F (36 2 °C)-97 6 °F (36 4 °C)] 97 6 °F (36 4 °C)  HR:  [66-88] 72  Resp:  [16-18] 16  BP: (85-98)/(53-58) 97/58  SpO2:  [95 %-100 %] 97 %  Body mass index is 17 14 kg/m²  Input and Output Summary (last 24 hours): Intake/Output Summary (Last 24 hours) at 1/28/2022 1155  Last data filed at 1/28/2022 0100  Gross per 24 hour   Intake 470 ml   Output 800 ml   Net -330 ml       Physical Exam:   Physical Exam  Vitals and nursing note reviewed  Constitutional:       Appearance: She is cachectic  She is ill-appearing and toxic-appearing  HENT:      Head: Normocephalic and atraumatic  Nose: Nose normal       Mouth/Throat:      Mouth: Mucous membranes are dry  Eyes:      Extraocular Movements: Extraocular movements intact  Conjunctiva/sclera: Conjunctivae normal    Cardiovascular:      Rate and Rhythm: Normal rate and regular rhythm  Heart sounds: Normal heart sounds  No murmur heard  Pulmonary:      Effort: Pulmonary effort is normal  No respiratory distress  Breath sounds: No wheezing, rhonchi or rales  Comments: Decreased breath sounds throughout  Chest:      Chest wall: No tenderness  Abdominal:      General: Bowel sounds are normal       Palpations: Abdomen is soft  Genitourinary:     Comments: Pure wick  Musculoskeletal:         General: Normal range of motion  Cervical back: Neck supple  Skin:     General: Skin is warm and dry  Neurological:      General: No focal deficit present  Mental Status: She is oriented to person, place, and time     Psychiatric:         Behavior: Behavior normal       Comments: depressed          Additional Data:     Labs:  Results from last 7 days   Lab Units 01/27/22  0836   WBC Thousand/uL 13 60*   HEMOGLOBIN g/dL 10 3*   HEMATOCRIT % 32 8*   PLATELETS Thousands/uL 160     Results from last 7 days   Lab Units 01/27/22  0836   SODIUM mmol/L 135*   POTASSIUM mmol/L 4 5 CHLORIDE mmol/L 101   CO2 mmol/L 30   BUN mg/dL 16   CREATININE mg/dL 0 56*   ANION GAP mmol/L 4   CALCIUM mg/dL 8 0*   GLUCOSE RANDOM mg/dL 114     Results from last 7 days   Lab Units 01/25/22  1720   INR  1 22*             Results from last 7 days   Lab Units 01/26/22  0445 01/25/22  0918   PROCALCITONIN ng/ml 0 08 0 09       Lines/Drains:  Invasive Devices  Report    Peripheral Intravenous Line            Peripheral IV 01/27/22 Left;Ventral (anterior) Forearm 1 day                      Imaging: No pertinent imaging reviewed  Recent Cultures (last 7 days): none        Last 24 Hours Medication List:   Current Facility-Administered Medications   Medication Dose Route Frequency Provider Last Rate    acetaminophen  650 mg Oral Q6H PRN Azell Denisse Escobedo, DO      albuterol  2 puff Inhalation Q4H PRN Azell Denisse Escobedo, DO      apixaban  10 mg Oral BID Sole Vanegas PA-C      cefTRIAXone  1,000 mg Intravenous Q24H Livia Bai PA-C 1,000 mg (01/27/22 1202)    cyclobenzaprine  10 mg Oral TID PRN [facility], CRNP      dexamethasone  6 mg Intravenous Q24H Marcus Escobedo, DO      Diclofenac Sodium  2 g Topical 4x Daily [facility], CRNP      doxycycline hyclate  100 mg Oral Q12H Livia Bai PA-C      guaiFENesin  1,200 mg Oral Q12H [address] Sole Vanegas PA-C      levalbuterol  1 25 mg Nebulization TID Alvaro Walker MD      nicotine  1 patch Transdermal Daily Marcus Escobedo, DO      ondansetron  4 mg Intravenous Q6H PRN Marcus Escobedo, DO      pantoprazole  40 mg Oral Early Morning Sole Vanegas PA-C      sodium chloride  4 mL Nebulization TID Alvaro Walker MD          Today, Patient Was Seen By: Sheree Llanos PA-C    **Please Note: This note may have been constructed using a voice recognition system  **

## 2022-01-28 NOTE — PLAN OF CARE
Problem: Potential for Falls  Goal: Patient will remain free of falls  Description: INTERVENTIONS:  - Educate patient/family on patient safety including physical limitations  - Instruct patient to call for assistance with activity   - Consult OT/PT to assist with strengthening/mobility   - Keep Call bell within reach  - Keep bed low and locked with side rails adjusted as appropriate  - Keep care items and personal belongings within reach  - Initiate and maintain comfort rounds  - Make Fall Risk Sign visible to staff  - Offer Toileting every 2  Hours, in advance of need  - Initiate/Maintain bed/chair alarm  - Obtain necessary fall risk management equipment  - Apply yellow socks and bracelet for high fall risk patients  - Consider moving patient to room near nurses station  Outcome: Progressing     Problem: MOBILITY - ADULT  Goal: Maintain or return to baseline ADL function  Description: INTERVENTIONS:  -  Assess patient's ability to carry out ADLs; assess patient's baseline for ADL function and identify physical deficits which impact ability to perform ADLs (bathing, care of mouth/teeth, toileting, grooming, dressing, etc )  - Assess/evaluate cause of self-care deficits   - Assess range of motion  - Assess patient's mobility; develop plan if impaired  - Assess patient's need for assistive devices and provide as appropriate  - Encourage maximum independence but intervene and supervise when necessary  - Involve family in performance of ADLs  - Assess for home care needs following discharge   - Consider OT consult to assist with ADL evaluation and planning for discharge  - Provide patient education as appropriate  Outcome: Progressing  Goal: Maintains/Returns to pre admission functional level  Description: INTERVENTIONS:  - Perform BMAT or MOVE assessment daily    - Set and communicate daily mobility goal to care team and patient/family/caregiver     - Collaborate with rehabilitation services on mobility goals if consulted  - Perform Range of Motion 3  times a day  - Reposition patient every 2  hours    - Dangle patient 3 times a day  - Stand patient 3  times a day  - Ambulate patient 2  times a day  - Out of bed to chair 3 times a day   - Out of bed for meals 3 times a day  - Out of bed for toileting  - Record patient progress and toleration of activity level   Outcome: Progressing     Problem: PAIN - ADULT  Goal: Verbalizes/displays adequate comfort level or baseline comfort level  Description: Interventions:  - Encourage patient to monitor pain and request assistance  - Assess pain using appropriate pain scale  - Administer analgesics based on type and severity of pain and evaluate response  - Implement non-pharmacological measures as appropriate and evaluate response  - Consider cultural and social influences on pain and pain management  - Notify physician/advanced practitioner if interventions unsuccessful or patient reports new pain  Outcome: Progressing     Problem: INFECTION - ADULT  Goal: Absence or prevention of progression during hospitalization  Description: INTERVENTIONS:  - Assess and monitor for signs and symptoms of infection  - Monitor lab/diagnostic results  - Monitor all insertion sites, i e  indwelling lines, tubes, and drains  - Monitor endotracheal if appropriate and nasal secretions for changes in amount and color  - [place] appropriate cooling/warming therapies per order  - Administer medications as ordered  - Instruct and encourage patient and family to use good hand hygiene technique  - Identify and instruct in appropriate isolation precautions for identified infection/condition  Outcome: Progressing  Goal: Absence of fever/infection during neutropenic period  Description: INTERVENTIONS:  - Monitor WBC    Outcome: Progressing     Problem: SAFETY ADULT  Goal: Patient will remain free of falls  Description: INTERVENTIONS:  - Educate patient/family on patient safety including physical limitations  - Instruct patient to call for assistance with activity   - Consult OT/PT to assist with strengthening/mobility   - Keep Call bell within reach  - Keep bed low and locked with side rails adjusted as appropriate  - Keep care items and personal belongings within reach  - Initiate and maintain comfort rounds  - Make Fall Risk Sign visible to staff  - Offer Toileting every 2 Hours, in advance of need  - Initiate/Maintain bed/chair alarm  - Obtain necessary fall risk management equipment  - Apply yellow socks and bracelet for high fall risk patients  - Consider moving patient to room near nurses station  Outcome: Progressing  Goal: Maintain or return to baseline ADL function  Description: INTERVENTIONS:  -  Assess patient's ability to carry out ADLs; assess patient's baseline for ADL function and identify physical deficits which impact ability to perform ADLs (bathing, care of mouth/teeth, toileting, grooming, dressing, etc )  - Assess/evaluate cause of self-care deficits   - Assess range of motion  - Assess patient's mobility; develop plan if impaired  - Assess patient's need for assistive devices and provide as appropriate  - Encourage maximum independence but intervene and supervise when necessary  - Involve family in performance of ADLs  - Assess for home care needs following discharge   - Consider OT consult to assist with ADL evaluation and planning for discharge  - Provide patient education as appropriate  Outcome: Progressing  Goal: Maintains/Returns to pre admission functional level  Description: INTERVENTIONS:  - Perform BMAT or MOVE assessment daily    - Set and communicate daily mobility goal to care team and patient/family/caregiver  - Collaborate with rehabilitation services on mobility goals if consulted  - Perform Range of Motion 3 times a day  - Reposition patient every 2  hours    - Stand patient 3  times a day  - Ambulate patient 2  times a day  - Out of bed to chair 3 times a day   - Out of bed for meals 3  times a day  - Out of bed for toileting  - Record patient progress and toleration of activity level   Outcome: Progressing     Problem: DISCHARGE PLANNING  Goal: Discharge to home or other facility with appropriate resources  Description: INTERVENTIONS:  - Identify barriers to discharge w/patient and caregiver  - Arrange for needed discharge resources and transportation as appropriate  - Identify discharge learning needs (meds, wound care, etc )  - Arrange for interpretive services to assist at discharge as needed  - Refer to Case Management Department for coordinating discharge planning if the patient needs post-hospital services based on physician/advanced practitioner order or complex needs related to functional status, cognitive ability, or social support system  Outcome: Progressing     Problem: Knowledge Deficit  Goal: Patient/family/caregiver demonstrates understanding of disease process, treatment plan, medications, and discharge instructions  Description: Complete learning assessment and assess knowledge base    Interventions:  - Provide teaching at level of understanding  - Provide teaching via preferred learning methods  Outcome: Progressing     Problem: RESPIRATORY - ADULT  Goal: Achieves optimal ventilation and oxygenation  Description: INTERVENTIONS:  - Assess for changes in respiratory status  - Assess for changes in mentation and behavior  - Position to facilitate oxygenation and minimize respiratory effort  - Oxygen administered by appropriate delivery if ordered  - Initiate smoking cessation education as indicated  - Encourage broncho-pulmonary hygiene including cough, deep breathe, Incentive Spirometry  - Assess the need for suctioning and aspirate as needed  - Assess and instruct to report SOB or any respiratory difficulty  - Respiratory Therapy support as indicated  Outcome: Progressing     Problem: SKIN/TISSUE INTEGRITY - ADULT  Goal: Skin Integrity remains intact(Skin Breakdown Prevention)  Description: Assess:  -Perform [place] assessment every shift  -Clean and moisturize skin every shift   -Inspect skin when repositioning, toileting, and assisting with ADLS  -Assess under medical devices such as tubings  every 2 hours  -Assess extremities for adequate circulation and sensation     Bed Management:  -Have minimal linens on bed & keep smooth, unwrinkled  -Change linens as needed when moist or perspiring  -Avoid sitting or lying in one position for more than 2  hours while in bed  -Keep HOB at 30 degrees     Toileting:  -Offer bedside commode  -Assess for incontinence every 2 hrs  -Use incontinent care products after each incontinent episode such as hydraguard cream    Activity:  -Mobilize patient 3  times a day  -Encourage activity and walks on unit  -Encourage or provide ROM exercises   -Turn and reposition patient every 2  Hours  -Use appropriate equipment to lift or move patient in bed  -Instruct/ Assist with weight shifting every  2  hours when out of bed in chair  -Consider limitation of chair time 4 hour intervals    Skin Care:  -Avoid use of baby powder, tape, friction and shearing, hot water or constrictive clothing  -Relieve pressure over bony prominences using allevyn  -Do not massage red bony areas    Next Steps:  -Teach patient strategies to minimize risks such as weight shifting  -Consider consults to  interdisciplinary teams such   Outcome: Progressing  Goal: Incision(s), wounds(s) or drain site(s) healing without S/S of infection  Description: INTERVENTIONS  - Assess and document dressing, incision, wound bed, drain sites and surrounding tissue  - Provide patient and family education  - Perform skin care/dressing changes every shift  Outcome: Progressing  Goal: Pressure injury heals and does not worsen  Description: Interventions:  - Implement low air loss mattress or specialty surface (Criteria met)  - Apply silicone foam dressing  - Instruct/assist with weight shifting every 120 minutes when in chair   - Limit chair time to 4  hour intervals  - Use special pressure reducing interventions such as cushion when in chair   - Apply fecal or urinary incontinence containment device   - Perform passive or active ROM every shift  - Turn and reposition patient & offload bony prominences every 2  hours   - Utilize friction reducing device or surface for transfers   - Consider consults to  interdisciplinary teams such as   - Use incontinent care products after each incontinent episode such as hydraguard  - Consider nutrition services referral as needed  Outcome: Progressing     Problem: Nutrition/Hydration-ADULT  Goal: Nutrient/Hydration intake appropriate for improving, restoring or maintaining nutritional needs  Description: Monitor and assess patient's nutrition/hydration status for malnutrition  Collaborate with interdisciplinary team and initiate plan and interventions as ordered  Monitor patient's weight and dietary intake as ordered or per policy  Utilize nutrition screening tool and intervene as necessary  Determine patient's food preferences and provide high-protein, high-caloric foods as appropriate       INTERVENTIONS:  - Monitor oral intake, urinary output, labs, and treatment plans  - Assess nutrition and hydration status and recommend course of action  - Evaluate amount of meals eaten  - Assist patient with eating if necessary   - Allow adequate time for meals  - Recommend/ encourage appropriate diets, oral nutritional supplements, and vitamin/mineral supplements  - Order, calculate, and assess calorie counts as needed  - Recommend, monitor, and adjust tube feedings based on assessed needs  - Assess need for intravenous fluids  - Provide  nutrition/hydration education as appropriate  - Include patient/family/caregiver in decisions related to nutrition  Outcome: Progressing     Problem: Prexisting or High Potential for Compromised Skin Integrity  Goal: Skin integrity is maintained or improved  Description: INTERVENTIONS:  - Identify patients at risk for skin breakdown  - Assess and monitor skin integrity  - Assess and monitor nutrition and hydration status  - Monitor labs   - Assess for incontinence   - Turn and reposition patient  - Assist with mobility/ambulation  - Relieve pressure over bony prominences  - Avoid friction and shearing  - Provide appropriate hygiene as needed including keeping skin clean and dry  - Evaluate need for skin moisturizer/barrier cream  - Collaborate with interdisciplinary team   - Patient/family teaching  - Consider wound care consult   Outcome: Progressing

## 2022-01-28 NOTE — ASSESSMENT & PLAN NOTE
· Confirmed on CTA PE, likely present on admission  · Heparin gtt bridge to coumadin  · Echo 1/21 nl RV fxn

## 2022-01-28 NOTE — ASSESSMENT & PLAN NOTE
· Secondary to COVID-19 pneumonia as well as underlying severe emphysema, mucous plugging, and RML collapse  · Continue O2 supplement to 2L  · Aggressive pulmonary toilet  · Scheduled nebs, mucinex, ISB, flutter valve, vest therapy  · Consider mucomyst nebs  · Pulmonology consult appreciated

## 2022-01-29 NOTE — PLAN OF CARE
Problem: PAIN - ADULT  Goal: Verbalizes/displays adequate comfort level or baseline comfort level  Description: Interventions:  - Encourage patient to monitor pain and request assistance  - Assess pain using appropriate pain scale  - Administer analgesics based on type and severity of pain and evaluate response  - Implement non-pharmacological measures as appropriate and evaluate response  - Consider cultural and social influences on pain and pain management  - Notify physician/advanced practitioner if interventions unsuccessful or patient reports new pain  Outcome: Progressing     Problem: INFECTION - ADULT  Goal: Absence or prevention of progression during hospitalization  Description: INTERVENTIONS:  - Assess and monitor for signs and symptoms of infection  - Monitor lab/diagnostic results  - Monitor all insertion sites, i e  indwelling lines, tubes, and drains  - Monitor endotracheal if appropriate and nasal secretions for changes in amount and color  - [place] appropriate cooling/warming therapies per order  - Administer medications as ordered  - Instruct and encourage patient and family to use good hand hygiene technique  - Identify and instruct in appropriate isolation precautions for identified infection/condition  Outcome: Progressing  Goal: Absence of fever/infection during neutropenic period  Description: INTERVENTIONS:  - Monitor WBC    Outcome: Progressing     Problem: DISCHARGE PLANNING  Goal: Discharge to home or other facility with appropriate resources  Description: INTERVENTIONS:  - Identify barriers to discharge w/patient and caregiver  - Arrange for needed discharge resources and transportation as appropriate  - Identify discharge learning needs (meds, wound care, etc )  - Arrange for interpretive services to assist at discharge as needed  - Refer to Case Management Department for coordinating discharge planning if the patient needs post-hospital services based on physician/advanced practitioner order or complex needs related to functional status, cognitive ability, or social support system  Outcome: Progressing

## 2022-01-29 NOTE — ASSESSMENT & PLAN NOTE
· Presented with several days of worsening dyspnea on exertion, shortness of breath, cough, generalized weakness, fatigue, and decreased appetite    Tested positive for COVID-19 on presentation 01/20/2022  · Currently on MILD COVID pathway:  · Continue O2 supplement, goal >92% O2 sat; remains on 2 L   · Completed IV remdesivir, 1/24  · Continue IV Decadron, day 10 of 10  · Completed 7 days of IV ceftriaxone due to elevated procalcitonin  · Will bridge to coumadin with therapeutic lovenox  · Encourage patient to self-prone, incentive spirometer, flutter valve, OOB  · Add Breo

## 2022-01-29 NOTE — PLAN OF CARE
Problem: Potential for Falls  Goal: Patient will remain free of falls  Description: INTERVENTIONS:  - Educate patient/family on patient safety including physical limitations  - Instruct patient to call for assistance with activity   - Consult OT/PT to assist with strengthening/mobility   - Keep Call bell within reach  - Keep bed low and locked with side rails adjusted as appropriate  - Keep care items and personal belongings within reach  - Initiate and maintain comfort rounds  - Make Fall Risk Sign visible to staff  - Offer Toileting every  Hours, in advance of need  - Initiate/Maintain alarm  - Obtain necessary fall risk management equipment:   - Apply yellow socks and bracelet for high fall risk patients  - Consider moving patient to room near nurses station  Outcome: Progressing     Problem: MOBILITY - ADULT  Goal: Maintain or return to baseline ADL function  Description: INTERVENTIONS:  -  Assess patient's ability to carry out ADLs; assess patient's baseline for ADL function and identify physical deficits which impact ability to perform ADLs (bathing, care of mouth/teeth, toileting, grooming, dressing, etc )  - Assess/evaluate cause of self-care deficits   - Assess range of motion  - Assess patient's mobility; develop plan if impaired  - Assess patient's need for assistive devices and provide as appropriate  - Encourage maximum independence but intervene and supervise when necessary  - Involve family in performance of ADLs  - Assess for home care needs following discharge   - Consider OT consult to assist with ADL evaluation and planning for discharge  - Provide patient education as appropriate  Outcome: Progressing  Goal: Maintains/Returns to pre admission functional level  Description: INTERVENTIONS:  - Perform BMAT or MOVE assessment daily    - Set and communicate daily mobility goal to care team and patient/family/caregiver     - Collaborate with rehabilitation services on mobility goals if consulted  - Perform Range of Motion  times a day  - Reposition patient every  hours    - Dangle patient  times a day  - Stand patient  times a day  - Ambulate patient  times a day  - Out of bed to chair  times a day   - Out of bed for meals  times a day  - Out of bed for toileting  - Record patient progress and toleration of activity level   Outcome: Progressing     Problem: PAIN - ADULT  Goal: Verbalizes/displays adequate comfort level or baseline comfort level  Description: Interventions:  - Encourage patient to monitor pain and request assistance  - Assess pain using appropriate pain scale  - Administer analgesics based on type and severity of pain and evaluate response  - Implement non-pharmacological measures as appropriate and evaluate response  - Consider cultural and social influences on pain and pain management  - Notify physician/advanced practitioner if interventions unsuccessful or patient reports new pain  Outcome: Progressing     Problem: INFECTION - ADULT  Goal: Absence or prevention of progression during hospitalization  Description: INTERVENTIONS:  - Assess and monitor for signs and symptoms of infection  - Monitor lab/diagnostic results  - Monitor all insertion sites, i e  indwelling lines, tubes, and drains  - Monitor endotracheal if appropriate and nasal secretions for changes in amount and color  - [place] appropriate cooling/warming therapies per order  - Administer medications as ordered  - Instruct and encourage patient and family to use good hand hygiene technique  - Identify and instruct in appropriate isolation precautions for identified infection/condition  Outcome: Progressing  Goal: Absence of fever/infection during neutropenic period  Description: INTERVENTIONS:  - Monitor WBC    Outcome: Progressing     Problem: SAFETY ADULT  Goal: Patient will remain free of falls  Description: INTERVENTIONS:  - Educate patient/family on patient safety including physical limitations  - Instruct patient to call for assistance with activity   - Consult OT/PT to assist with strengthening/mobility   - Keep Call bell within reach  - Keep bed low and locked with side rails adjusted as appropriate  - Keep care items and personal belongings within reach  - Initiate and maintain comfort rounds  - Make Fall Risk Sign visible to staff  - Offer Toileting every  Hours, in advance of need  - Initiate/Maintain alarm  - Obtain necessary fall risk management equipment:   - Apply yellow socks and bracelet for high fall risk patients  - Consider moving patient to room near nurses station  Outcome: Progressing  Goal: Maintain or return to baseline ADL function  Description: INTERVENTIONS:  -  Assess patient's ability to carry out ADLs; assess patient's baseline for ADL function and identify physical deficits which impact ability to perform ADLs (bathing, care of mouth/teeth, toileting, grooming, dressing, etc )  - Assess/evaluate cause of self-care deficits   - Assess range of motion  - Assess patient's mobility; develop plan if impaired  - Assess patient's need for assistive devices and provide as appropriate  - Encourage maximum independence but intervene and supervise when necessary  - Involve family in performance of ADLs  - Assess for home care needs following discharge   - Consider OT consult to assist with ADL evaluation and planning for discharge  - Provide patient education as appropriate  Outcome: Progressing  Goal: Maintains/Returns to pre admission functional level  Description: INTERVENTIONS:  - Perform BMAT or MOVE assessment daily    - Set and communicate daily mobility goal to care team and patient/family/caregiver  - Collaborate with rehabilitation services on mobility goals if consulted  - Perform Range of Motion  times a day  - Reposition patient every  hours    - Dangle patient  times a day  - Stand patient  times a day  - Ambulate patient  times a day  - Out of bed to chair  times a day   - Out of bed for meals  times a day  - Out of bed for toileting  - Record patient progress and toleration of activity level   Outcome: Progressing     Problem: DISCHARGE PLANNING  Goal: Discharge to home or other facility with appropriate resources  Description: INTERVENTIONS:  - Identify barriers to discharge w/patient and caregiver  - Arrange for needed discharge resources and transportation as appropriate  - Identify discharge learning needs (meds, wound care, etc )  - Arrange for interpretive services to assist at discharge as needed  - Refer to Case Management Department for coordinating discharge planning if the patient needs post-hospital services based on physician/advanced practitioner order or complex needs related to functional status, cognitive ability, or social support system  Outcome: Progressing     Problem: Knowledge Deficit  Goal: Patient/family/caregiver demonstrates understanding of disease process, treatment plan, medications, and discharge instructions  Description: Complete learning assessment and assess knowledge base    Interventions:  - Provide teaching at level of understanding  - Provide teaching via preferred learning methods  Outcome: Progressing     Problem: RESPIRATORY - ADULT  Goal: Achieves optimal ventilation and oxygenation  Description: INTERVENTIONS:  - Assess for changes in respiratory status  - Assess for changes in mentation and behavior  - Position to facilitate oxygenation and minimize respiratory effort  - Oxygen administered by appropriate delivery if ordered  - Initiate smoking cessation education as indicated  - Encourage broncho-pulmonary hygiene including cough, deep breathe, Incentive Spirometry  - Assess the need for suctioning and aspirate as needed  - Assess and instruct to report SOB or any respiratory difficulty  - Respiratory Therapy support as indicated  Outcome: Progressing     Problem: SKIN/TISSUE INTEGRITY - ADULT  Goal: Skin Integrity remains intact(Skin Breakdown Prevention)  Description: Assess:  -Perform Ranulfo assessment every   -Clean and moisturize skin every  -Inspect skin when repositioning, toileting, and assisting with ADLS  -Assess under medical devices such as  every   -Assess extremities for adequate circulation and sensation     Bed Management:  -Have minimal linens on bed & keep smooth, unwrinkled  -Change linens as needed when moist or perspiring  -Avoid sitting or lying in one position for more than  hours while in bed  -Keep HOB at degrees     Toileting:  -Offer bedside commode  -Assess for incontinence every   -Use incontinent care products after each incontinent episode such as     Activity:  -Mobilize patient  times a day  -Encourage activity and walks on unit  -Encourage or provide ROM exercises   -Turn and reposition patient every  Hours  -Use appropriate equipment to lift or move patient in bed  -Instruct/ Assist with weight shifting every  when out of bed in chair  -Consider limitation of chair time  hour intervals    Skin Care:  -Avoid use of baby powder, tape, friction and shearing, hot water or constrictive clothing  -Relieve pressure over bony prominences using   -Do not massage red bony areas    Next Steps:  -Teach patient strategies to minimize risks such as    -Consider consults to  interdisciplinary teams such as   Outcome: Progressing  Goal: Incision(s), wounds(s) or drain site(s) healing without S/S of infection  Description: INTERVENTIONS  - Assess and document dressing, incision, wound bed, drain sites and surrounding tissue  - Provide patient and family education  - Perform skin care/dressing changes every   Outcome: Progressing  Goal: Pressure injury heals and does not worsen  Description: Interventions:  - Implement low air loss mattress or specialty surface (Criteria met)  - Apply silicone foam dressing  - Instruct/assist with weight shifting every  minutes when in chair   - Limit chair time to  hour intervals  - Use special pressure reducing interventions such as  when in chair   - Apply fecal or urinary incontinence containment device   - Perform passive or active ROM every   - Turn and reposition patient & offload bony prominences every  hours   - Utilize friction reducing device or surface for transfers   - Consider consults to  interdisciplinary teams such as   - Use incontinent care products after each incontinent episode such as   - Consider nutrition services referral as needed  Outcome: Progressing     Problem: Nutrition/Hydration-ADULT  Goal: Nutrient/Hydration intake appropriate for improving, restoring or maintaining nutritional needs  Description: Monitor and assess patient's nutrition/hydration status for malnutrition  Collaborate with interdisciplinary team and initiate plan and interventions as ordered  Monitor patient's weight and dietary intake as ordered or per policy  Utilize nutrition screening tool and intervene as necessary  Determine patient's food preferences and provide high-protein, high-caloric foods as appropriate       INTERVENTIONS:  - Monitor oral intake, urinary output, labs, and treatment plans  - Assess nutrition and hydration status and recommend course of action  - Evaluate amount of meals eaten  - Assist patient with eating if necessary   - Allow adequate time for meals  - Recommend/ encourage appropriate diets, oral nutritional supplements, and vitamin/mineral supplements  - Order, calculate, and assess calorie counts as needed  - Recommend, monitor, and adjust tube feedings based on assessed needs  - Assess need for intravenous fluids  - Provide  nutrition/hydration education as appropriate  - Include patient/family/caregiver in decisions related to nutrition  Outcome: Progressing     Problem: Prexisting or High Potential for Compromised Skin Integrity  Goal: Skin integrity is maintained or improved  Description: INTERVENTIONS:  - Identify patients at risk for skin breakdown  - Assess and monitor skin integrity  - Assess and monitor nutrition and hydration status  - Monitor labs   - Assess for incontinence   - Turn and reposition patient  - Assist with mobility/ambulation  - Relieve pressure over bony prominences  - Avoid friction and shearing  - Provide appropriate hygiene as needed including keeping skin clean and dry  - Evaluate need for skin moisturizer/barrier cream  - Collaborate with interdisciplinary team   - Patient/family teaching  - Consider wound care consult   Outcome: Progressing

## 2022-01-29 NOTE — PROGRESS NOTES
[facility]  Progress Note Delfino Mc 1950, 66 y o  female MRN: [id number]  Unit/Bed#: -01 Encounter: [id number]  Primary Care Provider: No primary care provider on file  Date and time admitted to hospital: 1/20/2022 12:01 PM    * Pneumonia due to COVID-19 virus  Assessment & Plan  · Presented with several days of worsening dyspnea on exertion, shortness of breath, cough, generalized weakness, fatigue, and decreased appetite  Tested positive for COVID-19 on presentation 01/20/2022  · Currently on MILD COVID pathway:  · Continue O2 supplement, goal >92% O2 sat; remains on 2 L   · Completed IV remdesivir, 1/24  · Continue IV Decadron, day 10 of 10  · Completed 7 days of IV ceftriaxone due to elevated procalcitonin  · Will bridge to coumadin with therapeutic lovenox  · Encourage patient to self-prone, incentive spirometer, flutter valve, OOB  · Add Breo    Hypotension  Assessment & Plan  · Noted on 1/27 pm --> Symptomatic hypotension 80s/40s with lightheadedness  · Likely hypovolemic hypotension in poor po intake since admission  · Given a 0 5 L bolus on 1/28 without improvement, later the BP responded to Albumin  · Much improved, noted to be /60-80s overnight      Pulmonary embolism (HCC)  Assessment & Plan  · Confirmed on CTA PE, likely present on admission  · Lovenox bridge to coumadin  · Echo 1/21 nl RV fxn    Anemia  Assessment & Plan  Hemoglobin   Date Value Ref Range Status   01/27/2022 10 3 (L) 11 5 - 15 4 g/dL Final   01/25/2022 11 3 (L) 11 5 - 15 4 g/dL Final   01/25/2022 11 6 11 5 - 15 4 g/dL Final     · On AC for acute PE, monitor for abnormal bleeding  · SQ Lovenox bridge to coumadin  · Am CBC    Severe protein-calorie malnutrition ([facility] [address] )  Assessment & Plan  · Frail and cachectic appearing on admission;  says she has been that way for years  · Does have chronic history of smoking but reports no recent weight loss  · Nutrition consult, mealtime supplementation  · BMI of 17    Tobacco use  Assessment & Plan  · Active smoker for many decades  · Nicotine patch ordered - has been refusing    Acute respiratory failure with hypoxia ([address] )  Assessment & Plan  · Secondary to COVID-19 pneumonia as well as underlying severe emphysema, mucous plugging, and RML collapse  · Continue O2 supplement to 2L  · Aggressive pulmonary toilet  · Scheduled nebs, mucinex, ISB, flutter valve, vest therapy  · Consider mucomyst nebs  · Pulmonology consult appreciated    VTE Pharmacologic Prophylaxis: VTE Score: 5 High Risk (Score >/= 5) - Pharmacological DVT Prophylaxis Ordered: enoxaparin (Lovenox)  Sequential Compression Devices Ordered  Patient Centered Rounds: I performed bedside rounds with nursing staff today  Discussions with Specialists or Other Care Team Provider:     Education and Discussions with Family / Patient: Updated  () via phone  Time Spent for Care: 20 minutes  More than 50% of total time spent on counseling and coordination of care as described above  Current Length of Stay: 9 day(s)  Current Patient Status: Inpatient   Certification Statement: The patient will continue to require additional inpatient hospital stay due to ongoing treatment in setting of lovenox bridge to coumadin  Discharge Plan: When INR therapeutic  Code Status: Level 1 - Full Code    Subjective:   CC: "I feel terrible"    Patient resting in bed, reports difficulty breathing  Denies chest pain, fever, or chills  Complains of feeling weak and tired  Offered PT/OT, she reports she would not want to go to [facility]  Encouraged patient to get OOB with assistance, use her incentive spirometer      Objective:     Vitals:   Temp (24hrs), Av 5 °F (36 4 °C), Min:97 3 °F (36 3 °C), Max:97 6 °F (36 4 °C)    Temp:  [97 3 °F (36 3 °C)-97 6 °F (36 4 °C)] 97 6 °F (36 4 °C)  HR:  [45-82] 82  Resp:  [18-20] 18  BP: ()/(61-89) 129/87  SpO2:  [93 %-99 %] 99 %  Body mass index is 17 14 kg/m²  Input and Output Summary (last 24 hours): Intake/Output Summary (Last 24 hours) at 1/29/2022 1159  Last data filed at 1/29/2022 0100  Gross per 24 hour   Intake 120 ml   Output 2100 ml   Net -1980 ml       Physical Exam:   Physical Exam  Vitals and nursing note reviewed  Constitutional:       General: She is not in acute distress  Appearance: She is cachectic  She is ill-appearing  Comments: +muscle wasting   Cardiovascular:      Rate and Rhythm: Normal rate  Pulses: Normal pulses  Heart sounds: Normal heart sounds  Pulmonary:      Effort: Accessory muscle usage present  No tachypnea, bradypnea or respiratory distress  Breath sounds: Normal breath sounds  Comments: 2 L O2 via NC  No conversational dyspnea noted  Abdominal:      General: Bowel sounds are normal       Palpations: Abdomen is soft  Musculoskeletal:         General: Normal range of motion  Right lower leg: No edema  Left lower leg: No edema  Comments: + Kyphosis   Skin:     General: Skin is warm and dry  Capillary Refill: Capillary refill takes less than 2 seconds  Neurological:      Mental Status: She is alert and oriented to person, place, and time     Psychiatric:         Mood and Affect: Mood normal           Additional Data:     Labs:  Results from last 7 days   Lab Units 01/29/22  0934   WBC Thousand/uL 12 77*   HEMOGLOBIN g/dL 11 2*   HEMATOCRIT % 37 2   PLATELETS Thousands/uL 262     Results from last 7 days   Lab Units 01/27/22  0836   SODIUM mmol/L 135*   POTASSIUM mmol/L 4 5   CHLORIDE mmol/L 101   CO2 mmol/L 30   BUN mg/dL 16   CREATININE mg/dL 0 56*   ANION GAP mmol/L 4   CALCIUM mg/dL 8 0*   GLUCOSE RANDOM mg/dL 114     Results from last 7 days   Lab Units 01/29/22  0835   INR  1 04             Results from last 7 days   Lab Units 01/26/22  0445 01/25/22  0918   PROCALCITONIN ng/ml 0 08 0 09       Lines/Drains:  Invasive Devices  Report    Peripheral Intravenous Line            Peripheral IV 01/27/22 Left;Ventral (anterior) Forearm 2 days                      Imaging: No pertinent imaging reviewed  Recent Cultures (last 7 days):         Last 24 Hours Medication List:   Current Facility-Administered Medications   Medication Dose Route Frequency Provider Last Rate    acetaminophen  650 mg Oral Q6H PRN Tarun Escobedo, DO      albuterol  2 puff Inhalation Q4H PRN Tarun Escobedo, DO      cyclobenzaprine  10 [facility] TID [facility]      dexamethasone  6 mg Intravenous Q24H Tarun Escobedo, DO      Diclofenac Sodium  2 g Topical 4x Daily ANDREIA Salgado      enoxaparin  1 mg/kg Subcutaneous Q12H [address] ANDREIA Bravo      fluticasone-vilanterol  1 puff Inhalation Daily ANDREIA Kam      guaiFENesin  1,200 mg Oral Q12H [address] Sole Vanegas PA-C      ondansetron  4 mg Intravenous Q6H PRN Tarun Escobedo,       pantoprazole  40 mg Oral Early Morning Aj NovemberIMELDA      warfarin  10 mg Oral Daily (warfarin) ANDREIA Bravo          Today, Patient Was Seen By: ANDREIA Bravo    **Please Note: This note may have been constructed using a voice recognition system  **

## 2022-01-29 NOTE — ASSESSMENT & PLAN NOTE
· Noted on 1/27 pm --> Symptomatic hypotension 80s/40s with lightheadedness  · Likely hypovolemic hypotension in poor po intake since admission  · Given a 0 5 L bolus on 1/28 without improvement, later the BP responded to Albumin  · Much improved, noted to be /60-80s overnight

## 2022-01-29 NOTE — ASSESSMENT & PLAN NOTE
Hemoglobin   Date Value Ref Range Status   01/27/2022 10 3 (L) 11 5 - 15 4 g/dL Final   01/25/2022 11 3 (L) 11 5 - 15 4 g/dL Final   01/25/2022 11 6 11 5 - 15 4 g/dL Final     · On AC for acute PE, monitor for abnormal bleeding  · SQ Lovenox bridge to coumadin  · Am CBC

## 2022-01-29 NOTE — ASSESSMENT & PLAN NOTE
· Confirmed on CTA PE, likely present on admission  · Lovenox bridge to coumadin  · Echo 1/21 nl RV fxn

## 2022-01-30 NOTE — ASSESSMENT & PLAN NOTE
· Confirmed on CTA PE, likely present on admission  · Echo 1/21 nl RV fxn  · Continue coumadin; INR 2 11

## 2022-01-30 NOTE — ASSESSMENT & PLAN NOTE
Hemoglobin   Date Value Ref Range Status   01/30/2022 11 6 11 5 - 15 4 g/dL Final   01/29/2022 11 2 (L) 11 5 - 15 4 g/dL Final   01/27/2022 10 3 (L) 11 5 - 15 4 g/dL Final     · On AC for acute PE  · Stable

## 2022-01-30 NOTE — CASE MANAGEMENT
Case Management Discharge Planning Note    Patient name Le Madden  Location /-37 MRN [id number]  : 1950 Date 2022       Current Admission Date: 2022  Current Admission Diagnosis:Pneumonia due to COVID-19 virus   Patient Active Problem List    Diagnosis Date Noted    Anemia 2022    Pulmonary embolism ([facility] [address] ) 2022    Hypotension 2022    Pneumonia due to COVID-19 virus 2022    Acute respiratory failure with hypoxia ([facility] [address] ) 2022    Tobacco use 2022    Severe protein-calorie malnutrition ([facility] [address] ) 2022      LOS (days): 10  Geometric Mean LOS (GMLOS) (days): 5 40  Days to GMLOS:-4 6     OBJECTIVE:  Risk of Unplanned Readmission Score: 12         Current admission status: Inpatient   Preferred Pharmacy:   [address] [place] [address], [address] [place] [address] [place]  [address]  [place] [id number]  Phone: [phone] Fax: [phone]    Primary Care Provider: No primary care provider on file  Primary Insurance: MEDICARE  Secondary Insurance:     DISCHARGE DETAILS:    Additional Comments: CM was informed that pt will need to become established with a PCP as he will need monitoring of coumadin  No offices open at this time due to weekend, CM sent inkenxuset message to [address] of discharge, need, and requested they contact pt to schedule a visit

## 2022-01-30 NOTE — RESPIRATORY THERAPY NOTE
Home Oxygen Qualifying Test       Patient name: Elizabeth Lockhart        : 1950   Date of Test:  2022  Diagnosis:      Home Oxygen Test:    **Medicare Guidelines require item(s) 1-5 on all ambulatory patients or 1 and 2 on non-ambulatory patients  1   Baseline SPO2 on Room Air at rest 79 %  2   SPO2 during exercise on Room Air 74 %  During exercise monitor SpO2  If SPO2 increases >=89% with ambulation do not add supplemental             oxygen  If <= 88% on room air add O2 via NC and titrate patient  Patient must be ambulated with O2 and titrated to > 88% with exertion  3   SPO2 on Oxygen at rest 79 % 2 lpm     4   SPO2 during exercise on Oxygen  89% a liter flow of 4 lpm     5   Exercise performed:          walking, duration 6 (min)          [x]  Supplemental Home Oxygen is indicated  []  Client does not qualify for home oxygen  Respiratory Additional Notes- pt sat dropped into the 70's while ambulating on RA  Titrated to 4L to achieve a sat greater than 88%    Pt will require 4L home O2  Perico Easley, RT

## 2022-01-30 NOTE — PLAN OF CARE
Problem: Potential for Falls  Goal: Patient will remain free of falls  Description: INTERVENTIONS:  - Educate patient/family on patient safety including physical limitations  - Instruct patient to call for assistance with activity   - Consult OT/PT to assist with strengthening/mobility   - Keep Call bell within reach  - Keep bed low and locked with side rails adjusted as appropriate  - Keep care items and personal belongings within reach  - Initiate and maintain comfort rounds  - Make Fall Risk Sign visible to staff  - Offer Toileting every  Hours, in advance of need  - Initiate/Maintain alarm  - Obtain necessary fall risk management equipment:   - Apply yellow socks and bracelet for high fall risk patients  - Consider moving patient to room near nurses station  Outcome: Progressing     Problem: MOBILITY - ADULT  Goal: Maintain or return to baseline ADL function  Description: INTERVENTIONS:  -  Assess patient's ability to carry out ADLs; assess patient's baseline for ADL function and identify physical deficits which impact ability to perform ADLs (bathing, care of mouth/teeth, toileting, grooming, dressing, etc )  - Assess/evaluate cause of self-care deficits   - Assess range of motion  - Assess patient's mobility; develop plan if impaired  - Assess patient's need for assistive devices and provide as appropriate  - Encourage maximum independence but intervene and supervise when necessary  - Involve family in performance of ADLs  - Assess for home care needs following discharge   - Consider OT consult to assist with ADL evaluation and planning for discharge  - Provide patient education as appropriate  Outcome: Progressing  Goal: Maintains/Returns to pre admission functional level  Description: INTERVENTIONS:  - Perform BMAT or MOVE assessment daily    - Set and communicate daily mobility goal to care team and patient/family/caregiver     - Collaborate with rehabilitation services on mobility goals if consulted  - Perform Range of Motion  times a day  - Reposition patient every hours    - Dangle patient  times a day  - Stand patient  times a day  - Ambulate patient  times a day  - Out of bed to chair  times a day   - Out of bed for meals  times a day  - Out of bed for toileting  - Record patient progress and toleration of activity level   Outcome: Progressing     Problem: PAIN - ADULT  Goal: Verbalizes/displays adequate comfort level or baseline comfort level  Description: Interventions:  - Encourage patient to monitor pain and request assistance  - Assess pain using appropriate pain scale  - Administer analgesics based on type and severity of pain and evaluate response  - Implement non-pharmacological measures as appropriate and evaluate response  - Consider cultural and social influences on pain and pain management  - Notify physician/advanced practitioner if interventions unsuccessful or patient reports new pain  Outcome: Progressing     Problem: INFECTION - ADULT  Goal: Absence or prevention of progression during hospitalization  Description: INTERVENTIONS:  - Assess and monitor for signs and symptoms of infection  - Monitor lab/diagnostic results  - Monitor all insertion sites, i e  indwelling lines, tubes, and drains  - Monitor endotracheal if appropriate and nasal secretions for changes in amount and color  - [place] appropriate cooling/warming therapies per order  - Administer medications as ordered  - Instruct and encourage patient and family to use good hand hygiene technique  - Identify and instruct in appropriate isolation precautions for identified infection/condition  Outcome: Progressing  Goal: Absence of fever/infection during neutropenic period  Description: INTERVENTIONS:  - Monitor WBC    Outcome: Progressing     Problem: SAFETY ADULT  Goal: Patient will remain free of falls  Description: INTERVENTIONS:  - Educate patient/family on patient safety including physical limitations  - Instruct patient to call for assistance with activity   - Consult OT/PT to assist with strengthening/mobility   - Keep Call bell within reach  - Keep bed low and locked with side rails adjusted as appropriate  - Keep care items and personal belongings within reach  - Initiate and maintain comfort rounds  - Make Fall Risk Sign visible to staff  - Offer Toileting every  Hours, in advance of need  - Initiate/Maintain alarm  - Obtain necessary fall risk management equipment:   - Apply yellow socks and bracelet for high fall risk patients  - Consider moving patient to room near nurses station  Outcome: Progressing  Goal: Maintain or return to baseline ADL function  Description: INTERVENTIONS:  -  Assess patient's ability to carry out ADLs; assess patient's baseline for ADL function and identify physical deficits which impact ability to perform ADLs (bathing, care of mouth/teeth, toileting, grooming, dressing, etc )  - Assess/evaluate cause of self-care deficits   - Assess range of motion  - Assess patient's mobility; develop plan if impaired  - Assess patient's need for assistive devices and provide as appropriate  - Encourage maximum independence but intervene and supervise when necessary  - Involve family in performance of ADLs  - Assess for home care needs following discharge   - Consider OT consult to assist with ADL evaluation and planning for discharge  - Provide patient education as appropriate  Outcome: Progressing  Goal: Maintains/Returns to pre admission functional level  Description: INTERVENTIONS:  - Perform BMAT or MOVE assessment daily    - Set and communicate daily mobility goal to care team and patient/family/caregiver  - Collaborate with rehabilitation services on mobility goals if consulted  - Perform Range of Motion  times a day  - Reposition patient every  hours    - Dangle patient  times a day  - Stand patient  times a day  - Ambulate patient  times a day  - Out of bed to chair  times a day   - Out of bed for meals  times a day  - Out of bed for toileting  - Record patient progress and toleration of activity level   Outcome: Progressing     Problem: DISCHARGE PLANNING  Goal: Discharge to home or other facility with appropriate resources  Description: INTERVENTIONS:  - Identify barriers to discharge w/patient and caregiver  - Arrange for needed discharge resources and transportation as appropriate  - Identify discharge learning needs (meds, wound care, etc )  - Arrange for interpretive services to assist at discharge as needed  - Refer to Case Management Department for coordinating discharge planning if the patient needs post-hospital services based on physician/advanced practitioner order or complex needs related to functional status, cognitive ability, or social support system  Outcome: Progressing     Problem: Knowledge Deficit  Goal: Patient/family/caregiver demonstrates understanding of disease process, treatment plan, medications, and discharge instructions  Description: Complete learning assessment and assess knowledge base    Interventions:  - Provide teaching at level of understanding  - Provide teaching via preferred learning methods  Outcome: Progressing     Problem: RESPIRATORY - ADULT  Goal: Achieves optimal ventilation and oxygenation  Description: INTERVENTIONS:  - Assess for changes in respiratory status  - Assess for changes in mentation and behavior  - Position to facilitate oxygenation and minimize respiratory effort  - Oxygen administered by appropriate delivery if ordered  - Initiate smoking cessation education as indicated  - Encourage broncho-pulmonary hygiene including cough, deep breathe, Incentive Spirometry  - Assess the need for suctioning and aspirate as needed  - Assess and instruct to report SOB or any respiratory difficulty  - Respiratory Therapy support as indicated  Outcome: Progressing     Problem: SKIN/TISSUE INTEGRITY - ADULT  Goal: Skin Integrity remains intact(Skin Breakdown Prevention)  Description: Assess:  -Perform Ranulfo assessment every   -Clean and moisturize skin every   -Inspect skin when repositioning, toileting, and assisting with ADLS  -Assess under medical devices such as  every   -Assess extremities for adequate circulation and sensation     Bed Management:  -Have minimal linens on bed & keep smooth, unwrinkled  -Change linens as needed when moist or perspiring  -Avoid sitting or lying in one position for more than  hours while in bed  -Keep HOB at degrees     Toileting:  -Offer bedside commode  -Assess for incontinence every   -Use incontinent care products after each incontinent episode such as     Activity:  -Mobilize patient  times a day  -Encourage activity and walks on unit  -Encourage or provide ROM exercises   -Turn and reposition patient every  Hours  -Use appropriate equipment to lift or move patient in bed  -Instruct/ Assist with weight shifting every  when out of bed in chair  -Consider limitation of chair time  hour intervals    Skin Care:  -Avoid use of baby powder, tape, friction and shearing, hot water or constrictive clothing  -Relieve pressure over bony prominences using   -Do not massage red bony areas    Next Steps:  -Teach patient strategies to minimize risks such as    -Consider consults to  interdisciplinary teams such as   Outcome: Progressing  Goal: Incision(s), wounds(s) or drain site(s) healing without S/S of infection  Description: INTERVENTIONS  - Assess and document dressing, incision, wound bed, drain sites and surrounding tissue  - Provide patient and family education  - Perform skin care/dressing changes every   Outcome: Progressing  Goal: Pressure injury heals and does not worsen  Description: Interventions:  - Implement low air loss mattress or specialty surface (Criteria met)  - Apply silicone foam dressing  - Instruct/assist with weight shifting every minutes when in chair   -  - Use special pressure reducing interventions such as  when in chair   - Apply fecal or urinary incontinence containment device   - Perform passive or active ROM every   - Turn and reposition patient & offload bony prominences every  hours   - Utilize friction reducing device or surface for transfers   - Consider consults to  interdisciplinary teams such as   - Use incontinent care products after each incontinent episode such as   - Consider nutrition services referral as needed  Outcome: Progressing     Problem: Nutrition/Hydration-ADULT  Goal: Nutrient/Hydration intake appropriate for improving, restoring or maintaining nutritional needs  Description: Monitor and assess patient's nutrition/hydration status for malnutrition  Collaborate with interdisciplinary team and initiate plan and interventions as ordered  Monitor patient's weight and dietary intake as ordered or per policy  Utilize nutrition screening tool and intervene as necessary  Determine patient's food preferences and provide high-protein, high-caloric foods as appropriate       INTERVENTIONS:  - Monitor oral intake, urinary output, labs, and treatment plans  - Assess nutrition and hydration status and recommend course of action  - Evaluate amount of meals eaten  - Assist patient with eating if necessary   - Allow adequate time for meals  - Recommend/ encourage appropriate diets, oral nutritional supplements, and vitamin/mineral supplements  - Order, calculate, and assess calorie counts as needed  - Recommend, monitor, and adjust tube feedings based on assessed needs  - Assess need for intravenous fluids  - Provide  nutrition/hydration education as appropriate  - Include patient/family/caregiver in decisions related to nutrition  Outcome: Progressing     Problem: Prexisting or High Potential for Compromised Skin Integrity  Goal: Skin integrity is maintained or improved  Description: INTERVENTIONS:  - Identify patients at risk for skin breakdown  - Assess and monitor skin integrity  - Assess and monitor nutrition and hydration status  - Monitor labs   - Assess for incontinence   - Turn and reposition patient  - Assist with mobility/ambulation  - Relieve pressure over bony prominences  - Avoid friction and shearing  - Provide appropriate hygiene as needed including keeping skin clean and dry  - Evaluate need for skin moisturizer/barrier cream  - Collaborate with interdisciplinary team   - Patient/family teaching  - Consider wound care consult   Outcome: Progressing

## 2022-01-30 NOTE — PLAN OF CARE
Problem: PAIN - ADULT  Goal: Verbalizes/displays adequate comfort level or baseline comfort level  Description: Interventions:  - Encourage patient to monitor pain and request assistance  - Assess pain using appropriate pain scale  - Administer analgesics based on type and severity of pain and evaluate response  - Implement non-pharmacological measures as appropriate and evaluate response  - Consider cultural and social influences on pain and pain management  - Notify physician/advanced practitioner if interventions unsuccessful or patient reports new pain  Outcome: Progressing     Problem: INFECTION - ADULT  Goal: Absence or prevention of progression during hospitalization  Description: INTERVENTIONS:  - Assess and monitor for signs and symptoms of infection  - Monitor lab/diagnostic results  - Monitor all insertion sites, i e  indwelling lines, tubes, and drains  - Monitor endotracheal if appropriate and nasal secretions for changes in amount and color  - [place] appropriate cooling/warming therapies per order  - Administer medications as ordered  - Instruct and encourage patient and family to use good hand hygiene technique  - Identify and instruct in appropriate isolation precautions for identified infection/condition  Outcome: Progressing  Goal: Absence of fever/infection during neutropenic period  Description: INTERVENTIONS:  - Monitor WBC    Outcome: Progressing     Problem: DISCHARGE PLANNING  Goal: Discharge to home or other facility with appropriate resources  Description: INTERVENTIONS:  - Identify barriers to discharge w/patient and caregiver  - Arrange for needed discharge resources and transportation as appropriate  - Identify discharge learning needs (meds, wound care, etc )  - Arrange for interpretive services to assist at discharge as needed  - Refer to Case Management Department for coordinating discharge planning if the patient needs post-hospital services based on physician/advanced practitioner order or complex needs related to functional status, cognitive ability, or social support system  Outcome: Progressing     Problem: Knowledge Deficit  Goal: Patient/family/caregiver demonstrates understanding of disease process, treatment plan, medications, and discharge instructions  Description: Complete learning assessment and assess knowledge base    Interventions:  - Provide teaching at level of understanding  - Provide teaching via preferred learning methods  Outcome: Progressing     Problem: RESPIRATORY - ADULT  Goal: Achieves optimal ventilation and oxygenation  Description: INTERVENTIONS:  - Assess for changes in respiratory status  - Assess for changes in mentation and behavior  - Position to facilitate oxygenation and minimize respiratory effort  - Oxygen administered by appropriate delivery if ordered  - Initiate smoking cessation education as indicated  - Encourage broncho-pulmonary hygiene including cough, deep breathe, Incentive Spirometry  - Assess the need for suctioning and aspirate as needed  - Assess and instruct to report SOB or any respiratory difficulty  - Respiratory Therapy support as indicated  Outcome: Progressing     Problem: Prexisting or High Potential for Compromised Skin Integrity  Goal: Skin integrity is maintained or improved  Description: INTERVENTIONS:  - Identify patients at risk for skin breakdown  - Assess and monitor skin integrity  - Assess and monitor nutrition and hydration status  - Monitor labs   - Assess for incontinence   - Turn and reposition patient  - Assist with mobility/ambulation  - Relieve pressure over bony prominences  - Avoid friction and shearing  - Provide appropriate hygiene as needed including keeping skin clean and dry  - Evaluate need for skin moisturizer/barrier cream  - Collaborate with interdisciplinary team   - Patient/family teaching  - Consider wound care consult   Outcome: Progressing

## 2022-01-30 NOTE — ASSESSMENT & PLAN NOTE
· Noted on 1/27 pm --> Symptomatic hypotension 80s/40s with lightheadedness  · Likely hypovolemic hypotension in poor po intake since admission  · Given a 0 5 L bolus on 1/28 without improvement, later the BP responded to Albumin  · Now resolved

## 2022-01-30 NOTE — DISCHARGE SUMMARY
[address] [facility]  Discharge- [facility] 1950, 66 y o  female MRN: [id number]  Unit/Bed#: -01 Encounter: [id number]  Primary Care Provider: No primary care provider on file  Date and time admitted to hospital: 1/20/2022 12:01 PM    * Pneumonia due to COVID-19 virus  Assessment & Plan  · Presented with several days of worsening dyspnea on exertion, shortness of breath, cough, generalized weakness, fatigue, and decreased appetite  Tested positive for COVID-19 on presentation 01/20/2022  · Maintained on MILD COVID pathway:  · Continue O2 supplement, goal >92% O2 sat; remains on 2 L   · Completed IV remdesivir, 1/24; Completed 10 days of Decadron 1/29  · Completed 7 days of IV ceftriaxone due to elevated procalcitonin  · Encourage patient to self-prone, incentive spirometer, flutter valve, OOB  · Add Breo    Hypotension  Assessment & Plan  · Noted on 1/27 pm --> Symptomatic hypotension 80s/40s with lightheadedness  · Likely hypovolemic hypotension in poor po intake since admission  · Given a 0 5 L bolus on 1/28 without improvement, later the BP responded to Albumin  · Now resolved  Pulmonary embolism (HCC)  Assessment & Plan  · Confirmed on CTA PE, likely present on admission  · Echo 1/21 nl RV fxn  · Continue coumadin; INR 2 11    Anemia  Assessment & Plan  Hemoglobin   Date Value Ref Range Status   01/30/2022 11 6 11 5 - 15 4 g/dL Final   01/29/2022 11 2 (L) 11 5 - 15 4 g/dL Final   01/27/2022 10 3 (L) 11 5 - 15 4 g/dL Final     · On AC for acute PE  · Stable      Severe protein-calorie malnutrition ([address] )  Assessment & Plan  · Frail and cachectic appearing on admission;  says she has been that way for years  · Does have chronic history of smoking but reports no recent weight loss  · Nutrition consult, mealtime supplementation  · BMI of 17    Tobacco use  Assessment & Plan  · Active smoker for many decades  · Nicotine patch ordered - has been refusing    Acute respiratory failure with hypoxia (HCC)  Assessment & Plan  · Secondary to COVID-19 pneumonia as well as underlying severe emphysema, mucous plugging, and RML collapse  · Continue O2 supplement to 2L  · Aggressive pulmonary toilet  · Scheduled nebs, mucinex, ISB, flutter valve, vest therapy  · Consider mucomyst nebs  · Pulmonology consult appreciated    Medical Problems             Resolved Problems  Date Reviewed: 1/29/2022    None              Discharging Physician / Practitioner: ANDREIA Castellanos  PCP: No primary care provider on file  Admission Date:   Admission Orders (From admission, onward)     Ordered        01/20/22 1335  Inpatient Admission  Once                      Discharge Date: 01/30/22    Consultations During Hospital Stay:  [phone]  IP CONSULT TO PULMONOLOGY    Procedures Performed:   · None    Significant Findings / Test Results:   CTA chest pe study   Final Result by Cristal Frey MD (01/25 6966)      Single, subsegmental right lower lobe PE  No central PE or secondary signs of right heart strain  Severe emphysema  Tracheobronchial secretions with complete occlusion from mucous plugging of the right bronchus intermedius, secondary right middle lobe collapse  Mild basilar groundglass opacities may be infectious or secondary to air trapping and emphysema  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, 4 6 cm  Other nonemergent findings above  Workstation performed: [id number]         XR chest portable   Final Result by Arcadio Boast, MD (01/23 1205)      Mild new peripheral groundglass opacity in the left lung due to Covid 19  Emphysema  Workstation performed: [id number]         XR chest 1 view portable   Final Result by Jose Mckeon MD (01/20 1330)      No definite focal airspace consolidation identified  Emphysema                    Workstation performed: [id number]             Incidental Findings:   · None     Test Results Pending at Discharge (will require follow up): · None     Outpatient Tests Requested:  · Follow up with PCP within 1 week    Complications:  None    Reason for Admission: Pneumonia due to covid-19 infection    Hospital Course:   Isabel Leyva is a 66 y o  female patient without significant past medical history of who originally presented to the hospital on 1/20/2022 due to multiple complaints including dyspnea on exertion, shortness of breath, cough, generalized weakness, and fatigue  Patient tested positive for covid-19 on admission, 1/20  She was requiring 3 L to maintain oxygen saturation levels greater than 92%  She was initiated on covid-19 treatment including IV Remdesivir, IV Decadron, and Lovenox  She was found to have a PE while she was here in the hospital and initiated on IV Heparin and transitioned to PO Coumadin  Vital signs have remained stable, labs are stable  Patient was seen by PT/OT, recommended STR  Wants to go home with   Set up with home health care  Please see above list of diagnoses and related plan for additional information  Condition at Discharge: stable    Discharge Day Visit / Exam:   Subjective:  Patient resting in bed, reports feeling better today  Denies complaints of chest pain, shortness of breath, fever, or chills  Vitals: Blood Pressure: 110/77 (01/30/22 0714)  Pulse: 85 (01/30/22 0714)  Temperature: (!) 97 2 °F (36 2 °C) (01/30/22 0714)  Temp Source: Axillary (01/29/22 1623)  Respirations: 18 (01/30/22 0714)  Height: 5' 5" (165 1 cm) (01/21/22 1016)  Weight - Scale: 46 7 kg (103 lb) (01/21/22 1016)  SpO2: 97 % (01/30/22 0714)     Exam:   Physical Exam  Vitals and nursing note reviewed  Constitutional:       General: She is not in acute distress  Appearance: She is cachectic  She is ill-appearing (chronic)  Cardiovascular:      Rate and Rhythm: Normal rate  Pulses: Normal pulses        Heart sounds: Normal heart sounds  Pulmonary:      Effort: Pulmonary effort is normal       Breath sounds: Normal breath sounds  Abdominal:      General: Bowel sounds are normal       Palpations: Abdomen is soft  Musculoskeletal:         General: Normal range of motion  Skin:     General: Skin is cool and dry  Capillary Refill: Capillary refill takes less than 2 seconds  Findings: Bruising (bilateral upper extremities) present  Neurological:      Mental Status: She is alert and oriented to person, place, and time  Psychiatric:         Mood and Affect: Mood normal         Discussion with Family: Updated  () via phone  Discharge instructions/Information to patient and family:   See after visit summary for information provided to patient and family  Provisions for Follow-Up Care:  See after visit summary for information related to follow-up care and any pertinent home health orders  Disposition:   Home with [facility] Services (Reminder: Complete face to face encounter)    Planned Readmission: None     Discharge Statement:  I spent 35 minutes discharging the patient  This time was spent on the day of discharge  I had direct contact with the patient on the day of discharge  Greater than 50% of the total time was spent examining patient, answering all patient questions, arranging and discussing plan of care with patient as well as directly providing post-discharge instructions  Additional time then spent on discharge activities  Discharge Medications:  See after visit summary for reconciled discharge medications provided to patient and/or family        **Please Note: This note may have been constructed using a voice recognition system**

## 2022-01-30 NOTE — ASSESSMENT & PLAN NOTE
· Presented with several days of worsening dyspnea on exertion, shortness of breath, cough, generalized weakness, fatigue, and decreased appetite  Tested positive for COVID-19 on presentation 01/20/2022  · Maintained on MILD COVID pathway:  · Continue O2 supplement, goal >92% O2 sat; remains on 2 L   · Completed IV remdesivir, 1/24;  Completed 10 days of Decadron 1/29  · Completed 7 days of IV ceftriaxone due to elevated procalcitonin  · Encourage patient to self-prone, incentive spirometer, flutter valve, OOB  · Add Breo

## 2022-01-30 NOTE — PROGRESS NOTES
Pt refused to walk for ambulatory O2 evaluation  Supplemental O2 turned off while pt sat in bed   O2 saturation went down to 74% on RA at rest

## 2022-01-31 NOTE — PROGRESS NOTES
[address] [facility]     Progress Note Zina Grady 1950, 66 y o  female MRN: [id number]  Unit/Bed#: -01 Encounter: [id number]  Primary Care Provider: No primary care provider on file  Date and time admitted to hospital: 1/20/2022 12:01 PM    * Pneumonia due to COVID-19 virus  Assessment & Plan  · Presented with several days of worsening dyspnea on exertion, shortness of breath, cough, generalized weakness, fatigue, and decreased appetite  Tested positive for COVID-19 on presentation 01/20/2022  · Maintained on MILD COVID pathway:  · Continue O2 supplement, goal >92% O2 sat; remains on 2 L   · Completed IV remdesivir, 1/24; Completed 10 days of Decadron 1/29  · Completed 7 days of IV ceftriaxone due to elevated procalcitonin  · Encourage patient to self-prone, incentive spirometer, flutter valve, OOB  · Add Breo    Hypotension  Assessment & Plan  · Noted on 1/27 pm --> Symptomatic hypotension 80s/40s with lightheadedness  · Likely hypovolemic hypotension in poor po intake since admission  · Given a 0 5 L bolus on 1/28 without improvement, later the BP responded to Albumin  · Now resolved  Pulmonary embolism (HCC)  Assessment & Plan  · Confirmed on CTA PE, likely present on admission  · Echo 1/21 nl RV fxn  · Continue coumadin; INR unfortunately 1 64 today; will give Lovenox today  Anemia  Assessment & Plan  Hemoglobin   Date Value Ref Range Status   01/30/2022 11 6 11 5 - 15 4 g/dL Final   01/29/2022 11 2 (L) 11 5 - 15 4 g/dL Final   01/27/2022 10 3 (L) 11 5 - 15 4 g/dL Final     · On AC for acute PE  · Stable      Severe protein-calorie malnutrition ([address] )  Assessment & Plan  · Frail and cachectic appearing on admission;  says she has been that way for years  · Does have chronic history of smoking but reports no recent weight loss  · Nutrition consult, mealtime supplementation  · BMI of 17    Tobacco use  Assessment & Plan  · Active smoker for many decades  · Nicotine patch ordered - has been refusing    Acute respiratory failure with hypoxia ([facility] )  Assessment & Plan  · Secondary to COVID-19 pneumonia as well as underlying severe emphysema, mucous plugging, and RML collapse  · Continue O2 supplement to 2L  · Aggressive pulmonary toilet  · Scheduled nebs, mucinex, ISB, flutter valve, vest therapy  · Pulmonology consult appreciated    VTE Pharmacologic Prophylaxis: VTE Score: 5 High Risk (Score >/= 5) - Pharmacological DVT Prophylaxis Ordered: enoxaparin (Lovenox)  Sequential Compression Devices Ordered  Lovenox bridge to coumadin    Patient Centered Rounds: I performed bedside rounds with nursing staff today  Discussions with Specialists or Other Care Team Provider: Case Management    Education and Discussions with Family / Patient: Updated  () via phone  Time Spent for Care: 20 minutes  More than 50% of total time spent on counseling and coordination of care as described above  Current Length of Stay: 11 day(s)  Current Patient Status: Inpatient   Certification Statement: The patient will continue to require additional inpatient hospital stay due to ongoing treatment in setting of need for INR to be therapeutic  Discharge Plan: Anticipate discharge tomorrow to home with home services  Code Status: Level 1 - Full Code    Subjective:   CC: "I'm better now than I was"    Patient resting in bed, denies current complaints of chest pain, shortness of breath, fever, or chills  Reports having some right sided chest pain overnight, but reports it is better now s/p Tylenol  Vital signs remained stable  Objective:     Vitals:   Temp (24hrs), Av 8 °F (36 6 °C), Min:97 4 °F (36 3 °C), Max:98 °F (36 7 °C)    Temp:  [97 4 °F (36 3 °C)-98 °F (36 7 °C)] 97 9 °F (36 6 °C)  HR:  [] 77  Resp:  [20] 20  BP: (101-130)/(69-92) 101/69  SpO2:  [74 %-99 %] 99 %  Body mass index is 17 14 kg/m²  Input and Output Summary (last 24 hours):      Intake/Output Summary (Last 24 hours) at 1/31/2022 1437  Last data filed at 1/30/2022 1817  Gross per 24 hour   Intake 420 ml   Output --   Net 420 ml       Physical Exam:   Physical Exam  Vitals and nursing note reviewed  Constitutional:       General: She is not in acute distress  Appearance: She is cachectic  She is ill-appearing  Comments: Prominent muscle wasting noted  Cardiovascular:      Rate and Rhythm: Regular rhythm  Tachycardia present  Pulses: Normal pulses  Heart sounds: Normal heart sounds  Pulmonary:      Effort: Pulmonary effort is normal       Breath sounds: Normal breath sounds  Comments: Moist non-productive cough  Abdominal:      General: Bowel sounds are normal       Palpations: Abdomen is soft  Musculoskeletal:         General: Normal range of motion  Right lower leg: No edema  Left lower leg: No edema  Skin:     General: Skin is warm and dry  Capillary Refill: Capillary refill takes less than 2 seconds  Findings: Bruising (BUE) present  Neurological:      Mental Status: She is alert and oriented to person, place, and time  Psychiatric:         Mood and Affect: Mood normal           Additional Data:     Labs:  Results from last 7 days   Lab Units 01/30/22  0622   WBC Thousand/uL 11 23*   HEMOGLOBIN g/dL 11 6   HEMATOCRIT % 37 7   PLATELETS Thousands/uL 290     Results from last 7 days   Lab Units 01/31/22  0607 01/30/22  0622 01/30/22  0622   SODIUM mmol/L 133*   < > 133*   POTASSIUM mmol/L 4 8   < > 5 5*   CHLORIDE mmol/L 94*   < > 98*   CO2 mmol/L 32   < > 31   BUN mg/dL 21   < > 18   CREATININE mg/dL 0 64   < > 0 61   ANION GAP mmol/L 7   < > 4   CALCIUM mg/dL 8 8   < > 8 7   ALBUMIN g/dL  --   --  2 9*   TOTAL BILIRUBIN mg/dL  --   --  0 49   ALK PHOS U/L  --   --  146*   ALT U/L  --   --  35   AST U/L  --   --  32   GLUCOSE RANDOM mg/dL 97   < > 103    < > = values in this interval not displayed       Results from last 7 days   Lab Units 01/31/22  0607   INR  1 64*             Results from last 7 days   Lab Units 01/26/22  0445 01/25/22  0918   PROCALCITONIN ng/ml 0 08 0 09       Lines/Drains:  Invasive Devices  Report    Peripheral Intravenous Line            Peripheral IV 01/27/22 Left;Ventral (anterior) Forearm 4 days                  Telemetry:  Telemetry Orders (From admission, onward)             48 Hour Telemetry Monitoring  Continuous x 48 hours        References:    Telemetry Guidelines   Question:  Reason for 48 Hour Telemetry  Answer:  Acute MI, chest pain - R/O MI, or unstable angina                 Telemetry Reviewed: Sinus Tachycardia  Indication for Continued Telemetry Use: No indication for continued use  Will discontinue  Imaging: No pertinent imaging reviewed      Recent Cultures (last 7 days):         Last 24 Hours Medication List:   Current Facility-Administered Medications   Medication Dose Route Frequency Provider Last Rate    acetaminophen  650 mg Oral Q6H PRN Vasiliy Escobedo, DO      albuterol  2 puff Inhalation Q4H PRN Vasiliy Escobedo, DO      cyclobenzaprine  10 mg Oral TID PRN ANDREIA Parnell      Diclofenac Sodium  2 g Topical 4x Daily ANDREIA Salgado      enoxaparin  1 mg/kg Subcutaneous Q12H [facility] ANDREIA Hansen      fluticasone-vilanterol  1 puff Inhalation Daily Racheal Westbrook, ANDREIA      guaiFENesin  1,200 mg Oral Q12H [facility] Sole Vanegas PA-C      morphine injection  2 mg Intravenous Once [employer], [place]      nicotine  14 mg Transdermal Daily Racheal Westbrook, ANDREIA      ondansetron  4 mg Intravenous Q6H PRN Vasiliy Escobedo DO      pantoprazole  40 mg Oral Early Morning Sole Vanegas PA-C      sodium chloride  1 spray Each Nare Q1H PRN Racheal Westbrook, ANDREIA      warfarin  10 mg Oral Daily (warfarin) ANDREIA Hansen          Today, Patient Was Seen By: ANDREIA Hansen    **Please Note: This note may have been constructed using a voice recognition system  **

## 2022-01-31 NOTE — ASSESSMENT & PLAN NOTE
· Secondary to COVID-19 pneumonia as well as underlying severe emphysema, mucous plugging, and RML collapse  · Continue O2 supplement to 2L  · Aggressive pulmonary toilet  · Scheduled nebs, mucinex, ISB, flutter valve, vest therapy  · Pulmonology consult appreciated

## 2022-01-31 NOTE — CASE MANAGEMENT
Case Management Discharge Planning Note    Patient name Jeremias Lucas  Location /-17 MRN [id number]  : 1950 Date 2022       Current Admission Date: 2022  Current Admission Diagnosis:Pneumonia due to COVID-19 virus   Patient Active Problem List    Diagnosis Date Noted    Anemia 2022    Pulmonary embolism ([facility] ) 2022    Hypotension 2022    Pneumonia due to COVID-19 virus 2022    Acute respiratory failure with hypoxia ([facility] ) 2022    Tobacco use 2022    Severe protein-calorie malnutrition ([facility] ) 2022      LOS (days): 11  Geometric Mean LOS (GMLOS) (days): 5 40  Days to GMLOS:-5 7     OBJECTIVE:  Risk of Unplanned Readmission Score: 12         Current admission status: Inpatient   Preferred Pharmacy:   [address] [place] [address], [address] [place] [address] [address]  [facility] [address]  Phone: [phone] Fax: [phone]    Primary Care Provider: No primary care provider on file  Primary Insurance: MEDICARE  Secondary Insurance:     DISCHARGE DETAILS:    Discharge planning discussed with[de-identified] eliudadrienne  Freedom of Choice: Yes  Comments - Freedom of Choice: -adrienne states patient does not want STR and he will have patient at home  Veronica Velvetgudelia is agreeable to [facility]  [facility] accepted    CM contacted family/caregiver?: Yes  Were Treatment Team discharge recommendations reviewed with patient/caregiver?: Yes  Did patient/caregiver verbalize understanding of patient care needs?: Yes  Were patient/caregiver advised of the risks associated with not following Treatment Team discharge recommendations?: Yes    Contacts  Patient Contacts: -Adrienne  Relationship to Patient[de-identified] Family  Contact Method: Phone  Phone Number: [phone]  Reason/Outcome: Continuity of [address]         Is the patient interested in [facility] at discharge?: Yes    DME Referral Provided  Referral made for DME?: Yes  DME referral completed for the following items[de-identified] Home Oxygen concentrator,Portable Oxygen tanks  DME Supplier Name[de-identified] [employer]    Other Referral/Resources/Interventions Provided:  Interventions: [facility]  Referral Comments:   [facility] accepted      Would you like to participate in our [address] service program?  : No - Declined    Treatment Team Recommendation: Short Term Rehab  Discharge Destination Plan[de-identified] Home with [facility] ([facility] accepted )  Transport at Discharge : [facility]  Dispatcher Contacted: No        IMM Given (Date):: 01/31/22  IMM Given to[de-identified] Family  Family notified[de-identified]  -Grady Chase

## 2022-01-31 NOTE — RESPIRATORY THERAPY NOTE
Home Oxygen Qualifying Test       Patient name: Marielle Colby        : 1950   Date of Test:  2022  Diagnosis:      Home Oxygen Test:    **Medicare Guidelines require item(s) 1-5 on all ambulatory patients or 1 and 2 on non-ambulatory patients  1   Baseline SPO2 on Room Air at rest 88 %  2   SPO2 during exercise on Room Air N/A %  During exercise monitor SpO2  If SPO2 increases >=89% with ambulation do not add supplemental             oxygen  If <= 88% on room air add O2 via NC and titrate patient  Patient must be ambulated with O2 and titrated to > 88% with exertion  3   SPO2 on Oxygen at rest 93 % 2 lpm     4   SPO2 during exercise on Oxygen  N/A% a liter flow of N/A lpm             [x]  Supplemental Home Oxygen is indicated  []  Client does not qualify for home oxygen            Iona Kiran, RT

## 2022-01-31 NOTE — TELEPHONE ENCOUNTER
----- Message from Nisha Cason PA-C sent at 1/31/2022 11:02 AM EST -----  That should be ok  She will be seeing a PCP in between  ----- Message -----  From: Halley Landis  Sent: 1/31/2022   9:57 AM EST  To: Nisha Cason PA-C    Our next available is not until 2/28  Is this ok or can I do a 12:40 or 3:20 with you?  ----- Message -----  From: Nisha aCson PA-C  Sent: 1/31/2022   9:13 AM EST  To: Halley Landis    Can you schedule patient for hospital follow up in the next couple of weeks? Thanks

## 2022-01-31 NOTE — PLAN OF CARE
Problem: Potential for Falls  Goal: Patient will remain free of falls  Description: INTERVENTIONS:  - Educate patient/family on patient safety including physical limitations  - Instruct patient to call for assistance with activity   - Consult OT/PT to assist with strengthening/mobility   - Keep Call bell within reach  - Keep bed low and locked with side rails adjusted as appropriate  - Keep care items and personal belongings within reach  - Initiate and maintain comfort rounds  - Make Fall Risk Sign visible to staff  - Offer Toileting every  Hours, in advance of need  - Initiate/Maintain alarm  - Obtain necessary fall risk management equipment:   - Apply yellow socks and bracelet for high fall risk patients  - Consider moving patient to room near nurses station  Outcome: Progressing     Problem: MOBILITY - ADULT  Goal: Maintain or return to baseline ADL function  Description: INTERVENTIONS:  -  Assess patient's ability to carry out ADLs; assess patient's baseline for ADL function and identify physical deficits which impact ability to perform ADLs (bathing, care of mouth/teeth, toileting, grooming, dressing, etc )  - Assess/evaluate cause of self-care deficits   - Assess range of motion  - Assess patient's mobility; develop plan if impaired  - Assess patient's need for assistive devices and provide as appropriate  - Encourage maximum independence but intervene and supervise when necessary  - Involve family in performance of ADLs  - Assess for home care needs following discharge   - Consider OT consult to assist with ADL evaluation and planning for discharge  - Provide patient education as appropriate  Outcome: Progressing     Problem: PAIN - ADULT  Goal: Verbalizes/displays adequate comfort level or baseline comfort level  Description: Interventions:  - Encourage patient to monitor pain and request assistance  - Assess pain using appropriate pain scale  - Administer analgesics based on type and severity of pain and evaluate response  - Implement non-pharmacological measures as appropriate and evaluate response  - Consider cultural and social influences on pain and pain management  - Notify physician/advanced practitioner if interventions unsuccessful or patient reports new pain  Outcome: Progressing     Problem: INFECTION - ADULT  Goal: Absence or prevention of progression during hospitalization  Description: INTERVENTIONS:  - Assess and monitor for signs and symptoms of infection  - Monitor lab/diagnostic results  - Monitor all insertion sites, i e  indwelling lines, tubes, and drains  - Monitor endotracheal if appropriate and nasal secretions for changes in amount and color  - [place] appropriate cooling/warming therapies per order  - Administer medications as ordered  - Instruct and encourage patient and family to use good hand hygiene technique  - Identify and instruct in appropriate isolation precautions for identified infection/condition  Outcome: Progressing  Goal: Absence of fever/infection during neutropenic period  Description: INTERVENTIONS:  - Monitor WBC    Outcome: Progressing     Problem: SAFETY ADULT  Goal: Patient will remain free of falls  Description: INTERVENTIONS:  - Educate patient/family on patient safety including physical limitations  - Instruct patient to call for assistance with activity   - Consult OT/PT to assist with strengthening/mobility   - Keep Call bell within reach  - Keep bed low and locked with side rails adjusted as appropriate  - Keep care items and personal belongings within reach  - Initiate and maintain comfort rounds  - Make Fall Risk Sign visible to staff  - Offer Toileting every  Hours, in advance of need  - Initiate/Maintain alarm  - Obtain necessary fall risk management equipment:   - Apply yellow socks and bracelet for high fall risk patients  - Consider moving patient to room near nurses station  Outcome: Progressing  Goal: Maintain or return to baseline ADL function  Description: INTERVENTIONS:  -  Assess patient's ability to carry out ADLs; assess patient's baseline for ADL function and identify physical deficits which impact ability to perform ADLs (bathing, care of mouth/teeth, toileting, grooming, dressing, etc )  - Assess/evaluate cause of self-care deficits   - Assess range of motion  - Assess patient's mobility; develop plan if impaired  - Assess patient's need for assistive devices and provide as appropriate  - Encourage maximum independence but intervene and supervise when necessary  - Involve family in performance of ADLs  - Assess for home care needs following discharge   - Consider OT consult to assist with ADL evaluation and planning for discharge  - Provide patient education as appropriate  Outcome: Progressing  Goal: Maintains/Returns to pre admission functional level  Description: INTERVENTIONS:  - Perform BMAT or MOVE assessment daily    - Set and communicate daily mobility goal to care team and patient/family/caregiver  - Collaborate with rehabilitation services on mobility goals if consulted  - Perform Range of Motion  times a day  - Reposition patient every  hours    - Dangle patient  times a day  - Stand patient  times a day  - Ambulate patient  times a day  - Out of bed to chair  times a day   - Out of bed for meals  times a day  - Out of bed for toileting  - Record patient progress and toleration of activity level   Outcome: Progressing     Problem: DISCHARGE PLANNING  Goal: Discharge to home or other facility with appropriate resources  Description: INTERVENTIONS:  - Identify barriers to discharge w/patient and caregiver  - Arrange for needed discharge resources and transportation as appropriate  - Identify discharge learning needs (meds, wound care, etc )  - Arrange for interpretive services to assist at discharge as needed  - Refer to Case Management Department for coordinating discharge planning if the patient needs post-hospital services based on physician/advanced practitioner order or complex needs related to functional status, cognitive ability, or social support system  Outcome: Progressing     Problem: Knowledge Deficit  Goal: Patient/family/caregiver demonstrates understanding of disease process, treatment plan, medications, and discharge instructions  Description: Complete learning assessment and assess knowledge base    Interventions:  - Provide teaching at level of understanding  - Provide teaching via preferred learning methods  Outcome: Progressing     Problem: SKIN/TISSUE INTEGRITY - ADULT  Goal: Skin Integrity remains intact(Skin Breakdown Prevention)  Description: Assess:  -Perform Ranulfo assessment every   -Clean and moisturize skin every   -Inspect skin when repositioning, toileting, and assisting with ADLS  -Assess under medical devices such as  every   -Assess extremities for adequate circulation and sensation     Bed Management:  -Have minimal linens on bed & keep smooth, unwrinkled  -Change linens as needed when moist or perspiring  -Avoid sitting or lying in one position for more than  hours while in bed  -Keep HOB at degrees     Toileting:  -Offer bedside commode  -Assess for incontinence every   -Use incontinent care products after each incontinent episode such as     Activity:  -Mobilize patient  times a day  -Encourage activity and walks on unit  -Encourage or provide ROM exercises   -Turn and reposition patient every  Hours  -Use appropriate equipment to lift or move patient in bed  -Instruct/ Assist with weight shifting every  when out of bed in chair  -Consider limitation of chair time  hour intervals    Skin Care:  -Avoid use of baby powder, tape, friction and shearing, hot water or constrictive clothing  -Relieve pressure over bony prominences using   -Do not massage red bony areas    Next Steps:  -Teach patient strategies to minimize risks such as    -Consider consults to  interdisciplinary teams such as   Outcome: Progressing  Goal: Incision(s), wounds(s) or drain site(s) healing without S/S of infection  Description: INTERVENTIONS  - Assess and document dressing, incision, wound bed, drain sites and surrounding tissue  - Provide patient and family education  - Perform skin care/dressing changes every   Outcome: Progressing  Goal: Pressure injury heals and does not worsen  Description: Interventions:  - Implement low air loss mattress or specialty surface (Criteria met)  - Apply silicone foam dressing  - Instruct/assist with weight shifting every  minutes when in chair   - Limit chair time to  hour intervals  - Use special pressure reducing interventions such as  when in chair   - Apply fecal or urinary incontinence containment device   - Perform passive or active ROM every   - Turn and reposition patient & offload bony prominences every  hours   - Utilize friction reducing device or surface for transfers   - Consider consults to  interdisciplinary teams such as   - Use incontinent care products after each incontinent episode such as  - Consider nutrition services referral as needed  Outcome: Progressing     Problem: RESPIRATORY - ADULT  Goal: Achieves optimal ventilation and oxygenation  Description: INTERVENTIONS:  - Assess for changes in respiratory status  - Assess for changes in mentation and behavior  - Position to facilitate oxygenation and minimize respiratory effort  - Oxygen administered by appropriate delivery if ordered  - Initiate smoking cessation education as indicated  - Encourage broncho-pulmonary hygiene including cough, deep breathe, Incentive Spirometry  - Assess the need for suctioning and aspirate as needed  - Assess and instruct to report SOB or any respiratory difficulty  - Respiratory Therapy support as indicated  Outcome: Progressing

## 2022-01-31 NOTE — PLAN OF CARE
Problem: Potential for Falls  Goal: Patient will remain free of falls  Description: INTERVENTIONS:  - Educate patient/family on patient safety including physical limitations  - Instruct patient to call for assistance with activity   - Consult OT/PT to assist with strengthening/mobility   - Keep Call bell within reach  - Keep bed low and locked with side rails adjusted as appropriate  - Keep care items and personal belongings within reach  - Initiate and maintain comfort rounds  - Make Fall Risk Sign visible to staff  - Offer Toileting every  Hours, in advance of need  - Initiate/Maintain alarm  - Obtain necessary fall risk management equipment:   - Apply yellow socks and bracelet for high fall risk patients  - Consider moving patient to room near nurses station  Outcome: Not Progressing     Problem: MOBILITY - ADULT  Goal: Maintain or return to baseline ADL function  Description: INTERVENTIONS:  -  Assess patient's ability to carry out ADLs; assess patient's baseline for ADL function and identify physical deficits which impact ability to perform ADLs (bathing, care of mouth/teeth, toileting, grooming, dressing, etc )  - Assess/evaluate cause of self-care deficits   - Assess range of motion  - Assess patient's mobility; develop plan if impaired  - Assess patient's need for assistive devices and provide as appropriate  - Encourage maximum independence but intervene and supervise when necessary  - Involve family in performance of ADLs  - Assess for home care needs following discharge   - Consider OT consult to assist with ADL evaluation and planning for discharge  - Provide patient education as appropriate  Outcome: Not Progressing  Goal: Maintains/Returns to pre admission functional level  Description: INTERVENTIONS:  - Perform BMAT or MOVE assessment daily    - Set and communicate daily mobility goal to care team and patient/family/caregiver     - Collaborate with rehabilitation services on mobility goals if consulted  - Perform Range of Motion  times a day  - Reposition patient every  hours    - Dangle patient  times a day  - Stand patient  times a day  - Ambulate patient  times a day  - Out of bed to chair  times a day   - Out of bed for meals  times a day  - Out of bed for toileting  - Record patient progress and toleration of activity level   Outcome: Not Progressing     Problem: PAIN - ADULT  Goal: Verbalizes/displays adequate comfort level or baseline comfort level  Description: Interventions:  - Encourage patient to monitor pain and request assistance  - Assess pain using appropriate pain scale  - Administer analgesics based on type and severity of pain and evaluate response  - Implement non-pharmacological measures as appropriate and evaluate response  - Consider cultural and social influences on pain and pain management  - Notify physician/advanced practitioner if interventions unsuccessful or patient reports new pain  Outcome: Not Progressing     Problem: INFECTION - ADULT  Goal: Absence or prevention of progression during hospitalization  Description: INTERVENTIONS:  - Assess and monitor for signs and symptoms of infection  - Monitor lab/diagnostic results  - Monitor all insertion sites, i e  indwelling lines, tubes, and drains  - Monitor endotracheal if appropriate and nasal secretions for changes in amount and color  - [place] appropriate cooling/warming therapies per order  - Administer medications as ordered  - Instruct and encourage patient and family to use good hand hygiene technique  - Identify and instruct in appropriate isolation precautions for identified infection/condition  Outcome: Not Progressing  Goal: Absence of fever/infection during neutropenic period  Description: INTERVENTIONS:  - Monitor WBC    Outcome: Not Progressing     Problem: SAFETY ADULT  Goal: Patient will remain free of falls  Description: INTERVENTIONS:  - Educate patient/family on patient safety including physical limitations  - Instruct patient to call for assistance with activity   - Consult OT/PT to assist with strengthening/mobility   - Keep Call bell within reach  - Keep bed low and locked with side rails adjusted as appropriate  - Keep care items and personal belongings within reach  - Initiate and maintain comfort rounds  - Make Fall Risk Sign visible to staff  - Offer Toileting every  Hours, in advance of need  - Initiate/Maintain alarm  - Obtain necessary fall risk management equipment:   - Apply yellow socks and bracelet for high fall risk patients  - Consider moving patient to room near nurses station  Outcome: Not Progressing  Goal: Maintain or return to baseline ADL function  Description: INTERVENTIONS:  -  Assess patient's ability to carry out ADLs; assess patient's baseline for ADL function and identify physical deficits which impact ability to perform ADLs (bathing, care of mouth/teeth, toileting, grooming, dressing, etc )  - Assess/evaluate cause of self-care deficits   - Assess range of motion  - Assess patient's mobility; develop plan if impaired  - Assess patient's need for assistive devices and provide as appropriate  - Encourage maximum independence but intervene and supervise when necessary  - Involve family in performance of ADLs  - Assess for home care needs following discharge   - Consider OT consult to assist with ADL evaluation and planning for discharge  - Provide patient education as appropriate  Outcome: Not Progressing  Goal: Maintains/Returns to pre admission functional level  Description: INTERVENTIONS:  - Perform BMAT or MOVE assessment daily    - Set and communicate daily mobility goal to care team and patient/family/caregiver  - Collaborate with rehabilitation services on mobility goals if consulted  - Perform Range of Motion  times a day  - Reposition patient every  hours    - Dangle patient  times a day  - Stand patient  times a day  - Ambulate patient  times a day  - Out of bed to chair  times a day   - Out of bed for meals  times a day  - Out of bed for toileting  - Record patient progress and toleration of activity level   Outcome: Not Progressing     Problem: DISCHARGE PLANNING  Goal: Discharge to home or other facility with appropriate resources  Description: INTERVENTIONS:  - Identify barriers to discharge w/patient and caregiver  - Arrange for needed discharge resources and transportation as appropriate  - Identify discharge learning needs (meds, wound care, etc )  - Arrange for interpretive services to assist at discharge as needed  - Refer to Case Management Department for coordinating discharge planning if the patient needs post-hospital services based on physician/advanced practitioner order or complex needs related to functional status, cognitive ability, or social support system  Outcome: Not Progressing     Problem: Knowledge Deficit  Goal: Patient/family/caregiver demonstrates understanding of disease process, treatment plan, medications, and discharge instructions  Description: Complete learning assessment and assess knowledge base    Interventions:  - Provide teaching at level of understanding  - Provide teaching via preferred learning methods  Outcome: Not Progressing     Problem: RESPIRATORY - ADULT  Goal: Achieves optimal ventilation and oxygenation  Description: INTERVENTIONS:  - Assess for changes in respiratory status  - Assess for changes in mentation and behavior  - Position to facilitate oxygenation and minimize respiratory effort  - Oxygen administered by appropriate delivery if ordered  - Initiate smoking cessation education as indicated  - Encourage broncho-pulmonary hygiene including cough, deep breathe, Incentive Spirometry  - Assess the need for suctioning and aspirate as needed  - Assess and instruct to report SOB or any respiratory difficulty  - Respiratory Therapy support as indicated  Outcome: Not Progressing     Problem: SKIN/TISSUE INTEGRITY - ADULT  Goal: Skin Integrity remains intact(Skin Breakdown Prevention)  Description: Assess:  -Perform Ranulfo assessment every   -Clean and moisturize skin every   -Inspect skin when repositioning, toileting, and assisting with ADLS  -Assess under medical devices such as  every   -Assess extremities for adequate circulation and sensation     Bed Management:  -Have minimal linens on bed & keep smooth, unwrinkled  -Change linens as needed when moist or perspiring  -Avoid sitting or lying in one position for more than  hours while in bed  -Keep HOB at degrees     Toileting:  -Offer bedside commode  -Assess for incontinence every   -Use incontinent care products after each incontinent episode such as     Activity:  -Mobilize patient  times a day  -Encourage activity and walks on unit  -Encourage or provide ROM exercises   -Turn and reposition patient every  Hours  -Use appropriate equipment to lift or move patient in bed  -Instruct/ Assist with weight shifting every  when out of bed in chair  -Consider limitation of chair time  hour intervals    Skin Care:  -Avoid use of baby powder, tape, friction and shearing, hot water or constrictive clothing  -Relieve pressure over bony prominences using   -Do not massage red bony areas    Next Steps:  -Teach patient strategies to minimize risks such as    -Consider consults to  interdisciplinary teams such as   Outcome: Not Progressing  Goal: Incision(s), wounds(s) or drain site(s) healing without S/S of infection  Description: INTERVENTIONS  - Assess and document dressing, incision, wound bed, drain sites and surrounding tissue  - Provide patient and family education  - Perform skin care/dressing changes every   Outcome: Not Progressing  Goal: Pressure injury heals and does not worsen  Description: Interventions:  - Implement low air loss mattress or specialty surface (Criteria met)  - Apply silicone foam dressing  - Instruct/assist with weight shifting every  minutes when in chair   - Limit chair time to  hour intervals  - Use special pressure reducing interventions such as  when in chair   - Apply fecal or urinary incontinence containment device   - Perform passive or active ROM every   - Turn and reposition patient & offload bony prominences every  hours   - Utilize friction reducing device or surface for transfers   - Consider consults to  interdisciplinary teams such as   - Use incontinent care products after each incontinent episode such a  - Consider nutrition services referral as needed  Outcome: Not Progressing     Problem: Nutrition/Hydration-ADULT  Goal: Nutrient/Hydration intake appropriate for improving, restoring or maintaining nutritional needs  Description: Monitor and assess patient's nutrition/hydration status for malnutrition  Collaborate with interdisciplinary team and initiate plan and interventions as ordered  Monitor patient's weight and dietary intake as ordered or per policy  Utilize nutrition screening tool and intervene as necessary  Determine patient's food preferences and provide high-protein, high-caloric foods as appropriate       INTERVENTIONS:  - Monitor oral intake, urinary output, labs, and treatment plans  - Assess nutrition and hydration status and recommend course of action  - Evaluate amount of meals eaten  - Assist patient with eating if necessary   - Allow adequate time for meals  - Recommend/ encourage appropriate diets, oral nutritional supplements, and vitamin/mineral supplements  - Order, calculate, and assess calorie counts as needed  - Recommend, monitor, and adjust tube feedings based on assessed needs  - Assess need for intravenous fluids  - Provide  nutrition/hydration education as appropriate  - Include patient/family/caregiver in decisions related to nutrition  Outcome: Not Progressing     Problem: Prexisting or High Potential for Compromised Skin Integrity  Goal: Skin integrity is maintained or improved  Description: INTERVENTIONS:  - Identify patients at risk for skin breakdown  - Assess and monitor skin integrity  - Assess and monitor nutrition and hydration status  - Monitor labs   - Assess for incontinence   - Turn and reposition patient  - Assist with mobility/ambulation  - Relieve pressure over bony prominences  - Avoid friction and shearing  - Provide appropriate hygiene as needed including keeping skin clean and dry  - Evaluate need for skin moisturizer/barrier cream  - Collaborate with interdisciplinary team   - Patient/family teaching  - Consider wound care consult   Outcome: Not Progressing

## 2022-01-31 NOTE — PROGRESS NOTES
Pt complaining of 6/10 stabbing midsternal chest pain, nausea, and increased work of breathing HR 92, /83, O2 sats 95% on 2 L NC, R 25  On Call SLIM notified, new orders obtained       Maxx Villalba RN  7:01 AM

## 2022-01-31 NOTE — PROGRESS NOTES
Covering SW CM received IB in assigned SW CM's IB asking that SW CM find out more information regarding patient's ability to attend in office appointment for new PCP appointment due to need to monitor coumadin  SW CM outreached SW that sent message, Reinaldo Arias , and Pierce Clark asked that covering SW CM contact patient's assigned Jarvis Jacob and provided phone number  SW CM called Kae Mckenzie [phone]) and left a message requesting call back

## 2022-01-31 NOTE — ASSESSMENT & PLAN NOTE
· Confirmed on CTA PE, likely present on admission  · Echo 1/21 nl RV fxn  · Continue coumadin; INR unfortunately 1 64 today; will give Lovenox today

## 2022-02-01 NOTE — DISCHARGE SUMMARY
[address]  Discharge- Laura Benites 1950, 66 y o  female MRN: [id number]  Unit/Bed#: -01 Encounter: [id number]  Primary Care Provider: No primary care provider on file  Date and time admitted to hospital: 1/20/2022 12:01 PM     * Pneumonia due to COVID-19 virus  Assessment & Plan  · Presented with several days of worsening dyspnea on exertion, shortness of breath, cough, generalized weakness, fatigue, and decreased appetite  Tested positive for COVID-19 on presentation 01/20/2022  · Maintained on MILD COVID pathway:  · Continue O2 supplement, goal >92% O2 sat; remains on 2 L   · Completed IV remdesivir, 1/24;  Completed 10 days of Decadron 1/29  · Completed 7 days of IV ceftriaxone due to elevated procalcitonin  · Encourage patient to self-prone, incentive spirometer, flutter valve, OOB  · Add Breo     Hypotension  Assessment & Plan  · Noted on 1/27 pm --> Symptomatic hypotension 80s/40s with lightheadedness  · Likely hypovolemic hypotension in poor po intake since admission  · Given a 0 5 L bolus on 1/28 without improvement, later the BP responded to Albumin  · Now resolved      Pulmonary embolism (HCC)  Assessment & Plan  · Confirmed on CTA PE, likely present on admission  · Echo 1/21 nl RV fxn  · Continue coumadin; INR 2 11     Anemia  Assessment & Plan        Hemoglobin   Date Value Ref Range Status   01/30/2022 11 6 11 5 - 15 4 g/dL Final   01/29/2022 11 2 (L) 11 5 - 15 4 g/dL Final   01/27/2022 10 3 (L) 11 5 - 15 4 g/dL Final      · On AC for acute PE  · Stable      Severe protein-calorie malnutrition (HCC)  Assessment & Plan  · Frail and cachectic appearing on admission;  says she has been that way for years  · Does have chronic history of smoking but reports no recent weight loss  · Nutrition consult, mealtime supplementation  · BMI of 17     Tobacco use  Assessment & Plan  · Active smoker for many decades  · Nicotine patch ordered - has been refusing     Acute respiratory failure with hypoxia (HCC)  Assessment & Plan  · Secondary to COVID-19 pneumonia as well as underlying severe emphysema, mucous plugging, and RML collapse  ? Continue O2 supplement to 2L  ? Aggressive pulmonary toilet  ? Scheduled nebs, mucinex, ISB, flutter valve, vest therapy  ? Pulmonology consult appreciated         Medical Problems                        Resolved Problems  Date Reviewed: 1/29/2022           None                  Discharging Physician / Practitioner: Regina Baumgarten, CRNP  PCP: No primary care provider on file  Admission Date:       Admission Orders (From admission, onward)              Ordered          01/20/22 1335   Inpatient Admission  Once                          Discharge Date 02/01/22       Consultations During Hospital Stay:  · IP CONSULT TO NUTRITION SERVICES  · IP CONSULT TO PULMONOLOGY     Procedures Performed:   · None     Significant Findings / Test Results:   · CTA chest pe study   · Final Result by Osvaldo Renee MD (01/25 7350)   ·     · Single, subsegmental right lower lobe PE  No central PE or secondary signs of right heart strain  ·     · Severe emphysema  ·     · Tracheobronchial secretions with complete occlusion from mucous plugging of the right bronchus intermedius, secondary right middle lobe collapse  ·     · Mild basilar groundglass opacities may be infectious or secondary to air trapping and emphysema  ·     · 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, 4 6 cm  ·     · Other nonemergent findings above  ·     ·     ·     · Workstation performed: [id number]   ·     ·     · XR chest portable   · Final Result by Lester Millard MD (01/23 7205)   ·     · Mild new peripheral groundglass opacity in the left lung due to Covid 19  ·     · Emphysema     ·     ·     ·     ·     ·     · Workstation performed: [id number]   ·     ·     · XR chest 1 view portable   · Final Result by Marikay Goltz, MD (01/20 8690)   ·     · No definite focal airspace consolidation identified  Emphysema  ·     ·     ·     ·     ·     · Workstation performed: [id number]   ·     ·     ·       Incidental Findings:   · None      Test Results Pending at Discharge (will require follow up): · None     Outpatient Tests Requested:  · Follow up with PCP within 1 week     Complications:  None     Reason for Admission: Pneumonia due to covid-19 infection     Hospital Course:   Stephany Marcum is a 66 y o  female patient without significant past medical history of who originally presented to the hospital on 1/20/2022 due to multiple complaints including dyspnea on exertion, shortness of breath, cough, generalized weakness, and fatigue  Patient tested positive for covid-19 on admission, 1/20  She was requiring 3 L to maintain oxygen saturation levels greater than 92%  She was initiated on covid-19 treatment including IV Remdesivir, IV Decadron, and Lovenox  She was found to have a PE while she was here in the hospital and initiated on IV Heparin and transitioned to PO Coumadin  Vital signs have remained stable, labs are stable  Patient was seen by PT/OT, recommended STR  Wants to go home with   Set up with home health care      Please see above list of diagnoses and related plan for additional information       Condition at Discharge: stable     Discharge Day Visit / Exam:   Subjective:  Patient resting in bed, reports feeling better today  Denies complaints of chest pain, shortness of breath, fever, or chills      Vitals: Blood Pressure: 101/69 (01/31/22 0747)  Pulse: 90 (01/31/22 0747)  Temperature: 97 9 °F (36 6 °C) (01/31/22 0747)  Temp Source: Axillary (01/30/22 2143)  Respirations: 20 (01/30/22 2143)  Height: 5' 5" (165 1 cm) (01/21/22 1016)  Weight - Scale: 46 7 kg (103 lb) (01/21/22 1016)  SpO2: 93 % (01/31/22 0747)      Exam:   Physical Exam  Vitals and nursing note reviewed  Constitutional:       General: She is not in acute distress  Appearance: She is cachectic  She is ill-appearing (chronic)  Cardiovascular:      Rate and Rhythm: Normal rate  Pulses: Normal pulses  Heart sounds: Normal heart sounds  Pulmonary:      Effort: Pulmonary effort is normal       Breath sounds: Normal breath sounds  Abdominal:      General: Bowel sounds are normal       Palpations: Abdomen is soft  Musculoskeletal:         General: Normal range of motion  Skin:     General: Skin is cool and dry  Capillary Refill: Capillary refill takes less than 2 seconds  Findings: Bruising (bilateral upper extremities) present  Neurological:      Mental Status: She is alert and oriented to person, place, and time  Psychiatric:         Mood and Affect: Mood normal          Discussion with Family: Updated  () via phone      Discharge instructions/Information to patient and family:   See after visit summary for information provided to patient and family        Provisions for Follow-Up Care:  See after visit summary for information related to follow-up care and any pertinent home health orders  Disposition:   Home with VNA Services (Reminder: Complete face to face encounter)     Planned Readmission: None     Discharge Statement:  I spent 35 minutes discharging the patient  This time was spent on the day of discharge  I had direct contact with the patient on the day of discharge  Greater than 50% of the total time was spent examining patient, answering all patient questions, arranging and discussing plan of care with patient as well as directly providing post-discharge instructions    Additional time then spent on discharge activities      Discharge Medications:  See after visit summary for reconciled discharge medications provided to patient and/or family        **Please Note: This note may have been constructed using a voice recognition system**

## 2022-02-01 NOTE — CASE MANAGEMENT
Case Management Discharge Planning Note    Patient name Marybeth Lovett  Location /-34 MRN [id number]  : 1950 Date 2022       Current Admission Date: 2022  Current Admission Diagnosis:Pneumonia due to COVID-19 virus   Patient Active Problem List    Diagnosis Date Noted    Anemia 2022    Pulmonary embolism ([facility] [address] ) 2022    Hypotension 2022    Pneumonia due to COVID-19 virus 2022    Acute respiratory failure with hypoxia ([facility] [address] ) 2022    Tobacco use 2022    Severe protein-calorie malnutrition ([facility] [address] ) 2022      LOS (days): 12  Geometric Mean LOS (GMLOS) (days): 5 40  Days to GMLOS:-6 5     OBJECTIVE:  Risk of Unplanned Readmission Score: 12         Current admission status: Inpatient   Preferred Pharmacy:   [facility] [facility]  [address]  [facility] [address]  Phone: [phone] Fax: [phone]    Primary Care Provider: No primary care provider on file  Primary Insurance: MEDICARE  Secondary Insurance:     DISCHARGE DETAILS:    Other Referral/Resources/Interventions Provided:  Interventions: PCP  Referral Comments: CM spoke to  Yusef Le who scheduled New PCP appt with Steffanie Torres  at [facility] with ANDREIA who will follow up with patient's coumadin

## 2022-02-01 NOTE — DISCHARGE INSTR - AVS FIRST PAGE
Dear Brittany Peterson,     It was our pleasure to care for you here at [facility] [facility]  It is our hope that we were always able to exceed the expected standards for your care during your stay  You were hospitalized due to COVID and lung blood clot  You were cared for on the 2nd floor by ANDREIA Mayberry under the service of Alberto Fritz DO with the [facility] who covers for your primary care physician (PCP), No primary care provider on file  , while you were hospitalized  If you have any questions or concerns related to this hospitalization, you may contact us at [phone]  For follow up as well as any medication refills, we recommend that you follow up with your primary care physician  A registered nurse will reach out to you by phone within a few days after your discharge to answer any additional questions that you may have after going home  However, at this time we provide for you here, the most important instructions / recommendations at discharge:     Notable Medication Adjustments -   Warfarin 5 mg daily  Testing Required after Discharge -   INR weekly  Important follow up information -   Please follow-up with your primary care provider as soon as possible  Other Instructions -   Please be sure to get your weekly blood work  Please review this entire after visit summary as additional general instructions including medication list, appointments, activity, diet, any pertinent wound care, and other additional recommendations from your care team that may be provided for you        Sincerely,     Alyson Mayberry

## 2022-02-01 NOTE — DISCHARGE INSTRUCTIONS
COVID-19 (Coronavirus Disease 2019)   AMBULATORY CARE:   Coronavirus disease 2019 (COVID-19)  is the disease caused by a coronavirus first discovered in December 2019  Coronaviruses generally cause upper respiratory (nose, throat, and lung) infections, such as a cold  The new virus spreads quickly and easily  The virus can be spread starting 2 days before symptoms even begin  The virus has also changed into several new forms (called variants) since it was discovered  The variants may be more contagious (easily spread) than the original form  Some may also cause more severe illness than others  It is important to follow local, national, and worldwide measures to protect yourself and others from infection  Signs and symptoms of COVID-19  may not develop  Signs and symptoms that develop usually start about 5 days after infection but can take 2 to 14 days  You may feel like you have the flu or a bad cold  Some signs and symptoms go away in a few days  Others can last weeks, months, or possibly years  Information on COVID-19 is still being learned  Tell your healthcare provider if you think you were infected but develop signs or symptoms not listed below:  · A cough    · Shortness of breath or trouble breathing that may become severe    · A fever of at least 100 4°F, or 38°C (may be lower in adults 65 or older)    · Chills that might include shaking    · Muscle pain, body aches, or a headache    · A sore throat    · Suddenly not being able to taste or smell anything    · Feeling mentally and physically tired (fatigue)    · Congestion (stuffy head and nose), or a runny nose    · Diarrhea, nausea, or vomiting    If you think you or someone you know may be infected:  Do the following to protect others:  · If emergency care is needed,  tell the  about the possible infection, or call ahead and tell the emergency department  · Call a healthcare provider  for instructions if symptoms are mild   Anyone who may be infected should not  arrive without calling first  The provider will need to protect staff members and other patients  · The person who may be infected needs to wear a face covering  while getting medical care  This will help lower the risk of infecting others  Coverings are not used for anyone who is younger than 2 years, has breathing problems, or cannot remove it  The provider can give you instructions for anyone who cannot wear a covering  Call your local emergency number (911 in the [address] [facility]) or an emergency department if:   · You have trouble breathing or shortness of breath at rest     · You have chest pain or pressure that lasts longer than 5 minutes  · You become confused or hard to wake  · Your lips or face are blue  · You have a fever of 104°F (40°C) or higher  Call your doctor if:   · You do not  have symptoms of COVID-19 but had close physical contact within 14 days with someone who tested positive  · You have questions or concerns about your condition or care  How COVID-19 is diagnosed: If you think you have COVID-19, call your healthcare provider  He or she will tell you what to do based on your symptoms and testing guidelines in your area  In general, the following may be used:  · A viral test  shows if you have a current infection  Samples are taken from your nose and throat, usually with swabs  You may need to wait several days to get the test results  Your healthcare provider will tell you how to get your results  You will need to quarantine (stay physically away from others) until you get your results  If results show you have COVID-19, you will need to continue until you are well  Your provider or other health official may give you more directions  You will also need to prevent another infection until it is known if you can get COVID-19 again  · An antibody test  shows if you had a past infection   Blood samples are used for this test  Antibodies are made by your immune system to attack the virus that causes COVID-19  Antibodies will form 1 to 3 weeks after you are infected  It is not known if antibodies prevent a second infection, or for how long a person might be protected  If you have antibodies, you will still need to be careful around others until more is known  · CT scans or x-rays  may be used to check for signs of pneumonia  The 2019 coronavirus causes a specific kind of pneumonia, usually in both lungs  The pictures may also be used to check for health problems in other parts of your body  Treatment  such as monoclonal antibodies and convalescent plasma have emergency use authorization (EUA)  This means they may be given only to patients who are hospitalized with severe signs and symptoms  The following may be used to manage your symptoms:  · Mild symptoms  may get better on their own  If you do not need to be treated in a hospital, you will be given instructions to use at home  Your condition will be closely monitored  You will need to watch for worsening symptoms and seek immediate care if needed  Talk to your healthcare provider about the following:    ? Decongestants  help reduce nasal congestion and help you breathe more easily  If you take decongestant pills, they may make you feel restless or cause problems with your sleep  Do not use decongestant sprays for more than a few days  ? Cough suppressants  help reduce coughing  Ask your healthcare provider which type of cough medicine is best for you  ? To soothe a sore throat,  gargle with warm salt water, or use throat lozenges or a throat spray  Drink more liquids to thin and loosen mucus and to prevent dehydration  ? NSAIDs or acetaminophen  can help lower a fever and relieve body aches or a headache  Follow directions  If not taken correctly, NSAIDs can cause kidney damage and acetaminophen can cause liver damage      · Severe or life-threatening symptoms  are treated in the hospital  You may need a combination of the following:    ? Medicines  may be given to lower or prevent inflammation or to fight the virus  You may also need blood thinners to prevent or treat blood clots  If you have a deep vein thrombosis (DVT) or pulmonary embolism (PE), you may need to keep using blood thinners for 3 months  ? Extra oxygen  may be given if you have respiratory failure  This means your lungs cannot get enough oxygen into your blood and out to your organs  Extra oxygen can help prevent organ failure  ? A ventilator  may be used to help you breathe  What you need to know about health problems the virus may cause:  Serious health problems may improve or continue for weeks, months, and possibly years  Health problems that continue may be called long COVID  Anyone can develop serious problems from this virus, but your risk is higher if you are 72 or older  A weak immune system, diabetes, or a heart or lung condition can also increase your risk  Your risk is also higher if you are a current or former cigarette smoker  COVID-19 can lead to any of the following:  · Multisymptom inflammatory syndrome in adults (MIS-A) or in children (MIS-C), causing inflammation in the heart, digestive system, skin, or brain    · Serious lower respiratory conditions, such as pneumonia or acute respiratory distress syndrome (ARDS)    · Blood vessel damage, leading to blood clots    · Organ damage from a lack of oxygen or from blood clots    · Sleep problems    · Problems thinking clearly, remembering information, or concentrating    · Mood changes, depression, or anxiety    What you need to know about COVID-19 vaccines:  Get a vaccine even if you already had COVID-19  · COVID-19 vaccines are given as a shot in 1 or 2 doses  Some vaccines have emergency use authorization (EUA)  An EUA means the vaccine is not approved but is given because the benefits outweigh the risks   A 2-dose vaccine is fully approved for use in those 16 years or older  This vaccine also has an EUA for adolescents 12 to 15 years  Your healthcare provider can help you understand the benefits and risks  · A third dose is recommended for adults with a weakened immune system who get a 2-dose vaccine  The third dose is given at least 28 days after the second  · Even after you get the vaccine, continue social distancing and other measures  Experts are still learning how well the vaccines work to prevent infection, transmission, and severe illness  Although rare, you can become infected after you get the vaccine  You may also be able to pass the virus to others without knowing you are infected  · After you get the vaccine, check local, national, and international travel rules  Check to see if you need to be tested before you travel  You may also need to quarantine after you return  Some countries require proof of a negative test before you leave  You should also be tested 3 to 5 days after you return from another country  How the 2019 coronavirus spreads: The following are ways the virus is thought to spread, but more information may be coming:  · Droplets are the main way all coronaviruses spread  The virus travels in droplets that form when a person talks, coughs, or sneezes  The droplets can also float in the air for minutes or hours  Infection happens when you breathe in the droplets or get them in your eyes or nose  Close personal contact with an infected person increases your risk for infection  This means being within 6 feet (2 meters) of the person for at least 15 minutes over 24 hours  · Person-to-person contact can spread the virus  For example, a person with the virus on his or her hands can spread it by shaking hands with someone  · The virus can stay on objects and surfaces for a short time  You may become infected by touching the object or surface and then touching your eyes or mouth      · An infected animal may be able to infect a person who touches it  This may happen at live markets or on a farm  Help lower the risk for COVID-19:  The best way to prevent infection is to avoid anyone who is infected, but this can be hard to do  An infected person can spread the virus before signs or symptoms begin, or even if signs or symptoms never develop  The following can help lower the risk for infection:      · Wash your hands often throughout the day  Use soap and water  Rub your soapy hands together, lacing your fingers, for at least 20 seconds  Rinse with warm, running water  Dry your hands with a clean towel or paper towel  Use hand  that contains alcohol if soap and water are not available  Teach children how to wash their hands and use hand   · Cover sneezes and coughs  Turn your face away and cover your mouth and nose with a tissue  Throw the tissue away  Use the bend of your arm if a tissue is not available  Then wash your hands well with soap and water or use hand   Teach children how to cover a cough or sneeze  · Wear a face covering (mask) around anyone who does not live in your home  Use a cloth covering with at least 2 layers  You can also create layers by putting a cloth covering over a disposable non-medical mask  Cover your mouth and your nose  The covering should fit snugly against the bridge of your nose  Securely fasten it under your chin and on the sides of your face  Do not  wear a plastic face shield instead of a covering  Continue social distancing and washing your hands often  A face covering is not a substitute for social distancing safety measures  · Follow worldwide, national, and local social distancing guidelines  Keep at least 6 feet (2 meters) between you and others  Also keep this distance from anyone who comes to your home, such as someone making a delivery  Wear a face covering while you are around others   You will need to wear a covering in restaurants, stores, and other public buildings  You will also need a covering on mass transit, such as a bus, subway, or airplane  Remember to use a covering made from thick material or wear 2 coverings together  · Make a habit of not touching your face  If you get the virus on your hands, you can transfer it to your eyes, nose, or mouth and become infected  You can also transfer it to objects, surfaces, or people  Do not touch your eyes, nose, or mouth without washing your hands first     · Clean and disinfect high-touch surfaces and objects often  Use disinfecting wipes, or make a solution of 4 teaspoons of bleach in 1 quart (4 cups) of water  Clean and disinfect even if you think no one living in or coming to your home is infected with the virus  · Ask about other vaccines you may need  Get the influenza (flu) vaccine as soon as recommended each year, usually starting in September or October  Get the pneumonia vaccine if recommended  Your healthcare provider can tell you if you should also get other vaccines, and when to get them  Follow social distancing guidelines:  National and local social distancing rules vary  Rules may change over time as restrictions are lifted  Restrictions may return if an outbreak happens where you live  It is important to know and follow all current social distancing rules in your area  The following are general guidelines:  · Stay home if you are sick or think you may have COVID-19  It is important to stay home if you are waiting for a testing appointment or for test results  Even if you do not have symptoms, you can pass the virus to others  · Limit trips out of your home  Have food, medicines, and other supplies delivered and left at your door or other area, if possible  Plan trips out of your home so you make the fewest stops possible to limit close personal contact  Keep track of places you go  This will help contact tracers notify others if you become infected      · Avoid close physical contact with anyone who does not live in your home  Do not shake hands with, hug, or kiss a person as a greeting  If you must use public transportation (such as a bus or subway), try to sit or stand away from others  Only allow necessary people into your home  Wear your face covering, and remind others to wear a face covering  Remind them to wash their hands when they arrive and before they leave  Do not  let someone into your home or go to someone's home just to visit  Even if you both do not feel sick, the virus can pass from one of you to the other  · Avoid in-person gatherings and crowds  Gatherings or crowds of 10 or more individuals can cause the virus to spread  Avoid places such as hall, beaches, sporting events, and tourist attractions  For events such as parties, holiday meals, Buddhist services, and conferences, attend virtually (on a computer), if possible  · Ask your healthcare provider for other ways to have appointments  Some providers offer phone, video, or other types of appointments  You may also be able to get prescriptions for a few months of your medicines at a time  · Stay safe if you must go out to work  Keep physical distance between you and other workers as much as possible  Follow your employer's rules so everyone stays safe  If you have COVID-19 and are recovering at home,  healthcare providers will give you specific instructions to follow  The following are general guidelines to remind you how to keep others safe until you are well:  · Wash your hands often  Use soap and water as much as possible  Use hand  that contains alcohol if soap and water are not available  Dry your hands with a clean towel or paper towel  Do not share towels with anyone  If you use paper towels, throw them away in a lined trash can kept in your room or area  Use a covered trash can, if possible  · Do not go out of your home unless it is necessary    Ask someone who is not infected to go out for groceries or supplies, or have them delivered  Do not go to your healthcare provider's office without an appointment  · Only have close physical contact with a person giving direct care, or a baby or child you must care for  Family members and friends should not visit you  If possible, stay in a separate area or room of your home if you live with others  No one should go into the area or room except to give you care  You can visit with others by phone, video chat, e-mail, or similar systems  · Wear a face covering while others are near you  This can help prevent droplets from spreading the virus when you talk, sneeze, or cough  Put the covering on before anyone comes into your room or area  Remind the person to cover his or her nose and mouth before coming in to provide care for you  · Do not share items  Do not share dishes, towels, or other items with anyone  Items need to be washed after you use them  · Protect your baby  Some newborns have tested positive for the virus  It is not known if they became infected before or after birth  The highest risk is when a  has close contact with an infected person  If you are pregnant or breastfeeding, talk to your healthcare provider or obstetrician about any concerns you have  He or she will tell you when to bring your baby in for check-ups and vaccines  He or she will also tell you what to do if you think your baby was infected with the coronavirus  Wash your hands and put on a clean face covering before you breastfeed or care for your baby  · Do not handle live animals unless it is necessary  Some animals, including pets, have been infected with the new coronavirus  Ask someone who is not infected to take care of your pet until you are well  If you must care for a pet, wear a face covering  Wash your hands before and after you give care  Talk to your healthcare provider about how to keep a service animal safe, if needed      · Follow directions from your healthcare provider for being around others after you recover  It is not known if or for how long a recovered person can pass the virus to others  Your provider may give you instructions, such as continuing social distancing and wearing a face covering  He or she will tell you when it is okay to be around others again  This may be 10 to 20 days after symptoms started or you had a positive test  Most symptoms will also need to be gone  Your provider will give you more information  Follow up with your doctor as directed:  Write down your questions so you remember to ask them during your visits  For more information:   · Centers for Disease Control and Prevention  [address] , [address]  Phone: 0- 660 - [phone]  Web Address: [employer]    © [address] [place] [address] 2021 Information is for End User's use only and may not be sold, redistributed or otherwise used for commercial purposes  All illustrations and images included in CareNotes® are the copyrighted property of [employer] D [employer] M [employer]  or [address] [place] [address]  The above information is an  only  It is not intended as medical advice for individual conditions or treatments  Talk to your doctor, nurse or pharmacist before following any medical regimen to see if it is safe and effective for you  Pulmonary Embolism   WHAT YOU NEED TO KNOW:   What is a pulmonary embolism (PE)?  A PE is the sudden blockage of a blood vessel in the lungs by an embolus  An embolus is a small piece of blood clot, fat, air, or tumor cells  The embolus cuts off the blood supply to your lungs  A PE can become life-threatening         What increases my risk for a PE?   · Obesity    · Smoking cigarettes    · A blood disorder that makes your blood clot faster than normal, such as factor V Leiden mutation    · Birth control pills, especially if you are older than 35 or smoke cigarettes    · Certain blood diseases, such as thrombophilia and hyperhomocysteinemia    · Medical conditions, such as a deep venous thrombosis (DVT) or cancer    · Pregnancy and childbirth    · Recent surgery    · Sitting or lying in one position for a long time, such as when you travel by plane    What are the signs and symptoms of a PE?   · Sudden shortness of breath or fast breathing    · Sudden chest pain that is worse when you take a deep breath    · Fast heartbeat    · Fever and coughing up blood    · Bluish nails    · Cold, pale, clammy skin    · Fainting    How is a PE diagnosed? Ask your healthcare provider about these and other tests you may need:  · Blood tests  may show signs of the PE or how well your organs are working  · An EKG  test records your heart rhythm and how fast your heart beats  It is used to check for abnormal heart function  · A chest x-ray  may show signs of a lung infection or other damage  · A CT scan  may show the PE  You may be given contrast liquid to help your lungs show up better in the pictures  Tell the healthcare provider if you have ever had an allergic reaction to contrast liquid  · A lung scan , or V/Q scan, may show how well blood and oxygen flow in your lungs  A small amount of contrast liquid is used to study your airflow (V) and blood flow (Q)  First, you breathe in medical gas  Then, contrast liquid is injected into a vein  Pictures are taken to see how well your lungs take in oxygen  How is a PE treated? Treatment depends on what the embolus is made of and where the PE is located in your lung  You may need any of the following:  · Clot busters  are emergency medicines that work to dissolve blood clots  · Blood thinners  help prevent blood clots  Clots can cause strokes, heart attacks, and death  The following are general safety guidelines to follow while you are taking a blood thinner:    ? Watch for bleeding and bruising while you take blood thinners  Watch for bleeding from your gums or nose   Watch for blood in your urine and bowel movements  Use a soft washcloth on your skin, and a soft toothbrush to brush your teeth  This can keep your skin and gums from bleeding  If you shave, use an electric shaver  Do not play contact sports  ? Tell your dentist and other healthcare providers that you take a blood thinner  Wear a bracelet or necklace that says you take this medicine  ? Do not start or stop any other medicines unless your healthcare provider tells you to  Many medicines cannot be used with blood thinners  ? Take your blood thinner exactly as prescribed by your healthcare provider  Do not skip does or take less than prescribed  Tell your provider right away if you forget to take your blood thinner, or if you take too much  ? Warfarin  is a blood thinner that you may need to take  The following are things you should be aware of if you take warfarin:     § Foods and medicines can affect the amount of warfarin in your blood  Do not make major changes to your diet while you take warfarin  Warfarin works best when you eat about the same amount of vitamin K every day  Vitamin K is found in green leafy vegetables and certain other foods  Ask for more information about what to eat when you are taking warfarin  § You will need to see your healthcare provider for follow-up visits when you are on warfarin  You will need regular blood tests  These tests are used to decide how much medicine you need  · A vena cava filter  may be placed inside your vena cava to prevent another PE  The vena cava is a large vein that brings blood from your lower body up to your heart  The filter may help trap an embolus and prevent it from going into your lungs  · Surgery  called a thrombectomy may be done to remove the PE  A procedure called thrombolysis may instead be done to inject a clot buster that helps break the clot apart  What can I do to prevent a PE? · Change your body position or move around often    Move and stretch in your seat several times each hour if you travel by car or work at a desk  In an airplane, get up and walk every hour  · Exercise regularly to help increase your blood flow  Walking is a good low-impact exercise  Talk to your healthcare provider about the best exercise plan for you  · Maintain a healthy weight  Ask your healthcare provider how much you should weigh  Ask him or her to help you create a weight loss plan if you are overweight  · Do not smoke  Nicotine and other chemicals in cigarettes and cigars can damage blood vessels and increase your risk for another PE  Ask your healthcare provider for information if you currently smoke and need help to quit  E-cigarettes or smokeless tobacco still contain nicotine  Talk to your healthcare provider before you use these products  · Ask about birth control if you are a woman who takes the pill  A birth control pill increases the risk for blood clots in certain women  The risk is higher if you are also older than 35, smoke cigarettes, or have a blood clotting disorder  Talk to your healthcare provider about other ways to prevent pregnancy, such as a cervical cap or intrauterine device (IUD)  Call your local emergency number (911 in the [facility]) if:   · You feel lightheaded, short of breath, and have chest pain  · You cough up blood  When should I call my doctor? · Your arm or leg feels warm, tender, and painful  It may look swollen and red  · You have questions or concerns about your condition or care  CARE AGREEMENT:   You have the right to help plan your care  Learn about your health condition and how it may be treated  Discuss treatment options with your healthcare providers to decide what care you want to receive  You always have the right to refuse treatment  The above information is an  only  It is not intended as medical advice for individual conditions or treatments   Talk to your doctor, nurse or pharmacist before following any medical regimen to see if it is safe and effective for you  © Copyright [employer] 2021 Information is for End User's use only and may not be sold, redistributed or otherwise used for commercial purposes   All illustrations and images included in CareNotes® are the copyrighted property of A D A M , Inc  or [address] [place], [place] [address]deepak

## 2022-02-02 NOTE — TELEPHONE ENCOUNTER
PT has a Transition of Care appt on 2/8/22 w/ Steffanie Torres  [facility] called to report that they have started visiting today ( 2/2/22 ) and will be seeing pt 2x weekly      Cristiano Tilley [facility] # [phone]

## 2022-02-10 NOTE — PROGRESS NOTES
Telephone call received from [facility], [facility]  She requested my assistance with getting patient follow up care  Patient was discharged from the hospital on 2/1 and appointment was scheduled to see ANDREIA Silva today  However, patient has never established care with this office and is not able to come in physically  Patient is also followed by  [facility]  Telephone call placed to patient and her niece , Na Combs answered the phone  Na Combs informed me that she lives across the street and she is patient's caregiver  Na Combs reports that patient is bed bound and can not come in to the office  I explained that patient can not establish care with our office is she is not seen in person by our Providers so that she can receive an appropriate medical examination  Na Combs verbalized an understanding  I informed her about [facility] who will have a Provider come to the home and I gave her their number  I also called ANDREIA and left a message for Dot  Dot returned my call and informed me that their office is closing and not accepting new patient's  She advised that I call her Supervisor, [address] [place] [address] tomorrow at [phone]    I informed [facility] of this and I will follow up tomorrow

## 2022-02-10 NOTE — TELEPHONE ENCOUNTER
Called bo Zhou to ask about video call- we are not established with this patient  On 01/31    I was speaking to the  from hospital and said if the patient is not ambulatory we cannot establish with her because with all her medical conditions  She needs coumadin monitored and had a PE and has post covid pneumonia  She stated she will speak to the  in charge and how to handle the problem  In my absence one of my receptionists made this patient a virtual appointment with Lucretia Obregon called me with concerns how to effectively manage this patient with multiple medical problems and not be able to put a stethoscope on her and listen to her lungs and do a complete examination  I then called our - Miesha Desir for advice explaining the above that we were un aware patient was bed ridden   Miesha Desir advised to call [facility] and check on who is managing coumadin  VNA sent me to  Call the nurse [facility] who saw the patient today  The nurse said that she saw the patient and advised patient and spouse they need to establish with a pcp  Once again we need to establish in person   And I was told Kalyn Jeff is managing coumadin as of now  Miesha Desir will also call the patient and family to set patient up with community resource  NP who can continue managing patients   Patient really needs to have house calls from a provider who can examine her  Called the care coordinator [phone]-- and explained we did not establish care with this patient because we had to see her in the office and that our out patient  - who has a note in [address] [place] [address] Rd- called the community resources NP to see if she will take on the patient and do house calls

## 2022-02-10 NOTE — TELEPHONE ENCOUNTER
Patient received a message that Virtual Visit can not be done that patient must come in to the office  Patient is bed ridden  Was at [facility] and d/c on 2/1/22  Please call Shannon Garcia at [phone] to let patient know if virtual will be done or not  Let patient know why

## 2022-02-11 NOTE — PROGRESS NOTES
Patient discussed today during morning huddle regarding patient not having an established PCP  Patient is already being followed by [facility] and recommendation from the team is to involve their  to assist     Telephone call placed to A CINDY, Manager, Michelle Acharya and I left a message on her VM requesting that she return my call  Telephone call also placed to [employer] and I left a message for Supervisor, Demetrius Driver requesting that she return my call

## 2022-02-14 NOTE — PROGRESS NOTES
Telephone eleonora received from Moni Poole, [address] [place] [address] and I made her aware of the situation regarding patient not having a PCP  I also informed her that I left a message  for Shaq Pickens last Friday  Moni Poole informed me that she will refer patient to Social Work to assist     I am closing this referral at this time and will not be making any further outreach

## 2022-02-14 NOTE — PROGRESS NOTES
Telephone to Alfred Hernández Sang again but she left a message on her VM that she is out of the office this week  Telephone call placed to BEVERLY ASHLEY and I spoke to Kellee regarding this patient  She transferred me to Espinoza Shave Supervisor for the [facility] team and I left a message on her VM requesting that she return my call

## 2022-02-15 NOTE — PROGRESS NOTES
Telephone call just received from Dot, [facility]  She informed me that they are not longer closing  Dot also informed me that she already spoke to Mr Alegria and the office will be calling patient and arranging a home visit  Telephone call placed to BEVERLY ASHLEY and I spoke to Diony Clancy and I informed her of above  I also provided her with there contact number for ANDREIA

## 2022-03-09 PROBLEM — J12.82 PNEUMONIA DUE TO COVID-19 VIRUS: Status: RESOLVED | Noted: 2022-01-01 | Resolved: 2022-01-01

## 2022-03-09 PROBLEM — J96.01 ACUTE RESPIRATORY FAILURE WITH HYPOXIA (HCC): Status: RESOLVED | Noted: 2022-01-01 | Resolved: 2022-01-01

## 2022-03-09 PROBLEM — F33.9 DEPRESSION, RECURRENT (HCC): Status: ACTIVE | Noted: 2022-01-01

## 2022-03-09 PROBLEM — Z72.0 TOBACCO USE: Status: RESOLVED | Noted: 2022-01-01 | Resolved: 2022-01-01

## 2022-03-09 PROBLEM — U07.1 PNEUMONIA DUE TO COVID-19 VIRUS: Status: RESOLVED | Noted: 2022-01-01 | Resolved: 2022-01-01

## 2022-03-09 PROBLEM — E53.8 B12 DEFICIENCY: Status: ACTIVE | Noted: 2022-01-01

## 2022-03-09 PROBLEM — R63.6 UNDERWEIGHT: Status: ACTIVE | Noted: 2022-01-01

## 2022-03-09 NOTE — PROGRESS NOTES
INTERNAL MEDICINE OFFICE VISIT  [facility]  [address] [facility]  Tel: [phone]      NAME: Loki Alegria  AGE: 66 y o  SEX: female  : 1950   MRN: [id number]    DATE: 3/9/2022  TIME: 12:12 PM      Assessment and Plan:  1  Anemia, unspecified type   follow-up CBC, was advised to eat a healthy diet     - CBC and differential; Future  - Comprehensive metabolic panel; Future    2  Pulmonary embolism, unspecified chronicity, unspecified pulmonary embolism type, unspecified whether acute cor pulmonale present ([facility] )  Could not take the warfarin or Eliquis  Has seen the pulmonologist and was told it is okay to not take the anticoagulant  Doing much better now  Had a PE when she had COVID recently    3  Depression, recurrent ([facility] )   has depression for long time but does not want to take any medication    4  Vitamin D deficiency    - Vitamin D 25 hydroxy; Future    5  B12 deficiency    - Vitamin B12; Future    6  Severe protein-calorie malnutrition ([facility] )   was advised to eat a healthy diet    7  Fatigue, unspecified type    - TSH, 3rd generation; Future    8  Underweight  BMI Counseling: Body mass index is 15 81 kg/m²  The BMI is below normal  Patient advised to gain weight and dietary education for weight gain was provided  Rationale for BMI follow-up plan is due to patient being underweight  9  Need for hepatitis C screening test    - Hepatitis C Antibody ([facility]); Future    10  Asymptomatic postmenopausal state    - DXA bone density spine hip and pelvis; Future      - Counseling Documentation: patient was counseled regarding: diagnostic results, instructions for management, risk factor reductions, prognosis, patient and family education, risks and benefits of treatment options and importance of compliance with treatment  - Medication Side Effects:  Adverse side effects of medications were reviewed with the patient/guardian today       Return for follow up visit in  4 months or earlier, if needed  Chief Complaint:  Chief Complaint   Patient presents with    New Patient Visit         History of Present Illness:    this is a new patient who is here to establish  She has not been to the doctor for years  Recently she had COVID and was in the hospital with acute respiratory failure and PE  Now she is recovering from it but has a lot of fatigue and wants to sleep all day  She does have anemia but it is not clearly iron deficiency anemia  She has been having diarrhea for decades and has not been able to gain weight  She was treated for the diarrhea by GI multiple times  She refuses to get a colonoscopy and has had multiple endoscopies  She also refuses to have a mammogram     She does have depression all her life but does not want to take any medication      Active Problem List:  Patient Active Problem List   Diagnosis    Severe protein-calorie malnutrition ([facility] )    Anemia    Pulmonary embolism ([facility] )    Hypotension    Underweight    B12 deficiency    Depression, recurrent ([facility] )         Past Medical History:  Past Medical History:   Diagnosis Date    Acute respiratory failure with hypoxia ([facility] ) 1/20/2022    Cough     Pneumonia due to COVID-19 virus 1/20/2022    SOB (shortness of breath)     Tobacco use 1/20/2022    Wheezing          Past Surgical History:  Past Surgical History:   Procedure Laterality Date    LAPAROSCOPIC PARTIAL GASTRECTOMY           Family History:  History reviewed  No pertinent family history        Social History:  Social History     Socioeconomic History    Marital status: /Civil Union     Spouse name: None    Number of children: None    Years of education: None    Highest education level: None   Occupational History    Occupation: retired   Tobacco Use    Smoking status: Former Smoker     Packs/day: 1 00     Years: 55 00     Pack years: 55 00     Types: Cigarettes     Start date: 5     Quit date: 2022     Years since quittin 1    Smokeless tobacco: Never Used   Substance and Sexual Activity    Alcohol use: Not Currently    Drug use: Never    Sexual activity: None   Other Topics Concern    None   Social History Narrative    None     Social Determinants of Health     Financial Resource Strain: Not on file   Food Insecurity: No Food Insecurity    Worried About Running Out of Food in the Last Year: Never true    Nahum of Food in the Last Year: Never true   Transportation Needs: No Transportation Needs    Lack of Transportation (Medical): No    Lack of Transportation (Non-Medical): No   Physical Activity: Not on file   Stress: Not on file   Social Connections: Not on file   Intimate Partner Violence: Not on file   Housing Stability: Low Risk     Unable to Pay for Housing in the Last Year: No    Number of Places Lived in the Last Year: 1    Unstable Housing in the Last Year: No         Allergies: Allergies   Allergen Reactions    Eliquis [Apixaban] Dizziness      Patient was seeming double    felt like she was going pass out     Wheat Bran - Food Allergy Vomiting     Anything wheat make patient vomiting is to much for her to eat           Medications:    Current Outpatient Medications:     albuterol (2 5 mg/3 mL) 0 083 % nebulizer solution, Take 3 mL (2 5 mg total) by nebulization every 6 (six) hours as needed for wheezing or shortness of breath, Disp: 1080 mL, Rfl: 3    albuterol (PROVENTIL HFA,VENTOLIN HFA) 90 mcg/act inhaler, Inhale 2 puffs every 4 (four) hours as needed for wheezing or shortness of breath, Disp: 8 g, Rfl: 0    fluticasone-vilanterol (BREO ELLIPTA) 100-25 mcg/inh inhaler, Inhale 1 puff daily Rinse mouth after use , Disp: 60 blister, Rfl: 3    furosemide (LASIX) 20 mg tablet, Take 1 tablet (20 mg total) by mouth 2 (two) times a day, Disp: 30 tablet, Rfl: 0    potassium chloride (K-DUR,KLOR-CON) 10 mEq tablet, Take 1 tablet (10 mEq total) by mouth 2 (two) times a day, Disp: 30 tablet, Rfl: 0    Diclofenac Sodium (VOLTAREN) 1 %, Apply 2 g topically 4 (four) times a day for 14 days, Disp: 112 g, Rfl: 0      The following portions of the patient's history were reviewed and updated as appropriate: past medical history, past surgical history, family history, social history, allergies, current medications and active problem list       Review of Systems:  Constitutional: Denies fever, chills, weight gain, weight loss, has fatigue  Eyes: Denies eye redness, eye discharge, double vision, change in visual acuity  ENT: Denies hearing loss, tinnitus, sneezing, nasal congestion, nasal discharge, sore throat   Respiratory: Denies cough, expectoration, hemoptysis, has shortness of breath, wheezing  Cardiovascular: Denies chest pain, palpitations, lower extremity swelling, orthopnea, PND  Gastrointestinal: Denies abdominal pain, heartburn, nausea, vomiting, hematemesis, diarrhea, bloody stools  Genito-Urinary: Denies dysuria, frequency, difficulty in micturition, nocturia, incontinence  Musculoskeletal: Denies back pain, joint pain, muscle pain  Neurologic: Denies confusion, lightheadedness, syncope, headache, focal weakness, sensory changes, seizures  Endocrine: Denies polyuria, polydipsia, temperature intolerance  Allergy and Immunology: Denies hives, insect bite sensitivity  Hematological and Lymphatic: Denies bleeding problems, swollen glands   Psychological:  has depression, denies suicidal ideation, anxiety, panic, mood swings  Dermatological: Denies pruritus, rash, skin lesion changes      Vitals:  Vitals:    03/09/22 1135   BP: 102/70   Pulse: (!) 112   Resp: 17   Temp: (!) 97 2 °F (36 2 °C)   SpO2: 90%       Body mass index is 15 81 kg/m²  Weight (last 2 days)     Date/Time Weight    03/09/22 1135 43 1 (95)            Physical Examination:  General: Patient is not in acute distress  Awake, alert, responding to commands   No weight gain or loss  Head: Normocephalic  Atraumatic  Eyes: Conjunctiva and lids with no swelling, erythema or discharge  Both pupils normal sized, round and reactive to light  Sclera nonicteric  ENT: External examination of nose and ear normal  Otoscopic examination shows translucent tympanic membranes with patent canals without erythema  Oropharynx moist with no erythema, edema, exudate or lesions  Neck: Supple  JVP not raised  Trachea midline  No masses  No thyromegaly  Lungs: No signs of increased work of breathing or respiratory distress  Bilateral bronchovascular breath sounds with no crackles or rhonchi  Chest wall: No tenderness  Cardiovascular: Normal PMI  No thrills  Regular rate and rhythm  S1 and S2 normal  No murmur, rub or gallop  Gastrointestinal: Abdomen soft, nontender  No guarding or rigidity  Liver and spleen not palpable  Bowel sounds present  Neurologic: Cranial nerves II-XII intact   Cortical functions normal  Motor system - Reflexes 2+ and symmetrical  Sensations normal  Musculoskeletal: Gait normal  No joint tenderness  Integumentary: Skin normal with no rash or lesions  Lymphatic: No palpable lymph nodes in neck, axilla or groin  Extremities: No clubbing, cyanosis, edema or varicosities  Psychological: Judgement and insight normal  Mood and affect normal      Laboratory Results:  CBC with diff:   Lab Results   Component Value Date    WBC 11 23 (H) 01/30/2022    RBC 3 68 (L) 01/30/2022    HGB 11 6 01/30/2022    HCT 37 7 01/30/2022     (H) 01/30/2022    MCH 31 5 01/30/2022    RDW 17 2 (H) 01/30/2022     01/30/2022       CMP:  Lab Results   Component Value Date    CREATININE 0 64 01/31/2022    BUN 21 01/31/2022    K 4 8 01/31/2022    CL 94 (L) 01/31/2022    CO2 32 01/31/2022    ALKPHOS 146 (H) 01/30/2022    ALT 35 01/30/2022    AST 32 01/30/2022    BILIDIR 0 09 01/20/2022       No results found for: HGBA1C, MG, PHOS    No results found for: TROPONINI, CKMB, CKTOTAL    Lipid Profile:   No results found for: CHOL  No results found for: HDL  No results found for: LDLCALC  No results found for: TRIG    Imaging Results:  VAS lower limb venous duplex study, unilateral/limited     [facility] REPORT  CLINICAL:  Indications: Patient complains of chronic left leg edema  Patient was on a blood thinner for PE, and numbers were low x 2 weeks  Doctor wants to rule out deep and superficial venous thrombosis  Operative History:  No cardiovascular surgeries  Risk Factors  The patient has history of, PE, on thinner, and  previous smoking (quit <1yr  ago)  CONCLUSION:  Impression:  RIGHT LOWER LIMB LIMITED:  Evaluation shows no evidence of thrombus in the common femoral vein  Doppler evaluation shows a normal response to augmentation maneuvers  LEFT LOWER LIMB:  No evidence of acute or chronic deep vein thrombosis  No evidence of superficial thrombophlebitis noted  Doppler evaluation shows a normal response to augmentation maneuvers  Popliteal, posterior tibial and anterior tibial arterial Doppler waveforms are  biphasic  Technical findings were given to Dr Cyndi Heck       SIGNATURE:  Electronically Signed by: Marquis Tarik MD, RPVI on 2022-03-07 04:45:56 PM       Health Maintenance:  Health Maintenance   Topic Date Due    Hepatitis C Screening  Never done    COVID-19 Vaccine (1) Never done    Pneumococcal Vaccine: 65+ Years (1 of 2 - PPSV23) Never done    Depression Follow-up Plan  Never done    BMI: Followup Plan  Never done    DTaP,Tdap,and Td Vaccines (1 - Tdap) Never done    Breast Cancer Screening: Mammogram  Never done    Osteoporosis Screening  Never done    Colorectal Cancer Screening  Never done    Influenza Vaccine (1) Never done    Lung Cancer Screening  01/25/2023    Fall Risk  03/09/2023    Depression Screening  03/09/2023    Medicare Annual Wellness Visit (AWV)  03/09/2023    BMI: Adult  03/09/2023    HIB Vaccine  Aged Out    Hepatitis B Vaccine  Aged Out    IPV Vaccine  Aged Out    Hepatitis A Vaccine  Aged Out    Meningococcal ACWY Vaccine  Aged Out    HPV Vaccine  Aged Out       There is no immunization history on file for this patient        Jerald Boone MD  3/9/2022,12:12 PM

## 2022-03-09 NOTE — PROGRESS NOTES
CARDIOLOGY NEW PATIENT OFFICE VISIT  [facility]  [facility], [place], [address], [address], [address]  Tel: [phone]      NAME: Ryan Alegria  AGE: 66 y o  SEX: female  : 1950   MRN: [id number]      Chief Complaint:  Chief Complaint   Patient presents with    Leg swelling         History of Present Illness:   Ref by Dr Fredo Harris    76-year-old frail female who recently had COVID-19 and following that has been on home oxygen, whoose requirement is gradually going down / improving  During the COVID-19 infection a subsegmental PE was found for which she was initially placed on Coumadin but her INRs were labile and she was changed over to Eliquis  Her first pill of Eliquis caused her side effects that the patient stopped taking it  In the meantime her pulmonologist Dr Fredo Harris advised her to discontinue her [facility]  Patient was sent to Cardiology because she had swelling in both her feet (L>R)    She had venous duplex of her legs and it did not show any DVT      Past Medical History:  Past Medical History:   Diagnosis Date    Cough     SOB (shortness of breath)     Wheezing          Past Surgical History:  Past Surgical History:   Procedure Laterality Date    LAPAROSCOPIC PARTIAL GASTRECTOMY           Family History:  Noncontributory      Social History:  Social History     Socioeconomic History    Marital status: /Civil Union     Spouse name: None    Number of children: None    Years of education: None    Highest education level: None   Occupational History    Occupation: retired   Tobacco Use    Smoking status: Former Smoker     Packs/day: 1 00     Years: 55 00     Pack years: 55 00     Types: Cigarettes     Start date: 5     Quit date: 2022     Years since quittin 1    Smokeless tobacco: Never Used   Substance and Sexual Activity    Alcohol use: Not Currently    Drug use: Never    Sexual activity: None   Other Topics Concern    None   Social History Narrative    None     Social Determinants of Health     Financial Resource Strain: Not on file   Food Insecurity: No Food Insecurity    Worried About Running Out of Food in the Last Year: Never true    Nahum of Food in the Last Year: Never true   Transportation Needs: No Transportation Needs    Lack of Transportation (Medical): No    Lack of Transportation (Non-Medical): No   Physical Activity: Not on file   Stress: Not on file   Social Connections: Not on file   Intimate Partner Violence: Not on file   Housing Stability: Low Risk     Unable to Pay for Housing in the Last Year: No    Number of Places Lived in the Last Year: 1    Unstable Housing in the Last Year: No         Active Problems:  Patient Active Problem List   Diagnosis    Pneumonia due to COVID-19 virus    Acute respiratory failure with hypoxia ([facility] [address] )    Tobacco use    Severe protein-calorie malnutrition ([facility] [address] )    Anemia    Pulmonary embolism (HCC)    Hypotension         The following portions of the patient's history were reviewed and updated as appropriate: past medical history, past surgical history, past family history,  past social history, current medications, allergies and problem list       Review of Systems:  Constitutional: Denies fever, chills  Eyes: Denies eye redness, eye discharge  ENT: Denies sneezing, nasal discharge, sore throat   Respiratory: Denies cough, expectoration  +SOB  Cardiovascular: Denies chest pain, palpitations   +Lower extremity swelling  Gastrointestinal: Denies abdominal pain, nausea, vomiting, diarrhea  Genito-Urinary: Denies dysuria, incontinence  Musculoskeletal: Denies back pain, muscle pain  Neurologic: Denies lightheadedness, syncope, headache, seizures  Endocrine: Denies polydipsia, temperature intolerance  Allergy and Immunology: Denies hives, insect bite sensitivity  Hematological and Lymphatic: Denies bleeding problems, swollen glands   Psychological: Denies depression, suicidal ideation, anxiety, panic  Dermatological: Denies pruritus, rash, skin lesion changes      Vitals:  Vitals:    03/09/22 0928   BP: 102/72   Pulse: 98   Resp: 16   SpO2: 100%       Body mass index is 14 81 kg/m²  Weight (last 2 days)     Date/Time Weight    03/09/22 0928 40 4 (89)            Physical Examination:  General: Patient is not in acute distress  Awake, alert, oriented in time, place and person  Responding to commands  Head: Normocephalic  Atraumatic  Eyes: Both pupils normal sized, round and reactive to light  Nonicteric  ENT: Normal external ear canals  Neck: Supple  JVP not raised  Trachea central  No thyromegaly  Lungs: Bilateral bronchovascular breath sounds with no crackles or rhonchi  Chest wall: No tenderness  Cardiovascular: RRR  S1 and S2 normal  No murmur, rub or gallop  Gastrointestinal: Abdomen soft, nontender  No guarding or rigidity  Liver and spleen not palpable  Bowel sounds present  Neurologic: Patient is awake, alert, oriented in time, place and person  Responding to commands  Moving all extremities  Integumentary:  No skin rash  Lymphatic: No cervical lymphadenopathy  Back: Symmetric  No CVA tenderness  Extremities: No clubbing, cyanosis or edema      Laboratory Results:  CBC with diff:   Lab Results   Component Value Date    WBC 11 23 (H) 01/30/2022    RBC 3 68 (L) 01/30/2022    HGB 11 6 01/30/2022    HCT 37 7 01/30/2022     (H) 01/30/2022    MCH 31 5 01/30/2022    RDW 17 2 (H) 01/30/2022     01/30/2022       CMP:  Lab Results   Component Value Date    CREATININE 0 64 01/31/2022    BUN 21 01/31/2022    K 4 8 01/31/2022    CL 94 (L) 01/31/2022    CO2 32 01/31/2022    ALKPHOS 146 (H) 01/30/2022    ALT 35 01/30/2022    AST 32 01/30/2022    BILIDIR 0 09 01/20/2022         Imaging: I have personally reviewed pertinent reports      Procedure: VAS lower limb venous duplex study, unilateral/limited    Result Date: 3/7/2022  Narrative:  [address] REPORT CLINICAL: Indications: Patient complains of chronic left leg edema  Patient was on a blood thinner for PE, and numbers were low x 2 weeks  Doctor wants to rule out deep and superficial venous thrombosis  Operative History: No cardiovascular surgeries Risk Factors The patient has history of, PE, on thinner, and  previous smoking (quit <1yr ago)  CONCLUSION: Impression: RIGHT LOWER LIMB LIMITED: Evaluation shows no evidence of thrombus in the common femoral vein  Doppler evaluation shows a normal response to augmentation maneuvers  LEFT LOWER LIMB: No evidence of acute or chronic deep vein thrombosis No evidence of superficial thrombophlebitis noted  Doppler evaluation shows a normal response to augmentation maneuvers  Popliteal, posterior tibial and anterior tibial arterial Doppler waveforms are biphasic  Technical findings were given to Dr Don Anderson    SIGNATURE: Electronically Signed by: Anil Byrnes MD, RPVI on 2022-03-07 04:45:56 PM      Medications:    Current Outpatient Medications:     albuterol (2 5 mg/3 mL) 0 083 % nebulizer solution, Take 3 mL (2 5 mg total) by nebulization every 6 (six) hours as needed for wheezing or shortness of breath, Disp: 1080 mL, Rfl: 3    albuterol (PROVENTIL HFA,VENTOLIN HFA) 90 mcg/act inhaler, Inhale 2 puffs every 4 (four) hours as needed for wheezing or shortness of breath, Disp: 8 g, Rfl: 0    fluticasone-vilanterol (BREO ELLIPTA) 100-25 mcg/inh inhaler, Inhale 1 puff daily Rinse mouth after use , Disp: 60 blister, Rfl: 3    senna-docusate sodium (SENOKOT S) 8 6-50 mg per tablet, Take 1 tablet by mouth daily, Disp: 30 tablet, Rfl: 0    apixaban (Eliquis) 5 mg, Take 1 tablet (5 mg total) by mouth 2 (two) times a day (Patient not taking: Reported on 3/9/2022 ), Disp: 60 tablet, Rfl: 0    Diclofenac Sodium (VOLTAREN) 1 %, Apply 2 g topically 4 (four) times a day for 14 days, Disp: 112 g, Rfl: 0    furosemide (LASIX) 20 mg tablet, Take 1 tablet (20 mg total) by mouth 2 (two) times a day, Disp: 30 tablet, Rfl: 0    pantoprazole (PROTONIX) 40 mg tablet, Take 1 tablet (40 mg total) by mouth daily in the early morning (Patient not taking: Reported on 3/9/2022 ), Disp: 30 tablet, Rfl: 0    potassium chloride (K-DUR,KLOR-CON) 10 mEq tablet, Take 1 tablet (10 mEq total) by mouth 2 (two) times a day, Disp: 30 tablet, Rfl: 0    warfarin (COUMADIN) 5 mg tablet, daily (Patient not taking: Reported on 3/9/2022 ), Disp: 30 tablet, Rfl: 0      Allergies: Allergies   Allergen Reactions    Eliquis [Apixaban] Dizziness      Patient was seeming double   felt like she was going pass out     Wheat Bran - Food Allergy Vomiting     Anything wheat make patient vomiting is to much for her to eat           Assessment and Plan:  1  Bilateral leg edema  Patient's Jan 2021 echo reviewed  EF 65%  Normal diastolic function  Mild MAC  Mild TR  Mild UT  Patient has no clinical signs of CHF  Leg swelling is likely dependent edema from a sedentary status +/- hypoproteinemia (no recent CMP)  Patient states she takes extremely low-salt diet  Patient asked to keep her legs elevated at night and even during daytime  VIPIN stockings will help  For couple of weeks she can take furosemide with potassium as explained  Recommend aggressive risk factor modification and therapeutic lifestyle changes  Low-salt, low-calorie, low-fat, low-cholesterol diet with regular exercise and to optimize weight  I will defer the ordering and monitoring of necessity lab studies to you, but I am available and happy to review and manage any of the data at your request in the future  Discussed concepts of atherosclerosis, including signs and symptoms of cardiac disease  Previous studies were reviewed  Safety measures were reviewed  Questions were entertained and answered  Patient was advised to report any problems requiring medical attention      Follow-up with PCP and appropriate specialist and lab work as discussed  Return for follow up visit as scheduled or earlier, if needed  Thank you for allowing me to participate in the care and evaluation of your patient  Should you have any questions, please feel free to contact me        Bere Chirinos MD  3/9/2022,11:11 AM

## 2022-03-09 NOTE — PROGRESS NOTES
Assessment and Plan:     Problem List Items Addressed This Visit     None      Visit Diagnoses     Need for hepatitis C screening test        Encounter for immunization        Encounter for screening mammogram for breast cancer        Screening for colorectal cancer               Preventive health issues were discussed with patient, and age appropriate screening tests were ordered as noted in patient's After Visit Summary  Personalized health advice and appropriate referrals for health education or preventive services given if needed, as noted in patient's After Visit Summary  History of Present Illness:     Patient presents for Medicare Annual Wellness visit    Patient Care Team:  Atif Willis MD as PCP - General (Internal Medicine)     Problem List:     Patient Active Problem List   Diagnosis    Pneumonia due to COVID-19 virus    Acute respiratory failure with hypoxia ([facility] [address] [facility]    Tobacco use    Severe protein-calorie malnutrition ([facility] [address] [facility]    Anemia    Pulmonary embolism ([facility] [address] [facility]    Hypotension    Underweight      Past Medical and Surgical History:     Past Medical History:   Diagnosis Date    Cough     SOB (shortness of breath)     Wheezing      Past Surgical History:   Procedure Laterality Date    LAPAROSCOPIC PARTIAL GASTRECTOMY        Family History:     History reviewed  No pertinent family history     Social History:     Social History     Socioeconomic History    Marital status: /Civil Union     Spouse name: None    Number of children: None    Years of education: None    Highest education level: None   Occupational History    Occupation: retired   Tobacco Use    Smoking status: Former Smoker     Packs/day: 1 00     Years: 55 00     Pack years: 55 00     Types: Cigarettes     Start date: 5     Quit date: 2022     Years since quittin 1    Smokeless tobacco: Never Used   Substance and Sexual Activity    Alcohol use: Not Currently    Drug use: Never    Sexual activity: None Other Topics Concern    None   Social History Narrative    None     Social Determinants of Health     Financial Resource Strain: Not on file   Food Insecurity: No Food Insecurity    Worried About Running Out of Food in the Last Year: Never true    Nahum of Food in the Last Year: Never true   Transportation Needs: No Transportation Needs    Lack of Transportation (Medical): No    Lack of Transportation (Non-Medical): No   Physical Activity: Not on file   Stress: Not on file   Social Connections: Not on file   Intimate Partner Violence: Not on file   Housing Stability: Low Risk     Unable to Pay for Housing in the Last Year: No    Number of Places Lived in the Last Year: 1    Unstable Housing in the Last Year: No      Medications and Allergies:     Current Outpatient Medications   Medication Sig Dispense Refill    furosemide (LASIX) 20 mg tablet Take 1 tablet (20 mg total) by mouth 2 (two) times a day 30 tablet 0    potassium chloride (K-DUR,KLOR-CON) 10 mEq tablet Take 1 tablet (10 mEq total) by mouth 2 (two) times a day 30 tablet 0    albuterol (2 5 mg/3 mL) 0 083 % nebulizer solution Take 3 mL (2 5 mg total) by nebulization every 6 (six) hours as needed for wheezing or shortness of breath (Patient not taking: Reported on 3/9/2022 ) 1080 mL 3    albuterol (PROVENTIL HFA,VENTOLIN HFA) 90 mcg/act inhaler Inhale 2 puffs every 4 (four) hours as needed for wheezing or shortness of breath (Patient not taking: Reported on 3/9/2022 ) 8 g 0    Diclofenac Sodium (VOLTAREN) 1 % Apply 2 g topically 4 (four) times a day for 14 days 112 g 0    fluticasone-vilanterol (BREO ELLIPTA) 100-25 mcg/inh inhaler Inhale 1 puff daily Rinse mouth after use  (Patient not taking: Reported on 3/9/2022 ) 60 blister 3     No current facility-administered medications for this visit  Allergies   Allergen Reactions    Eliquis [Apixaban] Dizziness      Patient was seeming double    felt like she was going pass out    Vasile & Taina Bran - Food Allergy Vomiting     Anything wheat make patient vomiting is to much for her to eat        Immunizations: There is no immunization history on file for this patient  Health Maintenance:         Topic Date Due    Hepatitis C Screening  Never done    Breast Cancer Screening: Mammogram  Never done    Colorectal Cancer Screening  Never done    Lung Cancer Screening  01/25/2023         Topic Date Due    COVID-19 Vaccine (1) Never done    Pneumococcal Vaccine: 65+ Years (1 of 2 - PPSV23) Never done    DTaP,Tdap,and Td Vaccines (1 - Tdap) Never done    Influenza Vaccine (1) Never done      Medicare Health Risk Assessment:     /70 (BP Location: Left arm, Patient Position: Sitting, Cuff Size: Standard)   Pulse (!) 112   Temp (!) 97 2 °F (36 2 °C) (Tympanic)   Resp 17   Ht 5' 5" (1 651 m)   Wt 43 1 kg (95 lb)   SpO2 90%   BMI 15 81 kg/m²      Haley Casey is here for her Initial Wellness visit  Health Risk Assessment:   Patient rates overall health as fair  Patient feels that their physical health rating is much worse  Patient is satisfied with their life  Eyesight was rated as much worse  Hearing was rated as same  Patient feels that their emotional and mental health rating is same  Patients states they are never, rarely angry  Patient states they are never, rarely unusually tired/fatigued  Pain experienced in the last 7 days has been a lot  Patient's pain rating has been 9/10  Patient states that she has experienced weight loss or gain in last 6 months  [facility]     Depression Screening:   PHQ-2 Score: 4  PHQ-9 Score: 16      Fall Risk Screening: In the past year, patient has experienced: history of falling in past year    Number of falls: 1  Injured during fall?: Yes    Feels unsteady when standing or walking?: Yes    Worried about falling?: Yes      Urinary Incontinence Screening:   Patient has not leaked urine accidently in the last six months       Home Safety:  Patient has trouble with stairs inside or outside of their home  Patient has working smoke alarms and has working carbon monoxide detector  Home safety hazards include: none  Nutrition:   Current diet is Regular  Medications:   Patient is not currently taking any over-the-counter supplements  Patient is able to manage medications  Activities of Daily Living (ADLs)/Instrumental Activities of Daily Living (IADLs):     Dress and groom yourself?: No    Bathe or shower yourself?: No    Feed yourself? Yes  Do your laundry/housekeeping?: No  Manage your money, pay your bills and track your expenses?: No  Make your own meals?: No    Do your own shopping?: No    Previous Hospitalizations:   Any hospitalizations or ED visits within the last 12 months?: Yes    How many hospitalizations have you had in the last year?: 1-2    Advance Care Planning:   Living will: No      Cognitive Screening:   Provider or family/friend/caregiver concerned regarding cognition?: No    PREVENTIVE SCREENINGS      Cardiovascular Screening:    General: Risks and Benefits Discussed      Diabetes Screening:     General: Screening Current      Colorectal Cancer Screening:     General: Risks and Benefits Discussed      Breast Cancer Screening:     General: Risks and Benefits Discussed      Cervical Cancer Screening:    General: Screening Not Indicated      Osteoporosis Screening:    General: Risks and Benefits Discussed      Abdominal Aortic Aneurysm (AAA) Screening:        General: Screening Not Indicated      Lung Cancer Screening:     General: Screening Current      Hepatitis C Screening:    General: Risks and Benefits Discussed    Screening, Brief Intervention, and Referral to Treatment (SBIRT)    Screening  Typical number of drinks in a day: 0  Typical number of drinks in a week: 0  Interpretation: Low risk drinking behavior      Single Item Drug Screening:  How often have you used an illegal drug (including marijuana) or a prescription medication for non-medical reasons in the past year? never    Single Item Drug Screen Score: 0  Interpretation: Negative screen for possible drug use disorder    Review of Current Opioid Use    Opioid Risk Tool (ORT) Interpretation: Complete Opioid Risk Tool (ORT)    Other Counseling Topics:   Car/seat belt/driving safety, skin self-exam, sunscreen and calcium and vitamin D intake and regular weightbearing exercise         Henrry Lopez MD

## 2022-03-09 NOTE — PATIENT INSTRUCTIONS
Medicare Preventive Visit Patient Instructions  Thank you for completing your Welcome to Medicare Visit or Medicare Annual Wellness Visit today  Your next wellness visit will be due in one year (3/10/2023)  The screening/preventive services that you may require over the next 5-10 years are detailed below  Some tests may not apply to you based off risk factors and/or age  Screening tests ordered at today's visit but not completed yet may show as past due  Also, please note that scanned in results may not display below  Preventive Screenings:  Service Recommendations Previous Testing/Comments   Colorectal Cancer Screening  * Colonoscopy    * Fecal Occult Blood Test (FOBT)/Fecal Immunochemical Test (FIT)  * Fecal DNA/Cologuard Test  * Flexible Sigmoidoscopy Age: 54-65 years old   Colonoscopy: every 10 years (may be performed more frequently if at higher risk)  OR  FOBT/FIT: every 1 year  OR  Cologuard: every 3 years  OR  Sigmoidoscopy: every 5 years  Screening may be recommended earlier than age 48 if at higher risk for colorectal cancer  Also, an individualized decision between you and your healthcare provider will decide whether screening between the ages of 74-80 would be appropriate  Colonoscopy: Not on file  FOBT/FIT: Not on file  Cologuard: Not on file  Sigmoidoscopy: Not on file          Breast Cancer Screening Age: 36 years old  Frequency: every 1-2 years  Not required if history of left and right mastectomy Mammogram: Not on file        Cervical Cancer Screening Between the ages of 21-29, pap smear recommended once every 3 years  Between the ages of 33-67, can perform pap smear with HPV co-testing every 5 years     Recommendations may differ for women with a history of total hysterectomy, cervical cancer, or abnormal pap smears in past  Pap Smear: Not on file    Screening Not Indicated   Hepatitis C Screening Once for adults born between [facility]  More frequently in patients at high risk for Hepatitis C Hep C Antibody: Not on file        Diabetes Screening 1-2 times per year if you're at risk for diabetes or have pre-diabetes Fasting glucose: No results in last 5 years   A1C: No results in last 5 years    Screening Current   Cholesterol Screening Once every 5 years if you don't have a lipid disorder  May order more often based on risk factors  Lipid panel: Not on file          Other Preventive Screenings Covered by Medicare:  1  Abdominal Aortic Aneurysm (AAA) Screening: covered once if your at risk  You're considered to be at risk if you have a family history of AAA  2  Lung Cancer Screening: covers low dose CT scan once per year if you meet all of the following conditions: (1) Age 50-69; (2) No signs or symptoms of lung cancer; (3) Current smoker or have quit smoking within the last 15 years; (4) You have a tobacco smoking history of at least 30 pack years (packs per day multiplied by number of years you smoked); (5) You get a written order from a healthcare provider  3  Glaucoma Screening: covered annually if you're considered high risk: (1) You have diabetes OR (2) Family history of glaucoma OR (3)  aged 48 and older OR (3)  American aged 72 and older  3  Osteoporosis Screening: covered every 2 years if you meet one of the following conditions: (1) You're estrogen deficient and at risk for osteoporosis based off medical history and other findings; (2) Have a vertebral abnormality; (3) On glucocorticoid therapy for more than 3 months; (4) Have primary hyperparathyroidism; (5) On osteoporosis medications and need to assess response to drug therapy  · Last bone density test (DXA Scan): Not on file  5  HIV Screening: covered annually if you're between the age of 12-76  Also covered annually if you are younger than 13 and older than 72 with risk factors for HIV infection  For pregnant patients, it is covered up to 3 times per pregnancy      Immunizations:  Immunization Recommendations   Influenza Vaccine Annual influenza vaccination during flu season is recommended for all persons aged >= 6 months who do not have contraindications   Pneumococcal Vaccine (Prevnar and Pneumovax)  * Prevnar = PCV13  * Pneumovax = PPSV23   Adults 25-60 years old: 1-3 doses may be recommended based on certain risk factors  Adults 72 years old: Prevnar (PCV13) vaccine recommended followed by Pneumovax (PPSV23) vaccine  If already received PPSV23 since turning 65, then PCV13 recommended at least one year after PPSV23 dose  Hepatitis B Vaccine 3 dose series if at intermediate or high risk (ex: diabetes, end stage renal disease, liver disease)   Tetanus (Td) Vaccine - COST NOT COVERED BY MEDICARE PART B Following completion of primary series, a booster dose should be given every 10 years to maintain immunity against tetanus  Td may also be given as tetanus wound prophylaxis  Tdap Vaccine - COST NOT COVERED BY MEDICARE PART B Recommended at least once for all adults  For pregnant patients, recommended with each pregnancy  Shingles Vaccine (Shingrix) - COST NOT COVERED BY MEDICARE PART B  2 shot series recommended in those aged 48 and above     Health Maintenance Due:      Topic Date Due    Hepatitis C Screening  Never done    Breast Cancer Screening: Mammogram  Never done    Colorectal Cancer Screening  Never done    Lung Cancer Screening  01/25/2023     Immunizations Due:      Topic Date Due    COVID-19 Vaccine (1) Never done    Pneumococcal Vaccine: 65+ Years (1 of 2 - PPSV23) Never done    DTaP,Tdap,and Td Vaccines (1 - Tdap) Never done    Influenza Vaccine (1) Never done     Advance Directives   What are advance directives? Advance directives are legal documents that state your wishes and plans for medical care  These plans are made ahead of time in case you lose your ability to make decisions for yourself   Advance directives can apply to any medical decision, such as the treatments you want, and if you want to donate organs  What are the types of advance directives? There are many types of advance directives, and each state has rules about how to use them  You may choose a combination of any of the following:  · Living will: This is a written record of the treatment you want  You can also choose which treatments you do not want, which to limit, and which to stop at a certain time  This includes surgery, medicine, IV fluid, and tube feedings  · Durable power of  for healthcare [facility]): This is a written record that states who you want to make healthcare choices for you when you are unable to make them for yourself  This person, called a proxy, is usually a family member or a friend  You may choose more than 1 proxy  · Do not resuscitate (DNR) order:  A DNR order is used in case your heart stops beating or you stop breathing  It is a request not to have certain forms of treatment, such as CPR  A DNR order may be included in other types of advance directives  · Medical directive: This covers the care that you want if you are in a coma, near death, or unable to make decisions for yourself  You can list the treatments you want for each condition  Treatment may include pain medicine, surgery, blood transfusions, dialysis, IV or tube feedings, and a ventilator (breathing machine)  · Values history: This document has questions about your views, beliefs, and how you feel and think about life  This information can help others choose the care that you would choose  Why are advance directives important? An advance directive helps you control your care  Although spoken wishes may be used, it is better to have your wishes written down  Spoken wishes can be misunderstood, or not followed  Treatments may be given even if you do not want them  An advance directive may make it easier for your family to make difficult choices about your care     Depression   Depression  is a medical condition that causes feelings of sadness or hopelessness that do not go away  Depression may cause you to lose interest in things you used to enjoy  These feelings may interfere with your daily life  Call your local emergency number (911 in the [address]) if:   · You think about harming yourself or someone else  · You have done something on purpose to hurt yourself  The following resources are available at any time to help you, if needed:   · [address] [facility]: 5-302.350.7551 (0-701-339-YQVE)   · Suicide Hotline: 3-230.445.2635 (6-071-WBGOKRR)   · For a list of international numbers: https://save org/find-help/international-resources/  Treatment for depression may include medicine to relieve depression  Medicine is often used together with therapy  Therapy is a way for you to talk about your feelings and anything that may be causing depression  Therapy can be done alone or in a group  It may also be done with family members or a significant other  · Get regular physical activity  · Create a regular sleep schedule  · Eat a variety of healthy foods  · Do not drink alcohol or use drugs  Fall Prevention    Fall prevention  includes ways to make your home and other areas safer  It also includes ways you can move more carefully to prevent a fall  Health conditions that cause changes in your blood pressure, vision, or muscle strength and coordination may increase your risk for falls  Medicines may also increase your risk for falls if they make you dizzy, weak, or sleepy  Fall prevention tips:   · Stand or sit up slowly  · Use assistive devices as directed  · Wear shoes that fit well and have soles that   · Wear a personal alarm  · Stay active  · Manage your medical conditions  Home Safety Tips:  · Add items to prevent falls in the bathroom  · Keep paths clear  · Install bright lights in your home  · Keep items you use often on shelves within reach      · Paint or place reflective tape on the edges of your stairs  Underweight  Underweight is defined as having a body mass index (BMI) of less than 18 5 kg/m2   Anorexia  is a loss of appetite, decreased food intake, or both  Your appetite naturally decreases as you get older  You also get full faster than you used to  This occurs because your body needs less energy  Other body changes can also lead to a decreased appetite  Even though some appetite loss is normal, you still need to get enough calories and nutrients to keep you healthy  You can start to lose too much weight if you do not eat as much food as your body needs  Unwanted weight loss can cause health problems, or worsen health problems you already have  You can also become dehydrated if you do not drink enough liquid  How to eat healthy and get enough nutrients:   · Choose healthy foods  Eat a variety of fruits, vegetables, whole grains, low-fat dairy foods, lean meats, and other protein foods  Limit foods high in fat, sugar, and salt  Limit or avoid alcohol as directed  Work with a dietitian to help you plan your meals if you need to follow a special diet  A dietitian can also teach you how to modify foods if you have trouble chewing or swallowing  · Snack on healthy foods between meals  if you only eat a small amount during meals  Snacks provide extra healthy nutrients and calories between meals  Examples include fruit, cheese, and whole grain crackers  · Drink liquids as directed  to avoid dehydration  Drink liquids between meals if they cause you to get full too quickly during meals  Ask how much liquid to drink each day and which liquids are best for you  · Use herbs, spices, and flavor enhancers to add flavor to foods  Avoid using herbs and spice blends that also contain sodium  Ask your healthcare provider or dietitian about flavor enhancers  Flavor enhancers with ham, natural sepulveda, and roast beef flavors can also be sprinkled on food to add flavor  · Share meals with others as often as you can  Eating with others may help you to eat better during meal time  Ask family members, neighbors, or friends to join you for lunch  There are also senior centers where you can meet people, and share meals with them  · Ask family and friends for help  with shopping or preparing foods  Ask for a ride to the grocery store, if needed  Narcotic (Opioid) Safety    Use narcotics safely:  · Take prescribed narcotics exactly as directed  · Do not give narcotics to others or take narcotics that belong to someone else  · Do not mix narcotics without medicines or alcohol  · Do not drive or operate heavy machinery after you take the narcotic  · Monitor for side effects and notify your healthcare provider if you experienced side effects such as nausea, sleepiness, itching, or trouble thinking clearly  Manage constipation:    Constipation is the most common side effect of narcotic medicine  Constipation is when you have hard, dry bowel movements, or you go longer than usual between bowel movements  Tell your healthcare provider about all changes in your bowel movements while you are taking narcotics  He or she may recommend laxative medicine to help you have a bowel movement  He or she may also change the kind of narcotic you are taking, or change when you take it  The following are more ways you can prevent or relieve constipation:    · Drink liquids as directed  You may need to drink extra liquids to help soften and move your bowels  Ask how much liquid to drink each day and which liquids are best for you  · Eat high-fiber foods  This may help decrease constipation by adding bulk to your bowel movements  High-fiber foods include fruits, vegetables, whole-grain breads and cereals, and beans  Your healthcare provider or dietitian can help you create a high-fiber meal plan   Your provider may also recommend a fiber supplement if you cannot get enough fiber from food   · Exercise regularly  Regular physical activity can help stimulate your intestines  Walking is a good exercise to prevent or relieve constipation  Ask which exercises are best for you  · Schedule a time each day to have a bowel movement  This may help train your body to have regular bowel movements  Bend forward while you are on the toilet to help move the bowel movement out  Sit on the toilet for at least 10 minutes, even if you do not have a bowel movement  Store narcotics safely:   · Store narcotics where others cannot easily get them  Keep them in a locked cabinet or secure area  Do not  keep them in a purse or other bag you carry with you  A person may be looking for something else and find the narcotics  · Make sure narcotics are stored out of the reach of children  A child can easily overdose on narcotics  Narcotics may look like candy to a small child  The best way to dispose of narcotics: The laws vary by country and area  In the [place], the best way is to return the narcotics through a take-back program  This program is offered by the [employer])  The following are options for using the program:  · Take the narcotics to a EH collection site  The site is often a law enforcement center  Call your local law enforcement center for scheduled take-back days in your area  You will be given information on where to go if the collection site is in a different location  · Take the narcotics to an approved pharmacy or hospital   A pharmacy or hospital may be set up as a collection site  You will need to ask if it is a EH collection site if you were not directed there  A pharmacy or doctor's office may not be able to take back narcotics unless it is a EH site  · Use a mail-back system  This means you are given containers to put the narcotics into  You will then mail them in the containers  · Use a take-back drop box    This is a place to leave the narcotics at any time  People and animals will not be able to get into the box  Your local law enforcement agency can tell you where to find a drop box in your area  Other ways to manage pain:   · Ask your healthcare provider about non-narcotic medicines to control pain  Nonprescription medicines include NSAIDs (such as ibuprofen) and acetaminophen  Prescription medicines include muscle relaxers, antidepressants, and steroids  · Pain may be managed without any medicines  Some ways to relieve pain include massage, aromatherapy, or meditation  Physical or occupational therapy may also help  For more information:   · Drug Enforcement Administration  [facility] [facility] [address]  Phone: 4- 102 - 387-2967  Web Address: [facility]/drug_disposal/    · [address]  and Drug Administration  [facility] , [address] [place] [address]  Phone: 9- 473 - 740-1671  Web Address: http://[employer]/     © Copyright [employer] 2018 Information is for End User's use only and may not be sold, redistributed or otherwise used for commercial purposes   All illustrations and images included in CareNotes® are the copyrighted property of A D A M , Inc  or [address] [place] [address] [employer]

## 2022-03-11 NOTE — TELEPHONE ENCOUNTER
Patient was having diarrhea so she refused to have [facility] come today they just wanted you to know   You can reach [facility] at [phone] if needed

## 2022-03-17 NOTE — TELEPHONE ENCOUNTER
Viky from [facility] reports that the physical therapist at home have been visiting pt 2x weekly - pt requests visits lowered to 1x weekly  Feels they are visiting too much  Pt had appt w/ A  Janet Gill and was prescribed Furosemide and Potassium BID and pt has only taken one tab - one time  She doesn't wish to spend all day urinating in the bathroom  Karina Lynne reports that she did get some fluid off and believes pt will be taking another tablet today  Pt advised therapist that she would take it as soon as she left      Viky # [phone]

## 2022-03-17 NOTE — LETTER
March 17, 2022     Luis Eduardo Gar MD  [address] [address]    Patient: Crow Dow   YOB: 1950   Date of Visit: 3/17/2022       Dear Dr Brooklyn Lemus:    Thank you for referring Shirlene Shankar to me for evaluation  Below are my notes for this consultation  If you have questions, please do not hesitate to call me  I look forward to following your patient along with you  Sincerely,        Ting Guillaume MD        CC: No Recipients  Ting Guillaume MD  3/17/2022  2:38 PM  Incomplete  [facility]      Chief Complaint:  Nausea and vomiting    HPI:  Crow Dow is a 66 y o   female who presents with nausea and vomiting  According to the patient she had had a partial gastrectomy done in 1993  She had a nonhealing ulcer at that time  Although the abdominal pain went away the nausea and vomiting never did  She has trouble keeping food down unless she has diarrhea  Apparently if she has diarrhea at her system cleans out she can keep food down  She has pasty dark stools all the time but not black  She has an unquantified weight loss  She has chronic oxygen therapy  She was a smoker in the past   She has no hematemesis  No dysphagia or odynophagia  No melena or hematochezia  No rectal bleeding  She has no actual abdominal pain  There is no specific food types that she vomits  She has no nocturnal symptomatology  No exertional symptomatology  She did not tolerate PPIs according to her  She does not think they did anything anyway  On March 3rd she underwent a celiac panel which was negative  January 29, 2022 she had normal iron studies are normal hemoglobin and hematocrit  She had a white count of 11 23 and an MCV of 102  She appears to have chronically elevated white count  At that time also a metabolic profile showed a sodium of 133, potassium 5 5, chloride of 98, alk-phos of 146, protein of 6 1, albumin of 2 9    There is no other contributory factor  Her last study of any kind was apparently an upper GI series done about 15 or more years ago  She has multiple loose pasty bowel movements during the day and has been having this for 25 years at least   She has not had any other studies in many years         Review of Systems:   Constitutional: No fever or chills, feels poorly, weight loss  HENT: No complaints of earache, no hearing loss, no nosebleeds, no nasal discharge, no sore throat, no hoarseness  Eyes: No complaints of eye pain, no red eyes, no discharge from eyes, no itchy eyes  Cardiovascular: No complaints of slow heart rate, no fast heart rate, no chest pain, no palpitations, no leg claudication, positive lower extremity edema  Respiratory:  Chronic shortness of breath, unable to exert  Gastrointestinal: As noted in HPI  Genitourinary: No complaints of dysuria, no incontinence, no hesitancy, no nocturia  Musculoskeletal:  Positive arthralgia, positive muscle loss  Neurological: No complaints of headache, no confusion, no convulsions, no numbness or tingling, no dizziness or fainting, no limb weakness, positive difficulty walking  Skin: No complaints of skin rash or skin lesions, no itching, no skin wound, no dry skin  Hematological/Lymphatic: No complaints of swollen glands, does not bleed easy  Allergic/Immunologic: No immunocompromised state  Endocrine:  No complaints of polyuria, no polydipsia  Psychiatric/Behavioral: is not suicidal, no sleep disturbances, no anxiety or depression, no change in personality, no emotional problems         Historical Information   Past Medical History:   Diagnosis Date    Acute respiratory failure with hypoxia ([address] ) 1/20/2022    Cough     Pneumonia due to COVID-19 virus 1/20/2022    SOB (shortness of breath)     Tobacco use 1/20/2022    Wheezing      Past Surgical History:   Procedure Laterality Date    LAPAROSCOPIC PARTIAL GASTRECTOMY       Social History   Social History Substance and Sexual Activity   Alcohol Use Not Currently     Social History     Substance and Sexual Activity   Drug Use Never     Social History     Tobacco Use   Smoking Status Former Smoker    Packs/day: 1 00    Years: 55 00    Pack years: 55 00    Types: Cigarettes    Start date: 5    Quit date: 2022    Years since quittin 2   Smokeless Tobacco Never Used     History reviewed  No pertinent family history  Current Medications: has a current medication list which includes the following prescription(s): albuterol, albuterol, fluticasone-vilanterol, furosemide, potassium chloride, and diclofenac sodium  Vital Signs: /80   Pulse 105   Ht 5' 5" (1 651 m)   SpO2 99%   BMI 15 81 kg/m²       Physical Exam:   Constitutional  General Appearance: No acute distress, chronically ill-appearing and significantly malnourished  She is on nasal oxygen  Head  Normocephalic  Eyes  Conjunctivae and lids: No swelling, erythema, or discharge  Pupils and irises: Equal, round and reactive to light  Ears, Nose, Mouth, and Throat  External inspection of ears and nose: Normal  Nasal mucosa, septum and turbinates: Normal without edema or erythema/   Oropharynx: Normal with no erythema, edema, exudate or lesions  Neck  Normal range of motion  Neck supple  Cardiovascular  Auscultation of the heart: Normal rate and rhythm, normal S1 and S2 without murmurs  Examination of the extremities for edema and/or varicosities: Normal  Pulmonary/Chest  Respiratory effort: No increased work of breathing or signs of respiratory distress  Auscultation of lungs: Clear to auscultation, equal breath sounds bilaterally, no wheezes, rales, no rhonchi  Abdomen  Abdomen: Non-tender, no masses  Liver and spleen: No hepatomegaly or splenomegaly  Musculoskeletal  Gait and station:  In a wheelchair  Digits and Nails: normal without clubbing or cyanosis    Inspection/palpation of joints, bones, and muscles: Diffuse musculoskeletal wasting  Neurological  No nystagmus or asterixis  Skin  Skin and subcutaneous tissue: Normal without rashes or lesions  Lymphatic  Palpation of the lymph nodes in neck: No lymphadenopathy  Psychiatric  Orientation to person, place and time: Normal   Mood and affect: Normal          Labs:  Lab Results   Component Value Date    ALT 35 01/30/2022    AST 32 01/30/2022    BUN 21 01/31/2022    CALCIUM 8 8 01/31/2022    CL 94 (L) 01/31/2022    CO2 32 01/31/2022    CREATININE 0 64 01/31/2022    HCT 37 7 01/30/2022    HGB 11 6 01/30/2022     01/30/2022    K 4 8 01/31/2022    WBC 11 23 (H) 01/30/2022         X-Rays & Procedures:   FL UGI w/ air routine    (Results Pending)   US abdomen complete    (Results Pending)           ______________________________________________________________________      Assessment & Plan:     Diagnoses and all orders for this visit:    Bilious vomiting with nausea  -     FL UGI w/ air routine; Future  -     US abdomen complete; Future  -     Calprotectin,Fecal; Future  -     Fecal leukocytes; Future  -     Occult Blood, Fecal Immunochemical; Future  -     Ova and parasite examination; Future  -     Stool Enteric Bacterial Panel by PCR; Future    Diarrhea, unspecified type  -     FL UGI w/ air routine; Future  -     US abdomen complete; Future  -     Calprotectin,Fecal; Future  -     Fecal leukocytes; Future  -     Occult Blood, Fecal Immunochemical; Future  -     Ova and parasite examination; Future  -     Stool Enteric Bacterial Panel by PCR; Future    Weight loss  -     FL UGI w/ air routine; Future  -     US abdomen complete; Future  -     Calprotectin,Fecal; Future  -     Fecal leukocytes; Future  -     Occult Blood, Fecal Immunochemical; Future  -     Ova and parasite examination; Future  -     Stool Enteric Bacterial Panel by PCR; Future      Patient will undergo stool testing, ultrasound, and upper GI series    Since the Pepto-Bismol is helping I advised that she try Pepcid 20 mg p o  b i d  She could not tolerate PPIs  She had taken Zantac in the past with good results  Further recommendation will depend on study results

## 2022-04-01 NOTE — TELEPHONE ENCOUNTER
Message from [address] [place] [address] visiting nurse     Continue to see physical therapy for the pt  Once a wk; per the pt's request  6 wks for strengthening and mobility

## 2022-04-29 NOTE — TELEPHONE ENCOUNTER
FYINed Dandy from [address]  Requesting one more mouth of physical therapist     Patient is doing well trying to walk without walker

## 2022-05-12 PROBLEM — E87.6 HYPOKALEMIA: Status: ACTIVE | Noted: 2022-01-01

## 2022-05-12 PROBLEM — J96.11 CHRONIC RESPIRATORY FAILURE WITH HYPOXIA (HCC): Status: ACTIVE | Noted: 2022-01-01

## 2022-05-12 PROBLEM — E55.9 VITAMIN D DEFICIENCY: Status: ACTIVE | Noted: 2022-01-01

## 2022-05-12 PROBLEM — R60.0 LOWER EXTREMITY EDEMA: Status: ACTIVE | Noted: 2022-01-01

## 2022-05-12 NOTE — ED PROVIDER NOTES
History  Chief Complaint   Patient presents with    Leg Swelling     Pt presents from PCP with lower extremity edema, worsening  On lasix at home, non compliant with compression stockings and here for eval for IV diuretics and admission  Ms Adilene Nunez is a 66year old female presenting from PCP with a chief complaint of bilateral lower extremity edema  She was also reported for purple discoloration of bilateral feet during her office visit, which essentially resolved by the time of presentation to our ED  There was concern for low BP readings at PCP visit, 90's/50's  Over the past 3 weeks, she reports that the swelling has increased, previously only noted in the left lower extremity, but now is noticeable bilaterally  She states that she has been experiencing swelling of her legs since being hospitalized for COVID in January  During this admission, she was also noted for a small pulmonary embolism, for which she was initially started on eliquis, but she reports that she was told to stop it by her PCP on the day after she was discharged  She has, additionally,required supplemental oxygen at 3L/min, which she has remained on since then, and is being worked-up by pulmonology as an outpatient  She denies any worsening shortness of breath/ PND/orthopnea, chest pain or palpitations  Previous ECHO conducted in January do not reveal any evidence of CHF with preserved EF and no diastolic dysfunction  Prior to Admission Medications   Prescriptions Last Dose Informant Patient Reported? Taking? albuterol (PROVENTIL HFA,VENTOLIN HFA) 90 mcg/act inhaler  Self No Yes   Sig: Inhale 2 puffs every 4 (four) hours as needed for wheezing or shortness of breath   fluticasone-vilanterol (BREO ELLIPTA) 100-25 mcg/inh inhaler 5/12/2022 at Unknown time Self No Yes   Sig: Inhale 1 puff daily Rinse mouth after use     furosemide (LASIX) 20 mg tablet 5/11/2022 at Unknown time Self No Yes   Sig: Take 1 tablet (20 mg total) by mouth 2 (two) times a day   Patient taking differently: Take 20 mg by mouth in the morning   potassium chloride (K-DUR,KLOR-CON) 10 mEq tablet Not Taking at Unknown time Self No No   Sig: Take 1 tablet (10 mEq total) by mouth 2 (two) times a day   Patient not taking: Reported on 2022      Facility-Administered Medications: None       Past Medical History:   Diagnosis Date    Acute respiratory failure with hypoxia ([address] ) 2022    Cough     Pneumonia due to COVID-19 virus 2022    SOB (shortness of breath)     Tobacco use 2022    Wheezing        Past Surgical History:   Procedure Laterality Date    LAPAROSCOPIC PARTIAL GASTRECTOMY         History reviewed  No pertinent family history  I have reviewed and agree with the history as documented  E-Cigarette/Vaping    E-Cigarette Use Never User      E-Cigarette/Vaping Substances    Nicotine No     THC No     CBD No     Flavoring No     Other No     Unknown No      Social History     Tobacco Use    Smoking status: Former Smoker     Packs/day: 1 00     Years: 55 00     Pack years: 55 00     Types: Cigarettes     Start date: 5     Quit date: 2022     Years since quittin 3    Smokeless tobacco: Never Used   Vaping Use    Vaping Use: Never used   Substance Use Topics    Alcohol use: Not Currently    Drug use: Never        Review of Systems   Constitutional: Negative for chills and fever  Respiratory: Positive for shortness of breath  Cardiovascular: Positive for leg swelling  Negative for chest pain and palpitations  Gastrointestinal: Positive for nausea and vomiting  Genitourinary: Negative for difficulty urinating  Musculoskeletal: Negative for arthralgias and myalgias  Skin: Positive for color change  Neurological: Negative for dizziness and headaches  Psychiatric/Behavioral: Negative for agitation and behavioral problems         Physical Exam  ED Triage Vitals   Temperature Pulse Respirations Blood Pressure SpO2   05/12/22 1111 05/12/22 1111 05/12/22 1111 05/12/22 1111 05/12/22 1113   97 9 °F (36 6 °C) 91 20 111/69 96 %      Temp Source Heart Rate Source Patient Position - Orthostatic VS BP Location FiO2 (%)   05/12/22 1111 05/12/22 1111 05/12/22 1111 05/12/22 1111 --   Oral Monitor Sitting Left arm       Pain Score       --                    Orthostatic Vital Signs  Vitals:    05/12/22 1111 05/12/22 1238   BP: 111/69 122/68   Pulse: 91 68   Patient Position - Orthostatic VS: Sitting Lying       Physical Exam  Constitutional:       Appearance: Normal appearance  She is underweight  HENT:      Head: Normocephalic and atraumatic  Eyes:      Conjunctiva/sclera: Conjunctivae normal    Neck:      Vascular: No JVD  Cardiovascular:      Rate and Rhythm: Normal rate and regular rhythm  Heart sounds: No murmur heard  Pulmonary:      Breath sounds: No wheezing, rhonchi or rales  Musculoskeletal:      Right lower leg: 3+ Edema present  Left lower leg: 3+ Edema present  Neurological:      Mental Status: She is alert and oriented to person, place, and time  Psychiatric:         Mood and Affect: Mood normal          Behavior: Behavior normal          ED Medications  Medications   furosemide (LASIX) injection 40 mg (40 mg Intravenous Given 5/12/22 1233)       Diagnostic Studies  Results Reviewed     Procedure Component Value Units Date/Time    Comprehensive metabolic panel [[id number]] Collected: 05/12/22 1227    Lab Status: In process Specimen: Blood from Arm, Left Updated: 05/12/22 1230    NT-BNP PRO [[id number]] Collected: 05/12/22 1227    Lab Status:  In process Specimen: Blood from Arm, Left Updated: 05/12/22 1230                 No orders to display         Procedures  Procedures      ED Course                                       MDM  Number of Diagnoses or Management Options  Diagnosis management comments: 66year old female presenting with bilateral lower extremity edema that is chronic in nature, ongoing since being hospitalized for COVID in January  She underwent evaluation for CHF which was unremarkable at that time  I doubt that this is an acute manifestation of heart failure, as there are no other focal signs suggestive of volume overload, such as noticeable JVD or pulmonary rales  She is noted for significantly low BMI and reports for decreased dietary intake, bringing into question the possibility that her symptoms may be secondary to protein-calorie malnutrition/hypoalbuminemia  Will obtain CMP to assess renal function, electrolytes as well as BNP  Will provide IV lasix 40 mg x 1 for bilateral lower extremity edema  Monitor vitals  Disposition  Final diagnoses:   None     ED Disposition     None      Follow-up Information    None         Patient's Medications   Discharge Prescriptions    No medications on file     No discharge procedures on file  PDMP Review     None           ED Provider  Attending physically available and evaluated Barbie Alegria I managed the patient along with the ED Attending      Electronically Signed by         Shoaib Pang MD  05/12/22 3070

## 2022-05-12 NOTE — PROGRESS NOTES
Internal Medicine [facility]    NAME: Sejal Alegria  AGE: 66 y o  SEX: female  : 1950     DATE: 2022     Assessment and Plan:     Problem List Items Addressed This Visit        Other    Lower extremity edema - Primary      Patient presents to the office today with an increase in her lower extremity swelling  She states this has been ongoing for approximately 3 weeks  The patient is mainly wheelchair bound and quite sedentary at home  She is oxygen dependent  She was evaluated by Cardiology in January of this year at which time an echocardiogram was performed which was normal   There were no suspicions for heart failure  Patient is not wearing her compression stockings  She does not ambulate much during the day  She currently is on Lasix 20 mg daily  Her blood pressures are low at 92/60  On exam the patient does have 3+ pitting edema from her mid calf to her feet  Her toes are purplish in color  Pulses could not be palpated due to the edema  She is pale in color  I do believe the patient would benefit from an emergency room evaluation at this time  I believe she does need to be diuresed however I do not feel comfortable increasing her Lasix due to her low blood pressures  I believe she should be monitored during this process  In addition the patient did have a diagnosis of a pulmonary embolism in the recent past and stopped her Eliquis on her own  She does continue to follow with pulmonology  She has no shortness of breath the office today  Relevant Orders    NT-BNP PRO    Compression Stocking    Transfer to other facility (Completed)            Falls Plan of Care: Home safety education provided  No follow-ups on file  Chief Complaint:     Chief Complaint   Patient presents with    Follow-up     Left leg purple and swollen, tingling feeling  Nausea   Check lungs    Results        History of Present Illness:       Patient presents to the office today as a walk-in appointment  She has some lower extremity swelling which is increasing over the past week  This is described above  I did recommend the patient be evaluated in the emergency room which is also described above  Review of Systems:     Review of Systems   Constitutional: Negative for activity change, fatigue and fever  HENT: Negative for congestion, hearing loss, rhinorrhea, trouble swallowing and voice change  Eyes: Negative for photophobia, pain, discharge and visual disturbance  Respiratory: Negative for cough, chest tightness and shortness of breath  Cardiovascular: Positive for leg swelling  Negative for chest pain and palpitations  Gastrointestinal: Negative for abdominal pain, blood in stool, constipation, nausea and vomiting  Endocrine: Negative for cold intolerance and heat intolerance  Genitourinary: Negative for difficulty urinating, frequency, hematuria, urgency, vaginal bleeding and vaginal discharge  Musculoskeletal: Negative for arthralgias and myalgias  Skin: Negative  Neurological: Negative for dizziness, weakness, numbness and headaches  Psychiatric/Behavioral: Negative for decreased concentration  The patient is not nervous/anxious           Problem List:     Patient Active Problem List   Diagnosis    Severe protein-calorie malnutrition ([facility] [address] )    Anemia    Pulmonary embolism (HCC)    Hypotension    Underweight    B12 deficiency    Depression, recurrent (HCC)    Lower extremity edema        Objective:     BP 92/60 (BP Location: Left arm, Patient Position: Sitting, Cuff Size: Standard)   Pulse 68   Temp (!) 97 4 °F (36 3 °C) (Temporal) Comment: no nsaids    Current Outpatient Medications   Medication Instructions    albuterol (PROVENTIL HFA,VENTOLIN HFA) 90 mcg/act inhaler 2 puffs, Inhalation, Every 4 hours PRN    Diclofenac Sodium (VOLTAREN) 2 g, Topical, 4 times daily    fluticasone-vilanterol (BREO ELLIPTA) 100-25 mcg/inh inhaler 1 puff, Inhalation, Daily, Rinse mouth after use   furosemide (LASIX) 20 mg, Oral, 2 times daily    potassium chloride (K-DUR,KLOR-CON) 10 mEq tablet 10 mEq, Oral, 2 times daily         Physical Exam  Vitals reviewed  Constitutional:       Appearance: Normal appearance  She is obese  HENT:      Head: Normocephalic  Nose: Nose normal       Mouth/Throat:      Mouth: Mucous membranes are moist       Pharynx: Oropharynx is clear  Eyes:      Extraocular Movements: Extraocular movements intact  Pupils: Pupils are equal, round, and reactive to light  Cardiovascular:      Rate and Rhythm: Normal rate and regular rhythm  Pulmonary:      Effort: Pulmonary effort is normal       Breath sounds: Normal breath sounds  Musculoskeletal:         General: Normal range of motion  Right lower leg: Edema present  Left lower leg: Pitting Edema present  Skin:     General: Skin is warm and dry  Coloration: Skin is pale  Neurological:      General: No focal deficit present  Mental Status: She is alert and oriented to person, place, and time  Psychiatric:         Mood and Affect: Mood normal          Behavior: Behavior normal          Thought Content:  Thought content normal          Judgment: Judgment normal          Mika Smith, [address] [place] [address]

## 2022-05-12 NOTE — ASSESSMENT & PLAN NOTE
Patient reports "I have been depressed all my life"  She adamantly refused psych consult or antidepressant medication  No SI/HI  Continue to monitor off medication

## 2022-05-12 NOTE — ED ATTENDING ATTESTATION
5/12/2022  IHammad DO, saw and evaluated the patient  I have discussed the patient with the resident/non-physician practitioner and agree with the resident's/non-physician practitioner's findings, Plan of Care, and MDM as documented in the resident's/non-physician practitioner's note, except where noted  All available labs and Radiology studies were reviewed  I was present for key portions of any procedure(s) performed by the resident/non-physician practitioner and I was immediately available to provide assistance  At this point I agree with the current assessment done in the Emergency Department  I have conducted an independent evaluation of this patient a history and physical is as follows:    67 y/o F p/w worsening BLE edema  Hx of covid in January, had small PE then  Has been on oxygen since then  3L oxygen via [place] at baseline  Had normal echo  Started on lasix 20 mg daily  No SOB  No orthopnea or PND  Has had nausea, has been not eating many solids  Patient does not appear toxic on examination, head normocephalic atraumatic, pupils equal reactive to light neck supple nontender, heart regular rate and rhythm, lungs clear to auscultation bilaterally bilateral significant pitting edema in extremities  Patient with significant lower extremity edema, not requiring any more oxygen than her baseline, given a dose of Lasix  She has hypokalemia, low albumin, likely malnourished  Discussed with internal medicine for hospitalization      Labs Reviewed   COMPREHENSIVE METABOLIC PANEL - Abnormal       Result Value Ref Range Status    Sodium 139  136 - 145 mmol/L Final    Potassium 2 8 (*) 3 5 - 5 3 mmol/L Final    Chloride 98 (*) 100 - 108 mmol/L Final    CO2 30  21 - 32 mmol/L Final    ANION GAP 11  4 - 13 mmol/L Final    BUN 9  5 - 25 mg/dL Final    Creatinine 0 56 (*) 0 60 - 1 30 mg/dL Final    Comment: Standardized to IDMS reference method    Glucose 100  65 - 140 mg/dL Final    Comment: If the patient is fasting, the ADA then defines impaired fasting glucose as > 100 mg/dL and diabetes as > or equal to 123 mg/dL  Specimen collection should occur prior to Sulfasalazine administration due to the potential for falsely depressed results  Specimen collection should occur prior to Sulfapyridine administration due to the potential for falsely elevated results  Calcium 8 0 (*) 8 3 - 10 1 mg/dL Final    Corrected Calcium 9 8  8 3 - 10 1 mg/dL Final    AST 35  5 - 45 U/L Final    Comment: Specimen collection should occur prior to Sulfasalazine administration due to the potential for falsely depressed results  ALT 28  12 - 78 U/L Final    Comment: Specimen collection should occur prior to Sulfasalazine administration due to the potential for falsely depressed results  Alkaline Phosphatase 247 (*) 46 - 116 U/L Final    Total Protein 5 5 (*) 6 4 - 8 2 g/dL Final    Albumin 1 8 (*) 3 5 - 5 0 g/dL Final    Total Bilirubin 0 20  0 20 - 1 00 mg/dL Final    Comment: Use of this assay is not recommended for patients undergoing treatment with eltrombopag due to the potential for falsely elevated results      eGFR 93  ml/min/1 73sq m Final    Narrative:     [place] guidelines for Chronic Kidney Disease (CKD):     Stage 1 with normal or high GFR (GFR > 90 mL/min/1 73 square meters)    Stage 2 Mild CKD (GFR = 60-89 mL/min/1 73 square meters)    Stage 3A Moderate CKD (GFR = 45-59 mL/min/1 73 square meters)    Stage 3B Moderate CKD (GFR = 30-44 mL/min/1 73 square meters)    Stage 4 Severe CKD (GFR = 15-29 mL/min/1 73 square meters)    Stage 5 End Stage CKD (GFR <15 mL/min/1 73 square meters)  Note: GFR calculation is accurate only with a steady state creatinine   NT-BNP PRO (BRAIN NATRIURETIC PEPTIDE) - Abnormal    NT-proBNP 394 (*) <125 pg/mL Final         ED Course         Critical Care Time  Procedures

## 2022-05-12 NOTE — PATIENT INSTRUCTIONS

## 2022-05-12 NOTE — ASSESSMENT & PLAN NOTE
Malnutrition Findings:        patient with loss of taste since COVID infection 01/2022  Decreased p o  Intake  She reports not tolerating Ensure, boost, or Pedialyte  Patient is not interested in tube feeding  Encourage oral intake  Will give a trial of ProSource t i d                           BMI Findings: There is no height or weight on file to calculate BMI

## 2022-05-12 NOTE — ASSESSMENT & PLAN NOTE
Patient had single subsegmental right lower lobe PE on 1/27/2022  Patient has been off Eliquis, stop by pulmonologist

## 2022-05-12 NOTE — H&P
[facility]  H&P- Bertin Baron 1950, 66 y o  female MRN: [id number]  Unit/Bed#: ED 15 Encounter: [id number]  Primary Care Provider: Naeem Wong MD   Date and time admitted to hospital: 5/12/2022 11:43 AM    * Lower extremity edema  Assessment & Plan  She presents with worsening lower extremity edema from baseline, no shortness of breath or chest pain likely due to worsening hypoalbuminemia  She is compliant with furosemide 20 mg daily  ProBNP 394  -TTE (3/46/0383) normal systolic and diastolic function, EF 63%  -will continue IV Lasix 40 mg b i d   -add ProSource t i d   -strict I&Os  -potassium supplementation      Chronic respiratory failure with hypoxia (HCC)  Assessment & Plan  Patient developed hypoxic respiratory failure post COVID pneumonia in 1/2022  Currently on 3L continues home O2  Pulse ox 99%   Albuterol p r n  Breo daily    Vitamin D deficiency  Assessment & Plan  Will start vitamin-D supplementation    Hypokalemia  Assessment & Plan  Replete potassium  Monitor potassium level    Depression, recurrent ([facility] [address] )  Assessment & Plan  Patient reports "I have been depressed all my life"  She adamantly refused psych consult or antidepressant medication  No SI/HI  Continue to monitor off medication    Pulmonary embolism [facility])  Assessment & Plan  Patient had single subsegmental right lower lobe PE on 1/27/2022  Patient has been off Eliquis, stop by pulmonologist      Anemia  Assessment & Plan  Hemoglobin improved  Continue current management    Severe protein-calorie malnutrition ([facility] [address] )  Assessment & Plan  Malnutrition Findings:        patient with loss of taste since COVID infection 01/2022  Decreased p o  Intake  She reports not tolerating Ensure, boost, or Pedialyte  Patient is not interested in tube feeding  Encourage oral intake  Will give a trial of ProSource t i d                           BMI Findings: There is no height or weight on file to calculate BMI  VTE Pharmacologic Prophylaxis: VTE Score: 4 Moderate Risk (Score 3-4) - Pharmacological DVT Prophylaxis Ordered: heparin  Code Status: Level 3 - DNAR and DNI   Discussion with family: Updated  (niece) at bedside  Anticipated Length of Stay: Patient will be admitted on an observation basis with an anticipated length of stay of less than 2 midnights secondary to Peripheral edema  Total Time for Visit, including Counseling / Coordination of Care: 60 minutes Greater than 50% of this total time spent on direct patient counseling and coordination of care  Chief Complaint:  Increased leg swelling    History of Present Illness:  Tiara Gonzalez is a 66 y o  female with a PMH of protein caloric malnutrition, PE off eliquis, chronic hypoxic respiratory failure on 3L O2 due to COVID-19 pneumonia (1/2022), baseline bilateral peripheral edema  who presents with worsening lower extremity edema while taking furosemide 20 mg daily  Patient denies shortness of Breath, orthopnea, PND or chest pain  Patient still with decreased appetite not tolerating much solid intake and nutritional supplement  Review of Systems:  Review of Systems  Comprehensive review of systems negative except in above HPI  Past Medical and Surgical History:   Past Medical History:   Diagnosis Date    Acute respiratory failure with hypoxia ([facility] [address] ) 1/20/2022    Cough     Pneumonia due to COVID-19 virus 1/20/2022    SOB (shortness of breath)     Tobacco use 1/20/2022    Wheezing        Past Surgical History:   Procedure Laterality Date    LAPAROSCOPIC PARTIAL GASTRECTOMY         Meds/Allergies:  Prior to Admission medications    Medication Sig Start Date End Date Taking?  Authorizing Provider   albuterol (PROVENTIL HFA,VENTOLIN HFA) 90 mcg/act inhaler Inhale 2 puffs every 4 (four) hours as needed for wheezing or shortness of breath 4/13/22  Yes Kallie Harris MD   fluticasone-vilanterol (BREO ELLIPTA) 100-25 mcg/inh inhaler Inhale 1 puff daily Rinse mouth after use  22  Yes Lola Griffiths MD   furosemide (LASIX) 20 mg tablet Take 1 tablet (20 mg total) by mouth 2 (two) times a day  Patient taking differently: Take 20 mg by mouth in the morning 22  Yes Lola Griffiths MD   potassium chloride (K-DUR,KLOR-CON) 10 mEq tablet Take 1 tablet (10 mEq total) by mouth 2 (two) times a day  Patient not taking: Reported on 2022 3/9/22   Yosvany Aguayo MD   Diclofenac Sodium (VOLTAREN) 1 % Apply 2 g topically 4 (four) times a day for 14 days  Patient not taking: Reported on 2022  ANDREIA Ramos     I have reviewed home medications with patient personally  Allergies: Allergies   Allergen Reactions    Eliquis [Apixaban] Dizziness      Patient was seeming double   felt like she was going pass out     Wheat Bran - Food Allergy Vomiting     Anything wheat make patient vomiting is to much for her to eat         Social History:  Marital Status: /Civil Union   Occupation:   Patient Pre-hospital Living Situation: With spouse  Patient Pre-hospital Level of Mobility: walks with walker  Patient Pre-hospital Diet Restrictions: none  Substance Use History:   Social History     Substance and Sexual Activity   Alcohol Use Not Currently     Social History     Tobacco Use   Smoking Status Former Smoker    Packs/day: 1 00    Years: 55 00    Pack years: 55 00    Types: Cigarettes    Start date:     Quit date: 2022    Years since quittin 3   Smokeless Tobacco Never Used     Social History     Substance and Sexual Activity   Drug Use Never       Family History:  History reviewed  No pertinent family history      Physical Exam:     Vitals:   Blood Pressure: 117/76 (22 1300)  Pulse: (!) 109 (22 1300)  Temperature: 97 9 °F (36 6 °C) (22 1111)  Temp Source: Oral (22 1111)  Respirations: 16 (22 1300)  SpO2: 99 % (22 1300)    Physical Exam  Vitals and nursing note reviewed  Constitutional:       General: She is not in acute distress  Appearance: Normal appearance  She is well-developed  She is ill-appearing  Comments: Niece at the bedside  Cachectic with temporal wasting   HENT:      Head: Normocephalic and atraumatic  Eyes:      Conjunctiva/sclera: Conjunctivae normal    Cardiovascular:      Rate and Rhythm: Normal rate and regular rhythm  Pulses: Normal pulses  Heart sounds: Normal heart sounds  No murmur heard  Pulmonary:      Effort: Pulmonary effort is normal  No respiratory distress  Breath sounds: Normal breath sounds  Abdominal:      General: Abdomen is flat  Bowel sounds are normal  There is no distension  Palpations: Abdomen is soft  Tenderness: There is no abdominal tenderness  Musculoskeletal:      Cervical back: Neck supple  Right lower leg: Edema (2+) present  Left lower leg: Edema (2+) present  Skin:     General: Skin is warm and dry  Neurological:      General: No focal deficit present  Mental Status: She is alert and oriented to person, place, and time  Psychiatric:         Mood and Affect: Mood normal          Behavior: Behavior normal           Additional Data:     Lab Results:  Results from last 7 days   Lab Units 05/12/22  0915   WBC Thousand/uL 7 56   HEMOGLOBIN g/dL 13 0   HEMATOCRIT % 45 1   PLATELETS Thousands/uL 439*   NEUTROS PCT % 84*   LYMPHS PCT % 10*   MONOS PCT % 5   EOS PCT % 0     Results from last 7 days   Lab Units 05/12/22  1227   SODIUM mmol/L 139   POTASSIUM mmol/L 2 8*   CHLORIDE mmol/L 98*   CO2 mmol/L 30   BUN mg/dL 9   CREATININE mg/dL 0 56*   ANION GAP mmol/L 11   CALCIUM mg/dL 8 0*   ALBUMIN g/dL 1 8*   TOTAL BILIRUBIN mg/dL 0 20   ALK PHOS U/L 247*   ALT U/L 28   AST U/L 35   GLUCOSE RANDOM mg/dL 100                       Imaging: No pertinent imaging reviewed  No orders to display       EKG and Other Studies Reviewed on Admission:   · EKG: No EKG obtained      ** Please Note: This note has been constructed using a voice recognition system   **

## 2022-05-12 NOTE — ASSESSMENT & PLAN NOTE
Patient developed hypoxic respiratory failure post COVID pneumonia in 1/2022  Currently on 3L continues home O2  Pulse ox 99%   Albuterol p r n    Breo daily

## 2022-05-12 NOTE — ASSESSMENT & PLAN NOTE
She presents with worsening lower extremity edema from baseline, no shortness of breath or chest pain likely due to worsening hypoalbuminemia  She is compliant with furosemide 20 mg daily  ProBNP 394  -TTE (3/75/1142) normal systolic and diastolic function, EF 12%  -will continue IV Lasix 40 mg b i d   -add ProSource t i d   -strict I&Os  -potassium supplementation

## 2022-05-13 NOTE — ASSESSMENT & PLAN NOTE
Presented with worsening lower extremity edema from baseline, no shortness of breath or chest pain likely due to worsening hypoalbuminemia    She is compliant with furosemide 20 mg daily  ProBNP 394  -TTE (5/74/9048) normal systolic and diastolic function, EF 44%  Patient was started on Lasix 40 mg IV b i d     -will hold diuretics, will give 1 dose of 25% IV albumin   -continue ProSource t i d   -patient will benefit from outpatient GI evaluation to address the underlying cause of malnutrition secondary to poor oral intake

## 2022-05-13 NOTE — ASSESSMENT & PLAN NOTE
Patient reported "I have been depressed all my life"  She adamantly refused psych consult or antidepressant medication on presentation  No SI/HI  Continue to monitor off medication- patient will benefit from outpatient psychiatric evaluation

## 2022-05-13 NOTE — PLAN OF CARE
Problem: OCCUPATIONAL THERAPY ADULT  Goal: Performs self-care activities at highest level of function for planned discharge setting  See evaluation for individualized goals  Description: Treatment Interventions: ADL retraining, Functional transfer training, Endurance training, UE strengthening/ROM, Patient/family training, Compensatory technique education, Equipment evaluation/education, Continued evaluation, Energy conservation, Activityengagement          See flowsheet documentation for full assessment, interventions and recommendations  Note: Limitation: Decreased ADL status, Decreased endurance, Decreased self-care trans, Decreased high-level ADLs  Prognosis: Good  Assessment: Patient is a 66 y o  female [facility] OT evaluation [facility] w/Lower extremity edema  Commorbidities affecting patient's functional performance at time of assessment include: chronic respiratory failure with hypoxia, vitamin D deficiency, hypokalemia, recurrent depression, PE, anemia, and severe protein-calorie malnutrition  Patient  has a past medical history of Acute respiratory failure with hypoxia ([facility] [address] ) (1/20/2022), Cough, Pneumonia due to COVID-19 virus (1/20/2022), SOB (shortness of breath), Tobacco use (1/20/2022), and Wheezing  Orders placed for OT evaluation and treatment  Performed at least two patient identifiers during session including name and wristband  Prior to admission, patient was living with her spouse in a one-story home with 2 RODNEY via the back porch  At baseline, patient reports that she is independent in ADLs and receives assistance with IADLs from family  Personal factors affecting patient at time of initial evaluation include: steps to enter, difficulty performing ADLs and difficulty performing IADLs   Upon evaluation, patient requires supervision and set up assist for UB ADLs, supervision, set up and minimal assist for LB ADLs, transfers and functional ambulation in room with contact guard assist, with the use of Rolling Walker  Patient is alert and oriented x 4  Occupational performance is affected by the following deficits: dynamic sit/ stand balance deficit with poor standing tolerance time for self care and functional mobility and decreased activity tolerance, weakness, and decreased physical endurance and stamina  Patient to benefit from continued Occupational Therapy treatment while in the hospital to address deficits as defined above and maximize level of functional independence with ADLs and functional mobility  Occupational Performance areas to address include: grooming , bathing/ shower, dressing, toilet hygiene, transfer to all surfaces, functional mobility, health maintenance, IADLs: safety procedures and Leisure Participation  From OT standpoint, recommendation at time of d/c would be Home with family support and Home OT       OT Discharge Recommendation: Home with home health rehabilitation

## 2022-05-13 NOTE — OCCUPATIONAL THERAPY NOTE
Occupational Therapy Evaluation Note        Patient Name: Sloan Echevarria Date: 5/13/2022 05/13/22 5043   OT Last Visit   OT Visit Date 05/13/22   Note Type   Note type Evaluation   Restrictions/Precautions   Weight Bearing Precautions Per Order No   Braces or Orthoses Other (Comment)  (none per pt)   Other Precautions Chair Alarm; Bed Alarm; Fall Risk;Multiple lines;O2  (3L home O2 continuously, 3 5L O2 NC in hospital)   Pain Assessment   Pain Assessment Tool 0-10   Pain Score No Pain   Home Living   Type of [address] [place] [address] One level; Other (Comment); Performs ADLs on one level  (2 RODNEY via back porch)   Bathroom Shower/Tub Tub/shower unit  (Pt reports that family assists with shower transfers)   [address] bars in shower;Commode   [facility]; Wheelchair-manual;Hospital bed  (Pt ambulatory with rollator at baseline)   Prior Function   Level of [place] Independent with ADLs and functional mobility   Lives With Spouse   Receives Help From Family;Home health  (niece lives locally  home PT 1x/wk)   ADL Assistance Independent   IADLs Needs assistance   Falls in the last 6 months (S)  5 to 10  ("slide out of bed")   Vocational Retired   Comments (-) drives, family provides transportation   Lifestyle   Autonomy Per patient report, she lives with her spouse in a one-story home with 2 RODNEY via the back porch  At baseline, patient reports that she is primarily independent in ADLs and receives assistance for IADLs from her family  Patient is ambulatory with a rollator at baseline     Reciprocal Relationships Family   Service to Others Retired- green house   Intrinsic Gratification Coloring, [address] ([address]) [address]  7  [address] [facility] 5  16 John Broderick UB Dressing Assistance 5  Supervision/Setup   LB Dressing Assistance 5  Supervision/Setup   Toileting Assistance  5  Supervision/Setup   Functional Assistance 5  Supervision/Setup   Additional Comments ADL assist levels based on pt's functional performance during OT evaluation   Bed Mobility   Supine to Sit 5  Supervision   Additional items Assist x 1;HOB elevated;Verbal cues   Additional Comments Patient received lying supine in bed upon OT arrival; at end of session: pt seated OOB to recliner chair w/ all needs within reach and chair alarm activated   Transfers   Sit to Stand 4  Minimal assistance  (CGA)   Additional items Assist x 1; Increased time required;Verbal cues   Stand to Sit 4  Minimal assistance  (CGA)   Additional items Assist x 1; Armrests; Increased time required;Verbal cues   Additional Comments Performed functional transfers using RW   Functional Mobility   Functional Mobility 4  Minimal assistance   Additional Comments CGA x1; Ambulation within pt room with no overt LOB   Patient with noted SOB/DOZIER post mobility trials, SpO2 decreased to 88% at lowest noted reading on Masimo on 3 5L O2 NC   Additional items Rolling walker   Balance   Static Sitting Fair +   Dynamic Sitting Fair   Static Standing Fair   Dynamic Standing Fair -   Ambulatory Fair -   Activity Tolerance   Activity Tolerance Patient limited by fatigue   Medical Staff Made Aware PT Jannetta Schaumann   Nurse Made Aware BOLA Patel confirmed pt appropriate for therapy   RUE Assessment   RUE Assessment WFL  (AROM grossly WFL; strength 4/5 distally)   LUE Assessment   LUE Assessment WFL  (AROM grossly WFL; strength 4/5 distally)   Hand Function   Gross Motor Coordination Functional  (Based on pt's functional performance)   Fine Motor Coordination Functional  (Based on pt's functional performance)   Sensation   Light Touch No apparent deficits  (BUEs)   Additional Comments Patient denies diminished sensation t/o BUEs   Vision-Basic Assessment   Current Vision Wears glasses all the time   Patient Visual Report Other (Comment)  (Patient reports intermittent double vision, none reported at time of IE )   Cognition   Overall Cognitive Status [facility]   Arousal/Participation Alert; Responsive; Cooperative   Attention Within functional limits   Orientation Level Oriented X4   Memory Within functional limits   Following Commands Follows all commands and directions without difficulty   Comments Patient agreeable to OT evaluation   Assessment   Limitation Decreased ADL status; Decreased endurance;Decreased self-care trans;Decreased high-level ADLs   Prognosis Good   Assessment Patient is a 66 y o  female seen for OT evaluation s/p admit to [id number] [place] [address] on 5/12/2022 w/Lower extremity edema  Commorbidities affecting patient's functional performance at time of assessment include: chronic respiratory failure with hypoxia, vitamin D deficiency, hypokalemia, recurrent depression, PE, anemia, and severe protein-calorie malnutrition  Patient  has a past medical history of Acute respiratory failure with hypoxia ([facility] [address] ) (1/20/2022), Cough, Pneumonia due to COVID-19 virus (1/20/2022), SOB (shortness of breath), Tobacco use (1/20/2022), and Wheezing  Orders placed for OT evaluation and treatment  Performed at least two patient identifiers during session including name and wristband  Prior to admission, patient was living with her spouse in a one-story home with 2 RODNEY via the back porch  At baseline, patient reports that she is independent in ADLs and receives assistance with IADLs from family  Personal factors affecting patient at time of initial evaluation include: steps to enter, difficulty performing ADLs and difficulty performing IADLs  Upon evaluation, patient requires supervision and set up assist for UB ADLs, supervision, set up and minimal assist for LB ADLs, transfers and functional ambulation in room with contact guard assist, with the use of [facility]   Patient is alert and oriented x 4  Occupational performance is affected by the following deficits: dynamic sit/ stand balance deficit with poor standing tolerance time for self care and functional mobility and decreased activity tolerance, weakness, and decreased physical endurance and stamina  Patient to benefit from continued Occupational Therapy treatment while in the hospital to address deficits as defined above and maximize level of functional independence with ADLs and functional mobility  Occupational Performance areas to address include: grooming , bathing/ shower, dressing, toilet hygiene, transfer to all surfaces, functional mobility, health maintenance, IADLs: safety procedures and Leisure Participation  From OT standpoint, recommendation at time of d/c would be Home with family support and Home OT  Plan   Treatment Interventions ADL retraining;Functional transfer training; Endurance training;UE strengthening/ROM; Patient/family training; Compensatory technique education;Equipment evaluation/education;Continued evaluation; Energy conservation; Activityengagement   Goal Expiration Date 05/23/22   OT Treatment Day 0   OT Frequency 2-3x/wk   Recommendation   OT Discharge Recommendation Home with home health rehabilitation   Additional Comments  The patient's raw score on the AM-PAC Daily Activity inpatient short form is 20, standardized score is 42 03, greater than 39 4  Patients at this level are likely to benefit from discharge to home  Please refer to the recommendation of the Occupational Therapist for safe discharge planning     AM-PAC Daily Activity Inpatient   Lower Body Dressing 3   Bathing 3   Toileting 3   Upper Body Dressing 3   Grooming 4   Eating 4   Daily Activity Raw Score 20   Daily Activity Standardized Score (Calc for Raw Score >=11) 42 03   AM-[facility] Applied Cognition Inpatient   Following a Speech/Presentation 4   Understanding Ordinary Conversation 4   Taking Medications 4   Remembering Where Things Are Placed or Put Away 4   Remembering List of 4-5 Errands 4   Taking Care of Complicated Tasks 4   Applied Cognition Raw Score 24   Applied Cognition Standardized Score 62 21   Barthel Index   Feeding 10   Bathing 0   Grooming Score 5   Dressing Score 5   Bladder Score 10   Bowels Score 10   Toilet Use Score 5   Transfers (Bed/Chair) Score 10   Mobility (Level Surface) Score 0   Stairs Score 0   Barthel Index Score 55   Modified Lincoln Scale   Modified Pardeep Scale 3     Occupational Therapy Goals to be completed in 7-10 Days:    1 - Patient will verbalize and demonstrate use of energy conservation/ deep breathing technique and work simplification skills during functional activity with no verbal cues  2 - Patient will verbalize and demonstrate good body mechanics and joint protection techniques during  ADLs/ IADLs with no verbal cues  3 - Patient will increase OOB/ sitting tolerance to 2-4 hours per day for increased participation in self care and leisure tasks with no s/s of exertion  4 - Patient will increase standing tolerance time to 5 minutes with unilateral UE support to complete sink level ADLs@ mod I level  5 - Patient will increase sitting tolerance at edge of bed to 30 minutes to complete UB ADLs @ set up assist level  6 - Patient will transfer bed to Chair / toilet at Mod I assist level with AD as indicated  7 - Patient will complete UB and LB ADLs with Mod I assist      8 - Patient will demonstrate good safety awareness during functional transfers, mobility, and ADLs 100% of the time with no VCs  9 - Patient will complete toileting hygiene with Mod I assist/ supervision for thoroughness      10 - Patient/ Family will demonstrate competency with 1 CECILIO Espino/DYLAN

## 2022-05-13 NOTE — QUICK NOTE
Received text from the nurse in charge that the patient is vomiting  Assessed the patient at bedside at 10:53 a m , patient had 1 episode of greenish tinged bilious vomiting  Patient reports that she has been vomiting all day yesterday prior to coming to the hospital, and she has been dealing with this all her life  She has no other associated symptoms, appears tired  Will keep patient NPO, until GI evaluation, start patient on LR at 75 cc an hour  EKG for QTc check     Zofran PRN

## 2022-05-13 NOTE — ASSESSMENT & PLAN NOTE
Potassium at the time of admission-2 8, repleted-potassium this morning-3 3  Repeat a  EKG revealed a QTC of 450

## 2022-05-13 NOTE — PLAN OF CARE
Problem: Potential for Falls  Goal: Patient will remain free of falls  Description: INTERVENTIONS:  - Educate patient/family on patient safety including physical limitations  - Instruct patient to call for assistance with activity   - Consult OT/PT to assist with strengthening/mobility   - Keep Call bell within reach  - Keep bed low and locked with side rails adjusted as appropriate  - Keep care items and personal belongings within reach  - Initiate and maintain comfort rounds  - Make Fall Risk Sign visible to staff  - Offer Toileting every 3 Hours, in advance of need  - Initiate/Maintain bed alarm  - Obtain necessary fall risk management equipment:   - Apply yellow socks and bracelet for high fall risk patients  - Consider moving patient to room near nurses station  Outcome: Progressing     Problem: MOBILITY - ADULT  Goal: Maintain or return to baseline ADL function  Description: INTERVENTIONS:  -  Assess patient's ability to carry out ADLs; assess patient's baseline for ADL function and identify physical deficits which impact ability to perform ADLs (bathing, care of mouth/teeth, toileting, grooming, dressing, etc )  - Assess/evaluate cause of self-care deficits   - Assess range of motion  - Assess patient's mobility; develop plan if impaired  - Assess patient's need for assistive devices and provide as appropriate  - Encourage maximum independence but intervene and supervise when necessary  - Involve family in performance of ADLs  - Assess for home care needs following discharge   - Consider OT consult to assist with ADL evaluation and planning for discharge  - Provide patient education as appropriate  Outcome: Progressing  Goal: Maintains/Returns to pre admission functional level  Description: INTERVENTIONS:  - Perform BMAT or MOVE assessment daily    - Set and communicate daily mobility goal to care team and patient/family/caregiver     - Collaborate with rehabilitation services on mobility goals if consulted  - Perform Range of Motion 3 times a day  - Reposition patient every 3 hours    - Dangle patient 3 times a day  - Stand patient 3 times a day  - Ambulate patient 3 times a day  - Out of bed to chair 3 times a day   - Out of bed for meals 3 times a day  - Out of bed for toileting  - Record patient progress and toleration of activity level   Outcome: Progressing     Problem: PAIN - ADULT  Goal: Verbalizes/displays adequate comfort level or baseline comfort level  Description: Interventions:  - Encourage patient to monitor pain and request assistance  - Assess pain using appropriate pain scale  - Administer analgesics based on type and severity of pain and evaluate response  - Implement non-pharmacological measures as appropriate and evaluate response  - Consider cultural and social influences on pain and pain management  - Notify physician/advanced practitioner if interventions unsuccessful or patient reports new pain  Outcome: Progressing     Problem: INFECTION - ADULT  Goal: Absence or prevention of progression during hospitalization  Description: INTERVENTIONS:  - Assess and monitor for signs and symptoms of infection  - Monitor lab/diagnostic results  - Monitor all insertion sites, i e  indwelling lines, tubes, and drains  - Monitor endotracheal if appropriate and nasal secretions for changes in amount and color  - [place] appropriate cooling/warming therapies per order  - Administer medications as ordered  - Instruct and encourage patient and family to use good hand hygiene technique  - Identify and instruct in appropriate isolation precautions for identified infection/condition  Outcome: Progressing  Goal: Absence of fever/infection during neutropenic period  Description: INTERVENTIONS:  - Monitor WBC    Outcome: Progressing     Problem: SAFETY ADULT  Goal: Patient will remain free of falls  Description: INTERVENTIONS:  - Educate patient/family on patient safety including physical limitations  - Instruct patient to call for assistance with activity   - Consult OT/PT to assist with strengthening/mobility   - Keep Call bell within reach  - Keep bed low and locked with side rails adjusted as appropriate  - Keep care items and personal belongings within reach  - Initiate and maintain comfort rounds  - Make Fall Risk Sign visible to staff  - Offer Toileting every 3 Hours, in advance of need  - Initiate/Maintain bed alarm  - Obtain necessary fall risk management equipment:   - Apply yellow socks and bracelet for high fall risk patients  - Consider moving patient to room near nurses station  Outcome: Progressing  Goal: Maintain or return to baseline ADL function  Description: INTERVENTIONS:  -  Assess patient's ability to carry out ADLs; assess patient's baseline for ADL function and identify physical deficits which impact ability to perform ADLs (bathing, care of mouth/teeth, toileting, grooming, dressing, etc )  - Assess/evaluate cause of self-care deficits   - Assess range of motion  - Assess patient's mobility; develop plan if impaired  - Assess patient's need for assistive devices and provide as appropriate  - Encourage maximum independence but intervene and supervise when necessary  - Involve family in performance of ADLs  - Assess for home care needs following discharge   - Consider OT consult to assist with ADL evaluation and planning for discharge  - Provide patient education as appropriate  Outcome: Progressing  Goal: Maintains/Returns to pre admission functional level  Description: INTERVENTIONS:  - Perform BMAT or MOVE assessment daily    - Set and communicate daily mobility goal to care team and patient/family/caregiver  - Collaborate with rehabilitation services on mobility goals if consulted  - Perform Range of Motion 3 times a day  - Reposition patient every 3 hours    - Dangle patient 3 times a day  - Stand patient 3 times a day  - Ambulate patient 3 times a day  - Out of bed to chair 3 times a day   - Out of bed for meals 3 times a day  - Out of bed for toileting  - Record patient progress and toleration of activity level   Outcome: Progressing     Problem: DISCHARGE PLANNING  Goal: Discharge to home or other facility with appropriate resources  Description: INTERVENTIONS:  - Identify barriers to discharge w/patient and caregiver  - Arrange for needed discharge resources and transportation as appropriate  - Identify discharge learning needs (meds, wound care, etc )  - Arrange for interpretive services to assist at discharge as needed  - Refer to Case Management Department for coordinating discharge planning if the patient needs post-hospital services based on physician/advanced practitioner order or complex needs related to functional status, cognitive ability, or social support system  Outcome: Progressing     Problem: Knowledge Deficit  Goal: Patient/family/caregiver demonstrates understanding of disease process, treatment plan, medications, and discharge instructions  Description: Complete learning assessment and assess knowledge base    Interventions:  - Provide teaching at level of understanding  - Provide teaching via preferred learning methods  Outcome: Progressing     Problem: Prexisting or High Potential for Compromised Skin Integrity  Goal: Skin integrity is maintained or improved  Description: INTERVENTIONS:  - Identify patients at risk for skin breakdown  - Assess and monitor skin integrity  - Assess and monitor nutrition and hydration status  - Monitor labs   - Assess for incontinence   - Turn and reposition patient  - Assist with mobility/ambulation  - Relieve pressure over bony prominences  - Avoid friction and shearing  - Provide appropriate hygiene as needed including keeping skin clean and dry  - Evaluate need for skin moisturizer/barrier cream  - Collaborate with interdisciplinary team   - Patient/family teaching  - Consider wound care consult   Outcome: Progressing     Problem: Nutrition/Hydration-ADULT  Goal: Nutrient/Hydration intake appropriate for improving, restoring or maintaining nutritional needs  Description: Monitor and assess patient's nutrition/hydration status for malnutrition  Collaborate with interdisciplinary team and initiate plan and interventions as ordered  Monitor patient's weight and dietary intake as ordered or per policy  Utilize nutrition screening tool and intervene as necessary  Determine patient's food preferences and provide high-protein, high-caloric foods as appropriate       INTERVENTIONS:  - Monitor oral intake, urinary output, labs, and treatment plans  - Assess nutrition and hydration status and recommend course of action  - Evaluate amount of meals eaten  - Assist patient with eating if necessary   - Allow adequate time for meals  - Recommend/ encourage appropriate diets, oral nutritional supplements, and vitamin/mineral supplements  - Order, calculate, and assess calorie counts as needed  - Recommend, monitor, and adjust tube feedings and TPN/PPN based on assessed needs  - Assess need for intravenous fluids  - Provide specific nutrition/hydration education as appropriate  - Include patient/family/caregiver in decisions related to nutrition  Outcome: Progressing

## 2022-05-13 NOTE — ASSESSMENT & PLAN NOTE
Patient had single subsegmental right lower lobe PE on 1/27/2022  Patient has been off Eliquis, outpatient pulmonologist is aware, upon chart review it looks like patient had a adverse reaction to Eliquis and since it was a single subsegmental PE, pulmonologist was okay with not continuing long-term anticoagulation

## 2022-05-13 NOTE — MALNUTRITION/BMI
This medical record reflects one or more clinical indicators suggestive of malnutrition and/or morbid obesity  Malnutrition Findings:   Adult Malnutrition type: Chronic illness  Adult Degree of Malnutrition: Other severe protein calorie malnutrition  Malnutrition Characteristics: Fat loss, Muscle loss, Inadequate energy, Weight loss, Fluid accumulation                  360 Statement: related to inadequate energy intakes of less than 75% or more of estimated needs for greater than 1 month, oral aversion, complaint of frequent nausea with PO, poor taste sensation that hasn't fully recovered since having COVID in January 2022 per Patient, early satiety with previous history partial Gastrectomy; as evidenced by significant weight loss of 8 8 lb (9 3%) x 2 months, 16 7 lb (16%) x 4 months; hollowing around Orbitals, Temples, wasting around Triceps and Biceps; protruding Clavicle; wasting around Shoulders and Ribs  Intervention-High Calorie/Protein diet, small frequent meals/snacks, trial of vanilla Mightyshake BID (refusing Ensures)  BMI Findings:  Adult BMI Classifications: Underweight < 18 5        Body mass index is 14 38 kg/m²  See Nutrition note dated 5/13/22 for additional details  Completed nutrition assessment is viewable in the nutrition documentation

## 2022-05-13 NOTE — ASSESSMENT & PLAN NOTE
Malnutrition Findings:   Adult Malnutrition type: Chronic illness  Adult Degree of Malnutrition: Other severe protein calorie malnutrition patient with loss of taste since COVID infection 01/2022  Decreased p o  Intake  She reports not tolerating Ensure, boost, or Pedialyte  When discussed with the patient about tube feeding at the time of admission, she was apparently not interested, we initiated the discussion today at bedside-patient reports that she may be willing to consider  Trial of ProSource t i d   Malnutrition Characteristics: Fat loss, Muscle loss, Inadequate energy, Weight loss, Fluid accumulation                  360 Statement: related to inadequate energy intakes of less than 75% or more of estimated needs for greater than 1 month, oral aversion, complaint of frequent nausea with PO, poor taste sensation that hasn't fully recovered since having COVID in January 2022 per Patient, early satiety with previous history partial Gastrectomy; as evidenced by significant weight loss of 8 8 lb (9 3%) x 2 months, 16 7 lb (16%) x 4 months; hollowing around Orbitals, Temples, wasting around Triceps and Biceps; protruding Clavicle; wasting around Shoulders and Ribs  Intervention-High Calorie/Protein diet, small frequent meals/snacks, trial of vanilla Mightyshake BID (refusing Ensures)  BMI Findings:  Adult BMI Classifications: Underweight < 18 5        Body mass index is 14 38 kg/m²

## 2022-05-13 NOTE — PROGRESS NOTES
Patient very upset due to her diet being NPO, states " I'm very hungry, I want to eat dinner, if I don't eat I will ripped out IV's and call my  to take me home"  On call physician notified, verbal order for diet change taken

## 2022-05-13 NOTE — ASSESSMENT & PLAN NOTE
Patient developed hypoxic respiratory failure post COVID pneumonia in 1/2022  Currently on 3L continues home O2    Albuterol p r n    Breo daily

## 2022-05-13 NOTE — PROGRESS NOTES
[address] [facility]  Progress Note Kingsley Avelar 1950, 66 y o  female MRN: [id number]  Unit/Bed#: -Rohan Encounter: [id number]  Primary Care Provider: Addy Iglesias MD   Date and time admitted to hospital: 5/12/2022 11:43 AM    Chronic respiratory failure with hypoxia [facility])  Assessment & Plan  Patient developed hypoxic respiratory failure post COVID pneumonia in 1/2022  Currently on 3L continues home O2    Albuterol p r n  Breo daily    Vitamin D deficiency  Assessment & Plan  Will start vitamin-D supplementation    Hypokalemia  Assessment & Plan  Potassium at the time of admission-2 8, repleted-potassium this morning-3 3  Repeat a  EKG revealed a QTC of 450  Depression, recurrent [facility])  Assessment & Plan  Patient reported "I have been depressed all my life"  She adamantly refused psych consult or antidepressant medication on presentation  No SI/HI  Continue to monitor off medication- patient will benefit from outpatient psychiatric evaluation    Pulmonary embolism [facility])  Assessment & Plan  Patient had single subsegmental right lower lobe PE on 1/27/2022  Patient has been off Eliquis, outpatient pulmonologist is aware, upon chart review it looks like patient had a adverse reaction to Eliquis and since it was a single subsegmental PE, pulmonologist was okay with not continuing long-term anticoagulation  Anemia  Assessment & Plan  Hemoglobin today-13  Continue current management    Severe protein-calorie malnutrition ([address] )  Assessment & Plan  Malnutrition Findings:   Adult Malnutrition type: Chronic illness  Adult Degree of Malnutrition: Other severe protein calorie malnutrition patient with loss of taste since COVID infection 01/2022  Decreased p o   Intake  She reports not tolerating Ensure, boost, or Pedialyte  When discussed with the patient about tube feeding at the time of admission, she was apparently not interested, we initiated the discussion today at bedside-patient reports that she may be willing to consider  Trial of ProSource t i d   Malnutrition Characteristics: Fat loss, Muscle loss, Inadequate energy, Weight loss, Fluid accumulation                  360 Statement: related to inadequate energy intakes of less than 75% or more of estimated needs for greater than 1 month, oral aversion, complaint of frequent nausea with PO, poor taste sensation that hasn't fully recovered since having COVID in January 2022 per Patient, early satiety with previous history partial Gastrectomy; as evidenced by significant weight loss of 8 8 lb (9 3%) x 2 months, 16 7 lb (16%) x 4 months; hollowing around Orbitals, Temples, wasting around Triceps and Biceps; protruding Clavicle; wasting around Shoulders and Ribs  Intervention-High Calorie/Protein diet, small frequent meals/snacks, trial of vanilla Mightyshake BID (refusing Ensures)  BMI Findings:  Adult BMI Classifications: Underweight < 18 5        Body mass index is 14 38 kg/m²         * Lower extremity edema  Assessment & Plan  Presented with worsening lower extremity edema from baseline, no shortness of breath or chest pain likely due to worsening hypoalbuminemia    She is compliant with furosemide 20 mg daily  ProBNP 394  -TTE (4/43/4630) normal systolic and diastolic function, EF 97%  Patient was started on Lasix 40 mg IV b i d     -will hold diuretics, will give 1 dose of 25% IV albumin   -continue ProSource t i d   -patient will benefit from outpatient GI evaluation to address the underlying cause of malnutrition secondary to poor oral intake          INTERNAL MEDICINE RESIDENCY PROGRESS NOTE     Name: Remigio Paz   Age & Sex: 66 y o  female   MRN: [id number]  Unit/Bed#: -01   Encounter: [id number]    PATIENT INFORMATION     Name: Remigio Paz   Age & Sex: 66 y o  female   MRN: [id number]  Hospital Stay Days: 0    ASSESSMENT/PLAN     Principal Problem:    Lower extremity edema  Active Problems:    Severe protein-calorie malnutrition ([facility] )    Anemia    Pulmonary embolism (HCC)    Depression, recurrent (HCC)    Hypokalemia    Vitamin D deficiency    Chronic respiratory failure with hypoxia (HCC)      Chronic respiratory failure with hypoxia [facility])  Assessment & Plan  Patient developed hypoxic respiratory failure post COVID pneumonia in 1/2022  Currently on 3L continues home O2    Albuterol p r n  Breo daily    Vitamin D deficiency  Assessment & Plan  Will start vitamin-D supplementation    Hypokalemia  Assessment & Plan  Potassium at the time of admission-2 8, repleted-potassium this morning-3 3  Repeat a  EKG revealed a QTC of 450  Depression, recurrent [facility])  Assessment & Plan  Patient reported "I have been depressed all my life"  She adamantly refused psych consult or antidepressant medication on presentation  No SI/HI  Continue to monitor off medication- patient will benefit from outpatient psychiatric evaluation    Pulmonary embolism [facility])  Assessment & Plan  Patient had single subsegmental right lower lobe PE on 1/27/2022  Patient has been off Eliquis, outpatient pulmonologist is aware, upon chart review it looks like patient had a adverse reaction to Eliquis and since it was a single subsegmental PE, pulmonologist was okay with not continuing long-term anticoagulation  Anemia  Assessment & Plan  Hemoglobin today-13  Continue current management    Severe protein-calorie malnutrition ([facility] )  Assessment & Plan  Malnutrition Findings:   Adult Malnutrition type: Chronic illness  Adult Degree of Malnutrition: Other severe protein calorie malnutrition patient with loss of taste since COVID infection 01/2022  Decreased p o  Intake  She reports not tolerating Ensure, boost, or Pedialyte  When discussed with the patient about tube feeding at the time of admission, she was apparently not interested, we initiated the discussion today at bedside-patient reports that she may be willing to consider      Trial of ProSource t i d   Malnutrition Characteristics: Fat loss, Muscle loss, Inadequate energy, Weight loss, Fluid accumulation                  360 Statement: related to inadequate energy intakes of less than 75% or more of estimated needs for greater than 1 month, oral aversion, complaint of frequent nausea with PO, poor taste sensation that hasn't fully recovered since having COVID in January 2022 per Patient, early satiety with previous history partial Gastrectomy; as evidenced by significant weight loss of 8 8 lb (9 3%) x 2 months, 16 7 lb (16%) x 4 months; hollowing around Orbitals, Temples, wasting around Triceps and Biceps; protruding Clavicle; wasting around Shoulders and Ribs  Intervention-High Calorie/Protein diet, small frequent meals/snacks, trial of vanilla Mightyshake BID (refusing Ensures)  BMI Findings:  Adult BMI Classifications: Underweight < 18 5        Body mass index is 14 38 kg/m²  * Lower extremity edema  Assessment & Plan  Presented with worsening lower extremity edema from baseline, no shortness of breath or chest pain likely due to worsening hypoalbuminemia    She is compliant with furosemide 20 mg daily  ProBNP 394  -TTE (2/81/4593) normal systolic and diastolic function, EF 93%  Patient was started on Lasix 40 mg IV b i d     -will hold diuretics, will give 1 dose of 25% IV albumin   -continue ProSource t i d   -patient will benefit from outpatient GI evaluation to address the underlying cause of malnutrition secondary to poor oral intake        Disposition:  Pending clinical improvement  SUBJECTIVE     Patient seen and examined  No acute events overnight  Patient has significant decrease in appetite, secondary to loss of taste from COVID 19 infection  Patient reports that she has also been dealing with chronic nausea and vomiting episodes  Patient denies any other new symptoms like fevers/chills  Patient had 1 episode of vomiting today      OBJECTIVE     Vitals:    05/13/22 6505 05/13/22 0598 05/13/22 4581 22 0747   BP: 110/79 109/79 105/75 105/75   Pulse: (!) 109 (!) 107  (!) 111   Resp: (!) 25 (!) 24     Temp: 98 5 °F (36 9 °C) 98 2 °F (36 8 °C) 98 °F (36 7 °C) 98 °F (36 7 °C)   TempSrc:       SpO2: 97% 95%  (!) 89%   Weight:       Height:          Temperature:   Temp (24hrs), Av 1 °F (36 7 °C), Min:97 3 °F (36 3 °C), Max:98 5 °F (36 9 °C)    Temperature: 98 °F (36 7 °C)  Intake & Output:  I/O        07 07 07 07 07 0700    P  O   600     Total Intake(mL/kg)  600 (15 3)     Urine (mL/kg/hr)  400     Total Output  400     Net  +200                Weights:        Body mass index is 14 38 kg/m²  Weight (last 2 days)     Date/Time Weight    22 1650 39 2 (86 4)        Physical Exam  LABORATORY DATA     Labs: I have personally reviewed pertinent reports  Results from last 7 days   Lab Units 22  0915   WBC Thousand/uL 7 56   HEMOGLOBIN g/dL 13 0   HEMATOCRIT % 45 1   PLATELETS Thousands/uL 439*   NEUTROS PCT % 84*   MONOS PCT % 5      Results from last 7 days   Lab Units 22  0550 22  1227 22  0915   POTASSIUM mmol/L 3 3* 2 8* 3 1*   CHLORIDE mmol/L 100 98* 100   CO2 mmol/L 31 30 33*   BUN mg/dL 8 9 8   CREATININE mg/dL 0 52* 0 56* 0 60   CALCIUM mg/dL 7 7* 8 0* 8 3   ALK PHOS U/L  --  247* 239*   ALT U/L  --  28 26   AST U/L  --  35 45     Results from last 7 days   Lab Units 22  0550   MAGNESIUM mg/dL 1 9     Results from last 7 days   Lab Units 22  0550   PHOSPHORUS mg/dL 2 3                    IMAGING & DIAGNOSTIC TESTING     Radiology Results: I have personally reviewed pertinent reports  No results found  Other Diagnostic Testing: I have personally reviewed pertinent reports      ACTIVE MEDICATIONS     Current Facility-Administered Medications   Medication Dose Route Frequency    albumin human (FLEXBUMIN) 25 % injection 12 5 g  12 5 g Intravenous Once    albuterol (PROVENTIL HFA,VENTOLIN HFA) inhaler 2 puff  2 puff Inhalation Q4H PRN    cholecalciferol (VITAMIN D3) tablet 1,000 Units  1,000 Units Oral Daily    fluticasone-vilanterol (BREO ELLIPTA) 100-25 mcg/inh inhaler 1 puff  1 puff Inhalation Daily    heparin (porcine) subcutaneous injection 5,000 Units  5,000 Units Subcutaneous Q12H CHAPARRO    lactated ringers infusion  75 mL/hr Intravenous Continuous    melatonin tablet 3 mg  3 mg Oral HS PRN    ondansetron (ZOFRAN) injection 4 mg  4 mg Intravenous Q8H PRN    potassium chloride oral solution 20 mEq  20 mEq Oral Daily       VTE Pharmacologic Prophylaxis: Heparin  VTE Mechanical Prophylaxis: sequential compression device    Portions of the record may have been created with voice recognition software  Occasional wrong word or "sound a like" substitutions may have occurred due to the inherent limitations of voice recognition software    Read the chart carefully and recognize, using context, where substitutions have occurred   ==  Antionette Trevizo MD  [address]  Internal Medicine Residency PGY-3

## 2022-05-13 NOTE — QUICK NOTE
Plan of care discussed with the patient's family member - Niece at bedside  Answered their questions/ concerns, provided daily updates

## 2022-05-13 NOTE — PLAN OF CARE
Problem: PHYSICAL THERAPY ADULT  Goal: Performs mobility at highest level of function for planned discharge setting  See evaluation for individualized goals  Description: Treatment/Interventions: Functional transfer training, LE strengthening/ROM, Elevations, Therapeutic exercise, Endurance training, Patient/family training, Equipment eval/education, Gait training, Bed mobility, Spoke to nursing  Equipment Recommended: Paras Degroot (AUTUMN)       See flowsheet documentation for full assessment, interventions and recommendations  5/13/2022 1503 by Chelsea Yanes, PT  Note: Prognosis: Good  Problem List: Decreased strength, Decreased endurance, Impaired balance, Decreased mobility  Assessment: Pt is 66 y o  female seen for high-complexity PT evaluation on 5/13/2022 s/p admit to [facility] on 5/12/2022 w/ Lower extremity edema  PT was consulted to assess pt's functional mobility and d/c needs  Order placed for PT eval and tx, w/ up w/ A order  PTA, pt resides with spouse in [facility] with 2 RODNEY, uses rollator at baseline, +fall history, receiving HHPT services  At time of eval, pt performing bed mobility at A, transfers and household distance gait trial at [facility] with RW  Upon evaluation, pt presenting with impaired functional mobility d/t decreased strength, decreased endurance, impaired balance, decreased mobility and activity intolerance  Pertinent PMHx and current co-morbidities affecting pt's physical performance at time of assessment include: LE edema, severe protein calorie malnutrition, anemia, PE, depression, hypokalemia, vitamin D deficiency, chronic respiratory failure with hypoxia, hypotension, underweight, B12 deficiency  Personal factors affecting pt at time of eval include: inaccessible home environment, ambulating w/ assistive device, inability to navigate community distances and positive fall history   The following objective measures performed on IE also reveal limitations: Barthel Index: 50/100, Modified Welcome: 3 (moderate disability) and AM-PAC 6-Clicks: 47/31  Pt's clinical presentation is currently unstable/unpredictable seen in pt's presentation of abnormal lab value(s), need for input for task focus and mobility technique and ongoing medical assessment  Overall, pt's rehab potential and prognosis to return to PLOF is good as impacted by objective findings, warranting pt to receive further skilled PT interventions to address identified impairments, activity limitation(s), and participation restriction(s)  Pt to benefit from continued PT tx to address deficits as defined above and maximize level of functional independent mobility and consistency  From PT/mobility standpoint, recommendation at time of d/c would be home with home health rehabilitation pending progress in order to facilitate return to PLOF  Barriers to Discharge: Inaccessible home environment        PT Discharge Recommendation: Home with home health rehabilitation          See flowsheet documentation for full assessment  5/13/2022 1503 by Melanie Murry PT  Note: Prognosis: Good  Problem List: Decreased strength, Decreased endurance, Impaired balance, Decreased mobility  Assessment: Pt is 66 y o  female seen for high-complexity PT evaluation on 5/13/2022 s/p admit to [facility] on 5/12/2022 w/ Lower extremity edema  PT was consulted to assess pt's functional mobility and d/c needs  Order placed for PT eval and tx, w/ up w/ A order  PTA, pt resides with spouse in [facility] with 2 RODNEY, uses rollator at baseline, +fall history, receiving HHPT services  At time of eval, pt performing bed mobility at [facility], transfers and household distance gait trial at [facility] with RW  Upon evaluation, pt presenting with impaired functional mobility d/t decreased strength, decreased endurance, impaired balance, decreased mobility and activity intolerance   Pertinent PMHx and current co-morbidities affecting pt's physical performance at time of assessment include: LE edema, severe protein calorie malnutrition, anemia, PE, depression, hypokalemia, vitamin D deficiency, chronic respiratory failure with hypoxia, hypotension, underweight, B12 deficiency  Personal factors affecting pt at time of eval include: inaccessible home environment, ambulating w/ assistive device, inability to navigate community distances and positive fall history  The following objective measures performed on IE also reveal limitations: Barthel Index: 50/100, Modified Pardeep: 3 (moderate disability) and AM-PAC 6-Clicks: 72/68  Pt's clinical presentation is currently unstable/unpredictable seen in pt's presentation of abnormal lab value(s), need for input for task focus and mobility technique and ongoing medical assessment  Overall, pt's rehab potential and prognosis to return to PLOF is good as impacted by objective findings, warranting pt to receive further skilled PT interventions to address identified impairments, activity limitation(s), and participation restriction(s)  Pt to benefit from continued PT tx to address deficits as defined above and maximize level of functional independent mobility and consistency  From PT/mobility standpoint, recommendation at time of d/c would be home with home health rehabilitation pending progress in order to facilitate return to PLOF  Barriers to Discharge: Inaccessible home environment        PT Discharge Recommendation: Home with home health rehabilitation          See flowsheet documentation for full assessment

## 2022-05-13 NOTE — PHYSICAL THERAPY NOTE
Physical Therapy Evaluation   Time in: 1331  Time out: 1350  Total evaluation time: 19 minutes    Patient's Name: Caio Grover    Admitting Diagnosis  Hypoalbuminemia [E88 09]  Leg swelling [M79 89]  Bilateral lower extremity edema [R60 0]    Problem List  Patient Active Problem List   Diagnosis    Severe protein-calorie malnutrition ([facility] )    Anemia    Pulmonary embolism (HCC)    Hypotension    Underweight    B12 deficiency    Depression, recurrent ([facility] )    Lower extremity edema    Hypokalemia    Vitamin D deficiency    Chronic respiratory failure with hypoxia [facility])       Past Medical History  Past Medical History:   Diagnosis Date    Acute respiratory failure with hypoxia ([facility] ) 1/20/2022    Cough     Pneumonia due to COVID-19 virus 1/20/2022    SOB (shortness of breath)     Tobacco use 1/20/2022    Wheezing        Past Surgical History  Past Surgical History:   Procedure Laterality Date    LAPAROSCOPIC PARTIAL GASTRECTOMY         PT performed at least 2 patient identifiers during session: Name and wristband  05/13/22 1350   PT Last Visit   PT Visit Date 05/13/22   Note Type   Note type Evaluation   Pain Assessment   Pain Assessment Tool 0-10   Pain Score No Pain   Restrictions/Precautions   Weight Bearing Precautions Per Order No   Braces or Orthoses   (none per pt)   Other Precautions Chair Alarm; Bed Alarm; Fall Risk  (3L home O2 continuously, 3 5L O2 NC in hospital)   Home Living   Type of [address] [place] [address] One level;Performs ADLs on one level  (2 RODNEY via back porch)   Bathroom Shower/Tub Tub/shower unit  (Pt reports that family assists with shower transfers)   [address] bars in shower;Commode   [facility]; Wheelchair-manual;Hospital bed  (Pt ambulatory with rollator at baseline)   Prior Function   Level of [place] Independent with ADLs and functional mobility   Lives With Spouse   Receives Help From Family;Home health  (niece lives locally  home PT 1x/wk)   ADL Assistance Independent   IADLs Needs assistance   Falls in the last 6 months 5 to 10  ("slide out of bed")   Vocational Retired   Comments (-) drives, family provides transportation   General   Family/Caregiver Present No   Cognition   Overall Cognitive Status WFL   Arousal/Participation Alert   Orientation Level Oriented X4   Memory Within functional limits   Following Commands Follows all commands and directions without difficulty   Comments pt agreeable to PT eval   RLE Assessment   RLE Assessment   (grossly 3+/5 observed c functional mobility)   LLE Assessment   LLE Assessment   (grossly 3+/5 observed c functional mobility)   Coordination   Movements are Fluid and Coordinated 1   Sensation WFL   Bed Mobility   Supine to Sit 5  Supervision   Additional items Assist x 1;HOB elevated; Increased time required;Verbal cues   Transfers   Sit to Stand 4  Minimal assistance  (CGA)   Additional items Assist x 1; Armrests; Increased time required;Verbal cues   Stand to Sit 4  Minimal assistance  (CGA)   Additional items Assist x 1; Armrests; Increased time required;Verbal cues   Ambulation/Elevation   Gait pattern Decreased foot clearance;Shuffling; Short stride; Step to   Gait Assistance 4  Minimal assist  (CGA)   Additional items Assist x 1;Verbal cues   Assistive Device Rolling walker   Distance 20' x 2   Balance   Static Sitting Fair +   Dynamic Sitting Fair   Static Standing Fair   Dynamic Standing Fair -   Ambulatory Fair -   Endurance Deficit   Endurance Deficit No   Activity Tolerance   Activity Tolerance Patient limited by fatigue   Medical Staff Made Aware care coordination with JB Madden   Nurse Made Aware RN [facility]   Assessment   Prognosis Good   Problem List Decreased strength;Decreased endurance; Impaired balance;Decreased mobility   Assessment Pt is 66 y o  female seen for high-complexity PT evaluation on 5/13/2022 s/p admit to [facility] on 5/12/2022 w/ Lower extremity edema  PT was consulted to assess pt's functional mobility and d/c needs  Order placed for PT eval and tx, w/ up w/ A order  PTA, pt resides with spouse in [facility] with 2 RODNEY, uses rollator at baseline, +fall history, receiving HHPT services  At time of eval, pt performing bed mobility at A, transfers and household distance gait trial at [facility] with RW  Upon evaluation, pt presenting with impaired functional mobility d/t decreased strength, decreased endurance, impaired balance, decreased mobility and activity intolerance  Pertinent PMHx and current co-morbidities affecting pt's physical performance at time of assessment include: LE edema, severe protein calorie malnutrition, anemia, PE, depression, hypokalemia, vitamin D deficiency, chronic respiratory failure with hypoxia, hypotension, underweight, B12 deficiency  Personal factors affecting pt at time of eval include: inaccessible home environment, ambulating w/ assistive device, inability to navigate community distances and positive fall history  The following objective measures performed on IE also reveal limitations: Barthel Index: 50/100, Modified Padreep: 3 (moderate disability) and AM-PAC 6-Clicks: 58/61  Pt's clinical presentation is currently unstable/unpredictable seen in pt's presentation of abnormal lab value(s), need for input for task focus and mobility technique and ongoing medical assessment  Overall, pt's rehab potential and prognosis to return to PLOF is good as impacted by objective findings, warranting pt to receive further skilled PT interventions to address identified impairments, activity limitation(s), and participation restriction(s)  Pt to benefit from continued PT tx to address deficits as defined above and maximize level of functional independent mobility and consistency  From PT/mobility standpoint, recommendation at time of d/c would be home with home health rehabilitation pending progress in order to facilitate return to PLOF  Barriers to Discharge Inaccessible home environment   Goals   Patient Goals to dance   STG Expiration Date 05/23/22   Short Term Goal #1 In 7-10 days: Increase bilateral LE strength 1/2 grade to facilitate independent mobility, Perform all bed mobility tasks modified independent to decrease caregiver burden, Perform all transfers modified independent to improve independence, Ambulate > 150 ft  with least restrictive assistive device modified independent w/o LOB and w/ normalized gait pattern 100% of the time, Navigate 2 stairs with distant S with unilateral handrail to facilitate return to previous living environment, Increase all balance 1/2 grade to decrease risk for falls, Tolerate 4 hr OOB to faciliate upright tolerance, Improve Barthel Index score to 65 or greater to facilitate independence and PT provider will perform functional balance assessment to determine fall risk   PT Treatment Day 0   Plan   Treatment/Interventions Functional transfer training;LE strengthening/ROM; Elevations; Therapeutic exercise; Endurance training;Patient/family training;Equipment eval/education;Gait training;Bed mobility;Spoke to nursing   PT Frequency 3-5x/wk   Recommendation   PT Discharge Recommendation Home with home health rehabilitation   Equipment Recommended Walker  (RW)   AM-PAC Basic Mobility Inpatient   Turning in Bed Without Bedrails 3   Lying on Back to Sitting on Edge of Flat Bed 3   Moving Bed to Chair 3   Standing Up From Chair 3   Walk in Room 3   Climb 3-5 Stairs 2   Basic Mobility Inpatient Raw Score 17   Basic Mobility Standardized Score 39 67   Highest Level Of Mobility   -HL Goal 5: Stand one or more mins   -HLM Achieved 6: Walk 10 steps or more   Modified [place] Scale   Modified Pardeep Scale 3   Barthel Index   Feeding 10   Bathing 0   Grooming Score 0   Dressing Score 5   Bladder Score 10   Bowels Score 10   Toilet Use Score 5   Transfers (Bed/Chair) Score 10   Mobility (Level Surface) Score 0   Stairs Score 0   Barthel Index Score 50       Melanie Murry, PT, DPT

## 2022-05-13 NOTE — CASE MANAGEMENT
Case Management Assessment & Discharge Planning Note    Patient name Juan Pablo Farmer  Location /-48 MRN [id number]  : 1950 Date 2022       Current Admission Date: 2022  Current Admission Diagnosis:Lower extremity edema   Patient Active Problem List    Diagnosis Date Noted    Lower extremity edema 2022    Hypokalemia 2022    Vitamin D deficiency 2022    Chronic respiratory failure with hypoxia ([facility] ) 2022    Underweight 2022    B12 deficiency 2022    Depression, recurrent ([facility] ) 2022    Anemia 2022    Pulmonary embolism ([facility] ) 2022    Hypotension 2022    Severe protein-calorie malnutrition ([facility] ) 2022      LOS (days): 0  Geometric Mean LOS (GMLOS) (days):   Days to GMLOS:     OBJECTIVE:         Current admission status: Inpatient     Preferred Pharmacy:   [address] [place] [address] [facility] [address]  [place] [address]  Phone: [phone] Fax: [phone]    Primary Care Provider: Haley Quinones MD    Primary Insurance: MEDICARE  Secondary Insurance:     ASSESSMENT:  Active Health Care Proxies     [facility] Representative - Spouse   Primary Phone: [phone] (Mobile)               Advance Directives  Does patient have a [address] [place] [address] Avenue?: No  Was patient offered paperwork?: No (She reports she already has paperwork filled out at home; it's just not signed yet )  Does patient currently have a Health Care decision maker?: Yes, please see Health Care Proxy section  Does patient have Advance Directives?: No  Was patient offered paperwork?: No (see above)  Primary Contact: spouse Jarek Moreno     Readmission Root Cause  30 Day Readmission: No    Patient Information  Admitted from[de-identified] Home  Mental Status: Alert  During Assessment patient was accompanied by: Not accompanied during assessment  Assessment information provided by[de-identified] Patient  Primary Caregiver: Self  Support Systems: Spouse/significant other, Family members, Home care staff  [place] of Residence: Alvino Alcocer do you live in?:  [address] entry access options  Select all that apply : Stairs  Number of steps to enter home : 2  Type of Current Residence: Mayi [place]  In the last 12 months, was there a time when you were not able to pay the mortgage or rent on time?: No  In the last 12 months, how many places have you lived?: 1  In the last 12 months, was there a time when you did not have a steady place to sleep or slept in a shelter (including now)?: No  Living Arrangements: Lives w/ Spouse/significant other    Activities of Daily Living Prior to Admission  Functional Status: Assistance  Completes ADLs independently?: No  Level of ADL dependence: Assistance (spouse/niece assist)  Ambulates independently?: No  Level of ambulatory dependence: Assistance  Does patient use assisted devices?: Yes  Assisted Devices (DME) used: Janalyn Martinet, Wheelchair, Bedside Commode, Home Oxygen concentrator, Portable Oxygen tanks (Patient uses RW in the home and WC outside the home   "As long as I can stand I use the walker")  DME Company Name (respiratory supplies): unsure  O2 Rate(s): 3L  Does patient currently own DME?: Yes  What DME does the patient currently own?: Bedside Commode, Home Oxygen concentrator, Portable Oxygen tanks, Walker, Wheelchair  Does patient have a history of Outpatient Therapy (PT/OT)?: No  Does the patient have a history of Short-Term Rehab?: No  Does patient have a history of HHC?: Yes  Does patient currently have [facility]?: Yes    Current Home Health Care  Type of Current Home Care Services: Home OT, [address][de-identified] [facility] Provider[de-identified] PCP    Patient Information Continued  Does patient have prescription coverage?: Yes  Within the past 12 months, you worried that your food would run out before you got the money to buy more : Never true  Within the past 12 months, the food you bought just didn't last and you didn't have money to get more : Never true  Does patient receive dialysis treatments?: No  Does patient have a history of substance abuse?: No  Does patient have a history of Mental Health Diagnosis?: Yes (Depression)  Is patient receiving treatment for mental health?: No  Patient declined treatment information  (She reports she's never been on medications and she would never try them )  Has patient received inpatient treatment related to mental health in the last 2 years?: No     Means of Transportation  Means of Transport to Appts[de-identified] Family transport  In the past 12 months, has lack of transportation kept you from medical appointments or from getting medications?: No  In the past 12 months, has lack of transportation kept you from meetings, work, or from getting things needed for daily living?: No    DISCHARGE DETAILS:    Discharge planning discussed with[de-identified] patient at bedside, spouse Chloé Tenorio via phone  Freedom of Choice: Yes  Comments - Freedom of Choice: CM met with patient at bedside to introduce self/role  Patient is alert and oriented and sitting up in bed  She reports she's current with [facility] for PT/OT at home  She knows that PT/OT are going to evaluate her here at the hospital, however, she'll refuse rehab if recommended and just wants to go home  She would like SN/PT/OT through [facility] and CM agreed to send referral via [address]    CM contacted family/caregiver?: Yes (CM provided update to patient's spouse Chloé Tenorio via phone; he supports patient's decision to come home with [facility])  Were Treatment Team discharge recommendations reviewed with patient/caregiver?: Yes  Did patient/caregiver verbalize understanding of patient care needs?: Yes  Were patient/caregiver advised of the risks associated with not following Treatment Team discharge recommendations?: Yes    Contacts  Patient Contacts: -Darren  Relationship to Patient[de-identified] Family  Contact Method: Phone  Phone Number: [phone]  Reason/Outcome: Continuity of Care, Referral, Discharge [address]         Is the patient interested in [facility] [place] at discharge?: Yes  Via Alessandra Dunn requested[de-identified] Nursing, Occupational Therapy, Physical [address] Name[de-identified] [facility] Provider[de-identified] PCP  Home Health Services Needed[de-identified] Evaluate Functional Status and Safety, Gait/ADL Training, Strengthening/Theraputic Exercises to Improve Function, Other (comment) (diuresis; lower extremity edema)  Homebound Criteria Met[de-identified] Uses an Assist Device (i e  cane, walker, etc), Requires the Assistance of Another Person for Safe Ambulation or to Leave the Home  Supporting Clincal Findings[de-identified] Fatigues Easliy in [employer], Limited Endurance    DME Referral Provided  Referral made for DME?: No    Other Referral/Resources/Interventions Provided:  Interventions: [facility]  Referral Comments: Referral to [facility] for SN/PT/OT DESTINY    Would you like to participate in our [address] service program?  : No - Declined     Discharge Destination Plan[de-identified] Home with [place] at Discharge : Family

## 2022-05-14 NOTE — PLAN OF CARE
Problem: Potential for Falls  Goal: Patient will remain free of falls  Description: INTERVENTIONS:  - Educate patient/family on patient safety including physical limitations  - Instruct patient to call for assistance with activity   - Consult OT/PT to assist with strengthening/mobility   - Keep Call bell within reach  - Keep bed low and locked with side rails adjusted as appropriate  - Keep care items and personal belongings within reach  - Initiate and maintain comfort rounds  - Make Fall Risk Sign visible to staff  - Apply yellow socks and bracelet for high fall risk patients  - Consider moving patient to room near nurses station  Outcome: Progressing     Problem: MOBILITY - ADULT  Goal: Maintain or return to baseline ADL function  Description: INTERVENTIONS:  -  Assess patient's ability to carry out ADLs; assess patient's baseline for ADL function and identify physical deficits which impact ability to perform ADLs (bathing, care of mouth/teeth, toileting, grooming, dressing, etc )  - Assess/evaluate cause of self-care deficits   - Assess range of motion  - Assess patient's mobility; develop plan if impaired  - Assess patient's need for assistive devices and provide as appropriate  - Encourage maximum independence but intervene and supervise when necessary  - Involve family in performance of ADLs  - Assess for home care needs following discharge   - Consider OT consult to assist with ADL evaluation and planning for discharge  - Provide patient education as appropriate  Outcome: Progressing  Goal: Maintains/Returns to pre admission functional level  Description: INTERVENTIONS:  - Perform BMAT or MOVE assessment daily    - Set and communicate daily mobility goal to care team and patient/family/caregiver     - Collaborate with rehabilitation services on mobility goals if consulted  - Out of bed for toileting  - Record patient progress and toleration of activity level   Outcome: Progressing     Problem: PAIN - ADULT  Goal: Verbalizes/displays adequate comfort level or baseline comfort level  Description: Interventions:  - Encourage patient to monitor pain and request assistance  - Assess pain using appropriate pain scale  - Administer analgesics based on type and severity of pain and evaluate response  - Implement non-pharmacological measures as appropriate and evaluate response  - Consider cultural and social influences on pain and pain management  - Notify physician/advanced practitioner if interventions unsuccessful or patient reports new pain  Outcome: Progressing     Problem: INFECTION - ADULT  Goal: Absence or prevention of progression during hospitalization  Description: INTERVENTIONS:  - Assess and monitor for signs and symptoms of infection  - Monitor lab/diagnostic results  - Monitor all insertion sites, i e  indwelling lines, tubes, and drains  - Monitor endotracheal if appropriate and nasal secretions for changes in amount and color  - [place] appropriate cooling/warming therapies per order  - Administer medications as ordered  - Instruct and encourage patient and family to use good hand hygiene technique  - Identify and instruct in appropriate isolation precautions for identified infection/condition  Outcome: Progressing  Goal: Absence of fever/infection during neutropenic period  Description: INTERVENTIONS:  - Monitor WBC    Outcome: Progressing     Problem: SAFETY ADULT  Goal: Patient will remain free of falls  Description: INTERVENTIONS:  - Educate patient/family on patient safety including physical limitations  - Instruct patient to call for assistance with activity   - Consult OT/PT to assist with strengthening/mobility   - Keep Call bell within reach  - Keep bed low and locked with side rails adjusted as appropriate  - Keep care items and personal belongings within reach  - Initiate and maintain comfort rounds  - Make Fall Risk Sign visible to staff  - Apply yellow socks and bracelet for high fall risk patients  - Consider moving patient to room near nurses station  Outcome: Progressing  Goal: Maintain or return to baseline ADL function  Description: INTERVENTIONS:  -  Assess patient's ability to carry out ADLs; assess patient's baseline for ADL function and identify physical deficits which impact ability to perform ADLs (bathing, care of mouth/teeth, toileting, grooming, dressing, etc )  - Assess/evaluate cause of self-care deficits   - Assess range of motion  - Assess patient's mobility; develop plan if impaired  - Assess patient's need for assistive devices and provide as appropriate  - Encourage maximum independence but intervene and supervise when necessary  - Involve family in performance of ADLs  - Assess for home care needs following discharge   - Consider OT consult to assist with ADL evaluation and planning for discharge  - Provide patient education as appropriate  Outcome: Progressing  Goal: Maintains/Returns to pre admission functional level  Description: INTERVENTIONS:  - Perform BMAT or MOVE assessment daily    - Set and communicate daily mobility goal to care team and patient/family/caregiver     - Collaborate with rehabilitation services on mobility goals if consulted  - Out of bed for toileting  - Record patient progress and toleration of activity level   Outcome: Progressing     Problem: DISCHARGE PLANNING  Goal: Discharge to home or other facility with appropriate resources  Description: INTERVENTIONS:  - Identify barriers to discharge w/patient and caregiver  - Arrange for needed discharge resources and transportation as appropriate  - Identify discharge learning needs (meds, wound care, etc )  - Arrange for interpretive services to assist at discharge as needed  - Refer to Case Management Department for coordinating discharge planning if the patient needs post-hospital services based on physician/advanced practitioner order or complex needs related to functional status, cognitive ability, or social support system  Outcome: Progressing     Problem: Knowledge Deficit  Goal: Patient/family/caregiver demonstrates understanding of disease process, treatment plan, medications, and discharge instructions  Description: Complete learning assessment and assess knowledge base  Interventions:  - Provide teaching at level of understanding  - Provide teaching via preferred learning methods  Outcome: Progressing     Problem: Prexisting or High Potential for Compromised Skin Integrity  Goal: Skin integrity is maintained or improved  Description: INTERVENTIONS:  - Identify patients at risk for skin breakdown  - Assess and monitor skin integrity  - Assess and monitor nutrition and hydration status  - Monitor labs   - Assess for incontinence   - Turn and reposition patient  - Assist with mobility/ambulation  - Relieve pressure over bony prominences  - Avoid friction and shearing  - Provide appropriate hygiene as needed including keeping skin clean and dry  - Evaluate need for skin moisturizer/barrier cream  - Collaborate with interdisciplinary team   - Patient/family teaching  - Consider wound care consult   Outcome: Progressing     Problem: Nutrition/Hydration-ADULT  Goal: Nutrient/Hydration intake appropriate for improving, restoring or maintaining nutritional needs  Description: Monitor and assess patient's nutrition/hydration status for malnutrition  Collaborate with interdisciplinary team and initiate plan and interventions as ordered  Monitor patient's weight and dietary intake as ordered or per policy  Utilize nutrition screening tool and intervene as necessary  Determine patient's food preferences and provide high-protein, high-caloric foods as appropriate       INTERVENTIONS:  - Monitor oral intake, urinary output, labs, and treatment plans  - Assess nutrition and hydration status and recommend course of action  - Evaluate amount of meals eaten  - Assist patient with eating if necessary   - Allow adequate time for meals  - Recommend/ encourage appropriate diets, oral nutritional supplements, and vitamin/mineral supplements  - Order, calculate, and assess calorie counts as needed  - Recommend, monitor, and adjust tube feedings and TPN/PPN based on assessed needs  - Assess need for intravenous fluids  - Provide specific nutrition/hydration education as appropriate  - Include patient/family/caregiver in decisions related to nutrition  Outcome: Progressing

## 2022-05-14 NOTE — DISCHARGE SUMMARY
[address] [facility]  Discharge- Chichi Childs 1950, 66 y o  female MRN: [id number]  Unit/Bed#: -Rohan Encounter: [id number]  Primary Care Provider: Sam Herrera MD   Date and time admitted to hospital: 5/12/2022 11:43 AM    Chronic respiratory failure with hypoxia [facility])  Assessment & Plan  Patient developed hypoxic respiratory failure post COVID pneumonia in 1/2022  Currently on 3L continues home O2    Albuterol p r n  Breo daily    Vitamin D deficiency  Assessment & Plan  Will start vitamin-D supplementation    Hypokalemia  Assessment & Plan  Potassium at the time of admission-2 8, repleted-potassium on 4/13-3 3  EKG revealed a QTC of 450  Patient refused blood work this AM     Outpatient CBC, BMP    Depression, recurrent ([facility] [address] )  Assessment & Plan  Patient reported "I have been depressed all my life"  She adamantly refused psych consult or antidepressant medication on presentation  No SI/HI  Continue to monitor off medication- patient will benefit from outpatient psychiatric evaluation--to discuss with PCP    Pulmonary embolism [facility])  Assessment & Plan  Patient had single subsegmental right lower lobe PE on 1/27/2022  Patient has been off Eliquis, outpatient pulmonologist is aware, upon chart review it looks like patient had a adverse reaction to Eliquis and since it was a single subsegmental PE, pulmonologist was okay with not continuing long-term anticoagulation  Anemia  Assessment & Plan  Hgb stable   Continue current management    Severe protein-calorie malnutrition ([facility] [address] )  Assessment & Plan  Malnutrition Findings:   Adult Malnutrition type: Chronic illness  Adult Degree of Malnutrition: Other severe protein calorie malnutrition patient with loss of taste since COVID infection 01/2022  Decreased p o   Intake  She reports not tolerating Ensure, boost, or Pedialyte  When discussed with the patient about tube feeding at the time of admission, she was apparently not interested, we initiated the discussion at bedside-patient reports that she may be willing to consider  On assessment today -- patient would like to go home and f/u as an outpatient-- instructions provided   Trial of ProSource t i d   Malnutrition Characteristics: Fat loss, Muscle loss, Inadequate energy, Weight loss, Fluid accumulation                  360 Statement: related to inadequate energy intakes of less than 75% or more of estimated needs for greater than 1 month, oral aversion, complaint of frequent nausea with PO, poor taste sensation that hasn't fully recovered since having COVID in January 2022 per Patient, early satiety with previous history partial Gastrectomy; as evidenced by significant weight loss of 8 8 lb (9 3%) x 2 months, 16 7 lb (16%) x 4 months; hollowing around Orbitals, Temples, wasting around Triceps and Biceps; protruding Clavicle; wasting around Shoulders and Ribs  Intervention-High Calorie/Protein diet, small frequent meals/snacks, trial of vanilla Mightyshake BID (refusing Ensures)  BMI Findings:  Adult BMI Classifications: Underweight < 18 5        Body mass index is 14 38 kg/m²  * Lower extremity edema  Assessment & Plan  Presented with worsening lower extremity edema from baseline, no shortness of breath or chest pain likely due to worsening hypoalbuminemia    She is compliant with furosemide 20 mg daily  ProBNP 394  -TTE (4/56/0639) normal systolic and diastolic function, EF 23%  Patient was started on Lasix 40 mg IV b i d     -s/p 1 dose of 25% IV albumin  Diuretics on HOLD   -outpatient GI evaluation to address underlying poor oral intake causing hypoalbuminemia       Discharging Physician / Practitioner: Latasha Palomino MD  PCP: Eldon Angela MD  Admission Date:   Admission Orders (From admission, onward)     Ordered        05/13/22 1336  Inpatient Admission  Once            05/12/22 1413  Place in Observation  Once                      Discharge Date: 05/14/22    Medical Problems             Resolved Problems  Date Reviewed: 3/9/2022   None                 Consultations During Hospital Stay:  · None     Procedures Performed:   · None     Significant Findings / Test Results:   None     Incidental Findings:   · As mentioned above      Test Results Pending at Discharge (will require follow up): · None      Outpatient Tests Requested:  · CBC, BMP    Complications:  None     Reason for Admission: Increased LE swelling     Hospital Course:     Sergio Hernandez is a 66 y o  female patient who originally presented to the hospital on 5/12/2022 due to increased leg swelling  As per HPI     Sergio Hernandez is a 66 y o  female with a PMH of protein caloric malnutrition, PE off eliquis, chronic hypoxic respiratory failure on 3L O2 due to COVID-19 pneumonia (1/2022), baseline bilateral peripheral edema  who presents with worsening lower extremity edema while taking furosemide 20 mg daily  Patient denies shortness of Breath, orthopnea, PND or chest pain  Patient still with decreased appetite not tolerating much solid intake and nutritional supplement  During hospitalization, patient was initially started on IV diuretics, which was subsequently discontinued  Patient was given 1 dose of IV albumin  Case was discussed with GI who initially recommended outpatient evaluation they recommended Protonix twice daily, scheduled Zofran every 6 hours an outpatient evaluation  They also recommended an ultrasound which can be done as an outpatient after evaluation  At the time of discharge, patient was afebrile and hemodynamically stable  Patient reported that order is being done in the hospital can be done at home and she wanted to go home  She wanted to undergo outpatient evaluation  Patient was given Pepto-Bismol, Pepcid in the hospital   She was discharged with scheduled Zofran, Protonix daily, provided GI referral   Patient was discharged home with home healthcare services         Please see above list of diagnoses and related plan for additional information  Condition at Discharge: stable     Discharge Day Visit / Exam:     Subjective:  Patient was seen examined at the bedside  She was frustrated that she had these multiple episodes of dry heaving  She denied any chest pain or shortness of breath  Patient would like to go home and undergo outpatient evaluation  Vitals: Blood Pressure: (!) 137/101 (05/14/22 0824)  Pulse: 101 (05/14/22 0824)  Temperature: 98 2 °F (36 8 °C) (05/14/22 0824)  Temp Source: Oral (05/12/22 1652)  Respirations: (!) 24 (05/14/22 0322)  Height: 5' 5" (165 1 cm) (05/12/22 1650)  Weight - Scale: 39 2 kg (86 lb 6 4 oz) (05/12/22 1650)  SpO2: 92 % (05/14/22 0824)  Exam:   Physical Exam  Vitals and nursing note reviewed  Constitutional:       General: She is not in acute distress  Appearance: She is well-developed  Comments: Frail woman appearing cachectic    HENT:      Head: Normocephalic and atraumatic  Eyes:      General:         Right eye: No discharge  Left eye: No discharge  Conjunctiva/sclera: Conjunctivae normal    Cardiovascular:      Rate and Rhythm: Normal rate and regular rhythm  Pulses: Normal pulses  Heart sounds: Normal heart sounds  No murmur heard  Pulmonary:      Effort: Pulmonary effort is normal  No respiratory distress  Breath sounds: Normal breath sounds  Abdominal:      Palpations: Abdomen is soft  Tenderness: There is no abdominal tenderness  Musculoskeletal:      Cervical back: Neck supple  Right lower leg: Edema (1+ ) present  Left lower leg: Edema (1+ ) present  Skin:     General: Skin is warm and dry  Capillary Refill: Capillary refill takes less than 2 seconds  Neurological:      Mental Status: She is alert and oriented to person, place, and time         Discharge instructions/Information to patient and family:   See after visit summary for information provided to patient and family  Provisions for Follow-Up Care:  See after visit summary for information related to follow-up care and any pertinent home health orders  Disposition:     Home    For Discharges to [facility] SNF:   · Not Applicable to this Patient - Not Applicable to this Patient    Planned Readmission: None      Discharge Statement:  I spent 30 minutes discharging the patient  This time was spent on the day of discharge  I had direct contact with the patient on the day of discharge  Greater than 50% of the total time was spent examining patient, answering all patient questions, arranging and discussing plan of care with patient as well as directly providing post-discharge instructions  Additional time then spent on discharge activities  Discharge Medications:  See after visit summary for reconciled discharge medications provided to patient and family        ** Please Note: This note has been constructed using a voice recognition system **

## 2022-05-14 NOTE — PLAN OF CARE
Problem: Potential for Falls  Goal: Patient will remain free of falls  Description: INTERVENTIONS:  - Educate patient/family on patient safety including physical limitations  - Instruct patient to call for assistance with activity   - Consult OT/PT to assist with strengthening/mobility   - Keep Call bell within reach  - Keep bed low and locked with side rails adjusted as appropriate  - Keep care items and personal belongings within reach  - Initiate and maintain comfort rounds  - Make Fall Risk Sign visible to staff  - Offer Toileting every 3 Hours, in advance of need  - Initiate/Maintain  bed alarm  - Obtain necessary fall risk management equipment:   - Apply yellow socks and bracelet for high fall risk patients  - Consider moving patient to room near nurses station  Outcome: Progressing     Problem: MOBILITY - ADULT  Goal: Maintain or return to baseline ADL function  Description: INTERVENTIONS:  -  Assess patient's ability to carry out ADLs; assess patient's baseline for ADL function and identify physical deficits which impact ability to perform ADLs (bathing, care of mouth/teeth, toileting, grooming, dressing, etc )  - Assess/evaluate cause of self-care deficits   - Assess range of motion  - Assess patient's mobility; develop plan if impaired  - Assess patient's need for assistive devices and provide as appropriate  - Encourage maximum independence but intervene and supervise when necessary  - Involve family in performance of ADLs  - Assess for home care needs following discharge   - Consider OT consult to assist with ADL evaluation and planning for discharge  - Provide patient education as appropriate  Outcome: Progressing  Goal: Maintains/Returns to pre admission functional level  Description: INTERVENTIONS:  - Perform BMAT or MOVE assessment daily    - Set and communicate daily mobility goal to care team and patient/family/caregiver     - Collaborate with rehabilitation services on mobility goals if consulted  - Perform Range of Motion 3 times a day  - Reposition patient every 3 hours    - Dangle patient 3 times a day  - Stand patient 3 times a day  - Ambulate patient 3 times a day  - Out of bed to chair 3 times a day   - Out of bed for meals 3 times a day  - Out of bed for toileting  - Record patient progress and toleration of activity level   Outcome: Progressing     Problem: PAIN - ADULT  Goal: Verbalizes/displays adequate comfort level or baseline comfort level  Description: Interventions:  - Encourage patient to monitor pain and request assistance  - Assess pain using appropriate pain scale  - Administer analgesics based on type and severity of pain and evaluate response  - Implement non-pharmacological measures as appropriate and evaluate response  - Consider cultural and social influences on pain and pain management  - Notify physician/advanced practitioner if interventions unsuccessful or patient reports new pain  Outcome: Progressing     Problem: INFECTION - ADULT  Goal: Absence or prevention of progression during hospitalization  Description: INTERVENTIONS:  - Assess and monitor for signs and symptoms of infection  - Monitor lab/diagnostic results  - Monitor all insertion sites, i e  indwelling lines, tubes, and drains  - Monitor endotracheal if appropriate and nasal secretions for changes in amount and color  - [place] appropriate cooling/warming therapies per order  - Administer medications as ordered  - Instruct and encourage patient and family to use good hand hygiene technique  - Identify and instruct in appropriate isolation precautions for identified infection/condition  Outcome: Progressing  Goal: Absence of fever/infection during neutropenic period  Description: INTERVENTIONS:  - Monitor WBC    Outcome: Progressing     Problem: SAFETY ADULT  Goal: Patient will remain free of falls  Description: INTERVENTIONS:  - Educate patient/family on patient safety including physical limitations  - Instruct patient to call for assistance with activity   - Consult OT/PT to assist with strengthening/mobility   - Keep Call bell within reach  - Keep bed low and locked with side rails adjusted as appropriate  - Keep care items and personal belongings within reach  - Initiate and maintain comfort rounds  - Make Fall Risk Sign visible to staff  - Offer Toileting every 3 Hours, in advance of need  - Initiate/Maintain bed alarm  - Obtain necessary fall risk management equipment:   - Apply yellow socks and bracelet for high fall risk patients  - Consider moving patient to room near nurses station  Outcome: Progressing  Goal: Maintain or return to baseline ADL function  Description: INTERVENTIONS:  -  Assess patient's ability to carry out ADLs; assess patient's baseline for ADL function and identify physical deficits which impact ability to perform ADLs (bathing, care of mouth/teeth, toileting, grooming, dressing, etc )  - Assess/evaluate cause of self-care deficits   - Assess range of motion  - Assess patient's mobility; develop plan if impaired  - Assess patient's need for assistive devices and provide as appropriate  - Encourage maximum independence but intervene and supervise when necessary  - Involve family in performance of ADLs  - Assess for home care needs following discharge   - Consider OT consult to assist with ADL evaluation and planning for discharge  - Provide patient education as appropriate  Outcome: Progressing  Goal: Maintains/Returns to pre admission functional level  Description: INTERVENTIONS:  - Perform BMAT or MOVE assessment daily    - Set and communicate daily mobility goal to care team and patient/family/caregiver  - Collaborate with rehabilitation services on mobility goals if consulted  - Perform Range of Motion 3 times a day  - Reposition patient every 3 hours    - Dangle patient 3 times a day  - Stand patient 3 times a day  - Ambulate patient 3 times a day  - Out of bed to chair 3 times a day   - Out of bed for meals 3 times a day  - Out of bed for toileting  - Record patient progress and toleration of activity level   Outcome: Progressing     Problem: DISCHARGE PLANNING  Goal: Discharge to home or other facility with appropriate resources  Description: INTERVENTIONS:  - Identify barriers to discharge w/patient and caregiver  - Arrange for needed discharge resources and transportation as appropriate  - Identify discharge learning needs (meds, wound care, etc )  - Arrange for interpretive services to assist at discharge as needed  - Refer to Case Management Department for coordinating discharge planning if the patient needs post-hospital services based on physician/advanced practitioner order or complex needs related to functional status, cognitive ability, or social support system  Outcome: Progressing     Problem: Knowledge Deficit  Goal: Patient/family/caregiver demonstrates understanding of disease process, treatment plan, medications, and discharge instructions  Description: Complete learning assessment and assess knowledge base    Interventions:  - Provide teaching at level of understanding  - Provide teaching via preferred learning methods  Outcome: Progressing     Problem: Prexisting or High Potential for Compromised Skin Integrity  Goal: Skin integrity is maintained or improved  Description: INTERVENTIONS:  - Identify patients at risk for skin breakdown  - Assess and monitor skin integrity  - Assess and monitor nutrition and hydration status  - Monitor labs   - Assess for incontinence   - Turn and reposition patient  - Assist with mobility/ambulation  - Relieve pressure over bony prominences  - Avoid friction and shearing  - Provide appropriate hygiene as needed including keeping skin clean and dry  - Evaluate need for skin moisturizer/barrier cream  - Collaborate with interdisciplinary team   - Patient/family teaching  - Consider wound care consult   Outcome: Progressing     Problem: Nutrition/Hydration-ADULT  Goal: Nutrient/Hydration intake appropriate for improving, restoring or maintaining nutritional needs  Description: Monitor and assess patient's nutrition/hydration status for malnutrition  Collaborate with interdisciplinary team and initiate plan and interventions as ordered  Monitor patient's weight and dietary intake as ordered or per policy  Utilize nutrition screening tool and intervene as necessary  Determine patient's food preferences and provide high-protein, high-caloric foods as appropriate       INTERVENTIONS:  - Monitor oral intake, urinary output, labs, and treatment plans  - Assess nutrition and hydration status and recommend course of action  - Evaluate amount of meals eaten  - Assist patient with eating if necessary   - Allow adequate time for meals  - Recommend/ encourage appropriate diets, oral nutritional supplements, and vitamin/mineral supplements  - Order, calculate, and assess calorie counts as needed  - Recommend, monitor, and adjust tube feedings and TPN/PPN based on assessed needs  - Assess need for intravenous fluids  - Provide specific nutrition/hydration education as appropriate  - Include patient/family/caregiver in decisions related to nutrition  Outcome: Progressing

## 2022-05-14 NOTE — ASSESSMENT & PLAN NOTE
Potassium at the time of admission-2 8, repleted-potassium on 4/13-3 3  EKG revealed a QTC of 450     Patient refused blood work this AM     Outpatient CBC, BMP

## 2022-05-14 NOTE — ASSESSMENT & PLAN NOTE
Malnutrition Findings:   Adult Malnutrition type: Chronic illness  Adult Degree of Malnutrition: Other severe protein calorie malnutrition patient with loss of taste since COVID infection 01/2022  Decreased p o  Intake  She reports not tolerating Ensure, boost, or Pedialyte  When discussed with the patient about tube feeding at the time of admission, she was apparently not interested, we initiated the discussion at bedside-patient reports that she may be willing to consider  On assessment today -- patient would like to go home and f/u as an outpatient-- instructions provided   Trial of ProSource t i d   Malnutrition Characteristics: Fat loss, Muscle loss, Inadequate energy, Weight loss, Fluid accumulation                  360 Statement: related to inadequate energy intakes of less than 75% or more of estimated needs for greater than 1 month, oral aversion, complaint of frequent nausea with PO, poor taste sensation that hasn't fully recovered since having COVID in January 2022 per Patient, early satiety with previous history partial Gastrectomy; as evidenced by significant weight loss of 8 8 lb (9 3%) x 2 months, 16 7 lb (16%) x 4 months; hollowing around Orbitals, Temples, wasting around Triceps and Biceps; protruding Clavicle; wasting around Shoulders and Ribs  Intervention-High Calorie/Protein diet, small frequent meals/snacks, trial of vanilla Mightyshake BID (refusing Ensures)  BMI Findings:  Adult BMI Classifications: Underweight < 18 5        Body mass index is 14 38 kg/m²

## 2022-05-14 NOTE — ASSESSMENT & PLAN NOTE
Presented with worsening lower extremity edema from baseline, no shortness of breath or chest pain likely due to worsening hypoalbuminemia    She is compliant with furosemide 20 mg daily  ProBNP 394  -TTE (6/19/6426) normal systolic and diastolic function, EF 34%  Patient was started on Lasix 40 mg IV b i d     -s/p 1 dose of 25% IV albumin  Diuretics on HOLD   -outpatient GI evaluation to address underlying poor oral intake causing hypoalbuminemia

## 2022-05-14 NOTE — ASSESSMENT & PLAN NOTE
Patient reported "I have been depressed all my life"  She adamantly refused psych consult or antidepressant medication on presentation  No SI/HI  Continue to monitor off medication- patient will benefit from outpatient psychiatric evaluation--to discuss with PCP

## 2022-05-17 NOTE — TELEPHONE ENCOUNTER
BRANT to Dr Hunter Arguello called from pt's home  Labs are fasting- they will be done tomorrow in the AM

## 2022-05-18 NOTE — TELEPHONE ENCOUNTER
Attn: Stephanie ASHLEY concerned about the following  Pt is home and Sisi Garay went to meet her yesterday  Requesting referral nutrition  Pt has difficulty swallowing potasium pill, can you order liquid form  Pt is ordered to take pantoprazole and does not take it  Elsy ordered , has had suicide plan never carried it out  Sisi Garay gave pt suicide hotline  VERY depressed

## 2022-05-19 PROBLEM — Z86.711 HISTORY OF PULMONARY EMBOLISM: Status: ACTIVE | Noted: 2022-01-01

## 2022-05-19 PROBLEM — I31.39 PERICARDIAL EFFUSION: Status: ACTIVE | Noted: 2022-01-01

## 2022-05-19 PROBLEM — I71.40 ABDOMINAL AORTIC ANEURYSM (AAA) WITHOUT RUPTURE (HCC): Status: ACTIVE | Noted: 2022-01-01

## 2022-05-19 PROBLEM — K83.8 COMMON BILE DUCT DILATATION: Status: ACTIVE | Noted: 2022-01-01

## 2022-05-19 PROBLEM — R62.7 FAILURE TO THRIVE IN ADULT: Status: ACTIVE | Noted: 2022-01-01

## 2022-05-19 PROBLEM — I71.4 ABDOMINAL AORTIC ANEURYSM (AAA) WITHOUT RUPTURE (HCC): Status: ACTIVE | Noted: 2022-01-01

## 2022-05-19 PROBLEM — K52.9 CHRONIC DIARRHEA: Status: ACTIVE | Noted: 2022-01-01

## 2022-05-19 PROBLEM — J90 PLEURAL EFFUSION: Status: ACTIVE | Noted: 2022-01-01

## 2022-05-19 PROBLEM — I31.3 PERICARDIAL EFFUSION: Status: ACTIVE | Noted: 2022-01-01

## 2022-05-19 NOTE — H&P
[address] [facility]  H&P- Harmony Garduno 1950, 66 y o  female MRN: [id number]  Unit/Bed#: [facility] 725-01 Encounter: [id number]  Primary Care Provider: Russ Lozano MD   Date and time admitted to hospital: 5/19/2022  2:50 PM    * Pericardial effusion  Assessment & Plan  Cardiac echo with moderate sized pericardial echo space with likely thrombus or fibrinous deposit  Patient was transferred to [address] [place] [address] for thoracic surgery evaluation      Abdominal aortic aneurysm (AAA) without rupture [facility])  Assessment & Plan  Finding on abdominal CT scan,  7 7 cm infrarenal abdominal aortic aneurysm with mixed attenuation within the mural thrombus  Consult vascular surgery for further management    Pleural effusion  Assessment & Plan  Chest CT scan with Mild patchy irregular opacity in the right middle lobe, new   Moderate left pleural effusion,   Chronic right middle lobe collapse with no endobronchial obstruction      Consult pulmonology and thoracic surgery for further management    Common bile duct dilatation  Assessment & Plan  CT scan with  Distended gallbladder and dilated common bile duct  No obstruction visualized   Elevated alkaline phosphatase  Hepatic steatosis  Consult GI for further management    Failure to thrive in adult  Assessment & Plan  Likely secondary to above medical condition  Underlying severe protein calorie malnutrition    Chronic diarrhea  Assessment & Plan  Continue supportive care  GI consulted    Chronic respiratory failure with hypoxia [facility])  Assessment & Plan  Patient developed hypoxic respiratory failure post COVID pneumonia in 1/2022  Currently on 3L continues home O2     Albuterol p r n  Breo daily    Lower extremity edema  Assessment & Plan  Chronic bilateral lower extremity edema  Likely secondary to underlying poor oral intake/ hypoalbuminemia       History of pulmonary embolism  Assessment & Plan  Patient had single subsegmental right lower lobe PE on 1/27/2022  Patient has been off Eliquis, outpatient pulmonologist is aware, upon chart review it looks like patient had a adverse reaction to Eliquis and since it was a single subsegmental PE, pulmonologist was okay with not continuing long-term anticoagulation  Severe protein-calorie malnutrition ([facility] [address] )  Assessment & Plan  Malnutrition Findings:                                 BMI Findings: There is no height or weight on file to calculate BMI  Nutrition consult    VTE Pharmacologic Prophylaxis: VTE Score: 5 High Risk (Score >/= 5) - Pharmacological DVT Prophylaxis Ordered: heparin  Sequential Compression Devices Ordered  Code Status: DNR/DNI    Anticipated Length of Stay: Patient will be admitted on an inpatient basis with an anticipated length of stay of greater than 2 midnights secondary to [id number]  Total Time for Visit, including Counseling / Coordination of Care: 60 minutes Greater than 50% of this total time spent on direct patient counseling and coordination of care  Chief Complaint:   Failure to thrive    History of Present Illness:  Tami Giron is a 66 y o  female with a PMH of severe protein calorie malnutrition, chronic hypoxic respiratory failure on 3 L nasal cannula oxygen due to COVID-19 pneumonia on January 2022, chronic diarrhea, chronic lower extremity edema, history of PE off Eliquis who presents with to [facility] ED with failure to thrive and decreasing oral intake  Patient states that she was recently discharged following admission for lower extremity edema  She states that she has been unable to eat or drink and states that she has a foul taste in her mouth whenever she eats and that this has been going on for months since she had COVID pneumonia  She states that now she is even having difficulty eating cereal which previously was 1 of the only thing she ate   She notes lightheadedness with sitting up and no change her baseline shortness of breath     Patient was recently hospitalized from 05/12 until 5/14 due to increasing lower extremity edema    Patient was evaluated in them emergency room, she is afebrile without leukocytosis  Found to have left pleural effusion, pericardial effusion, distended gallbladder and common bile duct in addition to infrarenal abdominal aortic aneurysm on CT scan  Patient was transferred to [address] [place] [address] for higher level of care    Review of Systems:  Review of Systems   Constitutional: Positive for appetite change and unexpected weight change  Negative for chills and fever  HENT: Negative for sore throat  Respiratory: Positive for cough and shortness of breath  Negative for chest tightness  Cardiovascular: Positive for leg swelling  Negative for chest pain and palpitations  Gastrointestinal: Negative for abdominal pain, blood in stool, diarrhea, nausea and vomiting  Endocrine: Negative for polyuria  Genitourinary: Negative for difficulty urinating and dysuria  Neurological: Negative for dizziness, speech difficulty and headaches  All other systems reviewed and are negative  Past Medical and Surgical History:   Past Medical History:   Diagnosis Date    Acute respiratory failure with hypoxia ([facility] [address] ) 1/20/2022    Cough     Pneumonia due to COVID-19 virus 1/20/2022    SOB (shortness of breath)     Tobacco use 1/20/2022    Wheezing        Past Surgical History:   Procedure Laterality Date    LAPAROSCOPIC PARTIAL GASTRECTOMY         Meds/Allergies:  Prior to Admission medications    Medication Sig Start Date End Date Taking?  Authorizing Provider   albuterol (PROVENTIL HFA,VENTOLIN HFA) 90 mcg/act inhaler Inhale 2 puffs every 4 (four) hours as needed for wheezing or shortness of breath 4/13/22   Addy Iglesias MD   aspirin-acetaminophen-caffeine (EXCEDRIN MIGRAINE) 411-107-48 MG per tablet Take 2 tablets by mouth every 6 (six) hours as needed for headaches    Historical Provider, MD   bismuth subsalicylate (PEPTO BISMOL) 524 mg/30 mL oral suspension Take 15 mL (262 mg total) by mouth every 6 (six) hours as needed for indigestion 22   Jennifer Chase MD   fluticasone-vilanterol (BREO ELLIPTA) 100-25 mcg/inh inhaler Inhale 1 puff daily Rinse mouth after use  22   Leslie Matt MD   ondansetron (ZOFRAN) 4 mg tablet Take 1 tablet (4 mg total) by mouth every 6 (six) hours for 15 days  Patient not taking: Reported on 2022  Jennifer Chase MD   pantoprazole (PROTONIX) 40 mg tablet Take 1 tablet (40 mg total) by mouth in the morning  Patient not taking: Reported on 2022  Jennifer Eye, MD     I have reviewed home medications with a medical source (PCP, Pharmacy, other)  Allergies: Allergies   Allergen Reactions    Eliquis [Apixaban] Dizziness      Patient was seeming double   felt like she was going pass out     Wheat Bran - Food Allergy Vomiting     Anything wheat make patient vomiting is to much for her to eat         Social History:  Marital Status: /Civil Union     Substance Use History:   Social History     Substance and Sexual Activity   Alcohol Use Not Currently     Social History     Tobacco Use   Smoking Status Former Smoker    Packs/day: 1 00    Years: 55 00    Pack years: 55 00    Types: Cigarettes    Start date: 5    Quit date: 2022    Years since quittin 3   Smokeless Tobacco Never Used     Social History     Substance and Sexual Activity   Drug Use Never       Family History:    No family history on file  Physical Exam:     Vitals:   Blood Pressure: 135/84 (22 1510)  Pulse: (!) 113 (22 1510)  SpO2: 97 % (22 1510)    Physical Exam  Vitals reviewed  Constitutional:       Appearance: Normal appearance  She is ill-appearing  Comments: Cachectic thin female   HENT:      Head: Normocephalic and atraumatic        Mouth/Throat:      Mouth: Mucous membranes are moist       Pharynx: No oropharyngeal exudate  Eyes:      General: No scleral icterus  Extraocular Movements: Extraocular movements intact  Conjunctiva/sclera: Conjunctivae normal       Pupils: Pupils are equal, round, and reactive to light  Cardiovascular:      Rate and Rhythm: Normal rate and regular rhythm  Pulses: Normal pulses  Heart sounds: Normal heart sounds  No murmur heard  Pulmonary:      Effort: Pulmonary effort is normal       Breath sounds: Normal breath sounds  No wheezing  Comments: Decreased breath sounds bilateral  Abdominal:      General: Bowel sounds are normal  There is no distension  Palpations: Abdomen is soft  Tenderness: There is no abdominal tenderness  Musculoskeletal:         General: Normal range of motion  Cervical back: Normal range of motion and neck supple  Comments: Trace bilateral lower extremities edema   Skin:     General: Skin is warm and dry  Findings: No rash  Neurological:      General: No focal deficit present  Mental Status: She is alert and oriented to person, place, and time  Cranial Nerves: No cranial nerve deficit     Psychiatric:         Mood and Affect: Mood normal             Additional Data:     Lab Results:  Results from last 7 days   Lab Units 05/19/22  1032   WBC Thousand/uL 6 31   HEMOGLOBIN g/dL 13 0   HEMATOCRIT % 42 3   PLATELETS Thousands/uL 394*   NEUTROS PCT % 84*   LYMPHS PCT % 8*   MONOS PCT % 7   EOS PCT % 0     Results from last 7 days   Lab Units 05/19/22  1032   SODIUM mmol/L 138   POTASSIUM mmol/L 3 8   CHLORIDE mmol/L 101   CO2 mmol/L 29   BUN mg/dL 11   CREATININE mg/dL 0 64   ANION GAP mmol/L 8   CALCIUM mg/dL 7 7*   ALBUMIN g/dL 1 7*   TOTAL BILIRUBIN mg/dL 0 25   ALK PHOS U/L 233*   ALT U/L 27   AST U/L 53*   GLUCOSE RANDOM mg/dL 89                       Imaging:  Reviewed  No orders to display       EKG and Other Studies Reviewed on Admission:   · yes    ** Please Note: This note has been constructed using a voice recognition system   **

## 2022-05-19 NOTE — ASSESSMENT & PLAN NOTE
Chronic bilateral lower extremity edema  Likely secondary to underlying poor oral intake/ hypoalbuminemia

## 2022-05-19 NOTE — CONSULTS
Consultation - Thoracic Surgery   Tami Giron 66 y o  female MRN: [id number]  [facility] Encounter: [id number]    Assessment/Plan     Assessment:  66 F with PMHx severe protein calorie malnutrition, chronic hypoxic respiratory failure on 3 L home nasal cannula following COVID pneumonia in 0 1/22 and history of PE off Eliquis,as well as unrepaired 7 7cm AAA initially presented to [facility] with adult failure to thrive, now transferred to [place] for further consult management, thoracic surgery consultation sought for chronic pericardial effusion without hemodynamic compromise  VSS, sinue tachycardia noted, intermittent correlated with anxiety, 3 L nasal cannula (baseline)    Echocardiogram reviewed, ejection fraction 52%, normal systolic function mild mitral regurgitation noted, moderate size pericardial effusion with fibrinous component stable from January 2022 upon review of prior film, no evidence of tamponade      WBC 6 31  Hemoglobin 13 0  CMP notable for albumin of 1 7, otherwise grossly within normal limits    CT chest showing persistent moderate loculated pericardial effusion, chronic right middle lobe atelectasis, moderate left pleural effusion    CT abdomen pelvis notable for 7 7 cm infrarenal abdominal aortic aneurysm with mural thrombus, hepatosteatosis    Plan:  -no acute thoracic surgical intervention  -given chronicity of effusion and no signs of tamponade, likely reflects chronic sequelae of prior inflammation likely in context of COVID as well as malnutrition  -recommend nutritional optimization  -pulmonology input appreciated  -vascular/medical input regarding large AAA  -additional care as per primary   History of Present Illness   History, ROS and PFSH unobtainable from any source due to n/a  HPI:  Tami Giron is a 66 y o  female who presents with adult failure to thrive    She suffered a bout of COVID pneumonia in January of 2022 and has since been requiring 3 L of home oxygen  She also states that she has had loss of appetite since her acute illness and has been malnourished in this time  She has worsening lower extremity edema present on admission  At present, she denies acute chest pain or dyspnea  She denies fevers or chills  Consults    Review of Systems   Constitutional: Positive for activity change, appetite change and fatigue  Respiratory: Positive for cough and shortness of breath  Negative for wheezing and stridor  Cardiovascular: Positive for leg swelling  Negative for chest pain  Musculoskeletal: Positive for arthralgias and gait problem  All other systems reviewed and are negative        Historical Information   Past Medical History:   Diagnosis Date    AAA (abdominal aortic aneurysm) (HCC)     Acute respiratory failure with hypoxia ([facility] [address] ) 2022    Chronic Home O2 3lpm    Cough     Pneumonia due to COVID-19 virus 2022    SOB (shortness of breath)     Tobacco use 2022    Wheezing      Past Surgical History:   Procedure Laterality Date    LAPAROSCOPIC PARTIAL GASTRECTOMY       Social History   Social History     Substance and Sexual Activity   Alcohol Use Not Currently     Social History     Substance and Sexual Activity   Drug Use Never     E-Cigarette/Vaping    E-Cigarette Use Never User      E-Cigarette/Vaping Substances    Nicotine No     THC No     CBD No     Flavoring No     Other No     Unknown No      Social History     Tobacco Use   Smoking Status Former Smoker    Packs/day: 1 00    Years: 55 00    Pack years: 55 00    Types: Cigarettes    Start date: 5    Quit date: 2022    Years since quittin 3   Smokeless Tobacco Never Used     Family History:   Family History   Problem Relation Age of Onset    Abdominal aortic aneurysm Sister         s/p open repair       Meds/Allergies   current meds:   Current Facility-Administered Medications   Medication Dose Route Frequency    acetaminophen (TYLENOL) tablet 650 mg  650 mg Oral Q6H PRN    albuterol (PROVENTIL HFA,VENTOLIN HFA) inhaler 2 puff  2 puff Inhalation Q4H PRN    fluticasone-vilanterol (BREO ELLIPTA) 100-25 mcg/inh inhaler 1 puff  1 puff Inhalation Daily    heparin (porcine) subcutaneous injection 5,000 Units  5,000 Units Subcutaneous Q8H [address]    loperamide (IMODIUM) capsule 2 mg  2 mg Oral 4x Daily PRN    ondansetron (ZOFRAN) injection 4 mg  4 mg Intravenous Q6H PRN    [START ON 5/20/2022] pantoprazole (PROTONIX) EC tablet 40 mg  40 mg Oral Early Morning     Allergies   Allergen Reactions    Eliquis [Apixaban] Dizziness      Patient was seeming double   felt like she was going pass out     Wheat Bran - Food Allergy Vomiting     Anything wheat make patient vomiting is to much for her to eat         Objective   First Vitals:   Blood Pressure: 135/84 (05/19/22 1510)  Pulse: (!) 113 (05/19/22 1510)  Height: 5' 5" (165 1 cm) (05/19/22 1510)  Weight - Scale: 39 6 kg (87 lb 4 8 oz) (05/19/22 1510)  SpO2: 97 % (05/19/22 1510)    Current Vitals:   Blood Pressure: 135/84 (05/19/22 1510)  Pulse: (!) 113 (05/19/22 1510)  Height: 5' 5" (165 1 cm) (05/19/22 1510)  Weight - Scale: 39 6 kg (87 lb 4 8 oz) (05/19/22 1510)  SpO2: 97 % (05/19/22 1510)    No intake or output data in the 24 hours ending 05/19/22 1900    Invasive Devices  Report    Peripheral Intravenous Line  Duration           Peripheral IV Right Antecubital -- days    Peripheral IV 05/19/22 Left Antecubital <1 day                Physical Exam  Vitals reviewed  Constitutional:       General: She is not in acute distress  Appearance: She is ill-appearing  She is not toxic-appearing or diaphoretic  HENT:      Head: Normocephalic and atraumatic  Mouth/Throat:      Mouth: Mucous membranes are moist    Eyes:      General: No scleral icterus  Pupils: Pupils are equal, round, and reactive to light  Cardiovascular:      Rate and Rhythm: Tachycardia present     Pulmonary:      Effort: Pulmonary effort is normal  No respiratory distress  Breath sounds: Rhonchi present  Chest:      Chest wall: No tenderness  Abdominal:      General: There is no distension  Palpations: Abdomen is soft  Tenderness: There is no abdominal tenderness  Musculoskeletal:         General: Swelling present  Right lower le+ Edema present  Left lower le+ Edema present  Skin:     General: Skin is warm and dry  Capillary Refill: Capillary refill takes 2 to 3 seconds  Coloration: Skin is not jaundiced  Findings: No erythema  Neurological:      Mental Status: She is alert and oriented to person, place, and time  Psychiatric:         Mood and Affect: Mood normal          Behavior: Behavior normal          Lab Results:   I have personally reviewed pertinent lab results  , CBC:   Lab Results   Component Value Date    WBC 6 31 2022    HGB 13 0 2022    HCT 42 3 2022    MCV 87 2022     (H) 2022    MCH 26 6 (L) 2022    MCHC 30 7 (L) 2022    RDW 22 8 (H) 2022    MPV 8 8 (L) 2022    NRBC 0 2022   , CMP:   Lab Results   Component Value Date    SODIUM 138 2022    K 3 8 2022     2022    CO2 29 2022    BUN 11 2022    CREATININE 0 64 2022    CALCIUM 7 7 (L) 2022    AST 53 (H) 2022    ALT 27 2022    ALKPHOS 233 (H) 2022    EGFR 89 2022   , Coagulation: No results found for: PT, INR, APTT, Urinalysis: No results found for: COLORU, CLARITYU, SPECGRAV, PHUR, LEUKOCYTESUR, NITRITE, PROTEINUA, GLUCOSEU, KETONESU, BILIRUBINUR, BLOODU, Amylase: No results found for: AMYLASE, Lipase:   Lab Results   Component Value Date    LIPASE 13 (L) 2022     Imaging: I have personally reviewed pertinent reports  and I have personally reviewed pertinent films in PACS  EKG, Pathology, and Other Studies: I have personally reviewed pertinent reports     and I have personally reviewed pertinent films in PACS  CT abdomen pelvis wo contrast    Result Date: 5/19/2022  Impression: 1   7 7 cm infrarenal abdominal aortic aneurysm with mixed attenuation within the mural thrombus  This is suboptimally evaluated without contrast and CTA as well as vascular surgery consult is recommended  2   Distended gallbladder and dilated common bile duct  There are no pericholecystic inflammatory changes and there is no obstruction visualized though correlation with liver enzymes including bilirubin recommended  If there is concern for biliary obstruction, MRCP can be obtained  3   Mild pelvic free fluid with diffuse subcutaneous edema and pleural effusions, likely related to volume status  4   Diffuse hepatic steatosis  I personally discussed this study with Mercy Amaya on 5/19/2022 at 11:17 AM  Workstation performed: [id number]     XR chest 2 views    Result Date: 5/19/2022  Impression: COPD  New left basilar opacity, likely a combination of pleural effusion, atelectasis and/or pneumonia  Correlate clinically  Streaky opacity on lateral view suggesting chronic right middle lobe atelectasis  Workstation performed: [id number]     CT chest without contrast    Result Date: 5/19/2022  Impression: Mild patchy irregular opacity in the right middle lobe, new since January 2022, likely the sequela of Covid 19 or other inflammatory etiology  Moderate left pleural effusion, of uncertain etiology  Chronic right middle lobe collapse with no endobronchial obstruction  Persistent moderate loculated pericardial effusion  Redemonstration of incompletely imaged abdominal aortic aneurysm    Workstation performed: [id number]

## 2022-05-19 NOTE — Clinical Note
Pt transferred to [facility]  Care assumed by RN    Drainage catheter intact

## 2022-05-19 NOTE — ASSESSMENT & PLAN NOTE
CT scan with  Distended gallbladder and dilated common bile duct    No obstruction visualized   Elevated alkaline phosphatase  Hepatic steatosis  Consult GI for further management

## 2022-05-19 NOTE — ASSESSMENT & PLAN NOTE
Malnutrition Findings:                                 BMI Findings: There is no height or weight on file to calculate BMI     Nutrition consult

## 2022-05-19 NOTE — ASSESSMENT & PLAN NOTE
Cardiac echo with moderate sized pericardial echo space with likely thrombus or fibrinous deposit     Patient was transferred to [facility] for thoracic surgery evaluation

## 2022-05-19 NOTE — ED PROVIDER NOTES
History  Chief Complaint   Patient presents with    Medical Problem     Pt c/o poor nutritional intake, unable to tolerate solid food, ensure doesn't work  Pt looking for feeding tube or other option  Pt c/o flutters in chest intermittantly      Patient is a 41-year-old female past medical history of pulmonary embolism, anemia, history of COVID pneumonia on 3 L nasal cannula at baseline, lower extremity edema presenting with decreased p o  Intake  Patient states that she was recently discharged following admission for lower extremity edema  She states that she has been unable to eat or drink and states that she has a foul taste in her mouth whenever she eats and that this has been going on for months since she had COVID pneumonia  She states that now she is even having difficulty eating cereal which previously was 1 of the only thing she ate  She notes lightheadedness with sitting up and no change her baseline shortness of breath  She notes a cough productive of thick milky sputum but denies any fevers, chest pain, abdominal pain, dysphagia, mouth pain, dysuria, rashes, vision changes  Notes nausea as well as intermittent vomiting times months, states that she believes last time she vomited was within the last week  She notes 1-7 episodes a day of diarrhea  Prior to Admission Medications   Prescriptions Last Dose Informant Patient Reported? Taking?    albuterol (PROVENTIL HFA,VENTOLIN HFA) 90 mcg/act inhaler  Self No Yes   Sig: Inhale 2 puffs every 4 (four) hours as needed for wheezing or shortness of breath   aspirin-acetaminophen-caffeine (EXCEDRIN MIGRAINE) 250-250-65 MG per tablet   Yes Yes   Sig: Take 2 tablets by mouth every 6 (six) hours as needed for headaches   bismuth subsalicylate (PEPTO BISMOL) 524 mg/30 mL oral suspension   No Yes   Sig: Take 15 mL (262 mg total) by mouth every 6 (six) hours as needed for indigestion   fluticasone-vilanterol (BREO ELLIPTA) 100-25 mcg/inh inhaler  Self No Yes   Sig: Inhale 1 puff daily Rinse mouth after use  ondansetron (ZOFRAN) 4 mg tablet Not Taking at Unknown time  No No   Sig: Take 1 tablet (4 mg total) by mouth every 6 (six) hours for 15 days   Patient not taking: Reported on 2022   pantoprazole (PROTONIX) 40 mg tablet Not Taking at Unknown time  No No   Sig: Take 1 tablet (40 mg total) by mouth in the morning  Patient not taking: Reported on 2022      Facility-Administered Medications: None       Past Medical History:   Diagnosis Date    Acute respiratory failure with hypoxia ([facility] [address] ) 2022    Cough     Pneumonia due to COVID-19 virus 2022    SOB (shortness of breath)     Tobacco use 2022    Wheezing        Past Surgical History:   Procedure Laterality Date    LAPAROSCOPIC PARTIAL GASTRECTOMY         History reviewed  No pertinent family history  I have reviewed and agree with the history as documented  E-Cigarette/Vaping    E-Cigarette Use Never User      E-Cigarette/Vaping Substances    Nicotine No     THC No     CBD No     Flavoring No     Other No     Unknown No      Social History     Tobacco Use    Smoking status: Former Smoker     Packs/day: 1 00     Years: 55 00     Pack years: 55 00     Types: Cigarettes     Start date: 5     Quit date: 2022     Years since quittin 3    Smokeless tobacco: Never Used   Vaping Use    Vaping Use: Never used   Substance Use Topics    Alcohol use: Not Currently    Drug use: Never       Review of Systems   All other systems reviewed and are negative  Physical Exam  Physical Exam  Vitals reviewed  Constitutional:       General: She is not in acute distress  Appearance: Normal appearance  She is not ill-appearing  HENT:      Mouth/Throat:      Mouth: Mucous membranes are dry  Eyes:      Conjunctiva/sclera: Conjunctivae normal    Cardiovascular:      Rate and Rhythm: Normal rate and regular rhythm  Heart sounds: Normal heart sounds     Pulmonary: Effort: Pulmonary effort is normal       Comments: Diminished in all lung fields with mild right basilar rales  Abdominal:      General: Abdomen is flat  Palpations: Abdomen is soft  Tenderness: There is no abdominal tenderness  Musculoskeletal:         General: No swelling or tenderness  Normal range of motion  Cervical back: Neck supple  Right lower leg: Edema present  Left lower leg: Edema present  Comments: Plus one nonpitting edema from foot to mid calf   Skin:     General: Skin is warm and dry  Neurological:      General: No focal deficit present  Mental Status: She is alert     Psychiatric:         Mood and Affect: Mood normal          Vital Signs  ED Triage Vitals [05/19/22 1004]   Temperature Pulse Respirations Blood Pressure SpO2   98 °F (36 7 °C) (!) 115 16 126/88 98 %      Temp src Heart Rate Source Patient Position - Orthostatic VS BP Location FiO2 (%)   -- Monitor Sitting Right arm --      Pain Score       --           Vitals:    05/19/22 1004 05/19/22 1212 05/19/22 1315 05/19/22 1334   BP: 126/88 142/98  142/98   Pulse: (!) 115 (!) 108 (!) 110 (!) 110   Patient Position - Orthostatic VS: Sitting Sitting Sitting          Visual Acuity      ED Medications  Medications   sodium chloride 0 9 % bolus 1,000 mL (0 mL Intravenous Stopped 5/19/22 1207)       Diagnostic Studies  Results Reviewed     Procedure Component Value Units Date/Time    HS Troponin I 2hr [[id number]]  (Normal) Collected: 05/19/22 1219    Lab Status: Final result Specimen: Blood from Arm, Left Updated: 05/19/22 1248     hs TnI 2hr 34 ng/L      Delta 2hr hsTnI 6 ng/L     HS Troponin I 4hr [[id number]]     Lab Status: No result Specimen: Blood     HS Troponin 0hr (reflex protocol) [[id number]]  (Normal) Collected: 05/19/22 1032    Lab Status: Final result Specimen: Blood from Arm, Left Updated: 05/19/22 1104     hs TnI 0hr 28 ng/L     Comprehensive metabolic panel [[id number]]  (Abnormal) Collected: 05/19/22 1032    Lab Status: Final result Specimen: Blood from Arm, Left Updated: 05/19/22 1056     Sodium 138 mmol/L      Potassium 3 8 mmol/L      Chloride 101 mmol/L      CO2 29 mmol/L      ANION GAP 8 mmol/L      BUN 11 mg/dL      Creatinine 0 64 mg/dL      Glucose 89 mg/dL      Calcium 7 7 mg/dL      Corrected Calcium 9 5 mg/dL      AST 53 U/L      ALT 27 U/L      Alkaline Phosphatase 233 U/L      Total Protein 5 1 g/dL      Albumin 1 7 g/dL      Total Bilirubin 0 25 mg/dL      eGFR 89 ml/min/1 73sq m     Narrative:      National Kidney Disease Foundation guidelines for Chronic Kidney Disease (CKD):     Stage 1 with normal or high GFR (GFR > 90 mL/min/1 73 square meters)    Stage 2 Mild CKD (GFR = 60-89 mL/min/1 73 square meters)    Stage 3A Moderate CKD (GFR = 45-59 mL/min/1 73 square meters)    Stage 3B Moderate CKD (GFR = 30-44 mL/min/1 73 square meters)    Stage 4 Severe CKD (GFR = 15-29 mL/min/1 73 square meters)    Stage 5 End Stage CKD (GFR <15 mL/min/1 73 square meters)  Note: GFR calculation is accurate only with a steady state creatinine    Lipase [[id number]]  (Abnormal) Collected: 05/19/22 1032    Lab Status: Final result Specimen: Blood from Arm, Left Updated: 05/19/22 1056     Lipase 13 u/L     Magnesium [[id number]]  (Normal) Collected: 05/19/22 1032    Lab Status: Final result Specimen: Blood from Arm, Left Updated: 05/19/22 1056     Magnesium 2 1 mg/dL     CBC and differential [[id number]]  (Abnormal) Collected: 05/19/22 1032    Lab Status: Final result Specimen: Blood from Arm, Left Updated: 05/19/22 1042     WBC 6 31 Thousand/uL      RBC 4 89 Million/uL      Hemoglobin 13 0 g/dL      Hematocrit 42 3 %      MCV 87 fL      MCH 26 6 pg      MCHC 30 7 g/dL      RDW 22 8 %      MPV 8 6 fL      Platelets 735 Thousands/uL      nRBC 0 /100 WBCs      Neutrophils Relative 84 %      Immat GRANS % 0 %      Lymphocytes Relative 8 %      Monocytes Relative 7 %      Eosinophils Relative 0 % Basophils Relative 1 %      Neutrophils Absolute 5 32 Thousands/µL      Immature Grans Absolute 0 02 Thousand/uL      Lymphocytes Absolute 0 51 Thousands/µL      Monocytes Absolute 0 43 Thousand/µL      Eosinophils Absolute 0 00 Thousand/µL      Basophils Absolute 0 03 Thousands/µL                  CT abdomen pelvis wo contrast   Final Result by Ja Fair MD (05/19 1132)      1   7 7 cm infrarenal abdominal aortic aneurysm with mixed attenuation within the mural thrombus  This is suboptimally evaluated without contrast and CTA as well as vascular surgery consult is recommended  2   Distended gallbladder and dilated common bile duct  There are no pericholecystic inflammatory changes and there is no obstruction visualized though correlation with liver enzymes including bilirubin recommended  If there is concern for biliary    obstruction, MRCP can be obtained  3   Mild pelvic free fluid with diffuse subcutaneous edema and pleural effusions, likely related to volume status  4   Diffuse hepatic steatosis  I personally discussed this study with Tayla Ryder on 5/19/2022 at 11:17 AM                   Workstation performed: [id number]         CT chest without contrast   Final Result by Shira Warner MD (05/19 1112)      Mild patchy irregular opacity in the right middle lobe, new since January 2022, likely the sequela of Covid 19 or other inflammatory etiology  Moderate left pleural effusion, of uncertain etiology  Chronic right middle lobe collapse with no endobronchial obstruction  Persistent moderate loculated pericardial effusion  Redemonstration of incompletely imaged abdominal aortic aneurysm  Workstation performed: [id number]         XR chest 2 views   ED Interpretation by Lupe Chopra DO (05/19 1029)   New left-sided effusion      Final Result by Grace Chavira DO (05/19 1144)      COPD    New left basilar opacity, likely a combination of pleural effusion, atelectasis and/or pneumonia  Correlate clinically  Streaky opacity on lateral view suggesting chronic right middle lobe atelectasis  Workstation performed: [id number]                    Procedures  ECG 12 Lead Documentation Only    Date/Time: 5/19/2022 10:07 AM  Performed by: Ida Arreguin DO  Authorized by: Ida Arreguin DO     ECG reviewed by me, the ED Provider: yes    Patient location:  ED  Previous ECG:     Previous ECG:  Compared to current    Similarity:  No change  Interpretation:     Interpretation: non-specific    Rate:     ECG rate assessment: tachycardic    Rhythm:     Rhythm: sinus rhythm    Ectopy:     Ectopy: none    QRS:     QRS axis:  Left    QRS intervals:  Normal  Conduction:     Conduction: normal    ST segments:     ST segments:  Normal  T waves:     T waves: non-specific               ED Course  ED Course as of 05/19/22 1501   Thu May 19, 2022   1030 Patient with new left-sided effusion   1130 Patient with moderate left-sided pleural effusion, loculated pericardial effusion, 8 cm saccular intra-abdominal aneurysm with heterogeneity  Will discuss with thoracic surgery, vascular surgery, and admit  [phone] Per vascular surgery patient will need to be transferred to [facility]  Number of Diagnoses or Management Options  Diagnosis management comments: Patient is a 71-year-old female past medical history pulmonary embolism, anemia, lower extremity edema, COVID pneumonia on 3 L nasal cannula presenting with decreased p o  Intake  Patient is well-appearing bedside stable vitals but with low-grade tachycardia between 110 and 120, and in no acute distress  She has diminished lung sounds bilaterally with rales at the bases but is saturating appropriately on her home 3 L of oxygen  She has dry mucous membranes, appears cachectic, and with +1 lower extremity edema    Chart review reveals that patient has normal ejection fraction and systolic and diastolic function, lower extremity edema thought to be secondary to hypoalbuminemia  Will obtain cardiac workup, administer fluids and if no acute changes found will discussed disposition with patient as she states that she is not able to keep herself nourished  Disposition  Final diagnoses: Aortic aneurysm (HCC)   Pericardial effusion   Pleural effusion     Time reflects when diagnosis was documented in both MDM as applicable and the Disposition within this note     Time User Action Codes Description Comment    5/19/2022 12:28 PM Chanetta Palm Add [I71 9] Aortic aneurysm ([address] )     5/19/2022 12:28 PM Chanetta Palm Add [I31 3] Pericardial effusion     5/19/2022 12:28 PM Chanetta Palm Add [J90] Pleural effusion       ED Disposition     ED Disposition   Transfer to Another Facility-In Network    Condition   --    Date/Time   Thu May 19, 2022 12:29 PM    Comment   Natalya Alegria should be transferred out to [facility]             MD Documentation    [address] Most Recent Value   Patient Condition The patient has been stabilized such that within reasonable medical probability, no material deterioration of the patient condition or the condition of the unborn child(rodney) is likely to result from the transfer   Reason for Transfer Level of Care needed not available at this facility   Benefits of Transfer Other benefits (Include comment)_______________________, Specialized equipment and/or services available at the receiving facility (Include comment)________________________   Accepting Physician [address] Name, [facility]    (Name & Tel number) PACS   Sending MD [facility]   Provider Certification General risk, such as traffic hazards, adverse weather conditions, rough terrain or turbulence, possible failure of equipment (including vehicle or aircraft), or consequences of actions of persons outside the control of the transport personnel, Unanticipated needs of medical equipment and personnel during transport, Risk of worsening condition, The possibility of a transport vehicle being unavailable      RN Documentation    Flowsheet Row Most [address] Name, [facility] (Name & Tel number) PACS      Follow-up Information    None         Discharge Medication List as of 5/19/2022  2:11 PM      CONTINUE these medications which have NOT CHANGED    Details   albuterol (PROVENTIL HFA,VENTOLIN HFA) 90 mcg/act inhaler Inhale 2 puffs every 4 (four) hours as needed for wheezing or shortness of breath, Starting Wed 4/13/2022, Normal      aspirin-acetaminophen-caffeine (EXCEDRIN MIGRAINE) 250-250-65 MG per tablet Take 2 tablets by mouth every 6 (six) hours as needed for headaches, Historical Med      bismuth subsalicylate (PEPTO BISMOL) 524 mg/30 mL oral suspension Take 15 mL (262 mg total) by mouth every 6 (six) hours as needed for indigestion, Starting Sat 5/14/2022, Normal      fluticasone-vilanterol (BREO ELLIPTA) 100-25 mcg/inh inhaler Inhale 1 puff daily Rinse mouth after use , Starting Fri 5/6/2022, Normal      ondansetron (ZOFRAN) 4 mg tablet Take 1 tablet (4 mg total) by mouth every 6 (six) hours for 15 days, Starting Sat 5/14/2022, Until Sun 5/29/2022, Normal      pantoprazole (PROTONIX) 40 mg tablet Take 1 tablet (40 mg total) by mouth in the morning , Starting Sat 5/14/2022, Until Mon 6/13/2022, Normal             No discharge procedures on file      PDMP Review       Value Time User    PDMP Reviewed  Yes 5/14/2022 11:05 Jose Smith MD          ED Provider  Electronically Signed by           Yolis Sterling DO  05/19/22 5188

## 2022-05-19 NOTE — ED NOTES
Patient transported to [facility], [address] [place] [address]  05/19/22 7799

## 2022-05-19 NOTE — EMTALA/ACUTE CARE TRANSFER
[address] [facility] [address]  [address]  [place] [address]  Dept: [phone]      EMTALA TRANSFER CONSENT    NAME Funmilayo Bryant                                         1950                              MRN [id number]    I have been informed of my rights regarding examination, treatment, and transfer   by Dr John Mcarthur DO    Benefits: Other benefits (Include comment)_______________________, Specialized equipment and/or services available at the receiving facility (Include comment)________________________Vacular surgery, thoracic surgery    Risks:        Transfer Request   I acknowledge that my medical condition has been evaluated and explained to me by the emergency department physician or other qualified medical person and/or my attending physician who has recommended and offered to me further medical examination and treatment  I understand the Hospital's obligation with respect to the treatment and stabilization of my emergency medical condition  I nevertheless request to be transferred  I release the Hospital, the doctor, and any other persons caring for me from all responsibility or liability for any injury or ill effects that may result from my transfer and agree to accept all responsibility for the consequences of my choice to transfer, rather than receive stabilizing treatment at the Hospital  I understand that because the transfer is my request, my insurance may not provide reimbursement for the services  The Hospital will assist and direct me and my family in how to make arrangements for transfer, but the hospital is not liable for any fees charged by the transport service  In spite of this understanding, I refuse to consent to further medical examination and treatment which has been offered to me, and request transfer to  Yevgeniy Barros Name, [address] : [facility]   I authorize the performance of emergency medical procedures and treatments upon me in both transit and upon arrival at the receiving facility  Additionally, I authorize the release of any and all medical records to the receiving facility and request they be transported with me, if possible  I authorize the performance of emergency medical procedures and treatments upon me in both transit and upon arrival at the receiving facility  Additionally, I authorize the release of any and all medical records to the receiving facility and request they be transported with me, if possible  I understand that the safest mode of transportation during a medical emergency is an ambulance and that the Hospital advocates the use of this mode of transport  Risks of traveling to the receiving facility by car, including absence of medical control, life sustaining equipment, such as oxygen, and medical personnel has been explained to me and I fully understand them  (BARRETT CORRECT BOX BELOW)  [  ]  I consent to the stated transfer and to be transported by ambulance/helicopter  [  ]  I consent to the stated transfer, but refuse transportation by ambulance and accept full responsibility for my transportation by car  I understand the risks of non-ambulance transfers and I exonerate the Hospital and its staff from any deterioration in my condition that results from this refusal     X___________________________________________    DATE  22  TIME________  Signature of patient or legally responsible individual signing on patient behalf           RELATIONSHIP TO PATIENT_________________________          Provider Certification    NAME Bernadine Summers 1950                              MRN [id number]    A medical screening exam was performed on the above named patient  Based on the examination:    Condition Necessitating Transfer The primary encounter diagnosis was Aortic aneurysm ([facility] [address] )   Diagnoses of Pericardial effusion and Pleural effusion were also pertinent to this visit  Patient Condition: The patient has been stabilized such that within reasonable medical probability, no material deterioration of the patient condition or the condition of the unborn child(rodney) is likely to result from the transfer    Reason for Transfer: Level of Care needed not available at this facility    Transfer Requirements: Facility [employer] available and qualified personnel available for treatment as acknowledged by PACS  · Agreed to accept transfer and to provide appropriate medical treatment as acknowledged by       Madelyn Chadwick  · Appropriate medical records of the examination and treatment of the patient are provided at the time of transfer   [address] [place] [address], [address] _______  · Transfer will be performed by qualified personnel from    and appropriate transfer equipment as required, including the use of necessary and appropriate life support measures      Provider Certification: I have examined the patient and explained the following risks and benefits of being transferred/refusing transfer to the patient/family:  General risk, such as traffic hazards, adverse weather conditions, rough terrain or turbulence, possible failure of equipment (including vehicle or aircraft), or consequences of actions of persons outside the control of the transport personnel, Unanticipated needs of medical equipment and personnel during transport, Risk of worsening condition, The possibility of a transport vehicle being unavailable      Based on these reasonable risks and benefits to the patient and/or the unborn child(rodney), and based upon the information available at the time of the patients examination, I certify that the medical benefits reasonably to be expected from the provision of appropriate medical treatments at another medical facility outweigh the increasing risks, if any, to the individuals medical condition, and in the case of labor to the unborn child, from effecting the transfer      X____________________________________________ DATE 05/19/22        TIME_______      ORIGINAL - SEND TO MEDICAL RECORDS   COPY - SEND WITH PATIENT DURING TRANSFER

## 2022-05-19 NOTE — CONSULTS
Consultation - Vascular Surgery   Juan Pablo Farmer 66 y o  female MRN: [id number]  Unit/Bed#: [facility] 725-01 Encounter: [id number]      Assessment/ Plan:    AAA (7+cm by non-con CT, partially imaged on CTAc 1/2022- 4 6cm)  --CTAa/p to further delineate anatomy  --Pt expressed disinterest in any open repair requiring a ventilator  She did seem open to the idea of endovascular repair     FTT  Sev Protein Calorie Malnutrition of chronic dz  BLE edema, likely 2* nutritional status  CBD dilation  Chronic frequent stooling/ diarrhea/ incontinence  --GI consult (p)  --Nutr consult (p)    Chr Hypoxic resp failure-- O2 dependent 3lpm  Chr Bronchitis  Remote tobacco abuse   COVID PNA 1/2022   RLL Subsegmental PE 1/2022  L pleural effusion  --Pulm consult (p)  Pericardial effusion  --Thoracic Surg consult (p)    Irregular pulse  --s/w SLIM regarding repeat EKG vs telemetry  --pt w/ history of palpitations/ racing heartbeat and ill-feeling yesterday      *d/w Dr Aminta Dewitt Doctor  _______________________________________________________________  Physician Requesting Consult: Tremaine Chin, DO    Additional consultants:   · Thoracic surgery  · Pulmonary    Reason for Consult / Principal Problem: AAA    HPI: Juan Pablo Farmer is a 66y o  year old female who presented to [facility] [place] with complaint of poor p o  intake and need for nutrition  In her workup she was found to have 7 cm AAA by noncontrast CT abdomen/pelvis along with left pleural effusion and pericardial effusion  Decision was made for transfer to [address] [place] [address] for escalated level of care to include vascular surgery and thoracic surgery evaluation  Deb Salts states that she was in her normal state of health, having never received any vaccinations nor even having a PCP up until January 2022 when she contracted COVID pneumonia    Since her hospitalization which was complicated by PE and continued need for oxygen therapy at 3 liters/minute, she describes inability to tolerate p o  due to COVID mouth    She complains of inability to taste as well as poor taste in her mouth  She is able to tolerate some foods but of nonsustainable quantity  "I live on popsicles "  She complains of a mucus type code in her mouth prompting her to brush her teeth at least 4 times daily  She states that she has lost approximately half her weight since January  She has chronic diarrhea which she describes as tar-like, pasty stools but denies blood  She has occasional nausea and vomiting  From a respiratory standpoint, she denies chest pain  She has intermittent shortness of breath which she describes as feeling breathless or where she can not catch her breath  She denies fever nor chills  She has had lower extremity edema  From an aneurysm standpoint, the patient denies knowing of AAA  She denies overt abdominal pain although notes some pain associated with her prior midline incision for partial gastrectomy performed for ulcer disease many years ago  Risk factors for AAA:  · Age  · Remote tobacco abuse having smoked 1-1 5 packs per day since the age of 24, having quit earlier this year    (55+ pky history)  · FH+ for older sister w/  AAA s/p repair        Historical Information   Past Medical History:   Diagnosis Date    AAA [facility]abdominal aortic aneurysm) ([facility] )     Acute respiratory failure with hypoxia ([facility] ) 01/20/2022    Chronic Home O2 3lpm    Cough     Pneumonia due to COVID-19 virus 01/20/2022    SOB (shortness of breath)     Tobacco use 01/20/2022    Wheezing      Past Surgical History:   Procedure Laterality Date    LAPAROSCOPIC PARTIAL GASTRECTOMY       Social History   Social History     Substance and Sexual Activity   Alcohol Use Not Currently     Social History     Substance and Sexual Activity   Drug Use Never     Social History     Tobacco Use   Smoking Status Former Smoker    Packs/day: 1 00    Years: 55 00    Pack years: 55 00    Types: Cigarettes    Start date: 5    Quit date: 2022    Years since quittin 3   Smokeless Tobacco Never Used     Family History:   Family History   Problem Relation Age of Onset    Abdominal aortic aneurysm Sister         s/p open repair   }    Meds/Allergies   Home meds:   Prior to Admission medications    Medication Sig Start Date End Date Taking? Authorizing Provider   albuterol (PROVENTIL HFA,VENTOLIN HFA) 90 mcg/act inhaler Inhale 2 puffs every 4 (four) hours as needed for wheezing or shortness of breath 22  Yes Naeem Wong MD   aspirin-acetaminophen-caffeine (EXCEDRIN MIGRAINE) 473-129-29 MG per tablet Take 2 tablets by mouth every 6 (six) hours as needed for headaches   Yes Historical Provider, MD   bismuth subsalicylate (PEPTO BISMOL) 524 mg/30 mL oral suspension Take 15 mL (262 mg total) by mouth every 6 (six) hours as needed for indigestion 22  Yes Dion Babb MD   fluticasone-vilanterol (BREO ELLIPTA) 100-25 mcg/inh inhaler Inhale 1 puff daily Rinse mouth after use  22  Yes Naeem Wong MD   ondansetron (ZOFRAN) 4 mg tablet Take 1 tablet (4 mg total) by mouth every 6 (six) hours for 15 days  Patient not taking: No sig reported 22  Dion Babb MD   pantoprazole (PROTONIX) 40 mg tablet Take 1 tablet (40 mg total) by mouth in the morning    Patient not taking: No sig reported 22  Dion Babb MD     Scheduled Meds:  Current Facility-Administered Medications   Medication Dose Route Frequency Provider Last Rate    acetaminophen  650 mg Oral Q6H PRN Rhetta Grace, DO      albuterol  2 puff Inhalation Q4H PRN Rhetta Grace, DO      fluticasone-vilanterol  1 puff Inhalation Daily Rhetta Grace, DO      heparin (porcine)  5,000 Units Subcutaneous [facility] Rhetta Grace, DO      loperamide  2 mg Oral 4x Daily PRN Rhetta Grace, DO      ondansetron  4 mg Intravenous Q6H PRN Rhetta Grace, DO      [START ON 2022] pantoprazole  40 mg Oral Early Morning Lexi Viveros DO       Continuous Infusions:   PRN Meds:    acetaminophen    albuterol    loperamide    ondansetron    ALLERGIES:   Allergies   Allergen Reactions    Eliquis [Apixaban] Dizziness      Patient was seeming double   felt like she was going pass out     Wheat Bran - Food Allergy Vomiting     Anything wheat make patient vomiting is to much for her to eat         Review of Systems:  General: positive for  - as noted in HPI  Cardiovascular: positive for - dyspnea on exertion, palpitations, rapid heart rate and shortness of breath  Respiratory: positive for - cough and shortness of breath  Gastrointestinal: positive for - appetite loss  Genitourinary: negative  Musculoskeletal: positive for - swelling in leg - bilateral  Neurological: no TIA or stroke symptoms  Hematological and Lymphatic: positive for - blood clots and weight loss  Dermatological : negative  Psychological: negative  Ophthalmic: negative  ENT: positive for - headaches and oral symptoms as per HPI      Objective   Vitals:  Blood pressure 135/84, pulse (!) 113, height 5' 5" (1 651 m), weight 39 6 kg (87 lb 4 8 oz), SpO2 97 %  Body mass index is 14 53 kg/m²  SpO2: SpO2: 97 %, 3lpm    I/Os:  I/O     None          Invasive Lines/Tubes:  Invasive Devices  Report    None                 Physical Exam  General appearance: alert, appears stated age and cooperative  Fetal position w/ multiple blankets  Cachectic  Pale  Head: Normocephalic, without obvious abnormality, atraumatic  Eyes: conjunctivae/corneas clear  PERRL, EOM's intact  Throat: lips, mucosa, and tongue normal; teeth and gums normal  (-) Thrush  Neck: no adenopathy, no carotid bruit, no JVD and supple, symmetrical, trachea midline  Back: symmetric, mild kyphosis  ROM normal  No CVA tenderness  Lungs: clear to auscultation bilaterally  Chest wall: no tenderness  Heart[de-identified] Tachycardic    Irregular rhythm, S1, S2 normal, no murmur, click, rub or gallop  Abdomen: soft, non-tender; bowel sounds normal; no masses,  no organomegaly   Midline surgical scar  Scaphoid  Aortic pulsation readily palpable  (-) tenderness to aneurysm  Genitalia: deferred  +Purwick  Rectal: deferred  Extremities: Normal  Atraumatic  Slowed motor/ debility  +Sensation  BLE foot/ LE pitting edema  B/L 2nd toes w/ mild duskiness-- ? atheroemboli  Skin: (-) rashes/ lesions  Neurologic: Grossly normal    Pulse exam:  Radial: Right: 1+               Left: 1+  Femoral: Right: 1+                   Left: 1+  DP: Right: non-palpable          Left: non-palpable  PT: Right: non-palpable         Left: 1+    Lab Results and Cultures:   COVID:   Last COVID19 Screening Values     None         CBC:   Results from last 7 days   Lab Units 05/19/22  1032 05/18/22  0910   WBC Thousand/uL 6 31 6 15   HEMOGLOBIN g/dL 13 0 12 9   HEMATOCRIT % 42 3 43 1   PLATELETS Thousands/uL 394* 441*     BMP/CMP:  Results from last 7 days   Lab Units 05/19/22  1032 05/18/22  0910 05/13/22  0550   POTASSIUM mmol/L 3 8 3 7 3 3*   CHLORIDE mmol/L 101 102 100   CO2 mmol/L 29 29 31   BUN mg/dL 11 11 8   CREATININE mg/dL 0 64 0 52* 0 52*   CALCIUM mg/dL 7 7* 7 7* 7 7*     Lipase: 13    TBili 0 25    ALT 27  AST 53  TProt 5 1  Alb 1 7    Trop 28- 34    Coags:     Lipid panel:     HgbA1c: No results found for: HGBA1C      Imaging Studies:  5/19 CXR- COPD  New L basilar opacity-- pl eff, atel, +/- PNA  Streaky opacity on lat view sugg chr RML atel  5/19 CT chest- mild patchy irreg opacity in RML-- COVID sequela  Mod L pl eff  Chr RML collapse  Persistent mod loculated pericardial eff  AAA  5/19 CT a/p (w/o) - 7 7 x 7 9cm infrarenal AAA w/ mixed attenuation within mural thrombus  Distended GB & dila CBD  Mild pelvic free fluid w/ diffuse SQ edema & pl eff  Diffuse hepatic steatosis  5/19 Echo- EF 60%  (-) wma  Mild AI/ MR/ PI   Mod peric echo space w/ likely thrombus or fibrinous deposit-- present in 1/2022       EKG, Pathology, and Other Studies:    VTE Prophylaxis: Heparin- SQ     Code Status: Level 3 - DNAR and DNI  Advance Directive and Living Will:      Power of :    POLST:        Jerrie Sandifer, PA-C  5/19/2022

## 2022-05-19 NOTE — Clinical Note
Prepped: left chest, right chest, chest and subxiphoid process  Prepped with: ChloraPrep  The site was clipped

## 2022-05-19 NOTE — ASSESSMENT & PLAN NOTE
Chest CT scan with Mild patchy irregular opacity in the right middle lobe, new   Moderate left pleural effusion,   Chronic right middle lobe collapse with no endobronchial obstruction      Consult pulmonology and thoracic surgery for further management

## 2022-05-19 NOTE — ASSESSMENT & PLAN NOTE
Finding on abdominal CT scan,  7 7 cm infrarenal abdominal aortic aneurysm with mixed attenuation within the mural thrombus     Consult vascular surgery for further management

## 2022-05-20 NOTE — ASSESSMENT & PLAN NOTE
Patient developed hypoxic respiratory failure post COVID pneumonia in 1/2022  On 3 L at baseline, increased to 4-5 L this morning secondary to shortness of breath and work of breathing  Thing repeat echo and Cardiology evaluation     Albuterol p r n    Breo daily

## 2022-05-20 NOTE — MALNUTRITION/BMI
This medical record reflects one or more clinical indicators suggestive of malnutrition and underweight BMI  Malnutrition Findings:   Adult Malnutrition type: Chronic illness  Adult Degree of Malnutrition: Other severe protein calorie malnutrition  Malnutrition Characteristics: Inadequate energy, Weight loss, Fluid accumulation              360 Statement: Severe malnutriton r/t inadequate intake in the setting of chronic illness (FTT) as evidenced by inadequate energy intake < 75% of estimated needs for >1 month, significant weight loss of 16% x 4 months (1/20/22, 103#), and moderate (+2) fluid accumulation in LE; treated w/ supplements    BMI Findings:  Adult BMI Classifications: Underweight < 18 5        Body mass index is 14 48 kg/m²  See Nutrition note dated 5/20/2022 for additional details  Completed nutrition assessment is viewable in the nutrition documentation

## 2022-05-20 NOTE — ASSESSMENT & PLAN NOTE
Cardiac echo with moderate sized pericardial echo space with likely thrombus or fibrinous deposit  Patient was transferred to [facility] for thoracic surgery evaluation  This morning patient with increased work of breathing and shortness breath with sinus tachycardia to 120s 130s  -stat Cardiology consult due to concern for worsening pericardial effusion/pericarditis,  echo ordered  -consider pericardiocentesis     -may need to treat with colchicine, would avoid NSAIDs for now  -will obtain MRI MRCP for biliary dilation and concern for malignancy

## 2022-05-20 NOTE — PROGRESS NOTES
[address] [facility]  Progress Note Arcelia Melton 1950, 66 y o  female MRN: [id number]  Unit/Bed#: BE CATH LAB ROOM Encounter: [id number]  Primary Care Provider: Lee Pitts MD   Date and time admitted to hospital: 5/19/2022  2:50 PM    * Pericardial effusion  Assessment & Plan  Cardiac echo with moderate sized pericardial echo space with likely thrombus or fibrinous deposit  Patient was transferred to [facility] for thoracic surgery evaluation  This morning patient with increased work of breathing and shortness breath with sinus tachycardia to 120s 130s  -stat Cardiology consult due to concern for worsening pericardial effusion/pericarditis,  echo ordered  -consider pericardiocentesis  -may need to treat with colchicine, would avoid NSAIDs for now  -will obtain MRI MRCP for biliary dilation and concern for malignancy      Chronic diarrhea  Assessment & Plan  Continue supportive care  MRI MRCP    Pleural effusion  Assessment & Plan  Chest CT scan with Mild patchy irregular opacity in the right middle lobe, new   Moderate left pleural effusion,   Chronic right middle lobe collapse with no endobronchial obstruction      Thoracic and pulmonary consulted on admission    Common bile duct dilatation  Assessment & Plan  CT scan with  Distended gallbladder and dilated common bile duct  No obstruction visualized   Elevated alkaline phosphatase  Hepatic steatosis  Hold off on GI consult for now, will obtain MRI MRCP    Abdominal aortic aneurysm (AAA) without rupture [facility])  Assessment & Plan  Finding on abdominal CT scan,  7 7 cm infrarenal abdominal aortic aneurysm with mixed attenuation within the mural thrombus     Consult vascular surgery for further management    Failure to thrive in adult  Assessment & Plan  Likely secondary to above medical condition  Underlying severe protein calorie malnutrition    Chronic respiratory failure with hypoxia [facility])  Assessment & Plan  Patient developed hypoxic respiratory failure post COVID pneumonia in 1/2022  On 3 L at baseline, increased to 4-5 L this morning secondary to shortness of breath and work of breathing  Thing repeat echo and Cardiology evaluation     Albuterol p r n  Breo daily    Lower extremity edema  Assessment & Plan  Chronic bilateral lower extremity edema  Likely secondary to underlying poor oral intake/ hypoalbuminemia  History of pulmonary embolism  Assessment & Plan  Patient had single subsegmental right lower lobe PE on 1/27/2022  Patient has been off Eliquis, outpatient pulmonologist is aware, upon chart review it looks like patient had a adverse reaction to Eliquis and since it was a single subsegmental PE, pulmonologist was okay with not continuing long-term anticoagulation   -may need repeat CTA to rule out PE with worsening shortness of breath this morning    Severe protein-calorie malnutrition ([address] )  Assessment & Plan  Malnutrition Findings:   Adult Malnutrition type: Chronic illness  Adult Degree of Malnutrition: Other severe protein calorie malnutrition                     360 Statement: per RD (5/13/22) related to inadequate energy intakes of less than 75% or more of estimated needs for greater than 1 month, oral aversion, complaint of frequent nausea with PO, poor taste sensation that hasn't fully recovered since having COVID in January 2022 per Patient, early satiety with previous history partial Gastrectomy; as evidenced by significant weight loss of 8 8 lb (9 3%) x 2 months, 16 7 lb (16%) x 4 months; hollowing around Orbitals, Temples, wasting around Triceps and Biceps; protruding Clavicle; wasting around Shoulders and Ribs  Intervention-High Calorie/Protein diet, small frequent meals/snacks, trial of vanilla Mightyshake BID (refusing Ensures)  BMI Findings:  Adult BMI Classifications: Underweight < 18 5        Body mass index is 14 48 kg/m²     Nutrition consult          VTE Pharmacologic Prophylaxis: VTE Score: 5 High Risk (Score >/= 5) - Pharmacological DVT Prophylaxis Ordered: heparin  Sequential Compression Devices Ordered  Patient Centered Rounds: I performed bedside rounds with nursing staff today  Discussions with Specialists or Other Care Team Provider: n/a    Education and Discussions with Family / Patient: Updated  (significant other and niece) at bedside  Time Spent for Care: 30 minutes  More than 50% of total time spent on counseling and coordination of care as described above  Current Length of Stay: 1 day(s)  Current Patient Status: Inpatient   Certification Statement: The patient will continue to require additional inpatient hospital stay due to Pending echocardiogram and possible pericardiocentesis  Discharge Plan: Anticipate discharge in 48-72 hrs to discharge location to be determined pending rehab evaluations  Code Status: Level 3 - DNAR and DNI    Subjective:   Patient reports worsening shortness of breath this morning, worsening of her chronic back pain  She denies fevers chills nausea vomiting    Objective:     Vitals:   Temp (24hrs), Av 5 °F (36 4 °C), Min:97 1 °F (36 2 °C), Max:97 9 °F (36 6 °C)    Temp:  [97 1 °F (36 2 °C)-97 9 °F (36 6 °C)] 97 1 °F (36 2 °C)  HR:  [112-125] 124  Resp:  [16-26] 26  BP: (119-135)/(84-99) 123/99  SpO2:  [2 %-100 %] 97 %  Body mass index is 14 48 kg/m²  Input and Output Summary (last 24 hours): Intake/Output Summary (Last 24 hours) at 2022 1424  Last data filed at 2022  Gross per 24 hour   Intake --   Output 150 ml   Net -150 ml       Physical Exam:   Physical Exam  Vitals and nursing note reviewed  Constitutional:       General: She is not in acute distress  Appearance: She is well-developed  She is ill-appearing  She is not toxic-appearing or diaphoretic  Comments: Chronically ill-appearing   HENT:      Head: Normocephalic and atraumatic  Eyes:      General: No scleral icterus  Conjunctiva/sclera: Conjunctivae normal    Cardiovascular:      Rate and Rhythm: Regular rhythm  Tachycardia present  Heart sounds: No murmur heard  No friction rub  No gallop  Pulmonary:      Effort: Pulmonary effort is normal  No respiratory distress  Breath sounds: No stridor  Rales present  No wheezing or rhonchi  Chest:      Chest wall: No tenderness  Abdominal:      General: There is no distension  Palpations: Abdomen is soft  There is no mass  Tenderness: There is no abdominal tenderness  There is no guarding or rebound  Hernia: No hernia is present  Musculoskeletal:      Cervical back: Neck supple  Right lower leg: Edema present  Left lower leg: Edema present  Skin:     General: Skin is warm and dry  Coloration: Skin is not jaundiced or pale  Findings: No bruising, erythema, lesion or rash  Neurological:      Mental Status: She is alert and oriented to person, place, and time            Additional Data:     Labs:  Results from last 7 days   Lab Units 05/19/22  1708 05/19/22  1032   WBC Thousand/uL  --  6 31   HEMOGLOBIN g/dL  --  13 0   HEMATOCRIT %  --  42 3   PLATELETS Thousands/uL 397* 394*   NEUTROS PCT %  --  84*   LYMPHS PCT %  --  8*   MONOS PCT %  --  7   EOS PCT %  --  0     Results from last 7 days   Lab Units 05/20/22  0538   SODIUM mmol/L 137   POTASSIUM mmol/L 3 6   CHLORIDE mmol/L 103   CO2 mmol/L 26   BUN mg/dL 8   CREATININE mg/dL 0 41*   ANION GAP mmol/L 8   CALCIUM mg/dL 7 7*   ALBUMIN g/dL 1 6*   TOTAL BILIRUBIN mg/dL 0 41   ALK PHOS U/L 213*   ALT U/L 30   AST U/L 56*   GLUCOSE RANDOM mg/dL 64*                       Lines/Drains:  Invasive Devices  Report    Peripheral Intravenous Line  Duration           Peripheral IV Right Antecubital -- days    Peripheral IV 05/19/22 Left Antecubital <1 day                  Telemetry:  Telemetry Orders (From admission, onward)             48 Hour Telemetry Monitoring  Continuous x 48 hours References:    Telemetry Guidelines   Question:  Reason for 48 Hour Telemetry  Answer:  Patient with [place]/Ramonita/Endocarditis; Cardiac Contusion                 Telemetry Reviewed: Sinus Bradycardia and Sinus Tachycardia  Indication for Continued Telemetry Use: Arrthymias requiring medical therapy             Imaging: No pertinent imaging reviewed  Recent Cultures (last 7 days):         Last 24 Hours Medication List:   Current Facility-Administered Medications   Medication Dose Route Frequency Provider Last Rate    acetaminophen  650 mg Oral Q6H PRN Yudelka Duncan, DO      albuterol  2 puff Inhalation Q4H PRN Yudelka Duncan, DO      ceFAZolin    Continuous PRN Erik Plunkett MD      colchicine  0 6 mg Oral BID Ismael Vargas MD      fentanyl citrate (PF)    PRN Erik Plunkett MD      fluticasone-vilanterol  1 puff Inhalation Daily Seng [facility]Benjamin [facility]      gabapentin  100 mg Oral TID Moo Heck, DO      heparin (porcine)  5,000 Units Subcutaneous [facility] Yudelka Duncan, DO      lidocaine  1 patch Topical Daily Moo Heck, DO      lidocaine (PF)    PRN [facility], DO      loperamide  2 mg Oral 4x Daily PRN Yudelka Duncan, DO      ondansetron  4 mg Intravenous Q6H PRN Yudelka Duncan, DO      ondansetron    PRN Erik Plunkett MD      oxyCODONE  2 5 mg Oral Q4H PRN Moo eHck, DO      pantoprazole  40 mg Oral Early Morning Yudelka Duncan, DO      sodium chloride    Continuous PRN Erik Plunkett MD 50 mL/hr (05/20/22 1401)        Today, Patient Was Seen By: Michelle Benjamin DO    **Please Note: This note may have been constructed using a voice recognition system  **

## 2022-05-20 NOTE — CONSULTS
PULMONOLOGY CONSULT NOTE     Name: Jennifer Benavides   Age & Sex: 66 y o  female   MRN: [id number]  Unit/Bed#: [facility] 725-01   Encounter: [id number]        Reason for consultation: Pleural Effusion    Requesting physician: Teofilo Simental DO    Assessment:   1  Post-acute sequelae of COVID19 - Infection 1/2022, not vaccinated  2  Chronic hypoxic respiratory failure - 3L baseline requirement since 1/2022  3  Infrarenal abdominal aortic aneurysm  4  Pericardial effusion  5  Pleural effusion  6  Severe emphysema  7  Former smoker  8  Severe protein-calorie malnutrition  9  Adult failure to thrive     Plan:  - Thoracentesis for pleural effusion may be diagnostic in terms of ruling out malignancy  However, it is high risk given the extent of her emphysema and the small size of the effusion  This could be pursued by IR as long as patient understands the risks  However, her goals of care seem to align more with avoidance of invasive diagnostic testing and pursing a mostly symptom-based treatment plan  Thoracentesis would not be therapeutic as this small amount of fluid is not driving her dyspnea  - Recommend palliative care involvement  - If patient wishes for aggressive workup and management, would pursue IR thoracentesis  Potentially surgical treatment for aortic aneurysm as well  - Echo pending, though pericardial effusion appears less acute  - Pain management per primary team  - Okay to continue Breo  May consider adding Spiriva or Incruse with the extent of her emphysema, but uncertain that this will provide significant benefit  History of Present Illness   HPI:  Jennifer Benavides is a 66 y o  female who presented to [address] for decreased ability to tolerate diet, general lethargy and anxiety  She has chronic respiratory failure with 3L oxygen requirement after developing COVID in January of this year   At that time she was also found to have a small segmental PE for which anticoagulation has ultimately been discontinued  During that hospitalization she was found to have a moderate pericardial effusion without evidence of tamponade which continues to be present  On this admission she has been on her chronic supplemental oxygen without increase in requirement  She was found to have a new left-sided pleural effusion likely from nutritional deficiency, though malignancy cannot be excluded with consideration of her weight loss  Additionally, a very large abdominal aortic aneurysm was found  During examination today patient does appear to have an increased work of breathing  When we discussed the possible ways to proceed with her care, she states that she gets very anxious around people and prefers to be left alone  She is ambivalent about undergoing procedures for diagnostic reasons  Review of systems:  12 point review of systems was completed and was otherwise negative except as listed in HPI  Historical Information   Past Medical History:   Diagnosis Date    AAA (abdominal aortic aneurysm) (HCC)     Acute respiratory failure with hypoxia ([address] ) 01/20/2022    Chronic Home O2 3lpm    Cough     Pneumonia due to COVID-19 virus 01/20/2022    SOB (shortness of breath)     Tobacco use 01/20/2022    Wheezing      Past Surgical History:   Procedure Laterality Date    LAPAROSCOPIC PARTIAL GASTRECTOMY       Family History   Problem Relation Age of Onset    Abdominal aortic aneurysm Sister         s/p open repair       Occupational History: Retired, no known exposure history    Social History: At least 50 pack year smoking history, quit January 2022  Denies alcohol or illicit drug use       Meds/Allergies   Current Facility-Administered Medications   Medication Dose Route Frequency    acetaminophen (TYLENOL) tablet 650 mg  650 mg Oral Q6H PRN    albuterol (PROVENTIL HFA,VENTOLIN HFA) inhaler 2 puff  2 puff Inhalation Q4H PRN    fluticasone-vilanterol (BREO ELLIPTA) 100-25 mcg/inh inhaler 1 puff  1 puff Inhalation Daily    heparin (porcine) subcutaneous injection 5,000 Units  5,000 Units Subcutaneous Q8H [address]    lidocaine (LIDODERM) 5 % patch 1 patch  1 patch Topical Daily    loperamide (IMODIUM) capsule 2 mg  2 mg Oral 4x Daily PRN    ondansetron (ZOFRAN) injection 4 mg  4 mg Intravenous Q6H PRN    pantoprazole (PROTONIX) EC tablet 40 mg  40 mg Oral Early Morning     Medications Prior to Admission   Medication    albuterol (PROVENTIL HFA,VENTOLIN HFA) 90 mcg/act inhaler    aspirin-acetaminophen-caffeine (EXCEDRIN MIGRAINE) 250-250-65 MG per tablet    bismuth subsalicylate (PEPTO BISMOL) 524 mg/30 mL oral suspension    fluticasone-vilanterol (BREO ELLIPTA) 100-25 mcg/inh inhaler    ondansetron (ZOFRAN) 4 mg tablet    pantoprazole (PROTONIX) 40 mg tablet     Allergies   Allergen Reactions    Eliquis [Apixaban] Dizziness      Patient was seeming double   felt like she was going pass out     Wheat Bran - Food Allergy Vomiting     Anything wheat make patient vomiting is to much for her to eat         Vitals: Blood pressure 119/85, pulse (!) 124, temperature (!) 97 1 °F (36 2 °C), resp  rate 19, height 5' 5" (1 651 m), weight 39 5 kg (87 lb), SpO2 96 %  Body mass index is 14 48 kg/m²  Intake/Output Summary (Last 24 hours) at 5/20/2022 1204  Last data filed at 5/19/2022 2030  Gross per 24 hour   Intake --   Output 150 ml   Net -150 ml       Physical Exam    Labs: I have personally reviewed pertinent lab results    Laboratory and Diagnostics  Results from last 7 days   Lab Units 05/19/22  1708 05/19/22  1032 05/18/22  0910   WBC Thousand/uL  --  6 31 6 15   HEMOGLOBIN g/dL  --  13 0 12 9   HEMATOCRIT %  --  42 3 43 1   PLATELETS Thousands/uL 397* 394* 441*   NEUTROS PCT %  --  84* 82*   MONOS PCT %  --  7 7     Results from last 7 days   Lab Units 05/20/22  0538 05/19/22  1032 05/18/22  0910   SODIUM mmol/L 137 138 138   POTASSIUM mmol/L 3 6 3 8 3 7   CHLORIDE mmol/L 103 101 102   CO2 mmol/L 26 29 29 ANION GAP mmol/L 8 8 7   BUN mg/dL 8 11 11   CREATININE mg/dL 0 41* 0 64 0 52*   CALCIUM mg/dL 7 7* 7 7* 7 7*   GLUCOSE RANDOM mg/dL 64* 89 75   ALT U/L 30 27 27   AST U/L 56* 53* 38   ALK PHOS U/L 213* 233* 228*   ALBUMIN g/dL 1 6* 1 7* 1 8*   TOTAL BILIRUBIN mg/dL 0 41 0 25 0 26     Results from last 7 days   Lab Units 05/19/22  1032   MAGNESIUM mg/dL 2 1            Imaging and other studies: I have personally reviewed pertinent films in PACS  CT abdomen pelvis wo contrast    Result Date: 5/19/2022  Impression: 1   7 7 cm infrarenal abdominal aortic aneurysm with mixed attenuation within the mural thrombus  This is suboptimally evaluated without contrast and CTA as well as vascular surgery consult is recommended  2   Distended gallbladder and dilated common bile duct  There are no pericholecystic inflammatory changes and there is no obstruction visualized though correlation with liver enzymes including bilirubin recommended  If there is concern for biliary obstruction, MRCP can be obtained  3   Mild pelvic free fluid with diffuse subcutaneous edema and pleural effusions, likely related to volume status  4   Diffuse hepatic steatosis  I personally discussed this study with iLtzy Jiménez on 5/19/2022 at 11:17 AM  Workstation performed: [id number]     XR chest 2 views    Result Date: 5/19/2022  Impression: COPD  New left basilar opacity, likely a combination of pleural effusion, atelectasis and/or pneumonia  Correlate clinically  Streaky opacity on lateral view suggesting chronic right middle lobe atelectasis  Workstation performed: [id number]     CT chest without contrast    Result Date: 5/19/2022  Impression: Mild patchy irregular opacity in the right middle lobe, new since January 2022, likely the sequela of Covid 19 or other inflammatory etiology  Moderate left pleural effusion, of uncertain etiology  Chronic right middle lobe collapse with no endobronchial obstruction   Persistent moderate loculated pericardial effusion  Redemonstration of incompletely imaged abdominal aortic aneurysm  Workstation performed: [id number]     CTA abdominal aorta w wo contrast    Result Date: 5/20/2022  Impression: Massive left saccular infrarenal abdominal aortic aneurysm, measuring up to 9 6 x 7 7 cm in maximal axial dimension  The aneurysm extends approximately 2 cm from the left renal artery takeoff, to the level of the aortic bifurcation  No focal plaque ulceration  No evidence of active extravasation  The distal abdominal aorta is stenotic, measuring 1 2 cm  Bilateral common femoral arteries measure 0 5 cm  Mild bilateral external iliac artery stenosis  Mild proximal right common iliac artery stenosis  Moderate simple left pleural effusion  Moderate simple anterior pericardial effusion  Anasarca  Prominent gallbladder distention without evidence of cholecystitis, likely secondary to mass effect from the aneurysm upon the distal CBD  Workstation performed: [id number]       EKG, Pathology, and Other Studies: I have personally reviewed pertinent reports  Code Status: Level 3 - DNAR and DNI    VTE Pharmacologic Prophylaxis: Heparin  VTE Mechanical Prophylaxis: sequential compression device    Disclaimer: Portions of the record may have been created with voice recognition software  Occasional wrong word or "sound a like" substitutions may have occurred due to the inherent limitations of voice recognition software  Careful consideration should be taken to recognize, using context, where substitutions have occurred      Chuy Callaway DO  Pulmonary/Critical Care Fellowship PGY-IV  [facility]

## 2022-05-20 NOTE — ASSESSMENT & PLAN NOTE
Chest CT scan with Mild patchy irregular opacity in the right middle lobe, new   Moderate left pleural effusion,   Chronic right middle lobe collapse with no endobronchial obstruction      Thoracic and pulmonary consulted on admission

## 2022-05-20 NOTE — PHYSICAL THERAPY NOTE
PHYSICAL THERAPY CANCEL NOTE          Patient Name: Segundo Rose  LXAIX'S Date: 5/20/2022 05/20/22 0900   PT Last Visit   PT Visit Date 05/20/22   Note Type   Note type Cancelled Session; Evaluation   Cancel Reasons Medical status   Orders received and chart reviewed  Per RN, pt with increased WOB and HR  Not appropriate for PT eval at this time  Will hold and continue to follow as able  Thank you      Lazaro Hernandez, PT

## 2022-05-20 NOTE — CONSULTS
05/20/22 1110   Recommendations/Interventions   Intervention Comments  Initiated Supplements: Magic Cup (Vanilla) BID (B/D) + Mighty Shake ([place]) once daily at lunch to increase kcal/pro intake and treat malnutrition    Severe Chronic Malnutrition + Low BMI Documented   Recommendations to Provider  Continue Current Diet Order   Encourage PO Intake    Monitor for Hypoglycemia    Consider Banatrol BID to TID if diarrhea worsens   Education Assessment   Education Notes RD (5/13/22) at recent prev adm  "reinforced importance of increasing nutritional intakes, increasing proteins, energy-dense foods/fluids, eating smaller frequent meals/snacks, adding protein powder to soups at home, various Supplements discussed, encouraged;  needs ongoing reinforcement and encouragement"

## 2022-05-20 NOTE — ASSESSMENT & PLAN NOTE
Malnutrition Findings:   Adult Malnutrition type: Chronic illness  Adult Degree of Malnutrition: Other severe protein calorie malnutrition                     360 Statement: per RD (5/13/22) related to inadequate energy intakes of less than 75% or more of estimated needs for greater than 1 month, oral aversion, complaint of frequent nausea with PO, poor taste sensation that hasn't fully recovered since having COVID in January 2022 per Patient, early satiety with previous history partial Gastrectomy; as evidenced by significant weight loss of 8 8 lb (9 3%) x 2 months, 16 7 lb (16%) x 4 months; hollowing around Orbitals, Temples, wasting around Triceps and Biceps; protruding Clavicle; wasting around Shoulders and Ribs  Intervention-High Calorie/Protein diet, small frequent meals/snacks, trial of vanilla Mightyshake BID (refusing Ensures)  BMI Findings:  Adult BMI Classifications: Underweight < 18 5        Body mass index is 14 48 kg/m²     Nutrition consult

## 2022-05-20 NOTE — ASSESSMENT & PLAN NOTE
Patient had single subsegmental right lower lobe PE on 1/27/2022  Patient has been off Eliquis, outpatient pulmonologist is aware, upon chart review it looks like patient had a adverse reaction to Eliquis and since it was a single subsegmental PE, pulmonologist was okay with not continuing long-term anticoagulation   -may need repeat CTA to rule out PE with worsening shortness of breath this morning

## 2022-05-20 NOTE — ASSESSMENT & PLAN NOTE
CT scan with  Distended gallbladder and dilated common bile duct    No obstruction visualized   Elevated alkaline phosphatase  Hepatic steatosis  Hold off on GI consult for now, will obtain MRI MRCP

## 2022-05-20 NOTE — PROGRESS NOTES
Messaged SLIM pt having increased WOB, increased o2 to 4L for comfort , inhalers given, HR's also 120-130's will continue to monitor

## 2022-05-20 NOTE — CONSULTS
Consultation - General Cardiology Team 2  Bernadine Escobar 66 y o  female MRN: [id number]  Unit/Bed#: [facility] 725-01 Encounter: [id number]      Consults  PCP: Addy Iglesias MD   Outpatient Cardiologist:     History of Present Illness   Physician Requesting Consult: Flori Mujica DO  Reason for Consult / Principal Problem: Pericardial effusion     HPI: Bernadine Escobar is a 66y o  year old female with a history of being underweight/malnourished, covid infection in January complicated by subsegental PE, significant smoker presenting with worsening failure to thrive and malnourishment for the past few months  Patient states that she has not seen a physician for over a couple decades  She states that for a while she is been underweight in the low 90s  However, singed she had COVID in early January and was complicatedby subsegmental pulmonary embolus  Since her reduction in January she states that she has had no appetite and her weight has continued to decline  She also has noted is a lower extremity bilateral edema and some dyspnea at rest and on exertion  She was recently discharged but then presented again this week for failure to thrive  She was found to have moderate-sized pericardial effusion and trans for the above I [facility] [place] for consideration for pericardial window  However there is no tamponade physiology at the time of the initial TTE and thoracic surgery thought a window was not warranted  Patient was thought to be working to breathe and more tachycardic overnight  A repeat limited echo was ordered and it revealed moderate-large effusion possibly expanding with fibrinous material with TR inflow patterns concerning for early tamponade  Patient has not seen any physician in decades and has never had malignancy screening  Review of Systems  Review of system was conducted and was negative except for as stated in the HPI        Historical Information   Past Medical History:   Diagnosis Date    AAA (abdominal aortic aneurysm) (HCC)     Acute respiratory failure with hypoxia ([address] ) 2022    Chronic Home O2 3lpm    Cough     Pneumonia due to COVID-19 virus 2022    SOB (shortness of breath)     Tobacco use 2022    Wheezing      Past Surgical History:   Procedure Laterality Date    LAPAROSCOPIC PARTIAL GASTRECTOMY       Social History     Substance and Sexual Activity   Alcohol Use Not Currently     Social History     Substance and Sexual Activity   Drug Use Never     Social History     Tobacco Use   Smoking Status Former Smoker    Packs/day: 1 00    Years: 55 00    Pack years: 55 00    Types: Cigarettes    Start date: 5    Quit date: 2022    Years since quittin 3   Smokeless Tobacco Never Used     Family History: non-contributory    Meds/Allergies   Hospital Medications:   Current Facility-Administered Medications   Medication Dose Route Frequency    acetaminophen (TYLENOL) tablet 650 mg  650 mg Oral Q6H PRN    albuterol (PROVENTIL HFA,VENTOLIN HFA) inhaler 2 puff  2 puff Inhalation Q4H PRN    fluticasone-vilanterol (BREO ELLIPTA) 100-25 mcg/inh inhaler 1 puff  1 puff Inhalation Daily    gabapentin (NEURONTIN) capsule 100 mg  100 mg Oral TID    heparin (porcine) subcutaneous injection 5,000 Units  5,000 Units Subcutaneous Q8H [facility]    lidocaine (LIDODERM) 5 % patch 1 patch  1 patch Topical Daily    loperamide (IMODIUM) capsule 2 mg  2 mg Oral 4x Daily PRN    ondansetron (ZOFRAN) injection 4 mg  4 mg Intravenous Q6H PRN    oxyCODONE (ROXICODONE) IR tablet 2 5 mg  2 5 mg Oral Q4H PRN    pantoprazole (PROTONIX) EC tablet 40 mg  40 mg Oral Early Morning     Home Medications:   Medications Prior to Admission   Medication    albuterol (PROVENTIL HFA,VENTOLIN HFA) 90 mcg/act inhaler    aspirin-acetaminophen-caffeine (EXCEDRIN MIGRAINE) 250-250-65 MG per tablet    bismuth subsalicylate (PEPTO BISMOL) 524 mg/30 mL oral suspension    fluticasone-vilanterol (BREO ELLIPTA) 100-25 mcg/inh inhaler    ondansetron (ZOFRAN) 4 mg tablet    pantoprazole (PROTONIX) 40 mg tablet       Allergies   Allergen Reactions    Eliquis [Apixaban] Dizziness      Patient was seeming double   felt like she was going pass out     Wheat Bran - Food Allergy Vomiting     Anything wheat make patient vomiting is to much for her to eat         Objective   Vitals: Blood pressure 119/85, pulse (!) 124, temperature (!) 97 1 °F (36 2 °C), resp  rate 19, height 5' 5" (1 651 m), weight 39 5 kg (87 lb), SpO2 96 %  Orthostatic Blood Pressures    Flowsheet Row Most Recent Value   Blood Pressure 119/85 filed at 05/20/2022 0910   Patient Position - Orthostatic VS Lying filed at 05/20/2022 0720            Invasive Devices  Report    Peripheral Intravenous Line  Duration           Peripheral IV Right Antecubital -- days    Peripheral IV 05/19/22 Left Antecubital <1 day                Physical Exam    GEN: Rob Alegria frail and deconditioned   HEENT:  Normocephalic, atraumatic, anicteric, moist mucous membranes  HEART: regular rhythm, tachycardic rate, normal S1 and S2, no murmurs, clicks, gallops or rubs, JVP 10 cm h20, pulsus present but only cuff avaiable was too large and probably not accurate, heart sounds appreciated     LUNGS: Clear to auscultation bilaterally; no wheezes, rales, or rhonchi; respiration nonlabored   ABDOMEN:  Normoactive bowel sounds, soft, no tenderness, no distention  EXTREMITIES: peripheral pulses palpable; +1 bilateral pitting edema   NEURO: no gross focal findings; cranial nerves grossly intact   SKIN:  Dry, intact, warm to touch    Lab Results: I have personally reviewed pertinent lab results          Results from last 7 days   Lab Units 05/20/22  0538 05/19/22  1032 05/18/22  0910   POTASSIUM mmol/L 3 6 3 8 3 7   CO2 mmol/L 26 29 29   CHLORIDE mmol/L 103 101 102   BUN mg/dL 8 11 11   CREATININE mg/dL 0 41* 0 64 0 52*     Results from last 7 days   Lab Units 05/19/22  8699 05/19/22  1032 05/18/22  0910   HEMOGLOBIN g/dL  --  13 0 12 9   HEMATOCRIT %  --  42 3 43 1   PLATELETS Thousands/uL 397* 394* 441*                 Imaging: I have personally reviewed pertinent reports  Telemetry:   Sinus tachycardia 130s    EKG:         Previous STRESS TEST:  No results found for this or any previous visit  No results found for this or any previous visit  No results found for this or any previous visit  Previous Cath/PCI:  No results found for this or any previous visit  No results found for this or any previous visit  No results found for this or any previous visit  ECHO:  No results found for this or any previous visit  Results for orders placed during the hospital encounter of 05/19/22    Echo complete w/ contrast if indicated    Interpretation Summary    Left Ventricle: Left ventricular cavity size is normal  The left ventricular ejection fraction is 60%  Systolic function is normal  Although no diagnostic regional wall motion abnormality was identified, this possibility cannot be completely excluded on the basis of this study  Diastolic function is mildly abnormal, consistent with grade I (abnormal) relaxation    Aortic Valve: There is mild regurgitation    Mitral Valve: There is mild annular calcification  There is mild regurgitation    Pulmonic Valve: There is mild regurgitation    Pericardium: There is a moderate sized pericardial echo space with likely thrombus or fibrinous deposit  Review of images from jan 2022 suggests that this was present before  There is no clear cut  echocardiographic evidence of tamponade  ASHLEY:  No results found for this or any previous visit  No results found for this or any previous visit  CMR:  No results found for this or any previous visit  No results found for this or any previous visit  No results found for this or any previous visit        HOLTER  No results found for this or any previous visit  No results found for this or any previous visit  VTE Prophylaxis: Sequential compression device Mendel Bloch)        Assessment/Plan   Seymour Nguyen is a 66y o  year old female with a history of being underweight/malnourished, covid infection in January complicated by subsegental PE, significant smoker presenting with worsening failure to thrive and malnourishment for the past few months  Pericardial Effusion: Patient presenting with failure to thrive and moderate to large pericardial effusion with early signs of tamponade with concerning TV inflow pattern on TTE  No significant RV collapse and difficult visualization of the RA  Clinically the patient is more tachycardic and with a mildly elevated JVP at 10 cm H20  Pulsus paradoxus was attempted and positive at 15 mmhg change but most likely is not fully accurate because there was not a smaller bp cuff available to fit her arm  -this effusion could have been related to her covid-19 infection with a viral pericarditis component; however I am more concerned this effusion is more related to an undiagnosed occult malignancy looking at her clinically with her weight lose and having no malignancy screening her whole life  -pericardiocentesis for therapeutic and diagnostic purposes   -Further investigation for occult malignancy per primary team  The patient has a dilated common bile duct and will have a MRCP   -can give her colchine  Mg BID too for now to treat medically if there is a component of viral pericarditis that caused this effusion       Case discussed and reviewed with Dr Rosalyn Goldmann who agrees with my assessment and plan  Thank you for involving us in the care of your patient  Misty Romero MD  Cardiology Fellow PGY-4    ==========================================================================================    Counseling / Coordination of Care  Total floor / unit time spent today 45 minutes minutes    Greater than 50% of total time was spent with the patient and / or family counseling and / or coordination of care  A description of the counseling / coordination of care:          Epic/ Allscripts/Care Everywhere records reviewed:     ** Please Note: Fluency DirectDictation voice to text software may have been used in the creation of this document   **

## 2022-05-21 NOTE — PROGRESS NOTES
Progress Note - vascular Surgery  Caio Grover 66 y o  female MRN: [id number]  Unit/Bed#: [facility] 512-01 Encounter: [id number]    Assessment:  66 y o  female with history of COVID with subsequent PE, respiratory failure, severe malnutrition, who presented with failure to thrive and bilateral LE edema with incidental findings of a 9 6 x 7 7 cm AAA  Patient is extremely high risk and would require an open AAA repair  Patient has yet to discuss goals of care with her family  She is s/p pericardiocentesis on 05/20 by Cardiology, and is being recommended for a thoracentesis by pulmonology  Plan:  - Diet NPO; Sips of clear liquids  - Pain and Nausea control PRN  - GOC decision between patient and family  - blood pressure control  - continue management by medical team's  - patient may follow-up with vascular surgery as outpatient to discuss operative options for her AAA once she able to make an informed decision  - please reach out to vascular surgery for any further questions  Subjective/Objective     Subjective:  Patient states she feels lousy this morning    Objective:   Vitals: Blood pressure 92/68, pulse (!) 106, temperature 97 9 °F (36 6 °C), temperature source Oral, resp  rate 22, height 5' 5" (1 651 m), weight 39 5 kg (87 lb), SpO2 95 %  ,Body mass index is 14 48 kg/m²  I/O       05/19 0701  05/20 0700 05/20 0701 05/21 0700 05/21 0701 05/22 0700    P  O    0    NG/GT   20    Total Intake(mL/kg)   20 (0 5)    Urine (mL/kg/hr) 150 200 (0 2)     Emesis/NG output  75     Drains  60 0    Stool  0     Blood  2     Total Output 150 337 0    Net -150 -337 +20           Unmeasured Urine Occurrence  1 x     Unmeasured Stool Occurrence  1 x           Physical Exam:  Gen: NAD, Aox3, Comfortable in bed  Chest: Normal work of breathing, no respiratory distress  Abd: Soft, ND, NT  Ext:  Bilateral pitting edema    Bilateral palpable femoral pulses  Skin: Warm, Dry, Intact      Lab, Imaging and other studies:   I have personally reviewed pertinent reports    , CBC with diff:   Lab Results   Component Value Date    WBC 12 83 (H) 05/21/2022    HGB 14 1 05/21/2022    HCT 46 3 (H) 05/21/2022    MCV 87 05/21/2022     05/21/2022    MCH 26 6 (L) 05/21/2022    MCHC 30 5 (L) 05/21/2022    RDW 23 2 (H) 05/21/2022    MPV 9 2 05/21/2022    NRBC 0 05/21/2022   , BMP/CMP:   Lab Results   Component Value Date    SODIUM 134 (L) 05/21/2022    K 4 0 05/21/2022     05/21/2022    CO2 23 05/21/2022    BUN 11 05/21/2022    CREATININE 0 57 (L) 05/21/2022    CALCIUM 8 1 (L) 05/21/2022     (H) 05/21/2022    ALT 52 05/21/2022    ALKPHOS 246 (H) 05/21/2022    EGFR 92 05/21/2022           Sai Villarreal MD  5/21/2022  10:26 AM

## 2022-05-21 NOTE — RESPIRATORY THERAPY NOTE
RT Protocol Note  Lourdes Splinter 66 y o  female MRN: [id number]  Unit/Bed#: [facility]  Encounter: [id number]    Assessment    Principal Problem:    Pericardial effusion  Active Problems:    Severe protein-calorie malnutrition (HCC)    History of pulmonary embolism    Lower extremity edema    Chronic respiratory failure with hypoxia (HCC)    Failure to thrive in adult    Abdominal aortic aneurysm (AAA) without rupture (HCC)    Common bile duct dilatation    Pleural effusion    Chronic diarrhea      Home Pulmonary Medications:  Breo, and albuterol udn/mdi  Home Devices/Therapy: Home O2    Past Medical History:   Diagnosis Date    AAA (abdominal aortic aneurysm) (HCC)     Acute respiratory failure with hypoxia ([address] ) 2022    Chronic Home O2 3lpm    Cough     Pneumonia due to COVID-19 virus 2022    SOB (shortness of breath)     Tobacco use 2022    Wheezing      Social History     Socioeconomic History    Marital status: /Civil Union     Spouse name: None    Number of children: None    Years of education: None    Highest education level: None   Occupational History    Occupation: retired   Tobacco Use    Smoking status: Former Smoker     Packs/day: 1 00     Years: 55 00     Pack years: 55 00     Types: Cigarettes     Start date:      Quit date: 2022     Years since quittin 3    Smokeless tobacco: Never Used   Vaping Use    Vaping Use: Never used   Substance and Sexual Activity    Alcohol use: Not Currently    Drug use: Never    Sexual activity: None   Other Topics Concern    None   Social History Narrative    None     Social Determinants of Health     Financial Resource Strain: Not on file   Food Insecurity: No Food Insecurity    Worried About Running Out of Food in the Last Year: Never true    Ran Out of Food in the Last Year: Never true   Transportation Needs: No Transportation Needs    Lack of Transportation (Medical): No    Lack of Transportation (Non-Medical):  No   Physical Activity: Not on file   Stress: Stress Concern Present    Feeling of Stress : Rather much   Social Connections: Not on file   Intimate Partner Violence: Not on file   Housing Stability: Low Risk     Unable to Pay for Housing in the Last Year: No    Number of Places Lived in the Last Year: 1    Unstable Housing in the Last Year: No       Subjective         Objective    Physical Exam:   Assessment Type: Pre-treatment  General Appearance: (P) Awake, Alert  Respiratory Pattern: (P) Dyspnea at rest  Chest Assessment: (P) Chest expansion symmetrical  Bilateral Breath Sounds: (P) Diminished  Cough: (P) Non-productive    Vitals:  Blood pressure 111/59, pulse (!) 122, temperature (!) 97 °F (36 1 °C), temperature source Axillary, resp  rate (!) 24, height 5' 5" (1 651 m), weight 39 5 kg (87 lb), SpO2 95 %  Imaging and other studies: I have personally reviewed pertinent reports  Plan    Respiratory Plan: Home Bronchodilator Patient pathway        Resp Comments: (P) Pt ordered on Resp protocol  Pt admitted with increased wob and pericardial effusion  Pt has hx of COVID 1/2022, PE, AAA  CXR shows severe emphysema and left effusion  Pt had pericardiocentesis 5/20  Pt uses home 02 , Breo, albuterol mdi and udn tx  >55+ pack year smoking hx  Pulm  suggested udn with xopenex and atrovent  Pt has incruse and breo ordered  Question ability to take a deep breath and make MDI effective  Pt c/o sob and agitaion  States if we would let her alone she would feel better  Pt agreeable to using udn tid  Will cont to follow via resp protocol

## 2022-05-21 NOTE — ASSESSMENT & PLAN NOTE
Patient had single subsegmental right lower lobe PE on 1/27/2022  Patient has been off Eliquis, outpatient pulmonologist is aware, upon chart review it looks like patient had a adverse reaction to Eliquis and since it was a single subsegmental PE, pulmonologist was okay with not continuing long-term anticoagulation   -may need repeat CTA to rule out PE

## 2022-05-21 NOTE — ASSESSMENT & PLAN NOTE
Malnutrition Findings:   Adult Malnutrition type: Chronic illness  Adult Degree of Malnutrition: Other severe protein calorie malnutrition  Malnutrition Characteristics: Inadequate energy, Weight loss, Fluid accumulation                  360 Statement: Severe malnutriton r/t inadequate intake in the setting of chronic illness (FTT) as evidenced by inadequate energy intake < 75% of estimated needs for >1 month, significant weight loss of 16% x 4 months (1/20/22, 103#), and moderate (+2) fluid accumulation in LE; treated w/ supplements    BMI Findings:  Adult BMI Classifications: Underweight < 18 5        Body mass index is 14 48 kg/m²     Nutrition consult  Possible PEG tube pending family discussions Statement Selected n/a

## 2022-05-21 NOTE — PLAN OF CARE
Problem: MOBILITY - ADULT  Goal: Maintain or return to baseline ADL function  Description: INTERVENTIONS:  -  Assess patient's ability to carry out ADLs; assess patient's baseline for ADL function and identify physical deficits which impact ability to perform ADLs (bathing, care of mouth/teeth, toileting, grooming, dressing, etc )  - Assess/evaluate cause of self-care deficits   - Assess range of motion  - Assess patient's mobility; develop plan if impaired  - Assess patient's need for assistive devices and provide as appropriate  - Encourage maximum independence but intervene and supervise when necessary  - Involve family in performance of ADLs  - Assess for home care needs following discharge   - Consider OT consult to assist with ADL evaluation and planning for discharge  - Provide patient education as appropriate  Outcome: Progressing      Problem: PAIN - ADULT  Goal: Verbalizes/displays adequate comfort level or baseline comfort level  Description: Interventions:  - Encourage patient to monitor pain and request assistance  - Assess pain using appropriate pain scale  - Administer analgesics based on type and severity of pain and evaluate response  - Implement non-pharmacological measures as appropriate and evaluate response  - Consider cultural and social influences on pain and pain management  - Notify physician/advanced practitioner if interventions unsuccessful or patient reports new pain  Outcome: Progressing

## 2022-05-21 NOTE — ASSESSMENT & PLAN NOTE
Patient developed hypoxic respiratory failure post COVID pneumonia in 1/2022  On 3 L at baseline,  currently on 3 L but with increased work of breathing  Pulmonary recommendations appreciated     Albuterol p r n    Breo daily

## 2022-05-21 NOTE — ASSESSMENT & PLAN NOTE
Chest CT scan with Mild patchy irregular opacity in the right middle lobe, new   Moderate left pleural effusion,   Chronic right middle lobe collapse with no endobronchial obstruction    -pulmonary consult appreciated, recommending IR thoracentesis

## 2022-05-21 NOTE — ASSESSMENT & PLAN NOTE
Cardiac echo with moderate sized pericardial echo space with likely thrombus or fibrinous deposit  Patient was transferred to [address] [place] [address] for thoracic surgery evaluation  This morning patient with increased work of breathing and shortness breath with sinus tachycardia to 120s 130s  -stat Cardiology consult due to concern for worsening pericardial effusion/pericarditis,  Echo with pericardial effusion relatively unchanged  -status post pericardiocentesis, minimal output from drain will likely be removed by Cardiology this afternoon  -may need to treat with colchicine  -will obtain MRI MRCP for biliary dilation and concern for malignancy  -patient having ongoing discussion with family about goals of care  Considering her comorbidities I did broach palliative care and possibility of hospice  Patient will discuss with family

## 2022-05-21 NOTE — ASSESSMENT & PLAN NOTE
Finding on abdominal CT scan,  7 7 cm infrarenal abdominal aortic aneurysm with mixed attenuation within the mural thrombus     Agree with vascular surgery the patient is high risk for any surgical procedure with current risks outweighing the benefits

## 2022-05-21 NOTE — PROGRESS NOTES
Benton Elva seen and examined  Patient with notes of chest pain with deep inspiration and positioning  SOB stable  No palpitations  RN with no acute events noted overnight, pericardial drain flushed regularly with minimal output  VSS on exam and Tele reviewed with Marilynnch this am   Drain pulled back and reassessed for drainage with no output upon suctioning  Given echo findings today, drain pulled at this time  Patient tolerated the procedure will  Will repeat cxr in am and away body fluid studies

## 2022-05-21 NOTE — OCCUPATIONAL THERAPY NOTE
Occupational Therapy Cancellation Note       05/21/22 0909   Note Type   Note type Cancelled Session   Cancel Reasons Medical status       Orders received and chart reviewed  Per EMR, not appropriate to be seen 2' Pt currently has active bed rest orders due to decline in medical status  Will continue to follow to be seen for OT evaluation as appropriate/when medically cleared         Suki Christine MS, OTR/L

## 2022-05-21 NOTE — PROGRESS NOTES
[facility]  Progress Note Linda Royal 1950, 66 y o  female MRN: [id number]  Unit/Bed#: [facility] 512-01 Encounter: [id number]  Primary Care Provider: Yvonne Rodriguez MD   Date and time admitted to hospital: 5/19/2022  2:50 PM    * Pericardial effusion  Assessment & Plan  Cardiac echo with moderate sized pericardial echo space with likely thrombus or fibrinous deposit  Patient was transferred to [facility] for thoracic surgery evaluation  This morning patient with increased work of breathing and shortness breath with sinus tachycardia to 120s 130s  -stat Cardiology consult due to concern for worsening pericardial effusion/pericarditis,  Echo with pericardial effusion relatively unchanged  -status post pericardiocentesis, minimal output from drain will likely be removed by Cardiology this afternoon  -may need to treat with colchicine  -will obtain MRI MRCP for biliary dilation and concern for malignancy  -patient having ongoing discussion with family about goals of care  Considering her comorbidities I did broach palliative care and possibility of hospice  Patient will discuss with family  Chronic diarrhea  Assessment & Plan  Continue supportive care  MRI MRCP    Pleural effusion  Assessment & Plan  Chest CT scan with Mild patchy irregular opacity in the right middle lobe, new   Moderate left pleural effusion,   Chronic right middle lobe collapse with no endobronchial obstruction  -pulmonary consult appreciated, recommending IR thoracentesis      Common bile duct dilatation  Assessment & Plan  CT scan with  Distended gallbladder and dilated common bile duct    No obstruction visualized   Elevated alkaline phosphatase  Hepatic steatosis  MRI MRCP pending to assess for malignancy    Abdominal aortic aneurysm (AAA) without rupture [facility])  Assessment & Plan  Finding on abdominal CT scan,  7 7 cm infrarenal abdominal aortic aneurysm with mixed attenuation within the mural thrombus  Agree with vascular surgery the patient is high risk for any surgical procedure with current risks outweighing the benefits    Failure to thrive in adult  Assessment & Plan  Likely secondary to above medical condition  Underlying severe protein calorie malnutrition    Chronic respiratory failure with hypoxia [facility])  Assessment & Plan  Patient developed hypoxic respiratory failure post COVID pneumonia in 1/2022  On 3 L at baseline,  currently on 3 L but with increased work of breathing  Pulmonary recommendations appreciated     Albuterol p r n  Breo daily    Lower extremity edema  Assessment & Plan  Chronic bilateral lower extremity edema  Likely secondary to underlying poor oral intake/ hypoalbuminemia  History of pulmonary embolism  Assessment & Plan  Patient had single subsegmental right lower lobe PE on 1/27/2022  Patient has been off Eliquis, outpatient pulmonologist is aware, upon chart review it looks like patient had a adverse reaction to Eliquis and since it was a single subsegmental PE, pulmonologist was okay with not continuing long-term anticoagulation   -may need repeat CTA to rule out PE     Severe protein-calorie malnutrition ([facility] [address] )  Assessment & Plan  Malnutrition Findings:   Adult Malnutrition type: Chronic illness  Adult Degree of Malnutrition: Other severe protein calorie malnutrition  Malnutrition Characteristics: Inadequate energy, Weight loss, Fluid accumulation                  360 Statement: Severe malnutriton r/t inadequate intake in the setting of chronic illness (FTT) as evidenced by inadequate energy intake < 75% of estimated needs for >1 month, significant weight loss of 16% x 4 months (1/20/22, 103#), and moderate (+2) fluid accumulation in LE; treated w/ supplements    BMI Findings:  Adult BMI Classifications: Underweight < 18 5        Body mass index is 14 48 kg/m²     Nutrition consult  Possible PEG tube pending family discussions          VTE Pharmacologic Prophylaxis: VTE Score: 5 High Risk (Score >/= 5) - Pharmacological DVT Prophylaxis Ordered: heparin  Sequential Compression Devices Ordered  Patient Centered Rounds: I performed bedside rounds with nursing staff today  Discussions with Specialists or Other Care Team Provider: pulm    Education and Discussions with Family / Patient: Updated  () at bedside  Time Spent for Care: 45 minutes  More than 50% of total time spent on counseling and coordination of care as described above  Current Length of Stay: 2 day(s)  Current Patient Status: Inpatient   Certification Statement: The patient will continue to require additional inpatient hospital stay due to Failure to thrive, shortness of breath pleural effusion  Discharge Plan: Anticipate discharge in 48-72 hrs to discharge location to be determined pending rehab evaluations  Code Status: Level 3 - DNAR and DNI    Subjective:   Patient reports persistent shortness of breath at rest   Chronic back pain and fatigue  Having ongoing goals of care discussion with family    Objective:     Vitals:   Temp (24hrs), Av 3 °F (36 3 °C), Min:97 °F (36 1 °C), Max:97 6 °F (36 4 °C)    Temp:  [97 °F (36 1 °C)-97 6 °F (36 4 °C)] 97 6 °F (36 4 °C)  HR:  [103-128] 114  Resp:  [15-24] 15  BP: ()/(57-87) 139/80  SpO2:  [91 %-98 %] 94 %  Body mass index is 14 48 kg/m²  Input and Output Summary (last 24 hours): Intake/Output Summary (Last 24 hours) at 2022 1842  Last data filed at 2022 1715  Gross per 24 hour   Intake 320 ml   Output 275 ml   Net 45 ml       Physical Exam:   Physical Exam  Constitutional:       General: She is not in acute distress  Appearance: She is ill-appearing  She is not toxic-appearing or diaphoretic  Comments: Frail and cachectic appearance   HENT:      Head: Normocephalic and atraumatic  Eyes:      General: No scleral icterus  Cardiovascular:      Rate and Rhythm: Tachycardia present        Heart sounds: No murmur heard  No friction rub  No gallop  Pulmonary:      Effort: No respiratory distress  Breath sounds: No stridor  No wheezing, rhonchi or rales  Comments: Increased work of breathing  Decreased breath sounds at bases worse at left base  Chest:      Chest wall: No tenderness  Abdominal:      General: There is no distension  Palpations: There is no mass  Tenderness: There is no abdominal tenderness  There is no guarding or rebound  Hernia: No hernia is present  Musculoskeletal:         General: No swelling, tenderness, deformity or signs of injury  Right lower leg: No edema  Left lower leg: No edema  Skin:     Coloration: Skin is pale  Skin is not jaundiced  Findings: No bruising, erythema, lesion or rash  Neurological:      Mental Status: She is oriented to person, place, and time            Additional Data:     Labs:  Results from last 7 days   Lab Units 05/21/22  0444   WBC Thousand/uL 12 83*   HEMOGLOBIN g/dL 14 1   HEMATOCRIT % 46 3*   PLATELETS Thousands/uL 375   NEUTROS PCT % 91*   LYMPHS PCT % 3*   MONOS PCT % 5   EOS PCT % 0     Results from last 7 days   Lab Units 05/21/22  0444   SODIUM mmol/L 134*   POTASSIUM mmol/L 4 0   CHLORIDE mmol/L 101   CO2 mmol/L 23   BUN mg/dL 11   CREATININE mg/dL 0 57*   ANION GAP mmol/L 10   CALCIUM mg/dL 8 1*   ALBUMIN g/dL 1 8*   TOTAL BILIRUBIN mg/dL 0 40   ALK PHOS U/L 246*   ALT U/L 52   AST U/L 173*   GLUCOSE RANDOM mg/dL 96                 Results from last 7 days   Lab Units 05/20/22  1622   PROCALCITONIN ng/ml 16 26*       Lines/Drains:  Invasive Devices  Report    Peripheral Intravenous Line  Duration           Peripheral IV Right Antecubital -- days    Peripheral IV 05/19/22 Left Antecubital 1 day          Drain  Duration           Closed/Suction Drain Midline Chest Other (Comment) 6 Fr  1 day    External Urinary Catheter <1 day                  Telemetry:  Telemetry Orders (From admission, onward) 48 Hour Telemetry Monitoring  Continuous x 48 hours        References:    Telemetry Guidelines   Question:  Reason for 48 Hour Telemetry  Answer:  Patient with [place]amonita/Endocarditis; Cardiac Contusion                 Telemetry Reviewed: Sinus Tachycardia  Indication for Continued Telemetry Use: Arrthymias requiring medical therapy             Imaging: No pertinent imaging reviewed      Recent Cultures (last 7 days):   Results from last 7 days   Lab Units 05/20/22  1400   GRAM STAIN RESULT  Rare Polys  No organisms seen   BODY FLUID CULTURE, STERILE  No growth       Last 24 Hours Medication List:   Current Facility-Administered Medications   Medication Dose Route Frequency Provider Last Rate    acetaminophen  975 mg Oral Q6H [address] Moo Banai, DO      albuterol  2 puff Inhalation Q4H PRN Phil Oliva, DO      bismuth subsalicylate  926 mg Oral Q6H PRN Moo Heck, DO      cefTRIAXone  1,000 mg Intravenous Q24H Moo Heck, DO Stopped (05/21/22 1248)    colchicine  0 6 mg Oral BID Rowan Mcclure MD      fluticasone-vilanterol  1 puff Inhalation Daily Phil Oliva,       gabapentin  100 mg Oral TID Moo Heck, DO      heparin (porcine)  5,000 Units Subcutaneous [facility] Phil Oliva, DO      HYDROmorphone  0 5 mg Intravenous Q3H PRN Argana Smith, DO      ipratropium  0 5 mg Nebulization TID Moo Banai, DO      levalbuterol  1 25 mg Nebulization TID Moo Bannanda, DO      lidocaine  1 patch Topical Daily Moo Bannanda, DO      loperamide  2 mg Oral 4x Daily PRN Phil Oliva, DO      multi-electrolyte  1,000 mL Intravenous Once Ha José Luis, DO      ondansetron  4 mg Intravenous Q6H PRN Phil Oliva, DO      oxyCODONE  2 5 mg Oral Q4H PRN Argie Smith, DO      oxyCODONE  5 mg Oral Q4H PRN Argana Smith, DO      pantoprazole  40 mg Oral Early Morning Phil Oliva, DO      umeclidinium bromide  1 puff Inhalation Daily Moo Heck, DO          Today, Patient Was Seen By: Macrina Mcclure, DO    **Please Note: This note may have been constructed using a voice recognition system  **

## 2022-05-21 NOTE — PROGRESS NOTES
PULMONOLOGY PROGRESS NOTE     Name: Lourdes Liriano   Age & Sex: 66 y o  female   MRN: [id number]  Unit/Bed#: [facility] 12-46   Encounter: [id number]    PATIENT INFORMATION     Name: Lourdes Liriano   Age & Sex: 66 y o  female   MRN: [id number]  Hospital Stay Days: 2    ASSESSMENT/PLAN     Principal Problem:    Pericardial effusion  Active Problems:    Severe protein-calorie malnutrition ([address] )    History of pulmonary embolism    Lower extremity edema    Chronic respiratory failure with hypoxia (HCC)    Failure to thrive in adult    Abdominal aortic aneurysm (AAA) without rupture (HCC)    Common bile duct dilatation    Pleural effusion    Chronic diarrhea      Assessment:    1  Dyspnea/shortness of breath  -multifactorial:  Chronic hypoxic respiratory failure, deconditioning, pericardial effusion, left pleural effusion  Possible pulmonary embolism (low likelihood given on baseline oxygen requirement, negative extremity Dopplers recently, no evidence of right heart strain on echocardiogram)    2  Chronic hypoxic respiratory failure - on 3 L nasal cannula at baseline    3  Severe emphysema - not in exacerbation  -noted on imaging  No PFTs on file    4  Moderate pericardial effusion   -status post pericardial drain; removed do poor output    5  Moderate left pleural effusion    6  Failure to thrive    7  Severe protein calorie malnutrition    8  Post acute sequelae of COVID-19 - infection January 2022    9  History of nicotine dependence    Plan    -continue supplemental oxygen titrate as tolerated; goal SpO2 > 88%  -patient has poor waveform on pulse ox readings which falsely lower readings to the low to mid 80s    When waveform is adequate readings are displayed at 92 to 96% on 3 L nasal cannula oxygen, which is her baseline    -repeat echocardiogram still consistent with moderate pericardial effusion  -cardiology consulted and following  -pericardiocentesis/pericardial fluid studies pending per cardiology    -patient appears dry and had soft blood pressures; administer 1 L fluid over 4 hours  -avoid diuretics at this moment given pericardial effusion; although patient does not have tamponade physiology diuretics may worsen the patient's clinical status due to persistent pericardial effusion     -performed bedside ultrasound to evaluate left-sided pleural effusion; adequate window for drainage  Recommend IR thoracentesis with fluid studies (pH, LDH, protein, glucose, Gram stain, cell count, cytology)    -monitor off antibiotics and steroids  -continue nutritional supplementation  -continue goals of care discussions  -continue Breo and Incruse  -continue Atrovent and Xopenex nebulizations  -out of bed as tolerated, incentive spirometry    -if of breathing continues to increased and respiratory status declines may try high-flow nasal cannula or BiPAP and consider repeat CTA and/or lower extremity Dopplers      SUBJECTIVE     Patient seen and examined  No acute events overnight  Patient reporting fatigue and shortness of breath  OBJECTIVE     Vitals:    22 0900 22 0938 22 1141 22 1529   BP:   111/59 (!) 87/57   BP Location:   Right arm Right arm   Pulse:  (!) 106 (!) 122 103   Resp:   (!) 24 15   Temp:   (!) 97 °F (36 1 °C) 97 6 °F (36 4 °C)   TempSrc:   Axillary Oral   SpO2: 91% 95% 95% 98%   Weight:       Height:          Temperature:   Temp (24hrs), Av 3 °F (36 3 °C), Min:97 °F (36 1 °C), Max:97 6 °F (36 4 °C)    Temperature: 97 6 °F (36 4 °C)  Intake & Output:  I/O        07 07 0701   07 07 0700    P  O    0    NG/GT   20    IV Piggyback   50    Total Intake(mL/kg)   70 (1 8)    Urine (mL/kg/hr) 150 200 (0 2)     Emesis/NG output  75     Drains  60 0    Stool  0     Blood  2     Total Output 150 337 0    Net -150 -337 +70           Unmeasured Urine Occurrence  1 x     Unmeasured Stool Occurrence  1 x         Weights:        Body mass index is 14 48 kg/m²  Weight (last 2 days)     Date/Time Weight    05/21/22 0830 39 5 (87)    05/20/22 1450 39 5 (87)    05/20/22 0910 39 5 (87)    05/19/22 15:10:53 39 6 (87 3)        Physical Exam  Vitals reviewed  Constitutional:       Appearance: She is ill-appearing  Comments: Thin frail cachectic appearance   HENT:      Head: Normocephalic and atraumatic  Right Ear: External ear normal       Left Ear: External ear normal       Nose: Nose normal    Eyes:      General: No scleral icterus  Right eye: No discharge  Left eye: No discharge  Extraocular Movements: Extraocular movements intact  Conjunctiva/sclera: Conjunctivae normal    Cardiovascular:      Rate and Rhythm: Regular rhythm  Tachycardia present  Pulses: Normal pulses  Heart sounds: Normal heart sounds  No murmur heard  No gallop  Pulmonary:      Effort: No respiratory distress  Breath sounds: No stridor  No wheezing, rhonchi or rales  Comments: Increased work of breathing with accessory muscle use  Diminished breath sounds left lung base  Chest:      Chest wall: No tenderness  Abdominal:      General: Bowel sounds are normal  There is no distension  Palpations: Abdomen is soft  Tenderness: There is no abdominal tenderness  There is no guarding or rebound  Musculoskeletal:      Right lower leg: No edema  Left lower leg: No edema  Skin:     General: Skin is warm and dry  Neurological:      Mental Status: She is alert and oriented to person, place, and time  LABORATORY DATA     Labs: I have personally reviewed pertinent reports    Results from last 7 days   Lab Units 05/21/22  0444 05/20/22  1622 05/19/22  1708 05/19/22  1032   WBC Thousand/uL 12 83* 5 66  --  6 31   HEMOGLOBIN g/dL 14 1 13 5  --  13 0   HEMATOCRIT % 46 3* 43 4  --  42 3   PLATELETS Thousands/uL 375 416* 397* 394*   NEUTROS PCT % 91* 81*  --  84*   MONOS PCT % 5 9  --  7      Results from last 7 days   Lab Units 05/21/22  0444 05/20/22  0538 05/19/22  1032   POTASSIUM mmol/L 4 0 3 6 3 8   CHLORIDE mmol/L 101 103 101   CO2 mmol/L 23 26 29   BUN mg/dL 11 8 11   CREATININE mg/dL 0 57* 0 41* 0 64   CALCIUM mg/dL 8 1* 7 7* 7 7*   ALK PHOS U/L 246* 213* 233*   ALT U/L 52 30 27   AST U/L 173* 56* 53*     Results from last 7 days   Lab Units 05/19/22  1032   MAGNESIUM mg/dL 2 1                          ABG:       IMAGING & DIAGNOSTIC TESTING     Radiology Results: I have personally reviewed pertinent reports  CT abdomen pelvis wo contrast    Result Date: 5/19/2022  Impression: 1   7 7 cm infrarenal abdominal aortic aneurysm with mixed attenuation within the mural thrombus  This is suboptimally evaluated without contrast and CTA as well as vascular surgery consult is recommended  2   Distended gallbladder and dilated common bile duct  There are no pericholecystic inflammatory changes and there is no obstruction visualized though correlation with liver enzymes including bilirubin recommended  If there is concern for biliary obstruction, MRCP can be obtained  3   Mild pelvic free fluid with diffuse subcutaneous edema and pleural effusions, likely related to volume status  4   Diffuse hepatic steatosis  I personally discussed this study with Lillian Frazier on 5/19/2022 at 11:17 AM  Workstation performed: [id number]     XR chest portable    Result Date: 5/20/2022  Impression: No pneumothorax  Stable size of small left pleural effusion  Workstation performed: [id number]     XR chest portable    Result Date: 5/20/2022  Impression: Pleural-parenchymal density at the left lung base is similar to the prior study  Pneumonia is possible Workstation performed: [id number]     XR chest 2 views    Result Date: 5/19/2022  Impression: COPD  New left basilar opacity, likely a combination of pleural effusion, atelectasis and/or pneumonia  Correlate clinically   Streaky opacity on lateral view suggesting chronic right middle lobe atelectasis  Workstation performed: [id number]     CT chest without contrast    Result Date: 5/19/2022  Impression: Mild patchy irregular opacity in the right middle lobe, new since January 2022, likely the sequela of Covid 19 or other inflammatory etiology  Moderate left pleural effusion, of uncertain etiology  Chronic right middle lobe collapse with no endobronchial obstruction  Persistent moderate loculated pericardial effusion  Redemonstration of incompletely imaged abdominal aortic aneurysm  Workstation performed: [id number]     CTA abdominal aorta w wo contrast    Result Date: 5/20/2022  Impression: Massive left saccular infrarenal abdominal aortic aneurysm, measuring up to 9 6 x 7 7 cm in maximal axial dimension  The aneurysm extends approximately 2 cm from the left renal artery takeoff, to the level of the aortic bifurcation  No focal plaque ulceration  No evidence of active extravasation  The distal abdominal aorta is stenotic, measuring 1 2 cm  Bilateral common femoral arteries measure 0 5 cm  Mild bilateral external iliac artery stenosis  Mild proximal right common iliac artery stenosis  Moderate simple left pleural effusion  Moderate simple anterior pericardial effusion  Anasarca  Prominent gallbladder distention without evidence of cholecystitis, likely secondary to mass effect from the aneurysm upon the distal CBD  Workstation performed: [id number]     Other Diagnostic Testing: I have personally reviewed pertinent reports      ACTIVE MEDICATIONS     Current Facility-Administered Medications   Medication Dose Route Frequency    acetaminophen (TYLENOL) tablet 975 mg  975 mg Oral Q6H [address]    albuterol (PROVENTIL HFA,VENTOLIN HFA) inhaler 2 puff  2 puff Inhalation Q4H PRN    cefTRIAXone (ROCEPHIN) 1,000 mg in dextrose 5 % 50 mL IVPB  1,000 mg Intravenous Q24H    colchicine (COLCRYS) tablet 0 6 mg  0 6 mg Oral BID    fluticasone-vilanterol (BREO ELLIPTA) 100-25 mcg/inh inhaler 1 puff  1 puff Inhalation Daily    gabapentin (NEURONTIN) capsule 100 mg  100 mg Oral TID    heparin (porcine) subcutaneous injection 5,000 Units  5,000 Units Subcutaneous Q8H [facility]    HYDROmorphone (DILAUDID) injection 0 5 mg  0 5 mg Intravenous Q3H PRN    ipratropium (ATROVENT) 0 02 % inhalation solution 0 5 mg  0 5 mg Nebulization TID    levalbuterol (XOPENEX) inhalation solution 1 25 mg  1 25 mg Nebulization TID    lidocaine (LIDODERM) 5 % patch 1 patch  1 patch Topical Daily    loperamide (IMODIUM) capsule 2 mg  2 mg Oral 4x Daily PRN    multi-electrolyte (ISOLYTE-S PH 7 4) bolus 1,000 mL  1,000 mL Intravenous Once    ondansetron (ZOFRAN) injection 4 mg  4 mg Intravenous Q6H PRN    oxyCODONE (ROXICODONE) IR tablet 2 5 mg  2 5 mg Oral Q4H PRN    oxyCODONE (ROXICODONE) IR tablet 5 mg  5 mg Oral Q4H PRN    pantoprazole (PROTONIX) EC tablet 40 mg  40 mg Oral Early Morning    umeclidinium bromide (INCRUSE ELLIPTA) 62 5 mcg/inh inhaler AEPB 1 puff  1 puff Inhalation Daily           Portions of the record may have been created with voice recognition software  Occasional wrong word or "sound a like" substitutions may have occurred due to the inherent limitations of voice recognition software    Read the chart carefully and recognize, using context, where substitutions have occurred   ==  [employer], [address] [place] [address]  Pulmonary/Critical Care Fellowship PGY-5

## 2022-05-21 NOTE — PROGRESS NOTES
[facility]    Cardiology Progress Note  Tami Giron 66 y o  female   YOB: 1950 MRN: [id number]  Unit/Bed#: [facility] 512-01 Encounter: [id number]      Subjective:   No significant events overnight  She underwent pericardiocentesis yesterday - draining 60cc, but has had minimal further drainage since then through the pigtail catheter that remains in place  She reports some discomfort at pericardiocentesis site  Assessments  76-year-old female had COVID infection in January 2022, and has since been feeling unwell and been unable to eat much  She has lost about 15 lbs over the past 4 months, and is down to 87 lbs (BMI 14 5)  She has been having poor nutritional intake and additionally been reporting some shortness of breath with exertion and intermittent dizziness and presented to [facility] ED yesterday  She underwent an echocardiogram there and this revealed moderate (predominantly anterior) pericardial effusion  She was additionally found to have a very large abdominal aortic aneurysm as well, and was transferred to [facility] for thoracic surgery and vascular surgery evaluation for pericardial window and potential AAA repair        She was evaluated by thoracic surgery Dr Melissa Jolly and was felt to not be a candidate for pericardial window, with probable chronic pericardial fluid   Overnight patient developed increase tachycardia, with continued shortness of breath and as a result underwent repeat echocardiogram earlier today        Principal Problem:    Pericardial effusion  Active Problems:    Severe protein-calorie malnutrition (HCC)    History of pulmonary embolism    Lower extremity edema    Chronic respiratory failure with hypoxia (HCC)    Failure to thrive in adult    Abdominal aortic aneurysm (AAA) without rupture (HCC)    Common bile duct dilatation    Pleural effusion    Chronic diarrhea        Plan  Pericardial effusion   · Unclear etiology -?protein calorie malnutrition v malignant v heart failure   · S/p pericardiocentesis yesterday --> drained about 60 cc, sent for diagnostics/cytology  · Repeat echo today - persistent, similar sized effusion, no tamponade  · Procalcitonin elevated at 16 26, CRP elevated at 29 3  · Concern for fibrinous exudate at epicardial edge, and possible inflammatory/infectious etiology of same  · She has been started on colchicine in the meantime  · Minimal pericardial drainage overnight --> discussed interventional fellow --> await further discussion with interventional cardiologist prior to discontinuing or possibly attempting repositioning the same      AAA   · Noted to have large left saccular abdominal aortic aneurysm measuring 9 6 x 7 7 cm on CTA   · Vascular surgery following  Patient adamantly refusing any sort of surgical intervention, but may be willing to consider endovascular procedures, and as such she will be high risk for same  · Not a candidate for endovascular repair due to anatomical features  · Is likely high-risk for open repair --> ongoing discussion b/w patient and vascular surgery regarding same          Review of Systems   All other systems reviewed and are negative  Telemetry Review: No significant arrhythmias seen on telemetry review  Objective:   Vitals: Blood pressure 92/68, pulse (!) 106, temperature 97 9 °F (36 6 °C), temperature source Oral, resp  rate 22, height 5' 5" (1 651 m), weight 39 5 kg (87 lb), SpO2 95 %  , Body mass index is 14 48 kg/m² ,   Orthostatic Blood Pressures    Flowsheet Row Most Recent Value   Blood Pressure 92/68 filed at 05/21/2022 0830   Patient Position - Orthostatic VS Lying filed at 05/21/2022 6074         Systolic (34OZQ), CPX:377 , Min:92 , EJO:650     Diastolic (15JSW), YUE:18, Min:68, Max:99    Wt Readings from Last 5 Encounters:   05/21/22 39 5 kg (87 lb)   05/19/22 39 kg (86 lb)   05/12/22 39 2 kg (86 lb 6 4 oz)   03/09/22 43 1 kg (95 lb)   03/09/22 40 4 kg (89 lb)     I/O       05/19 0701  05/20 0700 05/20 0701  05/21 0700 05/21 0701 05/22 0700    P  O    0    NG/GT   20    Total Intake(mL/kg)   20 (0 5)    Urine (mL/kg/hr) 150 200 (0 2)     Emesis/NG output  75     Drains  60 0    Stool  0     Blood  2     Total Output 150 337 0    Net -150 -337 +20           Unmeasured Urine Occurrence  1 x     Unmeasured Stool Occurrence  1 x               Physical Exam  Vitals and nursing note reviewed  Constitutional:       General: She is not in acute distress  Appearance: Normal appearance  She is well-developed  She is not ill-appearing or diaphoretic  HENT:      Head: Normocephalic and atraumatic  Nose: No congestion  Eyes:      General: No scleral icterus  Conjunctiva/sclera: Conjunctivae normal    Neck:      Vascular: No carotid bruit or JVD  Cardiovascular:      Rate and Rhythm: Normal rate and regular rhythm  Pulses: Normal pulses  Heart sounds: Normal heart sounds  No murmur heard  No friction rub  No gallop  Pulmonary:      Effort: Pulmonary effort is normal  No respiratory distress  Breath sounds: Normal breath sounds  No rales  Abdominal:      General: There is no distension  Palpations: Abdomen is soft  Tenderness: There is no abdominal tenderness  Musculoskeletal:         General: No swelling or tenderness  Cervical back: Neck supple  Right lower leg: No edema  Left lower leg: No edema  Skin:     General: Skin is warm  Neurological:      General: No focal deficit present  Mental Status: She is alert and oriented to person, place, and time  Mental status is at baseline  Psychiatric:         Mood and Affect: Mood normal          Behavior: Behavior normal          Thought Content:  Thought content normal          Laboratory Results: personally reviewed        CBC with diff:   Results from last 7 days   Lab Units 05/21/22  0444 05/20/22  1622 05/19/22  1708 05/19/22  1032 05/18/22  0910   WBC Thousand/uL 12 83* 5 66  --  6 31 6 15   HEMOGLOBIN g/dL 14 1 13 5  --  13 0 12 9   HEMATOCRIT % 46 3* 43 4  --  42 3 43 1   MCV fL 87 87  --  87 88   PLATELETS Thousands/uL 375 416* 397* 394* 441*   MCH pg 26 6* 27 0  --  26 6* 26 4*   MCHC g/dL 30 5* 31 1*  --  30 7* 29 9*   RDW % 23 2* 23 3*  --  22 8* 22 3*   MPV fL 9 2 8 6* 8 8* 8 6* 8 7*   NRBC AUTO /100 WBCs 0 0  --  0 0         CMP:  Results from last 7 days   Lab Units 05/21/22  0444 05/20/22  0538 05/19/22  1032 05/18/22  0910   POTASSIUM mmol/L 4 0 3 6 3 8 3 7   CHLORIDE mmol/L 101 103 101 102   CO2 mmol/L 23 26 29 29   BUN mg/dL 11 8 11 11   CREATININE mg/dL 0 57* 0 41* 0 64 0 52*   CALCIUM mg/dL 8 1* 7 7* 7 7* 7 7*   AST U/L 173* 56* 53* 38   ALT U/L 52 30 27 27   ALK PHOS U/L 246* 213* 233* 228*   EGFR ml/min/1 73sq m 92 103 89 95         BMP:  Results from last 7 days   Lab Units 05/21/22  0444 05/20/22  0538 05/19/22  1032 05/18/22  0910   POTASSIUM mmol/L 4 0 3 6 3 8 3 7   CHLORIDE mmol/L 101 103 101 102   CO2 mmol/L 23 26 29 29   BUN mg/dL 11 8 11 11   CREATININE mg/dL 0 57* 0 41* 0 64 0 52*   CALCIUM mg/dL 8 1* 7 7* 7 7* 7 7*       BNP: No results for input(s): BNP in the last 72 hours      Magnesium:   Results from last 7 days   Lab Units 05/19/22  1032   MAGNESIUM mg/dL 2 1       Coags:       TSH:        Hemoglobin A1C       Lipid Profile:       Meds/Allergies   all current active meds have been reviewed and current meds:   Current Facility-Administered Medications   Medication Dose Route Frequency    acetaminophen (TYLENOL) tablet 650 mg  650 mg Oral Q6H PRN    albuterol (PROVENTIL HFA,VENTOLIN HFA) inhaler 2 puff  2 puff Inhalation Q4H PRN    colchicine (COLCRYS) tablet 0 6 mg  0 6 mg Oral BID    fluticasone-vilanterol (BREO ELLIPTA) 100-25 mcg/inh inhaler 1 puff  1 puff Inhalation Daily    gabapentin (NEURONTIN) capsule 100 mg  100 mg Oral TID    heparin (porcine) subcutaneous injection 5,000 Units  5,000 Units Subcutaneous Q8H [address]    HYDROmorphone (DILAUDID) injection 0 5 mg  0 5 mg Intravenous Q3H PRN    lidocaine (LIDODERM) 5 % patch 1 patch  1 patch Topical Daily    loperamide (IMODIUM) capsule 2 mg  2 mg Oral 4x Daily PRN    ondansetron (ZOFRAN) injection 4 mg  4 mg Intravenous Q6H PRN    oxyCODONE (ROXICODONE) IR tablet 2 5 mg  2 5 mg Oral Q4H PRN    oxyCODONE (ROXICODONE) IR tablet 5 mg  5 mg Oral Q4H PRN    pantoprazole (PROTONIX) EC tablet 40 mg  40 mg Oral Early Morning     Medications Prior to Admission   Medication    albuterol (PROVENTIL HFA,VENTOLIN HFA) 90 mcg/act inhaler    aspirin-acetaminophen-caffeine (EXCEDRIN MIGRAINE) 250-250-65 MG per tablet    bismuth subsalicylate (PEPTO BISMOL) 524 mg/30 mL oral suspension    fluticasone-vilanterol (BREO ELLIPTA) 100-25 mcg/inh inhaler    ondansetron (ZOFRAN) 4 mg tablet    pantoprazole (PROTONIX) 40 mg tablet            Cardiac testing: reviewed  No results found for this or any previous visit  No results found for this or any previous visit  No results found for this or any previous visit  No results found for this or any previous visit

## 2022-05-22 NOTE — Clinical Note
70 YO [birth year]  DXS: Severe protein calorie malnutrition, lower extremity edema, adult FTT, AAA, chronic respiratory failure with hypoxia  Pleural effusion, pericardial effusion  AAA, massive, with thrombus, not amenable to EVAR, and pt is prohibitively high risk for open repair  severe protein-calorie malnutrition ; concern for occult malignancy; fungemia; pleural and pericardial effusions  Pt has good competence and insight; she is well supported by family in her goal to go home with hospice only  Albumin 1 8 Weight 91 lbs  I just met with  and niece and they are both in support of home hospice    also had covid and is O2 dependent at 3 liters  PPS 30%

## 2022-05-22 NOTE — ASSESSMENT & PLAN NOTE
Cardiac echo with moderate sized pericardial echo space with likely thrombus or fibrinous deposit     Patient was transferred to [facility] for thoracic surgery evaluation  Patient has been transition to comfort care no further management for pericardial effusion

## 2022-05-22 NOTE — QUICK NOTE
Patient being transitioned to palliative/comfort care  Palliative care Dr Chatterjee evaluating  We will sign off at this point  We will be available, as and when needed  Please call with questions or reconsult as appropriate

## 2022-05-22 NOTE — NURSING NOTE
Notified KRYSTA Siegel about pt's BP 25T systolic  Un able to obtained manual BP d/t very small arm  PICU and ED do not have pediatric manual BP cuffs available  Unable to obtain SP02, for pt has cold extremities  Notified respiratory and came bedside to evaluate  Patient has not voided since 530am   Bladder scanned for 264  Order for albumin to be given

## 2022-05-22 NOTE — PROGRESS NOTES
[address] [facility]  Progress Note Sadiq Vyas 1950, 66 y o  female MRN: [id number]  Unit/Bed#: [facility] 505-01 Encounter: [id number]  Primary Care Provider: Sam Herrera MD   Date and time admitted to hospital: 5/19/2022  2:50 PM    * Pericardial effusion  Assessment & Plan  Cardiac echo with moderate sized pericardial echo space with likely thrombus or fibrinous deposit  Patient was transferred to [facility] for thoracic surgery evaluation  Patient has been transition to comfort care no further management for pericardial effusion        Chronic diarrhea  Assessment & Plan  Continue supportive care      Pleural effusion  Assessment & Plan  Chest CT scan with Mild patchy irregular opacity in the right middle lobe, new   Moderate left pleural effusion,   Chronic right middle lobe collapse with no endobronchial obstruction  Common bile duct dilatation  Assessment & Plan  CT scan with  Distended gallbladder and dilated common bile duct  No obstruction visualized   Elevated alkaline phosphatase  Hepatic steatosis  Suspect malignancy, but will forego further imaging as patient is being transition to comfort in home hospice discharge tomorrow    Abdominal aortic aneurysm (AAA) without rupture [facility])  Assessment & Plan  Finding on abdominal CT scan,  7 7 cm infrarenal abdominal aortic aneurysm with mixed attenuation within the mural thrombus     Agree with vascular surgery the patient is high risk for any surgical procedure with current risks outweighing the benefits    Failure to thrive in adult  Assessment & Plan  Likely secondary to above medical condition  Underlying severe protein calorie malnutrition    Chronic respiratory failure with hypoxia [facility])  Assessment & Plan  Patient developed hypoxic respiratory failure post COVID pneumonia in 1/2022  On 3 L at baseline,  currently on 3 L but with increased work of breathing  Pulmonary recommendations appreciated     Albuterol p r n  Breo daily    Lower extremity edema  Assessment & Plan  Chronic bilateral lower extremity edema  Likely secondary to underlying poor oral intake/ hypoalbuminemia  History of pulmonary embolism  Assessment & Plan  Patient had single subsegmental right lower lobe PE on 1/27/2022  Patient has been off Eliquis, outpatient pulmonologist is aware, upon chart review it looks like patient had a adverse reaction to Eliquis and since it was a single subsegmental PE, pulmonologist was okay with not continuing long-term anticoagulation  -pending home hospice tomorrow    Severe protein-calorie malnutrition ([address] )  Assessment & Plan  Malnutrition Findings:   Adult Malnutrition type: Chronic illness  Adult Degree of Malnutrition: Other severe protein calorie malnutrition  Malnutrition Characteristics: Inadequate energy, Weight loss, Fluid accumulation                  360 Statement: Severe malnutriton r/t inadequate intake in the setting of chronic illness (FTT) as evidenced by inadequate energy intake < 75% of estimated needs for >1 month, significant weight loss of 16% x 4 months (1/20/22, 103#), and moderate (+2) fluid accumulation in LE; treated w/ supplements    BMI Findings:  Adult BMI Classifications: Underweight < 18 5        Body mass index is 15 3 kg/m²  Nutrition consult  Patient has been transition to comfort care home hospice tomorrow          VTE Pharmacologic Prophylaxis: VTE Score: 5 High Risk (Score >/= 5) - Pharmacological DVT Prophylaxis Ordered: heparin  Sequential Compression Devices Ordered  Patient Centered Rounds: I performed bedside rounds with nursing staff today  Discussions with Specialists or Other Care Team Provider: palliative    Education and Discussions with Family / Patient: Updated  (significant other) via phone  Time Spent for Care: 45 minutes   More than 50% of total time spent on counseling and coordination of care as described above     Current Length of Stay: 3 day(s)  Current Patient Status: Inpatient   Certification Statement: The patient will continue to require additional inpatient hospital stay due to Pending discharge on home hospice tomorrow  Discharge Plan: Home hospice    Code Status: Level 4 - Comfort Care    Subjective:   After thorough discussion with myself and then palliative care patient is consistent that she would like to transition to comfort care and go on hospice  She reports ongoing back pain and shortness of breath currently    Objective:     Vitals:   Temp (24hrs), Av 6 °F (36 4 °C), Min:97 1 °F (36 2 °C), Max:97 9 °F (36 6 °C)    Temp:  [97 1 °F (36 2 °C)-97 9 °F (36 6 °C)] 97 9 °F (36 6 °C)  HR:  [107-151] 134  Resp:  [16-17] 17  BP: ()/(54-80) 96/69  SpO2:  [92 %-98 %] 94 %  Body mass index is 15 3 kg/m²  Input and Output Summary (last 24 hours): Intake/Output Summary (Last 24 hours) at 2022 1636  Last data filed at 2022 1332  Gross per 24 hour   Intake 1347 5 ml   Output 100 ml   Net 1247 5 ml       Physical Exam:   Physical Exam  Constitutional:       General: She is not in acute distress  Appearance: She is ill-appearing  She is not toxic-appearing or diaphoretic  Comments: Frail and cachectic appearance   HENT:      Head: Normocephalic and atraumatic  Eyes:      General: No scleral icterus  Cardiovascular:      Rate and Rhythm: Tachycardia present  Heart sounds: No murmur heard  No friction rub  No gallop  Pulmonary:      Effort: No respiratory distress  Breath sounds: No stridor  No wheezing, rhonchi or rales  Comments: Increased work of breathing  Decreased breath sounds at bases worse at left base  Chest:      Chest wall: No tenderness  Abdominal:      General: There is no distension  Palpations: There is no mass  Tenderness: There is no abdominal tenderness  There is no guarding or rebound  Hernia: No hernia is present  Musculoskeletal:         General: No swelling, tenderness, deformity or signs of injury  Right lower leg: No edema  Left lower leg: No edema  Skin:     Coloration: Skin is pale  Skin is not jaundiced  Findings: No bruising, erythema, lesion or rash  Neurological:      Mental Status: She is oriented to person, place, and time  Additional Data:     Labs:  Results from last 7 days   Lab Units 05/22/22  0448   WBC Thousand/uL 11 07*   HEMOGLOBIN g/dL 12 4   HEMATOCRIT % 40 8   PLATELETS Thousands/uL 294   NEUTROS PCT % 87*   LYMPHS PCT % 5*   MONOS PCT % 7   EOS PCT % 0     Results from last 7 days   Lab Units 05/22/22  0448 05/21/22  0444   SODIUM mmol/L 133* 134*   POTASSIUM mmol/L 4 2 4 0   CHLORIDE mmol/L 99* 101   CO2 mmol/L 21 23   BUN mg/dL 17 11   CREATININE mg/dL 1 05 0 57*   ANION GAP mmol/L 13 10   CALCIUM mg/dL 8 0* 8 1*   ALBUMIN g/dL  --  1 8*   TOTAL BILIRUBIN mg/dL  --  0 40   ALK PHOS U/L  --  246*   ALT U/L  --  52   AST U/L  --  173*   GLUCOSE RANDOM mg/dL 71 96                 Results from last 7 days   Lab Units 05/22/22  0448 05/20/22  1622   PROCALCITONIN ng/ml 23 58* 16 26*       Lines/Drains:  Invasive Devices  Report    Peripheral Intravenous Line  Duration           Peripheral IV Right Antecubital -- days    Peripheral IV 05/19/22 Left Antecubital 2 days    Peripheral IV 05/22/22 Distal;Left;Ventral (anterior) Forearm <1 day          Drain  Duration           Closed/Suction Drain Midline Chest Other (Comment) 6 Fr  2 days    Urethral Catheter 16 Fr  <1 day              Urinary Catheter:  Goal for removal: N/A- Discharging with [place]               Imaging: No pertinent imaging reviewed      Recent Cultures (last 7 days):   Results from last 7 days   Lab Units 05/20/22  1400   GRAM STAIN RESULT  Rare Polys  No organisms seen   BODY FLUID CULTURE, STERILE  No growth       Last 24 Hours Medication List:   Current Facility-Administered Medications   Medication Dose Route Frequency Provider Last Rate    acetaminophen  650 mg Oral 4x Daily (with meals and at bedtime) Mauri Moreno MD      albuterol  2 puff Inhalation Q4H PRN Rice Calkin, DO      ALPRAZolam  0 25 mg Oral 4x Daily PRN Mauri Morneo MD      bismuth subsalicylate  736 mg Oral Demand Mauri Moreno MD      gabapentin  100 mg Oral TID Cj Tello DO      HYDROmorphone  0 3 mg Intravenous Q1H PRN Mauri Moreno MD      lidocaine  1 patch Topical Daily Moo Banai, DO      loperamide  2 mg Oral 4x Daily PRN Rice Calkin, DO      multi-electrolyte  75 mL/hr Intravenous Continuous Moo Banai, DO      ondansetron  4 mg Intravenous Q6H PRN Rice Calkin, DO      oxyCODONE  2 5 mg Oral 4x Daily (with meals and at bedtime) Mauri Moreno MD      oxyCODONE  5 mg Oral Q2H PRN Mauri Moreno MD      pantoprazole  40 mg Oral Early Morning Mukesh Hanks, DO          Today, Patient Was Seen By: Cj Tello DO    **Please Note: This note may have been constructed using a voice recognition system  **

## 2022-05-22 NOTE — NURSING NOTE
Notified Mary Ellen Sahni from [facility] about pt's HR in the 150s  BP still in the 23X systolic  Order for IVF to start and stat CXR  Pt voided 50mL

## 2022-05-22 NOTE — ASSESSMENT & PLAN NOTE
CT scan with  Distended gallbladder and dilated common bile duct    No obstruction visualized   Elevated alkaline phosphatase  Hepatic steatosis  Suspect malignancy, but will forego further imaging as patient is being transition to comfort in home hospice discharge tomorrow

## 2022-05-22 NOTE — PHYSICAL THERAPY NOTE
Physical Therapy Cancellation Note         05/22/22 0757   PT Last Visit   PT Visit Date 05/22/22   Note Type   Note type Cancelled Session   Cancel Reasons Medical status       PT orders received and chart reviewed  Per EMR, not appropriate to be seen  at this time as patient currently has active bed rest orders due to decline in medical status  Will continue to follow to be seen for PT evaluation as appropriate/when medically cleared       Worthy Ohs, PT

## 2022-05-22 NOTE — ASSESSMENT & PLAN NOTE
Malnutrition Findings:   Adult Malnutrition type: Chronic illness  Adult Degree of Malnutrition: Other severe protein calorie malnutrition  Malnutrition Characteristics: Inadequate energy, Weight loss, Fluid accumulation                  360 Statement: Severe malnutriton r/t inadequate intake in the setting of chronic illness (FTT) as evidenced by inadequate energy intake < 75% of estimated needs for >1 month, significant weight loss of 16% x 4 months (1/20/22, 103#), and moderate (+2) fluid accumulation in LE; treated w/ supplements    BMI Findings:  Adult BMI Classifications: Underweight < 18 5        Body mass index is 15 3 kg/m²     Nutrition consult  Patient has been transition to comfort care home hospice tomorrow

## 2022-05-22 NOTE — CONSULTS
Consultation - Palliative and Supportive Care   Sobeida San 66 y o  female [id number]    Patient Active Problem List   Diagnosis    Severe protein-calorie malnutrition ([address] )    Anemia    History of pulmonary embolism    Hypotension    Underweight    B12 deficiency    Depression, recurrent (HCC)    Lower extremity edema    Hypokalemia    Vitamin D deficiency    Chronic respiratory failure with hypoxia (HCC)    Failure to thrive in adult    Abdominal aortic aneurysm (AAA) without rupture (HCC)    Common bile duct dilatation    Pericardial effusion    Pleural effusion    Chronic diarrhea     Active issues specifically addressed today include: AAA, massive, with thrombus, not amenable to EVAR, and pt is prohibitively high risk for open repair  severe protein-calorie malnutrition ; concern for occult malignancy; fungemia; pleural and pericardial effusions    Plan:  1  Symptom management - increase access to opioids and BZDs for cancer pain, and for anxiety   - alprazolam 0 25mg PO QID PRN   - scheduled 2 5mg oxyIR QID   - PRN oxyIR q2hr   - PRN hydromorphone IV q1hr     2  Goals - Comfort Measures Only (CMO)    - Pt has good competence and insight; she is well supported by family in her goal to go home with hospice only  Code Status: DNR/DNI - Level 4   Decisional apparatus:  Patient is competent on my exam today  If competence is lost, patient's substitute decision maker would default to  by [place]  Advance Directive / Living Will / POLST:  None on file     I have reviewed the patient's controlled substance dispensing history in the Prescription Drug Monitoring Program in compliance with the [facility] regulations before prescribing any controlled substances  We appreciate the invitation to be involved in this patient's care  We will continue to be available for supp[ort as requested     Please do not hesitate to reach our on call provider through our clinic answering service at [phone] should you have acute symptom control concerns  Florentin Olson MD  Palliative and Supportive Care  Clinic/Answering Service: [phone]  You can find me on [employer]! IDENTIFICATION:  Inpatient consult to Palliative Care  Consult performed by: Florentin Olson MD  Consult ordered by: Delonte Ingram DO        Physician Requesting Consult: Delonte Ingram DO  Reason for Consult / Principal Problem: goals  Hx and PE limited by: none    HISTORY OF PRESENT ILLNESS:       Krystle Becerril is a 66 y o  female who presents with Failure to thrive  Pt is known in Network for her vasculopathy and VTE with subsegmental PE asso with  COVID infected in January 2022  Since that time, she has experienced taste changes and poor PO, and has lost a considerable amt of weight: she was 103# when she first contacted our system in January; she is now 91#  This is a 10% wt loss within a span of a few months, and her BMI at this time is calculated to be 15  This stay, she is found to have a massive AAA (9 6 x 7 7cm) with a great deal of thrombus  The renal arteries are spared, but the aneurysm extends to the bifurcation of the aorta in the pelvis, with post-aneurysmal stenosis of 1 2cm  With pt's poor functional status, dramatic malnutrition, severe emphysema, fatty liver, and concern for occult malignancy (tarry stool and multiple effusions), surgical correction of her vascular catastrophe is not being offered  Pt has been advised of these challenges and concerns, and she is described as overwhelmed by her care team members  She has suggested she wishes to consider the matter with her family, but thus far she has not had this conversation (today is Day 3 hospital stay)  She has, thankfully, adjusted her code status to DNR/DNI 3    Today, as Dr Trent Jaime attempted to revisit her goals and provide supportive engagement with her complex care plan and emotional distress, she is noted to have responded to his offer of comfort cares as a mode of 'giving up'  Upon further conversation at bedside with  and niece, pt shows very clear undertanding of her illness, px, and goals  Her family are under no illusions about her suffering, and the likely outcomes of her illness  Her niece is a nurse, and has completed hospice cares for other family members in dire situations  All parties well oriented to pt's multiple end-stage conditions and they all wish to go home ASAP with Comfort Measures Only (CMO)   Freedom-of-choice offered and family would prefer [facility] bed, commode, w/c already in home  Family do request bui cath placement before D/C, to improve cleanliness, and reduce pt's work of breathing for her hygiene      Review of Systems   Unable to perform ROS: acuity of condition       Past Medical History:   Diagnosis Date    AAA (abdominal aortic aneurysm) (HCC)     Acute respiratory failure with hypoxia ([address] ) 2022    Chronic Home O2 3lpm    Cough     Pneumonia due to COVID-19 virus 2022    SOB (shortness of breath)     Tobacco use 2022    Wheezing      Past Surgical History:   Procedure Laterality Date    LAPAROSCOPIC PARTIAL GASTRECTOMY       Social History     Socioeconomic History    Marital status: /Civil Union     Spouse name: Not on file    Number of children: Not on file    Years of education: Not on file    Highest education level: Not on file   Occupational History    Occupation: retired   Tobacco Use    Smoking status: Former Smoker     Packs/day: 1 00     Years: 55 00     Pack years: 55 00     Types: Cigarettes     Start date:      Quit date: 2022     Years since quittin 3    Smokeless tobacco: Never Used   Vaping Use    Vaping Use: Never used   Substance and Sexual Activity    Alcohol use: Not Currently    Drug use: Never    Sexual activity: Not on file   Other Topics Concern    Not on file   Social History Narrative    Not on file     Social Determinants of Health     Financial Resource Strain: Not on file   Food Insecurity: No Food Insecurity    Worried About Running Out of Food in the Last Year: Never true    Nahum of Food in the Last Year: Never true   Transportation Needs: No Transportation Needs    Lack of Transportation (Medical): No    Lack of Transportation (Non-Medical):  No   Physical Activity: Not on file   Stress: Stress Concern Present    Feeling of Stress : Rather much   Social Connections: Not on file   Intimate Partner Violence: Not on file   Housing Stability: Low Risk     Unable to Pay for Housing in the Last Year: No    Number of Places Lived in the Last Year: 1    Unstable Housing in the Last Year: No     Family History   Problem Relation Age of Onset    Abdominal aortic aneurysm Sister         s/p open repair       MEDICATIONS / ALLERGIES:    all current active meds have been reviewed and current meds:   Current Facility-Administered Medications   Medication Dose Route Frequency    acetaminophen (TYLENOL) tablet 650 mg  650 mg Oral 4x Daily (with meals and at bedtime)    albuterol (PROVENTIL HFA,VENTOLIN HFA) inhaler 2 puff  2 puff Inhalation Q4H PRN    ALPRAZolam (XANAX) tablet 0 25 mg  0 25 mg Oral 4x Daily PRN    bismuth subsalicylate (PEPTO BISMOL) oral suspension 524 mg  524 mg Oral Demand    gabapentin (NEURONTIN) capsule 100 mg  100 mg Oral TID    HYDROmorphone (DILAUDID) injection 0 3 mg  0 3 mg Intravenous Q1H PRN    ipratropium (ATROVENT) 0 02 % inhalation solution 0 5 mg  0 5 mg Nebulization TID    levalbuterol (XOPENEX) inhalation solution 1 25 mg  1 25 mg Nebulization TID    lidocaine (LIDODERM) 5 % patch 1 patch  1 patch Topical Daily    loperamide (IMODIUM) capsule 2 mg  2 mg Oral 4x Daily PRN    ondansetron (ZOFRAN) injection 4 mg  4 mg Intravenous Q6H PRN    oxyCODONE (ROXICODONE) IR tablet 2 5 mg  2 5 mg Oral 4x Daily (with meals and at bedtime)    oxyCODONE (ROXICODONE) IR tablet 5 mg  5 mg Oral Q2H PRN    pantoprazole (PROTONIX) EC tablet 40 mg  40 mg Oral Early Morning       Allergies   Allergen Reactions    Eliquis [Apixaban] Dizziness      Patient was seeming double   felt like she was going pass out     Wheat Bran - Food Allergy Vomiting     Anything wheat make patient vomiting is to much for her to eat         OBJECTIVE:    Physical Exam  Physical Exam  Constitutional:       General: She is not in acute distress  Appearance: She is ill-appearing  She is not toxic-appearing or diaphoretic  Comments: Frail, cachectic   HENT:      Head: Normocephalic and atraumatic  Right Ear: External ear normal       Left Ear: External ear normal       Mouth/Throat:      Comments: Mask not removed today  Eyes:      General:         Right eye: No discharge  Left eye: No discharge  Conjunctiva/sclera: Conjunctivae normal       Pupils: Pupils are equal, round, and reactive to light  Neck:      Trachea: No tracheal deviation  Cardiovascular:      Rate and Rhythm: Regular rhythm  Tachycardia present  Pulmonary:      Effort: Respiratory distress present  Breath sounds: No stridor  Abdominal:      General: There is no distension  Palpations: Abdomen is soft  Comments: Scaphoid   Skin:     General: Skin is warm and dry  Coloration: Skin is pale  Findings: No erythema or rash  Neurological:      General: No focal deficit present  Mental Status: She is alert and oriented to person, place, and time  Cranial Nerves: No cranial nerve deficit  Psychiatric:         Thought Content: Thought content normal          Judgment: Judgment normal          Lab Results:   I have personally reviewed pertinent labs  , CBC:   Lab Results   Component Value Date    WBC 11 07 (H) 05/22/2022    HGB 12 4 05/22/2022    HCT 40 8 05/22/2022    MCV 88 05/22/2022     05/22/2022    MCH 26 7 (L) 05/22/2022    MCHC 30 4 (L) 05/22/2022    RDW 23 8 (H) 05/22/2022    MPV 9 3 05/22/2022    NRBC 0 05/22/2022   , CMP:   Lab Results   Component Value Date    SODIUM 133 (L) 05/22/2022    K 4 2 05/22/2022    CL 99 (L) 05/22/2022    CO2 21 05/22/2022    BUN 17 05/22/2022    CREATININE 1 05 05/22/2022    CALCIUM 8 0 (L) 05/22/2022    EGFR 53 05/22/2022   , BMP:  Lab Results   Component Value Date    SODIUM 133 (L) 05/22/2022    K 4 2 05/22/2022    CL 99 (L) 05/22/2022    CO2 21 05/22/2022    BUN 17 05/22/2022    CREATININE 1 05 05/22/2022    GLUC 71 05/22/2022    CALCIUM 8 0 (L) 05/22/2022    AGAP 13 05/22/2022    EGFR 53 05/22/2022   , PT/PTT:No results found for: PT, PTT  Imaging Studies: reviewed reports as noted  EKG, Pathology, and Other Studies: none pertinent    Counseling / Coordination of Care    Total floor / unit time spent today 90+ minutes  Greater than 50% of total time was spent with the patient and / or family counseling and / or coordination of care  A description of the counseling / coordination of care: chart review; symptom pursuit; supportive listening; anticipatory guidance   review and assessment of decisional apparatus and capacity, applicable legal codes and extant documents; consideration of hospice; end-of-life cares; adjustment of parenteral controlled substances for advanced pain and symptoms, and review of the patient's controlled substance dispensing history in the Prescription Drug Monitoring Program in compliance with the [facility] regulations before prescribing said controlled substances;

## 2022-05-22 NOTE — QUICK NOTE
TT per nursing pt with hypotension sbp 89/54 hr 110   Upon arrival to the room pt has not had any urinary output for the day but bladder scans at 264 ml   Upon seeing the pt she is rather frustrated but alert and oriented x 3   She states she just wants to be left alone turn the lights out  I explained that her blood pressures are low and urine output nothing  She states she has not been able to eat  Pt is extremely malnourished cachectic and I question accuracy of sbp  Nursing unable to obtain small cuff  Pt is sp 1 liter iv fluid and I had ordered 25% albumin  sbp with slight improvement  Will monitor  I called  and let him know that pt was refusing some of her care and this made it difficult to care for her  He did state she is not confused at all and she does do this at home too  He did ask as to when we had plan for feeding tube  Small notation of this in documentation  will discuss with Dr Gustavo Castellanos, in regard to plan for ? Ng Dillon Britton or peg tube   Pt is not eating at all and therefore suspect her intake and current hypotension contributing to her output  Will monitor to see if increase over night now sp fluid

## 2022-05-22 NOTE — PLAN OF CARE
Problem: MOBILITY - ADULT  Goal: Maintain or return to baseline ADL function  Description: INTERVENTIONS:  -  Assess patient's ability to carry out ADLs; assess patient's baseline for ADL function and identify physical deficits which impact ability to perform ADLs (bathing, care of mouth/teeth, toileting, grooming, dressing, etc )  - Assess/evaluate cause of self-care deficits   - Assess range of motion  - Assess patient's mobility; develop plan if impaired  - Assess patient's need for assistive devices and provide as appropriate  - Encourage maximum independence but intervene and supervise when necessary  - Involve family in performance of ADLs  - Assess for home care needs following discharge   - Consider OT consult to assist with ADL evaluation and planning for discharge  - Provide patient education as appropriate  Outcome: Progressing  Goal: Maintains/Returns to pre admission functional level  Description: INTERVENTIONS:  - Perform BMAT or MOVE assessment daily    - Set and communicate daily mobility goal to care team and patient/family/caregiver  - Collaborate with rehabilitation services on mobility goals if consulted  - Perform Range of Motion  times a day  - Reposition patient every  hours    - Dangle patient  times a day  - Stand patient  times a day  - Ambulate patient  times a day  - Out of bed to chair  times a day   - Out of bed for meals  times a day  - Out of bed for toileting  - Record patient progress and toleration of activity level   Outcome: Progressing     Problem: Potential for Falls  Goal: Patient will remain free of falls  Description: INTERVENTIONS:  - Educate patient/family on patient safety including physical limitations  - Instruct patient to call for assistance with activity   - Consult OT/PT to assist with strengthening/mobility   - Keep Call bell within reach  - Keep bed low and locked with side rails adjusted as appropriate  - Keep care items and personal belongings within reach  - Initiate and maintain comfort rounds  - Make Fall Risk Sign visible to staff  - Offer Toileting every  Hours, in advance of need  - Initiate/Maintain alarm  - Obtain necessary fall risk management equipment:   - Apply yellow socks and bracelet for high fall risk patients  - Consider moving patient to room near nurses station  Outcome: Progressing     Problem: Prexisting or High Potential for Compromised Skin Integrity  Goal: Skin integrity is maintained or improved  Description: INTERVENTIONS:  - Identify patients at risk for skin breakdown  - Assess and monitor skin integrity  - Assess and monitor nutrition and hydration status  - Monitor labs   - Assess for incontinence   - Turn and reposition patient  - Assist with mobility/ambulation  - Relieve pressure over bony prominences  - Avoid friction and shearing  - Provide appropriate hygiene as needed including keeping skin clean and dry  - Evaluate need for skin moisturizer/barrier cream  - Collaborate with interdisciplinary team   - Patient/family teaching  - Consider wound care consult   Outcome: Progressing     Problem: Nutrition/Hydration-ADULT  Goal: Nutrient/Hydration intake appropriate for improving, restoring or maintaining nutritional needs  Description: Monitor and assess patient's nutrition/hydration status for malnutrition  Collaborate with interdisciplinary team and initiate plan and interventions as ordered  Monitor patient's weight and dietary intake as ordered or per policy  Utilize nutrition screening tool and intervene as necessary  Determine patient's food preferences and provide high-protein, high-caloric foods as appropriate       INTERVENTIONS:  - Monitor oral intake, urinary output, labs, and treatment plans  - Assess nutrition and hydration status and recommend course of action  - Evaluate amount of meals eaten  - Assist patient with eating if necessary   - Allow adequate time for meals  - Recommend/ encourage appropriate diets, oral nutritional supplements, and vitamin/mineral supplements  - Order, calculate, and assess calorie counts as needed  - Recommend, monitor, and adjust tube feedings and TPN/PPN based on assessed needs  - Assess need for intravenous fluids  - Provide specific nutrition/hydration education as appropriate  - Include patient/family/caregiver in decisions related to nutrition  Outcome: Progressing     Problem: PAIN - ADULT  Goal: Verbalizes/displays adequate comfort level or baseline comfort level  Description: Interventions:  - Encourage patient to monitor pain and request assistance  - Assess pain using appropriate pain scale  - Administer analgesics based on type and severity of pain and evaluate response  - Implement non-pharmacological measures as appropriate and evaluate response  - Consider cultural and social influences on pain and pain management  - Notify physician/advanced practitioner if interventions unsuccessful or patient reports new pain  Outcome: Progressing     Problem: INFECTION - ADULT  Goal: Absence or prevention of progression during hospitalization  Description: INTERVENTIONS:  - Assess and monitor for signs and symptoms of infection  - Monitor lab/diagnostic results  - Monitor all insertion sites, i e  indwelling lines, tubes, and drains  - Monitor endotracheal if appropriate and nasal secretions for changes in amount and color  - [place] appropriate cooling/warming therapies per order  - Administer medications as ordered  - Instruct and encourage patient and family to use good hand hygiene technique  - Identify and instruct in appropriate isolation precautions for identified infection/condition  Outcome: Progressing  Goal: Absence of fever/infection during neutropenic period  Description: INTERVENTIONS:  - Monitor WBC    Outcome: Progressing     Problem: SAFETY ADULT  Goal: Maintain or return to baseline ADL function  Description: INTERVENTIONS:  -  Assess patient's ability to carry out ADLs; assess patient's baseline for ADL function and identify physical deficits which impact ability to perform ADLs (bathing, care of mouth/teeth, toileting, grooming, dressing, etc )  - Assess/evaluate cause of self-care deficits   - Assess range of motion  - Assess patient's mobility; develop plan if impaired  - Assess patient's need for assistive devices and provide as appropriate  - Encourage maximum independence but intervene and supervise when necessary  - Involve family in performance of ADLs  - Assess for home care needs following discharge   - Consider OT consult to assist with ADL evaluation and planning for discharge  - Provide patient education as appropriate  Outcome: Progressing  Goal: Maintains/Returns to pre admission functional level  Description: INTERVENTIONS:  - Perform BMAT or MOVE assessment daily    - Set and communicate daily mobility goal to care team and patient/family/caregiver  - Collaborate with rehabilitation services on mobility goals if consulted  - Perform Range of Motion  times a day  - Reposition patient every  hours    - Dangle patient  times a day  - Stand patient  times a day  - Ambulate patient  times a day  - Out of bed to chair  times a day   - Out of bed for meals  times a day  - Out of bed for toileting  - Record patient progress and toleration of activity level   Outcome: Progressing  Goal: Patient will remain free of falls  Description: INTERVENTIONS:  - Educate patient/family on patient safety including physical limitations  - Instruct patient to call for assistance with activity   - Consult OT/PT to assist with strengthening/mobility   - Keep Call bell within reach  - Keep bed low and locked with side rails adjusted as appropriate  - Keep care items and personal belongings within reach  - Initiate and maintain comfort rounds  - Make Fall Risk Sign visible to staff  - Offer Toileting every  Hours, in advance of need  - Initiate/Maintain alarm  - Obtain necessary fall risk management equipment  - Apply yellow socks and bracelet for high fall risk patients  - Consider moving patient to room near nurses station  Outcome: Progressing     Problem: DISCHARGE PLANNING  Goal: Discharge to home or other facility with appropriate resources  Description: INTERVENTIONS:  - Identify barriers to discharge w/patient and caregiver  - Arrange for needed discharge resources and transportation as appropriate  - Identify discharge learning needs (meds, wound care, etc )  - Arrange for interpretive services to assist at discharge as needed  - Refer to Case Management Department for coordinating discharge planning if the patient needs post-hospital services based on physician/advanced practitioner order or complex needs related to functional status, cognitive ability, or social support system  Outcome: Progressing     Problem: Knowledge Deficit  Goal: Patient/family/caregiver demonstrates understanding of disease process, treatment plan, medications, and discharge instructions  Description: Complete learning assessment and assess knowledge base    Interventions:  - Provide teaching at level of understanding  - Provide teaching via preferred learning methods  Outcome: Progressing     Problem: CARDIOVASCULAR - ADULT  Goal: Maintains optimal cardiac output and hemodynamic stability  Description: INTERVENTIONS:  - Monitor I/O, vital signs and rhythm  - Monitor for S/S and trends of decreased cardiac output  - Administer and titrate ordered vasoactive medications to optimize hemodynamic stability  - Assess quality of pulses, skin color and temperature  - Assess for signs of decreased coronary artery perfusion  - Instruct patient to report change in severity of symptoms  Outcome: Progressing  Goal: Absence of cardiac dysrhythmias or at baseline rhythm  Description: INTERVENTIONS:  - Continuous cardiac monitoring, vital signs, obtain 12 lead EKG if ordered  - Administer antiarrhythmic and heart rate control medications as ordered  - Monitor electrolytes and administer replacement therapy as ordered  Outcome: Progressing     Problem: RESPIRATORY - ADULT  Goal: Achieves optimal ventilation and oxygenation  Description: INTERVENTIONS:  - Assess for changes in respiratory status  - Assess for changes in mentation and behavior  - Position to facilitate oxygenation and minimize respiratory effort  - Oxygen administered by appropriate delivery if ordered  - Initiate smoking cessation education as indicated  - Encourage broncho-pulmonary hygiene including cough, deep breathe, Incentive Spirometry  - Assess the need for suctioning and aspirate as needed  - Assess and instruct to report SOB or any respiratory difficulty  - Respiratory Therapy support as indicated  Outcome: Progressing     Problem: GASTROINTESTINAL - ADULT  Goal: Minimal or absence of nausea and/or vomiting  Description: INTERVENTIONS:  - Administer IV fluids if ordered to ensure adequate hydration  - Maintain NPO status until nausea and vomiting are resolved  - Nasogastric tube if ordered  - Administer ordered antiemetic medications as needed  - Provide nonpharmacologic comfort measures as appropriate  - Advance diet as tolerated, if ordered  - Consider nutrition services referral to assist patient with adequate nutrition and appropriate food choices  Outcome: Progressing  Goal: Maintains or returns to baseline bowel function  Description: INTERVENTIONS:  - Assess bowel function  - Encourage oral fluids to ensure adequate hydration  - Administer IV fluids if ordered to ensure adequate hydration  - Administer ordered medications as needed  - Encourage mobilization and activity  - Consider nutritional services referral to assist patient with adequate nutrition and appropriate food choices  Outcome: Progressing  Goal: Maintains adequate nutritional intake  Description: INTERVENTIONS:  - Monitor percentage of each meal consumed  - Identify factors contributing to decreased intake, treat as appropriate  - Assist with meals as needed  - Monitor I&O, weight, and lab values if indicated  - Obtain nutrition services referral as needed  Outcome: Progressing  Goal: Oral mucous membranes remain intact  Description: INTERVENTIONS  - Assess oral mucosa and hygiene practices  - Implement preventative oral hygiene regimen  - Implement oral medicated treatments as ordered  - Initiate Nutrition services referral as needed  Outcome: Progressing     Problem: GENITOURINARY - ADULT  Goal: Maintains or returns to baseline urinary function  Description: INTERVENTIONS:  - Assess urinary function  - Encourage oral fluids to ensure adequate hydration if ordered  - Administer IV fluids as ordered to ensure adequate hydration  - Administer ordered medications as needed  - Offer frequent toileting  - Follow urinary retention protocol if ordered  Outcome: Progressing  Goal: Absence of urinary retention  Description: INTERVENTIONS:  - Assess patients ability to void and empty bladder  - Monitor I/O  - Bladder scan as needed  - Discuss with physician/AP medications to alleviate retention as needed  - Discuss catheterization for long term situations as appropriate  Outcome: Progressing     Problem: HEMATOLOGIC - ADULT  Goal: Maintains hematologic stability  Description: INTERVENTIONS  - Assess for signs and symptoms of bleeding or hemorrhage  - Monitor labs  - Administer supportive blood products/factors as ordered and appropriate  Outcome: Progressing     Problem: MUSCULOSKELETAL - ADULT  Goal: Maintain or return mobility to safest level of function  Description: INTERVENTIONS:  - Assess patient's ability to carry out ADLs; assess patient's baseline for ADL function and identify physical deficits which impact ability to perform ADLs (bathing, care of mouth/teeth, toileting, grooming, dressing, etc )  - Assess/evaluate cause of self-care deficits   - Assess range of motion  - Assess patient's mobility  - Assess patient's need for assistive devices and provide as appropriate  - Encourage maximum independence but intervene and supervise when necessary  - Involve family in performance of ADLs  - Assess for home care needs following discharge   - Consider OT consult to assist with ADL evaluation and planning for discharge  - Provide patient education as appropriate  Outcome: Progressing  Goal: Maintain proper alignment of affected body part  Description: INTERVENTIONS:  - Support, maintain and protect limb and body alignment  - Provide patient/ family with appropriate education  Outcome: Progressing

## 2022-05-22 NOTE — ASSESSMENT & PLAN NOTE
Patient had single subsegmental right lower lobe PE on 1/27/2022  Patient has been off Eliquis, outpatient pulmonologist is aware, upon chart review it looks like patient had a adverse reaction to Eliquis and since it was a single subsegmental PE, pulmonologist was okay with not continuing long-term anticoagulation    -pending home hospice tomorrow

## 2022-05-22 NOTE — ASSESSMENT & PLAN NOTE
Chest CT scan with Mild patchy irregular opacity in the right middle lobe, new   Moderate left pleural effusion,   Chronic right middle lobe collapse with no endobronchial obstruction

## 2022-05-22 NOTE — CASE MANAGEMENT
Case Management Discharge Planning Note    Patient name Rodger   Location [facility] 505/[facility] 728-25 MRN [id number]  : 1950 Date 2022       Current Admission Date: 2022  Current Admission Diagnosis:Pericardial effusion   Patient Active Problem List    Diagnosis Date Noted    Failure to thrive in adult 2022    Abdominal aortic aneurysm (AAA) without rupture ([facility] ) 2022    Common bile duct dilatation 2022    Pericardial effusion 2022    Pleural effusion 2022    Chronic diarrhea 2022    Lower extremity edema 2022    Hypokalemia 2022    Vitamin D deficiency 2022    Chronic respiratory failure with hypoxia ([facility] ) 2022    Underweight 2022    B12 deficiency 2022    Depression, recurrent ([facility] ) 2022    Anemia 2022    History of pulmonary embolism 2022    Hypotension 2022    Severe protein-calorie malnutrition ([facility] ) 2022      LOS (days): 3  Geometric Mean LOS (GMLOS) (days): 4 80  Days to GMLOS:1 9     OBJECTIVE:  Risk of Unplanned Readmission Score: 18 08         Current admission status: Inpatient   Preferred Pharmacy:   [address] [place] [address] [address] [facility] [address]  [facility] [address]  Phone: [phone] Fax: [phone]    Primary Care Provider: Rachel Foreman MD    Primary Insurance: MEDICARE  Secondary Insurance:     DISCHARGE DETAILS:    Discharge planning discussed with[de-identified] Spouse and niece  Freedom of Choice: Yes  Comments - Freedom of Choice: Cm met with spouse and niece to discuss hospice  Per family pt is open to [facility] and would prefer referral to our hospice agency      Contacts  Patient Contacts: -Darren  Relationship to Patient[de-identified] Family  Contact Method: In Person  Reason/Outcome: Discharge Planning    Other Referral/Resources/Interventions Provided:  Interventions: Hospice  Referral Comments: Awaiting hospice evaluation  spouse states he would not be able to come back to the hospital  Cm spoke with hospice rosio ALEXANDER and she will meet with family today  Cm informed by rosio ALEXANDER pt accepted by [facility] home hospice for [facility] 5/23  Family is requesting pt be discharged home today  Cm submitted BLS transport request via [facility] [employer] and will f/u with a transport time  No available transports for today per [facility] they will f/u with pick-up time for 5/23   Cm left message for family

## 2022-05-23 NOTE — CASE MANAGEMENT
Case Management Discharge Planning Note    Patient name Willie Hansen  Location [facility] 505/[facility] 956-77 MRN [id number]  : 1950 Date 2022       Current Admission Date: 2022  Current Admission Diagnosis:Pericardial effusion   Patient Active Problem List    Diagnosis Date Noted    Failure to thrive in adult 2022    [facility] ) 2022    Common bile duct dilatation 2022    Pericardial effusion 2022    Pleural effusion 2022    Chronic diarrhea 2022    Lower extremity edema 2022    Hypokalemia 2022    Vitamin D deficiency 2022    Chronic respiratory failure with hypoxia [facility] ) 2022    Underweight 2022    B12 deficiency 2022    Depression, recurrent ([facility] ) 2022    Anemia 2022    History of pulmonary embolism 2022    Hypotension 2022    Severe protein-calorie malnutrition ([facility] ) 2022      LOS (days): 4  Geometric Mean LOS (GMLOS) (days): 4 80  Days to GMLOS:0 8     OBJECTIVE:  Risk of Unplanned Readmission Score: 21 67         Current admission status: Inpatient   Preferred Pharmacy:   [address] [place] [address], [address] [place] [address] [place]  [address]  [facility] [address]  Phone: [phone] Fax: [place] [facility] [place] [address] [facility] [place] [address] [address] [facility]  Phone: [phone] Fax: [phone]    Primary Care Provider: Any Barry MD    Primary Insurance: MEDICARE  Secondary Insurance:     DISCHARGE DETAILS:    Treatment Team Recommendation: Home with  [facility], Hospice  Discharge Destination Plan[de-identified] Home with  [facility] Hospice  Transport at Discharge : [facility]  Dispatcher Contacted: Yes  Number/Name of Dispatcher: [facility]  Transported by [employer] and Unit #):  [facility]  ETA of Transport (Date): 22  ETA of Transport (Time): 1630      Additional Comments: [facility] gave me the DNR Out of Hospital form to be signed by the Maia Vuong executed the form and was placed in the discharge envelope for transport to home today  Both Physcian and Liaiason for [facility] aware that services will start tomorrow  Form uploaded into [facility]

## 2022-05-23 NOTE — CASE MANAGEMENT
Case Management Discharge Planning Note    Patient name Jasmyne Singh  Location [facility] 146-32 MRN [id number]  : 1950 Date 2022       Current Admission Date: 2022  Current Admission Diagnosis:Pericardial effusion   Patient Active Problem List    Diagnosis Date Noted    Failure to thrive in adult 2022    Abdominal aortic aneurysm (AAA) without rupture ([facility] ) 2022    Common bile duct dilatation 2022    Pericardial effusion 2022    Pleural effusion 2022    Chronic diarrhea 2022    Lower extremity edema 2022    Hypokalemia 2022    Vitamin D deficiency 2022    Chronic respiratory failure with hypoxia ([facility] ) 2022    Underweight 2022    B12 deficiency 2022    Depression, recurrent ([facility] ) 2022    Anemia 2022    History of pulmonary embolism 2022    Hypotension 2022    Severe protein-calorie malnutrition ([facility] ) 2022      LOS (days): 4  Geometric Mean LOS (GMLOS) (days): 4 80  Days to GMLOS:1     OBJECTIVE:  Risk of Unplanned Readmission Score: 21 62         Current admission status: Inpatient   Preferred Pharmacy:   [address] [place] [address], [address]  [facility] [address]  Phone: [phone] Fax: [address], [address] [facility]  Phone: [phone] Fax: [phone]    Primary Care Provider: Jose D Wetzel MD    Primary Insurance: MEDICARE  Secondary Insurance:     DISCHARGE DETAILS:    Discharge planning discussed with[de-identified] Spouse and Niece        Other Referral/Resources/Interventions Provided:  Referral Comments: Patient being discharged to home with [facility] today  1630 Transport time with [facility]  [facility] will start services on   Per family -requesting an earlier transport    This CM called [facility] and they are unable to accomodate any earliert than 1630 at this time  IMM Given (Date):: 05/23/22  IMM Given to[de-identified] Family  Family notified[de-identified] Spouse present in room   IMM reviewed with patient's caregiver, patient's caregiver agrees with discharge determination

## 2022-05-23 NOTE — NURSING NOTE
This RN gave packet and discharge instructions to transport team  Patient transferred to stretcher  All belongings checked and accounted for  Patient belongings given to family  IVs removed

## 2022-05-23 NOTE — PROGRESS NOTES
Pastoral Care Progress Note    2022  Patient: Davee Delay : 1950  Admission Date & Time: 2022 1450  MRN: [id number] [facility]: [id number]         Eder Gaspar read Last Rites for pt,  present       22 1500   Clinical Encounter Type   Visited With Patient and family together   Sabianist Encounters   Sabianist Needs Prayer   Sacramental Encounters   Sacrament Other Other (Comment)  (Last Rites by Cheryl Cuellar)

## 2022-05-23 NOTE — CASE MANAGEMENT
Case Management Discharge Planning Note    Patient name Jenny Lewis  Location [facility] 341-26 MRN [id number]  : 1950 Date 2022       Current Admission Date: 2022  Current Admission Diagnosis:Pericardial effusion   Patient Active Problem List    Diagnosis Date Noted    Failure to thrive in adult 2022    Abdominal aortic aneurysm (AAA) without rupture ([facility] [address] ) 2022    Common bile duct dilatation 2022    Pericardial effusion 2022    Pleural effusion 2022    Chronic diarrhea 2022    Lower extremity edema 2022    Hypokalemia 2022    Vitamin D deficiency 2022    Chronic respiratory failure with hypoxia ([facility] [address] ) 2022    Underweight 2022    B12 deficiency 2022    Depression, recurrent ([facility] [address] ) 2022    Anemia 2022    History of pulmonary embolism 2022    Hypotension 2022    Severe protein-calorie malnutrition ([facility] [address] ) 2022      LOS (days): 4  Geometric Mean LOS (GMLOS) (days): 4 80  Days to GMLOS:0 8     OBJECTIVE:  Risk of Unplanned Readmission Score: 21 67         Current admission status: Inpatient   Preferred Pharmacy:   [address] [place] [address] [address]  [address] [address]  Phone: [phone] Fax: Easton Mcmillan [place] [address] [facility]  Phone: [phone] Fax: [phone]    Primary Care Provider: Virgin Prader, MD    Primary Insurance: MEDICARE  Secondary Insurance:     DISCHARGE DETAILS:     Other Referral/Resources/Interventions Provided:  Referral Comments: [facility] changed to 1800 p/u time  Family/Physcian/Nurse made aware of time change           Treatment Team Recommendation: Home with  [facility]  Discharge Destination Plan[de-identified] Home with  [facility]  Transport at Discharge : [facility]  Dispatcher Contacted: Yes  Number/Name of Dispatcher: [facility]  Transported by Janey Knight and Unit #): PAM  ETA of Transport (Date): 05/23/22  ETA of Transport (Time): 1800      Additional Comments: Dale [facility]) gave me the DNR Out of Hospital form to be signed by the [address]  Dr Alyssa Amor executed the form and was placed in the discharge envelope for transport to home today  Both Physcian and Liaiason for [facility] aware that services will start tomorrow  Form uploaded into [facility]

## 2022-05-24 NOTE — PHYSICIAN ADVISOR
Current patient class: Inpatient  The patient is currently on Hospital Day: 3 at [address]      The patient was admitted to the hospital at  1:36 PM on 5/13/22 for the following diagnosis:  Hypoalbuminemia [E88 09]  Leg swelling [M79 89]  Bilateral lower extremity edema [R60 0]       There is documentation in the medical record of an expected length of stay of at least 2 midnights  The patient is therefore expected to satisfy the 2 midnight benchmark and given the 2 midnight presumption is appropriate for INPATIENT ADMISSION  Given this expectation of a satisfying stay, CMS instructs us that the patient is most often appropriate for inpatient admission under part A provided medical necessity is documented in the chart  After review of the relevant documentation, labs, vital signs and test results, the patient is appropriate for INPATIENT ADMISSION  Admission to the hospital as an inpatient is a complex decision making process which requires the practitioner to consider the patients presenting complaint, history and physical examination and all relevant testing  With this in mind, in this case, the patient was deemed appropriate for INPATIENT ADMISSION  After review of the documentation and testing available at the time of the admission I concur with this clinical determination of medical necessity  Rationale is as follows: The patient is a 66 yrs old Female who presented to the ED at 5/12/2022 11:43 AM with a chief complaint of Leg Swelling (Pt presents from PCP with lower extremity edema, worsening  On lasix at home, non compliant with compression stockings and here for eval for IV diuretics and admission  )  Patient initially admitted OBS  She has a history of severe protein calorie malnutrition due to poor po intake  She was treated with IV lasix and also received IV albumin 5/13/22  Tube feeding were discussed but patient not yet ready   She was started on protonix and zofran for poor PO intake  She did received IV zofran  Case was discussed with GI and outpatient recommendations made  She was stable for discharge by hospital day 3  Given ongoing work up for poor PO intake, hypoalbuminemia and edema, she is inpatient appropriate  The patients vitals on arrival were   ED Triage Vitals   Temperature Pulse Respirations Blood Pressure SpO2   05/12/22 1111 05/12/22 1111 05/12/22 1111 05/12/22 1111 05/12/22 1113   97 9 °F (36 6 °C) 91 20 111/69 96 %      Temp Source Heart Rate Source Patient Position - Orthostatic VS BP Location FiO2 (%)   05/12/22 1111 05/12/22 1111 05/12/22 1111 05/12/22 1111 --   Oral Monitor Sitting Left arm       Pain Score       05/12/22 1650       No Pain           Past Medical History:   Diagnosis Date    AAA (abdominal aortic aneurysm) (HCC)     Acute respiratory failure with hypoxia (HCC) 01/20/2022    Chronic Home O2 3lpm    Cough     Pneumonia due to COVID-19 virus 01/20/2022    SOB (shortness of breath)     Tobacco use 01/20/2022    Wheezing      Past Surgical History:   Procedure Laterality Date    CARDIAC CATHETERIZATION N/A 5/20/2022    Procedure: CARDIAC PERICARDIOCENTESIS;  Surgeon: Bishnu Shankar MD;  Location: [facility]; Service: Cardiology    LAPAROSCOPIC PARTIAL GASTRECTOMY             Consults have been placed to:   None    Vitals:    05/13/22 1624 05/13/22 2235 05/14/22 0322 05/14/22 0824   BP: 118/79 117/80 115/79 (!) 137/101   Pulse: 99 99 104 101   Resp: 18  (!) 24    Temp: 98 1 °F (36 7 °C) 97 5 °F (36 4 °C) 98 2 °F (36 8 °C) 98 2 °F (36 8 °C)   TempSrc:       SpO2: 97% 97% 97% 92%   Weight:       Height:           Most recent labs:    Recent Labs     05/22/22  0448   WBC 11 07*   HGB 12 4   HCT 40 8      K 4 2   CALCIUM 8 0*   BUN 17   CREATININE 1 05       Scheduled Meds:  Continuous Infusions:No current facility-administered medications for this encounter  PRN Meds:      Surgical procedures (if appropriate):

## 2022-05-25 VITALS
SYSTOLIC BLOOD PRESSURE: 88 MMHG | TEMPERATURE: 96.6 F | DIASTOLIC BLOOD PRESSURE: 50 MMHG | HEART RATE: 88 BPM | RESPIRATION RATE: 12 BRPM

## 2022-05-26 NOTE — ASSESSMENT & PLAN NOTE
Cardiac echo with moderate sized pericardial echo space with likely thrombus or fibrinous deposit     Patient was transferred to [facility] for thoracic surgery evaluation  Patient has been transition to comfort care no further management for pericardial effusion

## 2022-05-26 NOTE — ASSESSMENT & PLAN NOTE
CT scan with  Distended gallbladder and dilated common bile duct    No obstruction visualized   Elevated alkaline phosphatase  Hepatic steatosis  Suspect malignancy, but will forego further imaging as patient wishes to go home on home hospice

## 2022-05-26 NOTE — ASSESSMENT & PLAN NOTE
Patient had single subsegmental right lower lobe PE on 1/27/2022  Patient has been off Eliquis, outpatient pulmonologist is aware, upon chart review it looks like patient had a adverse reaction to Eliquis and since it was a single subsegmental PE, pulmonologist was okay with not continuing long-term anticoagulation    -pending home hospice today [Negative] : Neurological

## 2022-05-26 NOTE — DISCHARGE SUMMARY
[address] [facility]  Discharge- Sergio Hernandez 1950, 66 y o  female MRN: [id number]  Unit/Bed#: [facility] 505-01 Encounter: [id number]  Primary Care Provider: Erna Mejia MD   Date and time admitted to hospital: 5/19/2022  2:50 PM    * Pericardial effusion  Assessment & Plan  Cardiac echo with moderate sized pericardial echo space with likely thrombus or fibrinous deposit  Patient was transferred to [facility] for thoracic surgery evaluation  Patient has been transition to comfort care no further management for pericardial effusion        Chronic diarrhea  Assessment & Plan  Continue supportive care      Pleural effusion  Assessment & Plan  Chest CT scan with Mild patchy irregular opacity in the right middle lobe, new   Moderate left pleural effusion,   Chronic right middle lobe collapse with no endobronchial obstruction  Common bile duct dilatation  Assessment & Plan  CT scan with  Distended gallbladder and dilated common bile duct  No obstruction visualized   Elevated alkaline phosphatase  Hepatic steatosis  Suspect malignancy, but will forego further imaging as patient wishes to go home on home hospice    Abdominal aortic aneurysm (AAA) without rupture [facility])  Assessment & Plan  Finding on abdominal CT scan,  7 7 cm infrarenal abdominal aortic aneurysm with mixed attenuation within the mural thrombus  Agree with vascular surgery the patient is high risk for any surgical procedure with current risks outweighing the benefits    Failure to thrive in adult  Assessment & Plan  Likely secondary to above medical condition  Underlying severe protein calorie malnutrition    Chronic respiratory failure with hypoxia [facility])  Assessment & Plan  Patient developed hypoxic respiratory failure post COVID pneumonia in 1/2022  On 3 L at baseline,  currently on 3 L but with increased work of breathing  Pulmonary recommendations appreciated     Albuterol p r n    Breo daily    Lower extremity edema  Assessment & Plan  Chronic bilateral lower extremity edema  Likely secondary to underlying poor oral intake/ hypoalbuminemia  History of pulmonary embolism  Assessment & Plan  Patient had single subsegmental right lower lobe PE on 1/27/2022  Patient has been off Eliquis, outpatient pulmonologist is aware, upon chart review it looks like patient had a adverse reaction to Eliquis and since it was a single subsegmental PE, pulmonologist was okay with not continuing long-term anticoagulation  -pending home hospice today    Severe protein-calorie malnutrition ([address] )  Assessment & Plan  Malnutrition Findings:   Adult Malnutrition type: Chronic illness  Adult Degree of Malnutrition: Other severe protein calorie malnutrition  Malnutrition Characteristics: Inadequate energy, Weight loss, Fluid accumulation                  360 Statement: Severe malnutriton r/t inadequate intake in the setting of chronic illness (FTT) as evidenced by inadequate energy intake < 75% of estimated needs for >1 month, significant weight loss of 16% x 4 months (1/20/22, 103#), and moderate (+2) fluid accumulation in LE; treated w/ supplements    BMI Findings:  Adult BMI Classifications: Underweight < 18 5        Body mass index is 15 3 kg/m²     Nutrition consult  Patient has been transition to comfort care home hospice tomorrow      Medical Problems             Resolved Problems  Date Reviewed: 5/25/2022   None               Discharging Physician / Practitioner: Lala Blake DO  PCP: Virgin Prader, MD  Admission Date:   Admission Orders (From admission, onward)     Ordered        05/19/22 1754  Inpatient Admission  Once                      Discharge Date: 05/23/22  Consultations During Hospital Stay:  · Palliative  · Cardiology  · Pulmonology  · Vascular surgery    Procedures Performed:   · none    Significant Findings / Test Results:   XR chest portable   Final Result by Nicole Nava MD (05/22 0309)      Unchanged small left pleural effusion with left basilar atelectasis  Emphysema  Workstation performed: [id number]         XR chest portable   Final Result by Mariana 6, DO (05/20 1710)      No pneumothorax  Stable size of small left pleural effusion  Workstation performed: [id number]         XR chest portable   Final Result by Samantha Chung MD (05/20 1325)      Pleural-parenchymal density at the left lung base is similar to the prior study  Pneumonia is possible            Workstation performed: [id number]         CTA abdominal aorta w wo contrast   Final Result by Earline Oneil MD (05/20 1038)      Massive left saccular infrarenal abdominal aortic aneurysm, measuring up to 9 6 x 7 7 cm in maximal axial dimension  The aneurysm extends approximately 2 cm from the left renal artery takeoff, to the level of the aortic bifurcation  No focal plaque    ulceration  No evidence of active extravasation  The distal abdominal aorta is stenotic, measuring 1 2 cm  Bilateral common femoral arteries measure 0 5 cm  Mild bilateral external iliac artery stenosis  Mild proximal right common iliac artery stenosis  Moderate simple left pleural effusion  Moderate simple anterior pericardial effusion  Anasarca  Prominent gallbladder distention without evidence of cholecystitis, likely secondary to mass effect from the aneurysm upon the distal CBD  Workstation performed: [id number]         ·     Incidental Findings:   · As under imaging    Test Results Pending at Discharge (will require follow up):   · none     Outpatient Tests Requested:  · none    Complications:  none    Reason for Admission: failure to thrive    Hospital Course:   Tami Giron is a 66 y o  female patient who originally presented to the hospital on 5/19/2022 due to failure to thrive among other conditions  She was evaluated by vascular surgery for aaa but deemed high risk for intervention   She had suspected gi malignancy and worsening resp failure but decided on comfort care measures and home hospice rather than any aggressive diagnostic measures or interventions  Please see above list of diagnoses and related plan for additional information  Condition at Discharge: terminal    Discharge Day Visit / Exam:   Subjective:  Patient denies any pain  Vitals: Blood Pressure: 96/69 (05/22/22 1100)  Pulse: 58 (05/23/22 1103)  Temperature: 97 9 °F (36 6 °C) (05/22/22 1000)  Temp Source: Oral (05/22/22 1000)  Respirations: 19 (05/23/22 1103)  Height: 5' 5" (165 1 cm) (05/21/22 0830)  Weight - Scale: 41 7 kg (91 lb 14 9 oz) (05/22/22 0512)  SpO2: 95 % (05/23/22 1103)  Exam:   Physical Exam  Constitutional:       General: She is not in acute distress  Appearance: She is ill-appearing  She is not toxic-appearing or diaphoretic  Comments: Frail and cachectic appearance   HENT:      Head: Normocephalic and atraumatic  Eyes:      General: No scleral icterus  Cardiovascular:      Rate and Rhythm: Tachycardia present  Heart sounds: No murmur heard  No friction rub  No gallop  Pulmonary:      Effort: No respiratory distress  Breath sounds: No stridor  No wheezing, rhonchi or rales  Comments: Increased work of breathing  Decreased breath sounds at bases worse at left base  Chest:      Chest wall: No tenderness  Abdominal:      General: There is no distension  Palpations: There is no mass  Tenderness: There is no abdominal tenderness  There is no guarding or rebound  Hernia: No hernia is present  Musculoskeletal:         General: No swelling, tenderness, deformity or signs of injury  Right lower leg: No edema  Left lower leg: No edema  Skin:     Coloration: Skin is pale  Skin is not jaundiced  Findings: No bruising, erythema, lesion or rash  Neurological:      Mental Status: She is oriented to person, place, and time            Discussion with Family: Updated  () at bedside  Discharge instructions/Information to patient and family:   See after visit summary for information provided to patient and family  Provisions for Follow-Up Care:  See after visit summary for information related to follow-up care and any pertinent home health orders  Disposition:   Home with [facility]NA Services (Reminder: Complete face to face encounter)    Planned Readmission: no     Discharge Statement:  I spent 35 minutes discharging the patient  This time was spent on the day of discharge  I had direct contact with the patient on the day of discharge  Greater than 50% of the total time was spent examining patient, answering all patient questions, arranging and discussing plan of care with patient as well as directly providing post-discharge instructions  Additional time then spent on discharge activities  Discharge Medications:  See after visit summary for reconciled discharge medications provided to patient and/or family        **Please Note: This note may have been constructed using a voice recognition system**

## 2022-06-20 LAB — FUNGUS SPEC CULT: NORMAL

## 2022-07-05 LAB
MYCOBACTERIUM SPEC CULT: NORMAL
RHODAMINE-AURAMINE STN SPEC: NORMAL

## 2023-06-02 NOTE — ASSESSMENT & PLAN NOTE
Initial potassium 5.8.  Improved with treatment of acidosis.   Patient presents to the office today with an increase in her lower extremity swelling  She states this has been ongoing for approximately 3 weeks  The patient is mainly wheelchair bound and quite sedentary at home  She is oxygen dependent  She was evaluated by Cardiology in January of this year at which time an echocardiogram was performed which was normal   There were no suspicions for heart failure  Patient is not wearing her compression stockings  She does not ambulate much during the day  She currently is on Lasix 20 mg daily  Her blood pressures are low at 92/60  On exam the patient does have 3+ pitting edema from her mid calf to her feet  Her toes are purplish in color  Pulses could not be palpated due to the edema  She is pale in color  I do believe the patient would benefit from an emergency room evaluation at this time  I believe she does need to be diuresed however I do not feel comfortable increasing her Lasix due to her low blood pressures  I believe she should be monitored during this process  In addition the patient did have a diagnosis of a pulmonary embolism in the recent past and stopped her Eliquis on her own  She does continue to follow with pulmonology  She has no shortness of breath the office today

## 2023-07-03 NOTE — PROGRESS NOTES
Assessment/Plan:   Diagnoses and all orders for this visit:    Simple chronic bronchitis ([address] )  -     Complete PFT with post Bronchodilator and Six Minute walk; Future  -     albuterol (2 5 mg/3 mL) 0 083 % nebulizer solution; Take 3 mL (2 5 mg total) by nebulization every 6 (six) hours as needed for wheezing or shortness of breath    Edema leg  -     Cancel: VAS lower limb venous duplex study, complete bilateral; Future    Left leg swelling    Chronic hypoxemic respiratory failure (HCC)    Pneumonia due to COVID-19 virus  -     CT chest without contrast; Future    Acute respiratory failure with hypoxia (HCC)  -     fluticasone-vilanterol (BREO ELLIPTA) 100-25 mcg/inh inhaler; Inhale 1 puff daily Rinse mouth after use  -     albuterol (PROVENTIL HFA,VENTOLIN HFA) 90 mcg/act inhaler; Inhale 2 puffs every 4 (four) hours as needed for wheezing or shortness of breath      chronic simple bronchitis in this patient with extensive smoking history with greater than 55 pack-year smoking history who quit about a month ago  She is currently saturating well on the 2 L at 99%, hope to taper off the oxygen   Will get a complete PFT with post bronchodilator and a 6 minute walk test and follow-up   Also her shortness of breath and dyspnea on exertion has improved since her hospital discharge she states  Able to walk around in the house using the oxygen,  Single sub segmental PE right lower lobe in the CT of angiogram in the hospital January 2022 in the setting of COVID-19 pneumonia was at anticoagulated, given significant shortness of breath and immobility , currently she has not been on any anticoagulation since 02/02/2022, although she took 1 dose of Eliquis on 02/15 which she had a reaction and was stopped      She does have bilateral leg edema left greater than right will get a Doppler lower extremities to rule out DVT and if negative  Given single and a sub segmental PE currently be ambulating well, will hold off on any anticoagulation   Discussed with the patient as well as the patient's niece if any worsening symptoms of shortness of breath the requiring more oxygen she will go into the ER right away understands and verbalizes  Hopefully after the 6 minute walk test will plan to taper off the oxygen  Discussed with the patient regarding her significant emphysematous disease in the lung, congratulated the patient on quitting smoking   She is currently on Breo 1 puff daily to continue   She has been using her rescue inhaler once or twice a day, discussed will switch her to a nebulizer treatment with albuterol 4 times daily as needed   She will continue with her albuterol MDI 2 puffs 4 times daily as needed   I will see her back in 6 weeks or p r n  earlier as needed  She is trying to establish with her PCP tomorrow  I called the and discussed the ultrasound Doppler results negative for DVT  She would still need a repeat CT of the chest for resolution of the bilateral ground-glass opacities at the bases  In 6-8 weeks  She is also eligible for a CT lung screening annually given her extensive smoking history and recently quit  No follow-ups on file  All questions are answered to the patient's satisfaction and understanding  She verbalizes understanding  She is encouraged to call with any further questions or concerns  Portions of the record may have been created with voice recognition software  Occasional wrong word or "sound a like" substitutions may have occurred due to the inherent limitations of voice recognition software  Read the chart carefully and recognize, using context, where substitutions have occurred      Electronically Signed by Darryle Breaker, MD    ______________________________________________________________________    Chief Complaint:   Chief Complaint   Patient presents with    Shortness of Breath    Cough    Wheezing       Patient ID: Lico Woods is a 66 y o  y o  female has a past medical history of Cough, SOB (shortness of breath), and Wheezing  3/7/2022  Patient presents today for follow-up visit  Dirk Dobbs is a very pleasant 69-year-old lady with extensive smoking history who just quit a month ago, she was recently admitted into the hospital with COVID-19 infection with acute hypoxemic respiratory failure with COVID-19 pneumonia, and with a single sub segmental PE was discharged home on anticoagulation on Coumadin, she only took it for about 2 week on February 2, 2022 she states she had a visiting nurse at home who checked her INR and it was 0 9 and she states has not been on any medication until 2/15 when she was given 1 dose of Eliquis which for which she had a severe reaction and has to stop she only took 1 pill of Eliquis she states  And since then again she has not been on any anticoagulation  In the past 2-2 and half weeks she states her breathing is better, she has been walking around in the house better than she came out of the hospital she still continues to use her 2 L  She is here with her niece, for the office visit today  Review of Systems   Constitutional: Negative  HENT: Negative  Respiratory: Positive for shortness of breath  She states her shortness of breath has improved since the hospital discharge  Cardiovascular: Positive for leg swelling  Bilateral leg swelling she states,   Gastrointestinal: Negative  Endocrine: Negative  Genitourinary: Negative  Musculoskeletal: Positive for back pain  Allergic/Immunologic: Negative  Neurological: Negative  Hematological: Negative  Psychiatric/Behavioral: Negative  Smoking history: She reports that she quit smoking about 2 months ago  Her smoking use included cigarettes  She started smoking about 55 years ago  She has a 55 00 pack-year smoking history   She has never used smokeless tobacco     The following portions of the patient's history were reviewed and updated as appropriate: allergies, current medications, past family history, past medical history, past social history, past surgical history and problem list       There is no immunization history on file for this patient  Current Outpatient Medications   Medication Sig Dispense Refill    albuterol (PROVENTIL HFA,VENTOLIN HFA) 90 mcg/act inhaler Inhale 2 puffs every 4 (four) hours as needed for wheezing or shortness of breath 8 g 0    apixaban (Eliquis) 5 mg Take 1 tablet (5 mg total) by mouth 2 (two) times a day 60 tablet 0    fluticasone-vilanterol (BREO ELLIPTA) 100-25 mcg/inh inhaler Inhale 1 puff daily Rinse mouth after use  60 blister 3    senna-docusate sodium (SENOKOT S) 8 6-50 mg per tablet Take 1 tablet by mouth daily 30 tablet 0    warfarin (COUMADIN) 5 mg tablet daily 30 tablet 0    albuterol (2 5 mg/3 mL) 0 083 % nebulizer solution Take 3 mL (2 5 mg total) by nebulization every 6 (six) hours as needed for wheezing or shortness of breath 1080 mL 3    Diclofenac Sodium (VOLTAREN) 1 % Apply 2 g topically 4 (four) times a day for 14 days 112 g 0    pantoprazole (PROTONIX) 40 mg tablet Take 1 tablet (40 mg total) by mouth daily in the early morning 30 tablet 0     No current facility-administered medications for this visit  Allergies: Patient has no known allergies  Objective:  Vitals:    03/07/22 1300   BP: 104/84   Pulse: 96   Temp: 98 1 °F (36 7 °C)   SpO2: 99%   Height: 5' 5" (1 651 m)   Oxygen Therapy  SpO2: 99 % (wirh 3 lts of oxygen)    Wt Readings from Last 3 Encounters:   01/21/22 46 7 kg (103 lb)     Body mass index is 17 14 kg/m²  Physical Exam  Vitals and nursing note reviewed  Constitutional:       Appearance: She is well-developed  HENT:      Head: Normocephalic and atraumatic  Eyes:      Conjunctiva/sclera: Conjunctivae normal       Pupils: Pupils are equal, round, and reactive to light  Neck:      Thyroid: No thyromegaly  Vascular: No JVD     Cardiovascular:      Rate and Rhythm: Normal rate and regular rhythm  Heart sounds: Normal heart sounds  No murmur heard  No friction rub  No gallop  Pulmonary:      Effort: Pulmonary effort is normal  No respiratory distress  Breath sounds: Normal breath sounds  No wheezing or rales  Chest:      Chest wall: No tenderness  Musculoskeletal:         General: No tenderness or deformity  Normal range of motion  Cervical back: Normal range of motion and neck supple  Lymphadenopathy:      Cervical: No cervical adenopathy  Skin:     General: Skin is warm and dry  Neurological:      Mental Status: She is alert and oriented to person, place, and time             Diagnostics:  I have personally reviewed pertinent films in PACS amLODIPine 10 mg oral tablet: 1 tab(s) orally once a day  folic acid 1 mg oral tablet: 1 tab(s) orally once a day  Multiple Vitamins oral tablet: 1 tab(s) orally once a day  neomycin/polymyxin B/dexamethasone 3.5 mg-10,000 units-1 mg/mL ophthalmic suspension: 2 drop(s) to each affected eye 4 times a day  thiamine 100 mg oral tablet: 1 tab(s) orally once a day

## 2024-12-06 NOTE — NURSING NOTE
(Darren Alegria) listed as emergency contact, given update from nursing staff regarding patients status  (3) no apparent problem

## (undated) DEVICE — TRAY PERICARDIOCENTESIS

## (undated) DEVICE — PINNACLE INTRODUCER SHEATH: Brand: PINNACLE

## (undated) DEVICE — FIXED CORE WIRE GUIDE SAFE-T-J, CURVED: Brand: COOK

## (undated) DEVICE — MICROPUNCTURE KIT 5FR